# Patient Record
Sex: FEMALE | Race: WHITE | NOT HISPANIC OR LATINO
[De-identification: names, ages, dates, MRNs, and addresses within clinical notes are randomized per-mention and may not be internally consistent; named-entity substitution may affect disease eponyms.]

---

## 2017-07-31 PROBLEM — Z00.00 ENCOUNTER FOR PREVENTIVE HEALTH EXAMINATION: Status: ACTIVE | Noted: 2017-07-31

## 2017-08-01 ENCOUNTER — APPOINTMENT (OUTPATIENT)
Dept: HEMATOLOGY ONCOLOGY | Facility: CLINIC | Age: 82
End: 2017-08-01
Payer: MEDICARE

## 2017-08-01 ENCOUNTER — OUTPATIENT (OUTPATIENT)
Dept: OUTPATIENT SERVICES | Facility: HOSPITAL | Age: 82
LOS: 1 days | Discharge: HOME | End: 2017-08-01

## 2017-08-01 ENCOUNTER — RESULT REVIEW (OUTPATIENT)
Age: 82
End: 2017-08-01

## 2017-08-01 ENCOUNTER — OUTPATIENT (OUTPATIENT)
Dept: OUTPATIENT SERVICES | Facility: HOSPITAL | Age: 82
LOS: 1 days | End: 2017-08-01
Payer: MEDICARE

## 2017-08-01 VITALS
SYSTOLIC BLOOD PRESSURE: 212 MMHG | DIASTOLIC BLOOD PRESSURE: 58 MMHG | RESPIRATION RATE: 14 BRPM | TEMPERATURE: 97.6 F | HEART RATE: 63 BPM | WEIGHT: 129.63 LBS | OXYGEN SATURATION: 97 % | HEIGHT: 59.84 IN | BODY MASS INDEX: 25.45 KG/M2

## 2017-08-01 VITALS — SYSTOLIC BLOOD PRESSURE: 182 MMHG | DIASTOLIC BLOOD PRESSURE: 62 MMHG

## 2017-08-01 DIAGNOSIS — D64.9 ANEMIA, UNSPECIFIED: ICD-10-CM

## 2017-08-01 DIAGNOSIS — E78.00 PURE HYPERCHOLESTEROLEMIA, UNSPECIFIED: ICD-10-CM

## 2017-08-01 DIAGNOSIS — D50.9 IRON DEFICIENCY ANEMIA, UNSPECIFIED: ICD-10-CM

## 2017-08-01 DIAGNOSIS — E11.9 TYPE 2 DIABETES MELLITUS W/OUT COMPLICATIONS: ICD-10-CM

## 2017-08-01 LAB
BASOPHILS # BLD AUTO: 0 K/UL — SIGNIFICANT CHANGE UP (ref 0–0.2)
BASOPHILS NFR BLD AUTO: 0.7 % — SIGNIFICANT CHANGE UP (ref 0–2)
BLD GP AB SCN SERPL QL: SIGNIFICANT CHANGE UP
EOSINOPHIL # BLD AUTO: 0.1 K/UL — SIGNIFICANT CHANGE UP (ref 0–0.5)
EOSINOPHIL NFR BLD AUTO: 1.3 % — SIGNIFICANT CHANGE UP (ref 0–6)
HCT VFR BLD CALC: 26.9 % — LOW (ref 34.5–45)
HGB BLD-MCNC: 8.2 G/DL — LOW (ref 11.5–15.5)
LYMPHOCYTES # BLD AUTO: 1.8 K/UL — SIGNIFICANT CHANGE UP (ref 1–3.3)
LYMPHOCYTES # BLD AUTO: 24.4 % — SIGNIFICANT CHANGE UP (ref 13–44)
MCHC RBC-ENTMCNC: 22.4 PG — LOW (ref 27–34)
MCHC RBC-ENTMCNC: 30.6 GM/DL — LOW (ref 32–36)
MCV RBC AUTO: 73.2 FL — LOW (ref 80–100)
MONOCYTES # BLD AUTO: 0.7 K/UL — SIGNIFICANT CHANGE UP (ref 0–0.9)
MONOCYTES NFR BLD AUTO: 8.9 % — SIGNIFICANT CHANGE UP (ref 2–14)
NEUTROPHILS # BLD AUTO: 4.9 K/UL — SIGNIFICANT CHANGE UP (ref 1.8–7.4)
NEUTROPHILS NFR BLD AUTO: 64.8 % — SIGNIFICANT CHANGE UP (ref 43–77)
PLATELET # BLD AUTO: 351 K/UL — SIGNIFICANT CHANGE UP (ref 150–400)
RBC # BLD: 3.67 M/UL — LOW (ref 3.8–5.2)
RBC # FLD: 15.8 % — HIGH (ref 10.3–14.5)
TYPE + AB SCN PNL BLD: SIGNIFICANT CHANGE UP
WBC # BLD: 7.5 K/UL — SIGNIFICANT CHANGE UP (ref 3.8–10.5)
WBC # FLD AUTO: 7.5 K/UL — SIGNIFICANT CHANGE UP (ref 3.8–10.5)

## 2017-08-01 PROCEDURE — 99205 OFFICE O/P NEW HI 60 MIN: CPT

## 2017-08-01 RX ORDER — ACETAMINOPHEN 500 MG
650 TABLET ORAL ONCE
Qty: 0 | Refills: 0 | Status: COMPLETED | OUTPATIENT
Start: 2017-08-02 | End: 2017-08-02

## 2017-08-01 RX ORDER — INSULIN GLARGINE 100 [IU]/ML
100 INJECTION, SOLUTION SUBCUTANEOUS
Refills: 0 | Status: ACTIVE | COMMUNITY

## 2017-08-01 RX ORDER — ASPIRIN ENTERIC COATED TABLETS 81 MG 81 MG/1
81 TABLET, DELAYED RELEASE ORAL DAILY
Refills: 0 | Status: ACTIVE | COMMUNITY

## 2017-08-01 RX ORDER — RAMIPRIL 5 MG/1
CAPSULE ORAL
Refills: 0 | Status: ACTIVE | COMMUNITY

## 2017-08-02 ENCOUNTER — OUTPATIENT (OUTPATIENT)
Dept: OUTPATIENT SERVICES | Facility: HOSPITAL | Age: 82
LOS: 1 days | End: 2017-08-02
Payer: MEDICARE

## 2017-08-02 VITALS
SYSTOLIC BLOOD PRESSURE: 192 MMHG | WEIGHT: 123.9 LBS | TEMPERATURE: 99 F | OXYGEN SATURATION: 97 % | HEART RATE: 66 BPM | DIASTOLIC BLOOD PRESSURE: 61 MMHG

## 2017-08-02 VITALS — DIASTOLIC BLOOD PRESSURE: 56 MMHG | HEART RATE: 60 BPM | SYSTOLIC BLOOD PRESSURE: 167 MMHG

## 2017-08-02 DIAGNOSIS — D64.9 ANEMIA, UNSPECIFIED: ICD-10-CM

## 2017-08-02 LAB — ABO RH CONFIRMATION: SIGNIFICANT CHANGE UP

## 2017-08-02 PROCEDURE — 86920 COMPATIBILITY TEST SPIN: CPT

## 2017-08-02 PROCEDURE — 86850 RBC ANTIBODY SCREEN: CPT

## 2017-08-02 PROCEDURE — P9016: CPT

## 2017-08-02 PROCEDURE — 36430 TRANSFUSION BLD/BLD COMPNT: CPT

## 2017-08-02 PROCEDURE — 36415 COLL VENOUS BLD VENIPUNCTURE: CPT

## 2017-08-02 PROCEDURE — 86900 BLOOD TYPING SEROLOGIC ABO: CPT

## 2017-08-02 PROCEDURE — 86901 BLOOD TYPING SEROLOGIC RH(D): CPT

## 2017-08-02 RX ORDER — HYDRALAZINE HCL 50 MG
10 TABLET ORAL ONCE
Qty: 0 | Refills: 0 | Status: COMPLETED | OUTPATIENT
Start: 2017-08-02 | End: 2017-08-02

## 2017-08-02 RX ADMIN — Medication 10 MILLIGRAM(S): at 15:13

## 2017-08-02 RX ADMIN — Medication 650 MILLIGRAM(S): at 11:07

## 2017-08-02 NOTE — PATIENT PROFILE ADULT. - PMH
Diabetes mellitus of other type without complication    Essential hypertension    Hypothyroidism, unspecified type

## 2017-09-19 ENCOUNTER — OUTPATIENT (OUTPATIENT)
Dept: OUTPATIENT SERVICES | Facility: HOSPITAL | Age: 82
LOS: 1 days | Discharge: HOME | End: 2017-09-19
Payer: MEDICARE

## 2017-09-19 ENCOUNTER — TRANSCRIPTION ENCOUNTER (OUTPATIENT)
Age: 82
End: 2017-09-19

## 2017-09-19 VITALS
HEART RATE: 75 BPM | HEIGHT: 60 IN | OXYGEN SATURATION: 97 % | DIASTOLIC BLOOD PRESSURE: 68 MMHG | RESPIRATION RATE: 17 BRPM | WEIGHT: 125.22 LBS | SYSTOLIC BLOOD PRESSURE: 193 MMHG | TEMPERATURE: 98 F

## 2017-09-19 VITALS
RESPIRATION RATE: 17 BRPM | OXYGEN SATURATION: 97 % | SYSTOLIC BLOOD PRESSURE: 127 MMHG | HEART RATE: 73 BPM | DIASTOLIC BLOOD PRESSURE: 76 MMHG

## 2017-09-19 DIAGNOSIS — Z90.49 ACQUIRED ABSENCE OF OTHER SPECIFIED PARTS OF DIGESTIVE TRACT: Chronic | ICD-10-CM

## 2017-09-19 DIAGNOSIS — Z98.49 CATARACT EXTRACTION STATUS, UNSPECIFIED EYE: Chronic | ICD-10-CM

## 2017-09-19 DIAGNOSIS — H44.001 UNSPECIFIED PURULENT ENDOPHTHALMITIS, RIGHT EYE: ICD-10-CM

## 2017-09-19 PROCEDURE — 93010 ELECTROCARDIOGRAM REPORT: CPT

## 2017-09-19 PROCEDURE — 87186 SC STD MICRODIL/AGAR DIL: CPT

## 2017-09-19 PROCEDURE — 87102 FUNGUS ISOLATION CULTURE: CPT

## 2017-09-19 PROCEDURE — 87075 CULTR BACTERIA EXCEPT BLOOD: CPT

## 2017-09-19 PROCEDURE — 87070 CULTURE OTHR SPECIMN AEROBIC: CPT

## 2017-09-19 PROCEDURE — 67036 REMOVAL OF INNER EYE FLUID: CPT | Mod: RT

## 2017-09-19 PROCEDURE — 93005 ELECTROCARDIOGRAM TRACING: CPT

## 2017-09-19 PROCEDURE — 87205 SMEAR GRAM STAIN: CPT

## 2017-09-19 RX ORDER — VANCOMYCIN HCL 1 G
0.5 VIAL (EA) INTRAVENOUS ONCE
Qty: 0 | Refills: 0 | Status: DISCONTINUED | OUTPATIENT
Start: 2017-09-19 | End: 2017-10-04

## 2017-09-19 RX ORDER — CEFTAZIDIME 6 G/30ML
0.5 INJECTION, POWDER, FOR SOLUTION INTRAVENOUS ONCE
Qty: 0 | Refills: 0 | Status: DISCONTINUED | OUTPATIENT
Start: 2017-09-19 | End: 2017-10-04

## 2017-09-20 LAB
GRAM STN FLD: SIGNIFICANT CHANGE UP
GRAM STN FLD: SIGNIFICANT CHANGE UP
SPECIMEN SOURCE: SIGNIFICANT CHANGE UP
SPECIMEN SOURCE: SIGNIFICANT CHANGE UP

## 2017-09-21 LAB
-  AMPICILLIN/SULBACTAM: SIGNIFICANT CHANGE UP
-  AMPICILLIN/SULBACTAM: SIGNIFICANT CHANGE UP
-  CEFAZOLIN: SIGNIFICANT CHANGE UP
-  CEFAZOLIN: SIGNIFICANT CHANGE UP
-  CIPROFLOXACIN: SIGNIFICANT CHANGE UP
-  CIPROFLOXACIN: SIGNIFICANT CHANGE UP
-  CLINDAMYCIN: SIGNIFICANT CHANGE UP
-  CLINDAMYCIN: SIGNIFICANT CHANGE UP
-  ERYTHROMYCIN: SIGNIFICANT CHANGE UP
-  ERYTHROMYCIN: SIGNIFICANT CHANGE UP
-  GENTAMICIN: SIGNIFICANT CHANGE UP
-  GENTAMICIN: SIGNIFICANT CHANGE UP
-  LEVOFLOXACIN: SIGNIFICANT CHANGE UP
-  LEVOFLOXACIN: SIGNIFICANT CHANGE UP
-  MOXIFLOXACIN(AEROBIC): SIGNIFICANT CHANGE UP
-  MOXIFLOXACIN(AEROBIC): SIGNIFICANT CHANGE UP
-  OXACILLIN: SIGNIFICANT CHANGE UP
-  OXACILLIN: SIGNIFICANT CHANGE UP
-  PENICILLIN: SIGNIFICANT CHANGE UP
-  PENICILLIN: SIGNIFICANT CHANGE UP
-  RIFAMPIN: SIGNIFICANT CHANGE UP
-  RIFAMPIN: SIGNIFICANT CHANGE UP
-  TETRACYCLINE: SIGNIFICANT CHANGE UP
-  TETRACYCLINE: SIGNIFICANT CHANGE UP
-  TRIMETHOPRIM/SULFAMETHOXAZOLE: SIGNIFICANT CHANGE UP
-  TRIMETHOPRIM/SULFAMETHOXAZOLE: SIGNIFICANT CHANGE UP
-  VANCOMYCIN: SIGNIFICANT CHANGE UP
-  VANCOMYCIN: SIGNIFICANT CHANGE UP
METHOD TYPE: SIGNIFICANT CHANGE UP
METHOD TYPE: SIGNIFICANT CHANGE UP

## 2017-09-22 LAB
-  TOBRAMYCIN: SIGNIFICANT CHANGE UP
-  TOBRAMYCIN: SIGNIFICANT CHANGE UP
METHOD TYPE: SIGNIFICANT CHANGE UP
METHOD TYPE: SIGNIFICANT CHANGE UP

## 2017-09-25 DIAGNOSIS — H40.9 UNSPECIFIED GLAUCOMA: ICD-10-CM

## 2017-09-25 NOTE — ASU PATIENT PROFILE, ADULT - VISION (WITH CORRECTIVE LENSES IF THE PATIENT USUALLY WEARS THEM):
Severely impaired: cannot locate objects without hearing or touching them or patient nonresponsive./right eye

## 2017-09-26 ENCOUNTER — TRANSCRIPTION ENCOUNTER (OUTPATIENT)
Age: 82
End: 2017-09-26

## 2017-09-26 ENCOUNTER — OUTPATIENT (OUTPATIENT)
Dept: OUTPATIENT SERVICES | Facility: HOSPITAL | Age: 82
LOS: 1 days | Discharge: HOME | End: 2017-09-26
Payer: MEDICARE

## 2017-09-26 VITALS
DIASTOLIC BLOOD PRESSURE: 64 MMHG | SYSTOLIC BLOOD PRESSURE: 198 MMHG | RESPIRATION RATE: 13 BRPM | HEIGHT: 60 IN | WEIGHT: 124.78 LBS | TEMPERATURE: 98 F | OXYGEN SATURATION: 100 % | HEART RATE: 68 BPM

## 2017-09-26 VITALS
SYSTOLIC BLOOD PRESSURE: 141 MMHG | DIASTOLIC BLOOD PRESSURE: 53 MMHG | OXYGEN SATURATION: 100 % | HEART RATE: 65 BPM | RESPIRATION RATE: 18 BRPM

## 2017-09-26 DIAGNOSIS — Z90.49 ACQUIRED ABSENCE OF OTHER SPECIFIED PARTS OF DIGESTIVE TRACT: Chronic | ICD-10-CM

## 2017-09-26 DIAGNOSIS — H40.9 UNSPECIFIED GLAUCOMA: ICD-10-CM

## 2017-09-26 DIAGNOSIS — Z98.49 CATARACT EXTRACTION STATUS, UNSPECIFIED EYE: Chronic | ICD-10-CM

## 2017-09-26 LAB
GRAM STN FLD: SIGNIFICANT CHANGE UP
SPECIMEN SOURCE: SIGNIFICANT CHANGE UP

## 2017-09-26 PROCEDURE — 87205 SMEAR GRAM STAIN: CPT

## 2017-09-26 PROCEDURE — 67036 REMOVAL OF INNER EYE FLUID: CPT | Mod: RT

## 2017-09-26 PROCEDURE — 87070 CULTURE OTHR SPECIMN AEROBIC: CPT

## 2017-09-26 PROCEDURE — 87075 CULTR BACTERIA EXCEPT BLOOD: CPT

## 2017-09-26 RX ORDER — VANCOMYCIN HCL 1 G
0.5 VIAL (EA) INTRAVENOUS ONCE
Qty: 0 | Refills: 0 | Status: DISCONTINUED | OUTPATIENT
Start: 2017-09-26 | End: 2017-10-11

## 2017-09-26 NOTE — ADVANCED PRACTICE NURSE CONSULT - RECOMMEDATIONS
Repeat fingerstick post op  give post operative snack  Instructed to monitor glucose post op every 3 hours until stable  communicate with Dr Kim if glucose < 100mg/dl before hs lantus is due.

## 2017-09-26 NOTE — PROVIDER CONTACT NOTE (OTHER) - RECOMMENDATIONS
IV Dextrose 50% ( 1/2 amp) given at 1151. LR changed to D5.45 NS . Accucheck repeated at 1220. result 154.Seen by Dr. Benjamin .

## 2017-09-26 NOTE — ADVANCED PRACTICE NURSE CONSULT - ASSESSMENT
82y    Female    Patient is a 82y old  Female who presents with a chief complaint of hypoglycemia    Daily Height in cm: 152.4 (26 Sep 2017 12:29)       Vital Signs Last 24 Hrs  T(C): 36.6 (26 Sep 2017 12:29), Max: 36.6 (26 Sep 2017 12:29)  T(F): 97.9 (26 Sep 2017 12:29), Max: 97.9 (26 Sep 2017 12:29)  HR: 68 (26 Sep 2017 12:29) (68 - 68)  BP: 198/64 (26 Sep 2017 12:29) (198/64 - 198/64)  BP(mean): --  RR: 13 (26 Sep 2017 12:29) (13 - 13)  SpO2: 100% (26 Sep 2017 12:29) (100% - 100%)    HPI:      PAST MEDICAL & SURGICAL HISTORY:  Diabetes mellitus type 2   Hypothyroidism, unspecified type  Essential hypertension  S/P appendectomy  S/P cataract surgery: both eyes      MEDICATIONS  (STANDING):  vancomycin 1 mG/0.1 mL Ophthalmic Injectable 0.5 milliLiter(s) IntraVitreal Once    CAPILLARY BLOOD GLUCOSE  fingerstick 59mg/dl treated with 1/2 amp D50 post treatment 156mg/dl  d5NS infusing @50ml/hr  Took Lantus 25units sq last hs without snack, felt symptomatic hypoglycemia confirmed    At home takes Lantus 25 mg/dl every 12 hours

## 2017-09-26 NOTE — PROVIDER CONTACT NOTE (OTHER) - ASSESSMENT
Pt c/o feeling weak, like her blood sugar is low. Placed in PACU stretcher. IV accessed in left wrist with #22 gauge by ANNALISA Dunne RN. Dr. Benjamin notified. Orders received.

## 2017-10-02 ENCOUNTER — OUTPATIENT (OUTPATIENT)
Dept: OUTPATIENT SERVICES | Facility: HOSPITAL | Age: 82
LOS: 1 days | Discharge: HOME | End: 2017-10-02

## 2017-10-02 DIAGNOSIS — Z98.49 CATARACT EXTRACTION STATUS, UNSPECIFIED EYE: Chronic | ICD-10-CM

## 2017-10-02 DIAGNOSIS — D50.9 IRON DEFICIENCY ANEMIA, UNSPECIFIED: ICD-10-CM

## 2017-10-02 DIAGNOSIS — Z90.49 ACQUIRED ABSENCE OF OTHER SPECIFIED PARTS OF DIGESTIVE TRACT: Chronic | ICD-10-CM

## 2017-10-03 ENCOUNTER — APPOINTMENT (OUTPATIENT)
Dept: HEMATOLOGY ONCOLOGY | Facility: CLINIC | Age: 82
End: 2017-10-03
Payer: MEDICARE

## 2017-10-03 ENCOUNTER — RESULT REVIEW (OUTPATIENT)
Age: 82
End: 2017-10-03

## 2017-10-03 VITALS
OXYGEN SATURATION: 95 % | BODY MASS INDEX: 25.13 KG/M2 | HEART RATE: 62 BPM | WEIGHT: 127.98 LBS | DIASTOLIC BLOOD PRESSURE: 78 MMHG | SYSTOLIC BLOOD PRESSURE: 184 MMHG | TEMPERATURE: 97.6 F

## 2017-10-03 DIAGNOSIS — E03.9 HYPOTHYROIDISM, UNSPECIFIED: ICD-10-CM

## 2017-10-03 LAB
BASOPHILS # BLD AUTO: 0.1 K/UL — SIGNIFICANT CHANGE UP (ref 0–0.2)
BASOPHILS NFR BLD AUTO: 0.9 % — SIGNIFICANT CHANGE UP (ref 0–2)
CULTURE RESULTS: SIGNIFICANT CHANGE UP
EOSINOPHIL # BLD AUTO: 0.2 K/UL — SIGNIFICANT CHANGE UP (ref 0–0.5)
EOSINOPHIL NFR BLD AUTO: 2.7 % — SIGNIFICANT CHANGE UP (ref 0–6)
HCT VFR BLD CALC: 32.4 % — LOW (ref 34.5–45)
HGB BLD-MCNC: 10.1 G/DL — LOW (ref 11.5–15.5)
LYMPHOCYTES # BLD AUTO: 1.6 K/UL — SIGNIFICANT CHANGE UP (ref 1–3.3)
LYMPHOCYTES # BLD AUTO: 23.3 % — SIGNIFICANT CHANGE UP (ref 13–44)
MCHC RBC-ENTMCNC: 24.8 PG — LOW (ref 27–34)
MCHC RBC-ENTMCNC: 31.3 GM/DL — LOW (ref 32–36)
MCV RBC AUTO: 79.1 FL — LOW (ref 80–100)
MONOCYTES # BLD AUTO: 0.7 K/UL — SIGNIFICANT CHANGE UP (ref 0–0.9)
MONOCYTES NFR BLD AUTO: 10.5 % — SIGNIFICANT CHANGE UP (ref 2–14)
NEUTROPHILS # BLD AUTO: 4.2 K/UL — SIGNIFICANT CHANGE UP (ref 1.8–7.4)
NEUTROPHILS NFR BLD AUTO: 62.6 % — SIGNIFICANT CHANGE UP (ref 43–77)
ORGANISM # SPEC MICROSCOPIC CNT: SIGNIFICANT CHANGE UP
PLATELET # BLD AUTO: 390 K/UL — SIGNIFICANT CHANGE UP (ref 150–400)
RBC # BLD: 4.09 M/UL — SIGNIFICANT CHANGE UP (ref 3.8–5.2)
RBC # FLD: 18.6 % — HIGH (ref 10.3–14.5)
SPECIMEN SOURCE: SIGNIFICANT CHANGE UP
WBC # BLD: 6.7 K/UL — SIGNIFICANT CHANGE UP (ref 3.8–10.5)
WBC # FLD AUTO: 6.7 K/UL — SIGNIFICANT CHANGE UP (ref 3.8–10.5)

## 2017-10-03 PROCEDURE — 99214 OFFICE O/P EST MOD 30 MIN: CPT

## 2017-10-03 RX ORDER — LEVOTHYROXINE SODIUM 0.14 MG/1
137 TABLET ORAL
Refills: 0 | Status: ACTIVE | COMMUNITY
Start: 2017-10-03

## 2017-10-04 LAB
CULTURE RESULTS: SIGNIFICANT CHANGE UP
FERRITIN SERPL-MCNC: 12 NG/ML
IRON SATN MFR SERPL: 13 %
IRON SERPL-MCNC: 45 UG/DL
SPECIMEN SOURCE: SIGNIFICANT CHANGE UP
TIBC SERPL-MCNC: 358 UG/DL
UIBC SERPL-MCNC: 313 UG/DL

## 2017-10-10 LAB
CULTURE RESULTS: SIGNIFICANT CHANGE UP
SPECIMEN SOURCE: SIGNIFICANT CHANGE UP

## 2017-10-29 ENCOUNTER — EMERGENCY (EMERGENCY)
Facility: HOSPITAL | Age: 82
LOS: 1 days | Discharge: HOME | End: 2017-10-29
Attending: EMERGENCY MEDICINE
Payer: MEDICARE

## 2017-10-29 VITALS
RESPIRATION RATE: 18 BRPM | WEIGHT: 145.06 LBS | SYSTOLIC BLOOD PRESSURE: 238 MMHG | HEART RATE: 76 BPM | DIASTOLIC BLOOD PRESSURE: 94 MMHG | HEIGHT: 61 IN | TEMPERATURE: 98 F | OXYGEN SATURATION: 100 %

## 2017-10-29 VITALS — DIASTOLIC BLOOD PRESSURE: 73 MMHG | SYSTOLIC BLOOD PRESSURE: 194 MMHG

## 2017-10-29 DIAGNOSIS — Z98.49 CATARACT EXTRACTION STATUS, UNSPECIFIED EYE: Chronic | ICD-10-CM

## 2017-10-29 DIAGNOSIS — Z90.49 ACQUIRED ABSENCE OF OTHER SPECIFIED PARTS OF DIGESTIVE TRACT: Chronic | ICD-10-CM

## 2017-10-29 LAB
ALBUMIN SERPL ELPH-MCNC: 3.9 G/DL — SIGNIFICANT CHANGE UP (ref 3.3–5.2)
ALP SERPL-CCNC: 89 U/L — SIGNIFICANT CHANGE UP (ref 40–120)
ALT FLD-CCNC: 17 U/L — SIGNIFICANT CHANGE UP
ANION GAP SERPL CALC-SCNC: 14 MMOL/L — SIGNIFICANT CHANGE UP (ref 5–17)
APPEARANCE UR: CLEAR — SIGNIFICANT CHANGE UP
APTT BLD: 31.1 SEC — SIGNIFICANT CHANGE UP (ref 27.5–37.4)
AST SERPL-CCNC: 23 U/L — SIGNIFICANT CHANGE UP
BACTERIA # UR AUTO: ABNORMAL
BASOPHILS # BLD AUTO: 0 K/UL — SIGNIFICANT CHANGE UP (ref 0–0.2)
BASOPHILS NFR BLD AUTO: 0.2 % — SIGNIFICANT CHANGE UP (ref 0–2)
BILIRUB SERPL-MCNC: 0.3 MG/DL — LOW (ref 0.4–2)
BILIRUB UR-MCNC: NEGATIVE — SIGNIFICANT CHANGE UP
BLD GP AB SCN SERPL QL: SIGNIFICANT CHANGE UP
BUN SERPL-MCNC: 15 MG/DL — SIGNIFICANT CHANGE UP (ref 8–20)
CALCIUM SERPL-MCNC: 8.9 MG/DL — SIGNIFICANT CHANGE UP (ref 8.6–10.2)
CHLORIDE SERPL-SCNC: 100 MMOL/L — SIGNIFICANT CHANGE UP (ref 98–107)
CO2 SERPL-SCNC: 24 MMOL/L — SIGNIFICANT CHANGE UP (ref 22–29)
COLOR SPEC: YELLOW — SIGNIFICANT CHANGE UP
CREAT SERPL-MCNC: 0.6 MG/DL — SIGNIFICANT CHANGE UP (ref 0.5–1.3)
DIFF PNL FLD: ABNORMAL
EOSINOPHIL # BLD AUTO: 0 K/UL — SIGNIFICANT CHANGE UP (ref 0–0.5)
EOSINOPHIL NFR BLD AUTO: 0.6 % — SIGNIFICANT CHANGE UP (ref 0–6)
EPI CELLS # UR: NEGATIVE — SIGNIFICANT CHANGE UP
GLUCOSE SERPL-MCNC: 77 MG/DL — SIGNIFICANT CHANGE UP (ref 70–115)
GLUCOSE UR QL: NEGATIVE MG/DL — SIGNIFICANT CHANGE UP
HCT VFR BLD CALC: 32.2 % — LOW (ref 37–47)
HGB BLD-MCNC: 10.4 G/DL — LOW (ref 12–16)
INR BLD: 0.95 RATIO — SIGNIFICANT CHANGE UP (ref 0.88–1.16)
KETONES UR-MCNC: NEGATIVE — SIGNIFICANT CHANGE UP
LEUKOCYTE ESTERASE UR-ACNC: ABNORMAL
LYMPHOCYTES # BLD AUTO: 1.1 K/UL — SIGNIFICANT CHANGE UP (ref 1–4.8)
LYMPHOCYTES # BLD AUTO: 16.5 % — LOW (ref 20–55)
MCHC RBC-ENTMCNC: 25.9 PG — LOW (ref 27–31)
MCHC RBC-ENTMCNC: 32.3 G/DL — SIGNIFICANT CHANGE UP (ref 32–36)
MCV RBC AUTO: 80.3 FL — LOW (ref 81–99)
MONOCYTES # BLD AUTO: 0.5 K/UL — SIGNIFICANT CHANGE UP (ref 0–0.8)
MONOCYTES NFR BLD AUTO: 8 % — SIGNIFICANT CHANGE UP (ref 3–10)
NEUTROPHILS # BLD AUTO: 4.9 K/UL — SIGNIFICANT CHANGE UP (ref 1.8–8)
NEUTROPHILS NFR BLD AUTO: 74.5 % — HIGH (ref 37–73)
NITRITE UR-MCNC: POSITIVE
PH UR: 6.5 — SIGNIFICANT CHANGE UP (ref 5–8)
PLATELET # BLD AUTO: 363 K/UL — SIGNIFICANT CHANGE UP (ref 150–400)
POTASSIUM SERPL-MCNC: 3.9 MMOL/L — SIGNIFICANT CHANGE UP (ref 3.5–5.3)
POTASSIUM SERPL-SCNC: 3.9 MMOL/L — SIGNIFICANT CHANGE UP (ref 3.5–5.3)
PROT SERPL-MCNC: 7.5 G/DL — SIGNIFICANT CHANGE UP (ref 6.6–8.7)
PROT UR-MCNC: 30 MG/DL
PROTHROM AB SERPL-ACNC: 10.4 SEC — SIGNIFICANT CHANGE UP (ref 9.8–12.7)
RBC # BLD: 4.01 M/UL — LOW (ref 4.4–5.2)
RBC # FLD: 18.5 % — HIGH (ref 11–15.6)
RBC CASTS # UR COMP ASSIST: NEGATIVE /HPF — SIGNIFICANT CHANGE UP (ref 0–4)
SODIUM SERPL-SCNC: 138 MMOL/L — SIGNIFICANT CHANGE UP (ref 135–145)
SP GR SPEC: 1 — LOW (ref 1.01–1.02)
TYPE + AB SCN PNL BLD: SIGNIFICANT CHANGE UP
UROBILINOGEN FLD QL: NEGATIVE MG/DL — SIGNIFICANT CHANGE UP
WBC # BLD: 6.6 K/UL — SIGNIFICANT CHANGE UP (ref 4.8–10.8)
WBC # FLD AUTO: 6.6 K/UL — SIGNIFICANT CHANGE UP (ref 4.8–10.8)
WBC UR QL: >50

## 2017-10-29 PROCEDURE — 86900 BLOOD TYPING SEROLOGIC ABO: CPT

## 2017-10-29 PROCEDURE — 85027 COMPLETE CBC AUTOMATED: CPT

## 2017-10-29 PROCEDURE — 36415 COLL VENOUS BLD VENIPUNCTURE: CPT

## 2017-10-29 PROCEDURE — 81001 URINALYSIS AUTO W/SCOPE: CPT

## 2017-10-29 PROCEDURE — 85610 PROTHROMBIN TIME: CPT

## 2017-10-29 PROCEDURE — 99284 EMERGENCY DEPT VISIT MOD MDM: CPT

## 2017-10-29 PROCEDURE — 80053 COMPREHEN METABOLIC PANEL: CPT

## 2017-10-29 PROCEDURE — 86850 RBC ANTIBODY SCREEN: CPT

## 2017-10-29 PROCEDURE — 85730 THROMBOPLASTIN TIME PARTIAL: CPT

## 2017-10-29 PROCEDURE — 99283 EMERGENCY DEPT VISIT LOW MDM: CPT

## 2017-10-29 PROCEDURE — 86901 BLOOD TYPING SEROLOGIC RH(D): CPT

## 2017-10-29 RX ORDER — NITROFURANTOIN MACROCRYSTAL 50 MG
1 CAPSULE ORAL
Qty: 14 | Refills: 0
Start: 2017-10-29 | End: 2017-11-05

## 2017-10-29 RX ORDER — NITROFURANTOIN MACROCRYSTAL 50 MG
100 CAPSULE ORAL ONCE
Qty: 0 | Refills: 0 | Status: COMPLETED | OUTPATIENT
Start: 2017-10-29 | End: 2017-10-29

## 2017-10-29 RX ADMIN — Medication 0.1 MILLIGRAM(S): at 10:24

## 2017-10-29 RX ADMIN — Medication 100 MILLIGRAM(S): at 11:22

## 2017-10-29 NOTE — ED ADULT TRIAGE NOTE - CHIEF COMPLAINT QUOTE
Pt states she noticed a blood spot on her bed and in the bathroom with dried blood around her ankles, but does not notice blood coming from anywhere on her body.  Hx of varicose veins.  Pt takes ASA 81mg

## 2017-10-29 NOTE — ED STATDOCS - OBJECTIVE STATEMENT
81 y/o F pt presents to ED c/o of unknown bleeding. Pt states she found spots of bright red blood on her lower extremities  and on her bed in the morning today. She notes the blood was on the lower part of the bed, in the bathroom and in the kitchen. The pt states she does not have a laceration. Additionally the pt adds this happened to her once before because one of her veins burst in the past. NKDA. Denies injury or recent trauma. Denies hematuria, rectal bleeding, N/V/D, fever, chills, SOB, CP, and abdominal pain. No further complaints at this time.

## 2017-10-29 NOTE — ED STATDOCS - CARE PLAN
Principal Discharge DX:	UTI (urinary tract infection) Principal Discharge DX:	UTI (urinary tract infection)  Secondary Diagnosis:	Essential hypertension

## 2017-10-29 NOTE — ED STATDOCS - PMH
Diabetes mellitus of other type without complication    Essential hypertension    Hypothyroidism, unspecified type Anemia    Diabetes mellitus of other type without complication    Essential hypertension    Hypothyroidism, unspecified type

## 2017-10-29 NOTE — ED STATDOCS - PROGRESS NOTE DETAILS
NP NOTE:  81 y/o F presents with c/o finding blood in the bed and on her lower extremities when she woke up this morning.  Denies and open wounds, + dysuria no hematuria.  On exam: In NAD, no open wounds noted.  The family no believes that it was their father that was bleeding not the patient.  Will add UA to check for UTI. NP NOTE:  Labs reviewed, + UTI will treat with Macrobid and f/u Dr. Kim. NP NOTE:  BP improved, will d/c home with PCP f/u

## 2017-10-29 NOTE — ED STATDOCS - ATTENDING CONTRIBUTION TO CARE
I, Dariusz Fierro, performed the initial face to face bedside interview with this patient regarding history of present illness, review of symptoms and relevant past medical, social and family history.  I completed an independent physical examination.  I was the initial provider who evaluated this patient. I have signed out the follow up of any pending tests (i.e. labs, radiological studies) to the ACP.  I have communicated the patient’s plan of care and disposition with the ACP.

## 2017-12-21 NOTE — ED STATDOCS - SKIN [+], MLM
-heparin for DVT ppx  IMPROVE VTE Individual Risk Assessment        RISK                                                          Points  [  ] Previous VTE                                                3  [  ] Thrombophilia                                             2  [  ] Lower limb paralysis                                   2        (unable to hold up >15 seconds)    [  ] Current Cancer                                            2         (within 6 months)  [  ] Immobilization > 24 hrs                              1  [  ] ICU/CCU stay > 24 hours                            1  [ x ] Age > 60                                                    1  IMPROVE VTE Score 1  Pt will need to bring home Mayra   Pt's daughter Brittney Smith # [phone] is healthy proxy, who pt will ask to bring [facility] in AM -chronic improved  -continue with Spironolactone + bleeding

## 2017-12-26 ENCOUNTER — OUTPATIENT (OUTPATIENT)
Dept: OUTPATIENT SERVICES | Facility: HOSPITAL | Age: 82
LOS: 1 days | Discharge: HOME | End: 2017-12-26

## 2017-12-26 DIAGNOSIS — Z90.49 ACQUIRED ABSENCE OF OTHER SPECIFIED PARTS OF DIGESTIVE TRACT: Chronic | ICD-10-CM

## 2017-12-26 DIAGNOSIS — Z98.49 CATARACT EXTRACTION STATUS, UNSPECIFIED EYE: Chronic | ICD-10-CM

## 2017-12-26 DIAGNOSIS — D50.9 IRON DEFICIENCY ANEMIA, UNSPECIFIED: ICD-10-CM

## 2018-01-02 ENCOUNTER — APPOINTMENT (OUTPATIENT)
Dept: HEMATOLOGY ONCOLOGY | Facility: CLINIC | Age: 83
End: 2018-01-02

## 2018-09-09 NOTE — ASU PATIENT PROFILE, ADULT - VISION (WITH CORRECTIVE LENSES IF THE PATIENT USUALLY WEARS THEM):
Partially impaired: cannot see medication labels or newsprint, but can see obstacles in path, and the surrounding layout; can count fingers at arm's length
I have reviewed and confirmed nurses' notes...

## 2019-02-21 ENCOUNTER — TRANSCRIPTION ENCOUNTER (OUTPATIENT)
Age: 84
End: 2019-02-21

## 2019-04-09 ENCOUNTER — TRANSCRIPTION ENCOUNTER (OUTPATIENT)
Age: 84
End: 2019-04-09

## 2019-06-19 PROBLEM — I10 ESSENTIAL (PRIMARY) HYPERTENSION: Chronic | Status: ACTIVE | Noted: 2017-08-02

## 2019-06-19 PROBLEM — E13.9 OTHER SPECIFIED DIABETES MELLITUS WITHOUT COMPLICATIONS: Chronic | Status: ACTIVE | Noted: 2017-08-02

## 2019-06-19 PROBLEM — D64.9 ANEMIA, UNSPECIFIED: Chronic | Status: ACTIVE | Noted: 2017-10-29

## 2019-06-19 PROBLEM — E03.9 HYPOTHYROIDISM, UNSPECIFIED: Chronic | Status: ACTIVE | Noted: 2017-08-02

## 2019-06-22 ENCOUNTER — OUTPATIENT (OUTPATIENT)
Dept: OUTPATIENT SERVICES | Facility: HOSPITAL | Age: 84
LOS: 1 days | Discharge: HOME | End: 2019-06-22

## 2019-06-22 DIAGNOSIS — Z90.49 ACQUIRED ABSENCE OF OTHER SPECIFIED PARTS OF DIGESTIVE TRACT: Chronic | ICD-10-CM

## 2019-06-22 DIAGNOSIS — D50.9 IRON DEFICIENCY ANEMIA, UNSPECIFIED: ICD-10-CM

## 2019-06-22 DIAGNOSIS — Z98.49 CATARACT EXTRACTION STATUS, UNSPECIFIED EYE: Chronic | ICD-10-CM

## 2019-06-27 ENCOUNTER — RESULT REVIEW (OUTPATIENT)
Age: 84
End: 2019-06-27

## 2019-06-27 ENCOUNTER — APPOINTMENT (OUTPATIENT)
Dept: HEMATOLOGY ONCOLOGY | Facility: CLINIC | Age: 84
End: 2019-06-27
Payer: MEDICARE

## 2019-06-27 VITALS
DIASTOLIC BLOOD PRESSURE: 69 MMHG | HEIGHT: 59 IN | OXYGEN SATURATION: 97 % | WEIGHT: 133.04 LBS | SYSTOLIC BLOOD PRESSURE: 161 MMHG | BODY MASS INDEX: 26.82 KG/M2 | HEART RATE: 65 BPM | TEMPERATURE: 97.9 F

## 2019-06-27 LAB
BASOPHILS # BLD AUTO: 0.1 K/UL — SIGNIFICANT CHANGE UP (ref 0–0.2)
BASOPHILS NFR BLD AUTO: 1.3 % — SIGNIFICANT CHANGE UP (ref 0–2)
EOSINOPHIL # BLD AUTO: 0.2 K/UL — SIGNIFICANT CHANGE UP (ref 0–0.5)
EOSINOPHIL NFR BLD AUTO: 2.3 % — SIGNIFICANT CHANGE UP (ref 0–6)
HCT VFR BLD CALC: 28.6 % — LOW (ref 34.5–45)
HGB BLD-MCNC: 9 G/DL — LOW (ref 11.5–15.5)
LYMPHOCYTES # BLD AUTO: 1.9 K/UL — SIGNIFICANT CHANGE UP (ref 1–3.3)
LYMPHOCYTES # BLD AUTO: 29 % — SIGNIFICANT CHANGE UP (ref 13–44)
MCHC RBC-ENTMCNC: 23.9 PG — LOW (ref 27–34)
MCHC RBC-ENTMCNC: 31.3 G/DL — LOW (ref 32–36)
MCV RBC AUTO: 76.2 FL — LOW (ref 80–100)
MONOCYTES # BLD AUTO: 0.7 K/UL — SIGNIFICANT CHANGE UP (ref 0–0.9)
MONOCYTES NFR BLD AUTO: 9.8 % — SIGNIFICANT CHANGE UP (ref 2–14)
NEUTROPHILS # BLD AUTO: 3.8 K/UL — SIGNIFICANT CHANGE UP (ref 1.8–7.4)
NEUTROPHILS NFR BLD AUTO: 57.5 % — SIGNIFICANT CHANGE UP (ref 43–77)
PLATELET # BLD AUTO: 328 K/UL — SIGNIFICANT CHANGE UP (ref 150–400)
RBC # BLD: 3.75 M/UL — LOW (ref 3.8–5.2)
RBC # FLD: 14.7 % — HIGH (ref 10.3–14.5)
WBC # BLD: 6.6 K/UL — SIGNIFICANT CHANGE UP (ref 3.8–10.5)
WBC # FLD AUTO: 6.6 K/UL — SIGNIFICANT CHANGE UP (ref 3.8–10.5)

## 2019-06-27 PROCEDURE — 99214 OFFICE O/P EST MOD 30 MIN: CPT

## 2019-06-27 RX ORDER — FERROUS SULFATE TAB EC 325 MG (65 MG FE EQUIVALENT) 325 (65 FE) MG
325 (65 FE) TABLET DELAYED RESPONSE ORAL
Qty: 60 | Refills: 3 | Status: DISCONTINUED | COMMUNITY
Start: 2017-10-03 | End: 2019-06-27

## 2019-06-28 NOTE — PHYSICAL EXAM
[Normal] : normal appearance, no rash, nodules, vesicles, ulcers, erythema [de-identified] : forgetful, has difficult recalling events [de-identified] :  elderly female

## 2019-06-28 NOTE — ASSESSMENT
[FreeTextEntry1] : 82 year old female with iron deficiency anemia, she is s/p colonoscopy on 5/23/19 which showed no obvious signs of bleeding.  First degree hemorrhoids were seen. Current ferritin 8 ng/ml.\par \par Plan: \par Will proceed with feraheme weekly x 2 doses\par Follow up in 2 months with NP for repeat iron studies\par Will trend iron levels, if continues to trend down after replacement, she may need upper endoscopy / capsule endoscopy\par MD follow up in 6 months\par \par \par \par

## 2019-06-28 NOTE — HISTORY OF PRESENT ILLNESS
[de-identified] : 82 year old female referred for iron deficiency anemia.\par She reports constantly fatigued in the recent past, she can fall asleep any time.\par No shortness of breath. 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 No headaches, or dizziness. Denies palpitations.\par Denies blurry vision. No abdominal pain. No nausea, vomiting, diarrhea.  Reports mild constipation.\par Denies hematochezia. Denies black stool. No hematuria. \par Reports progressive memory issues x 1 year. \par \par  [de-identified] : Returns for delayed follow up. \par Last seen on 10/3/2017.\par She saw her PCP recently and was noted to have ferritin 8, %sat 8.\par She had a colonoscopy on 5/23/19 by Dr. Barnhart which showed first degree hemorrhoids, diverticulosis, and sessile polyp in the transverse colon. \par No weight loss. No black stools.  No hematochezia. \par No hematuria. \par \par 6/15/19\par Vitamin B12 428\par ferritin 8 ng/ml, %sat\par Hgb 8.8 g/dL\par MCV 78

## 2019-07-08 ENCOUNTER — RESULT REVIEW (OUTPATIENT)
Age: 84
End: 2019-07-08

## 2019-07-08 ENCOUNTER — APPOINTMENT (OUTPATIENT)
Age: 84
End: 2019-07-08

## 2019-07-08 LAB
BASOPHILS # BLD AUTO: 0.1 K/UL — SIGNIFICANT CHANGE UP (ref 0–0.2)
BASOPHILS NFR BLD AUTO: 1.6 % — SIGNIFICANT CHANGE UP (ref 0–2)
EOSINOPHIL # BLD AUTO: 0.2 K/UL — SIGNIFICANT CHANGE UP (ref 0–0.5)
EOSINOPHIL NFR BLD AUTO: 3.5 % — SIGNIFICANT CHANGE UP (ref 0–6)
HCT VFR BLD CALC: 27.5 % — LOW (ref 34.5–45)
HGB BLD-MCNC: 8.6 G/DL — LOW (ref 11.5–15.5)
LYMPHOCYTES # BLD AUTO: 1.7 K/UL — SIGNIFICANT CHANGE UP (ref 1–3.3)
LYMPHOCYTES # BLD AUTO: 28 % — SIGNIFICANT CHANGE UP (ref 13–44)
MCHC RBC-ENTMCNC: 23.5 PG — LOW (ref 27–34)
MCHC RBC-ENTMCNC: 31.2 G/DL — LOW (ref 32–36)
MCV RBC AUTO: 75.2 FL — LOW (ref 80–100)
MONOCYTES # BLD AUTO: 0.6 K/UL — SIGNIFICANT CHANGE UP (ref 0–0.9)
MONOCYTES NFR BLD AUTO: 9.3 % — SIGNIFICANT CHANGE UP (ref 2–14)
NEUTROPHILS # BLD AUTO: 3.6 K/UL — SIGNIFICANT CHANGE UP (ref 1.8–7.4)
NEUTROPHILS NFR BLD AUTO: 57.7 % — SIGNIFICANT CHANGE UP (ref 43–77)
PLATELET # BLD AUTO: 348 K/UL — SIGNIFICANT CHANGE UP (ref 150–400)
RBC # BLD: 3.66 M/UL — LOW (ref 3.8–5.2)
RBC # FLD: 14 % — SIGNIFICANT CHANGE UP (ref 10.3–14.5)
WBC # BLD: 6.2 K/UL — SIGNIFICANT CHANGE UP (ref 3.8–10.5)
WBC # FLD AUTO: 6.2 K/UL — SIGNIFICANT CHANGE UP (ref 3.8–10.5)

## 2019-07-15 ENCOUNTER — APPOINTMENT (OUTPATIENT)
Age: 84
End: 2019-07-15

## 2019-07-15 RX ORDER — LEVOTHYROXINE SODIUM 125 MCG
0 TABLET ORAL
Qty: 0 | Refills: 0 | DISCHARGE

## 2019-07-15 RX ORDER — ATENOLOL 25 MG/1
0 TABLET ORAL
Qty: 0 | Refills: 0 | DISCHARGE

## 2019-08-15 ENCOUNTER — OUTPATIENT (OUTPATIENT)
Dept: OUTPATIENT SERVICES | Facility: HOSPITAL | Age: 84
LOS: 1 days | Discharge: HOME | End: 2019-08-15

## 2019-08-15 DIAGNOSIS — Z90.49 ACQUIRED ABSENCE OF OTHER SPECIFIED PARTS OF DIGESTIVE TRACT: Chronic | ICD-10-CM

## 2019-08-15 DIAGNOSIS — D50.9 IRON DEFICIENCY ANEMIA, UNSPECIFIED: ICD-10-CM

## 2019-08-15 DIAGNOSIS — Z98.49 CATARACT EXTRACTION STATUS, UNSPECIFIED EYE: Chronic | ICD-10-CM

## 2019-08-19 ENCOUNTER — RESULT REVIEW (OUTPATIENT)
Age: 84
End: 2019-08-19

## 2019-08-19 ENCOUNTER — APPOINTMENT (OUTPATIENT)
Dept: HEMATOLOGY ONCOLOGY | Facility: CLINIC | Age: 84
End: 2019-08-19
Payer: MEDICARE

## 2019-08-19 VITALS
HEIGHT: 59 IN | SYSTOLIC BLOOD PRESSURE: 205 MMHG | HEART RATE: 59 BPM | WEIGHT: 138.02 LBS | OXYGEN SATURATION: 96 % | TEMPERATURE: 97.5 F | BODY MASS INDEX: 27.82 KG/M2 | DIASTOLIC BLOOD PRESSURE: 72 MMHG

## 2019-08-19 LAB
BASOPHILS # BLD AUTO: 0.1 K/UL — SIGNIFICANT CHANGE UP (ref 0–0.2)
BASOPHILS NFR BLD AUTO: 1.1 % — SIGNIFICANT CHANGE UP (ref 0–2)
EOSINOPHIL # BLD AUTO: 0.2 K/UL — SIGNIFICANT CHANGE UP (ref 0–0.5)
EOSINOPHIL NFR BLD AUTO: 2.6 % — SIGNIFICANT CHANGE UP (ref 0–6)
HCT VFR BLD CALC: 35.9 % — SIGNIFICANT CHANGE UP (ref 34.5–45)
HGB BLD-MCNC: 11.7 G/DL — SIGNIFICANT CHANGE UP (ref 11.5–15.5)
LYMPHOCYTES # BLD AUTO: 1.8 K/UL — SIGNIFICANT CHANGE UP (ref 1–3.3)
LYMPHOCYTES # BLD AUTO: 29.6 % — SIGNIFICANT CHANGE UP (ref 13–44)
MCHC RBC-ENTMCNC: 28.4 PG — SIGNIFICANT CHANGE UP (ref 27–34)
MCHC RBC-ENTMCNC: 32.7 G/DL — SIGNIFICANT CHANGE UP (ref 32–36)
MCV RBC AUTO: 86.9 FL — SIGNIFICANT CHANGE UP (ref 80–100)
MONOCYTES # BLD AUTO: 0.6 K/UL — SIGNIFICANT CHANGE UP (ref 0–0.9)
MONOCYTES NFR BLD AUTO: 9.8 % — SIGNIFICANT CHANGE UP (ref 2–14)
NEUTROPHILS # BLD AUTO: 3.4 K/UL — SIGNIFICANT CHANGE UP (ref 1.8–7.4)
NEUTROPHILS NFR BLD AUTO: 56.9 % — SIGNIFICANT CHANGE UP (ref 43–77)
PLATELET # BLD AUTO: 254 K/UL — SIGNIFICANT CHANGE UP (ref 150–400)
RBC # BLD: 4.13 M/UL — SIGNIFICANT CHANGE UP (ref 3.8–5.2)
RBC # FLD: 21.1 % — HIGH (ref 10.3–14.5)
WBC # BLD: 5.9 K/UL — SIGNIFICANT CHANGE UP (ref 3.8–10.5)
WBC # FLD AUTO: 5.9 K/UL — SIGNIFICANT CHANGE UP (ref 3.8–10.5)

## 2019-08-19 PROCEDURE — 99213 OFFICE O/P EST LOW 20 MIN: CPT

## 2019-08-23 ENCOUNTER — EMERGENCY (EMERGENCY)
Facility: HOSPITAL | Age: 84
LOS: 1 days | Discharge: HOME | End: 2019-08-23
Attending: EMERGENCY MEDICINE
Payer: MEDICARE

## 2019-08-23 VITALS
HEIGHT: 64 IN | HEART RATE: 65 BPM | OXYGEN SATURATION: 96 % | SYSTOLIC BLOOD PRESSURE: 210 MMHG | RESPIRATION RATE: 18 BRPM | DIASTOLIC BLOOD PRESSURE: 108 MMHG | WEIGHT: 134.92 LBS | TEMPERATURE: 98 F

## 2019-08-23 VITALS
HEART RATE: 60 BPM | DIASTOLIC BLOOD PRESSURE: 96 MMHG | RESPIRATION RATE: 18 BRPM | TEMPERATURE: 98 F | SYSTOLIC BLOOD PRESSURE: 179 MMHG | OXYGEN SATURATION: 99 %

## 2019-08-23 DIAGNOSIS — Z98.49 CATARACT EXTRACTION STATUS, UNSPECIFIED EYE: Chronic | ICD-10-CM

## 2019-08-23 DIAGNOSIS — Z90.49 ACQUIRED ABSENCE OF OTHER SPECIFIED PARTS OF DIGESTIVE TRACT: Chronic | ICD-10-CM

## 2019-08-23 LAB
ALBUMIN SERPL ELPH-MCNC: 4.2 G/DL — SIGNIFICANT CHANGE UP (ref 3.3–5.2)
ALP SERPL-CCNC: 92 U/L — SIGNIFICANT CHANGE UP (ref 40–120)
ALT FLD-CCNC: 21 U/L — SIGNIFICANT CHANGE UP
ANION GAP SERPL CALC-SCNC: 11 MMOL/L — SIGNIFICANT CHANGE UP (ref 5–17)
ANISOCYTOSIS BLD QL: SLIGHT — SIGNIFICANT CHANGE UP
AST SERPL-CCNC: 29 U/L — SIGNIFICANT CHANGE UP
BASOPHILS # BLD AUTO: 0.11 K/UL — SIGNIFICANT CHANGE UP (ref 0–0.2)
BASOPHILS NFR BLD AUTO: 0.9 % — SIGNIFICANT CHANGE UP (ref 0–2)
BILIRUB SERPL-MCNC: 0.3 MG/DL — LOW (ref 0.4–2)
BUN SERPL-MCNC: 17 MG/DL — SIGNIFICANT CHANGE UP (ref 8–20)
CALCIUM SERPL-MCNC: 9.7 MG/DL — SIGNIFICANT CHANGE UP (ref 8.6–10.2)
CHLORIDE SERPL-SCNC: 107 MMOL/L — SIGNIFICANT CHANGE UP (ref 98–107)
CO2 SERPL-SCNC: 24 MMOL/L — SIGNIFICANT CHANGE UP (ref 22–29)
CREAT SERPL-MCNC: 0.5 MG/DL — SIGNIFICANT CHANGE UP (ref 0.5–1.3)
ELLIPTOCYTES BLD QL SMEAR: SLIGHT — SIGNIFICANT CHANGE UP
EOSINOPHIL # BLD AUTO: 0.3 K/UL — SIGNIFICANT CHANGE UP (ref 0–0.5)
EOSINOPHIL NFR BLD AUTO: 2.6 % — SIGNIFICANT CHANGE UP (ref 0–6)
GLUCOSE SERPL-MCNC: 74 MG/DL — SIGNIFICANT CHANGE UP (ref 70–115)
HCT VFR BLD CALC: 41.7 % — SIGNIFICANT CHANGE UP (ref 34.5–45)
HGB BLD-MCNC: 13.6 G/DL — SIGNIFICANT CHANGE UP (ref 11.5–15.5)
LYMPHOCYTES # BLD AUTO: 1.02 K/UL — SIGNIFICANT CHANGE UP (ref 1–3.3)
LYMPHOCYTES # BLD AUTO: 8.7 % — LOW (ref 13–44)
MACROCYTES BLD QL: SLIGHT — SIGNIFICANT CHANGE UP
MANUAL SMEAR VERIFICATION: SIGNIFICANT CHANGE UP
MCHC RBC-ENTMCNC: 29.4 PG — SIGNIFICANT CHANGE UP (ref 27–34)
MCHC RBC-ENTMCNC: 32.6 GM/DL — SIGNIFICANT CHANGE UP (ref 32–36)
MCV RBC AUTO: 90.3 FL — SIGNIFICANT CHANGE UP (ref 80–100)
MICROCYTES BLD QL: SLIGHT — SIGNIFICANT CHANGE UP
MONOCYTES # BLD AUTO: 0.72 K/UL — SIGNIFICANT CHANGE UP (ref 0–0.9)
MONOCYTES NFR BLD AUTO: 6.1 % — SIGNIFICANT CHANGE UP (ref 2–14)
NEUTROPHILS # BLD AUTO: 9.58 K/UL — HIGH (ref 1.8–7.4)
NEUTROPHILS NFR BLD AUTO: 77.4 % — HIGH (ref 43–77)
NEUTS BAND # BLD: 4.3 % — SIGNIFICANT CHANGE UP (ref 0–8)
PLAT MORPH BLD: NORMAL — SIGNIFICANT CHANGE UP
PLATELET # BLD AUTO: 297 K/UL — SIGNIFICANT CHANGE UP (ref 150–400)
POIKILOCYTOSIS BLD QL AUTO: SLIGHT — SIGNIFICANT CHANGE UP
POLYCHROMASIA BLD QL SMEAR: SLIGHT — SIGNIFICANT CHANGE UP
POTASSIUM SERPL-MCNC: 4.5 MMOL/L — SIGNIFICANT CHANGE UP (ref 3.5–5.3)
POTASSIUM SERPL-SCNC: 4.5 MMOL/L — SIGNIFICANT CHANGE UP (ref 3.5–5.3)
PROT SERPL-MCNC: 7.9 G/DL — SIGNIFICANT CHANGE UP (ref 6.6–8.7)
RBC # BLD: 4.62 M/UL — SIGNIFICANT CHANGE UP (ref 3.8–5.2)
RBC # FLD: SIGNIFICANT CHANGE UP (ref 10.3–14.5)
RBC BLD AUTO: ABNORMAL
SODIUM SERPL-SCNC: 142 MMOL/L — SIGNIFICANT CHANGE UP (ref 135–145)
T3 SERPL-MCNC: 74 NG/DL — LOW (ref 80–200)
WBC # BLD: 11.73 K/UL — HIGH (ref 3.8–10.5)
WBC # FLD AUTO: 11.73 K/UL — HIGH (ref 3.8–10.5)

## 2019-08-23 PROCEDURE — 93010 ELECTROCARDIOGRAM REPORT: CPT

## 2019-08-23 PROCEDURE — 93005 ELECTROCARDIOGRAM TRACING: CPT

## 2019-08-23 PROCEDURE — 36415 COLL VENOUS BLD VENIPUNCTURE: CPT

## 2019-08-23 PROCEDURE — 80053 COMPREHEN METABOLIC PANEL: CPT

## 2019-08-23 PROCEDURE — 99284 EMERGENCY DEPT VISIT MOD MDM: CPT | Mod: 25

## 2019-08-23 PROCEDURE — 84480 ASSAY TRIIODOTHYRONINE (T3): CPT

## 2019-08-23 PROCEDURE — 84443 ASSAY THYROID STIM HORMONE: CPT

## 2019-08-23 PROCEDURE — 85027 COMPLETE CBC AUTOMATED: CPT

## 2019-08-23 PROCEDURE — 84436 ASSAY OF TOTAL THYROXINE: CPT

## 2019-08-23 PROCEDURE — 82962 GLUCOSE BLOOD TEST: CPT

## 2019-08-23 PROCEDURE — 99284 EMERGENCY DEPT VISIT MOD MDM: CPT

## 2019-08-23 RX ORDER — HYDRALAZINE HCL 50 MG
50 TABLET ORAL ONCE
Refills: 0 | Status: COMPLETED | OUTPATIENT
Start: 2019-08-23 | End: 2019-08-23

## 2019-08-23 RX ORDER — AMLODIPINE BESYLATE 2.5 MG/1
5 TABLET ORAL ONCE
Refills: 0 | Status: COMPLETED | OUTPATIENT
Start: 2019-08-23 | End: 2019-08-23

## 2019-08-23 RX ORDER — AMLODIPINE BESYLATE 2.5 MG/1
1 TABLET ORAL
Qty: 30 | Refills: 0
Start: 2019-08-23 | End: 2019-09-21

## 2019-08-23 RX ADMIN — AMLODIPINE BESYLATE 5 MILLIGRAM(S): 2.5 TABLET ORAL at 21:00

## 2019-08-23 RX ADMIN — Medication 50 MILLIGRAM(S): at 19:20

## 2019-08-23 RX ADMIN — Medication 50 MILLIGRAM(S): at 15:26

## 2019-08-23 NOTE — ED ADULT NURSE REASSESSMENT NOTE - NS ED NURSE REASSESS COMMENT FT1
Dr. Mullins at pt bedsdie at this time repeat vitals performed, POC disucssed with pt and family at bedside who verbalize understanding and agree, pt to be discharged at this time.

## 2019-08-23 NOTE — ED PROVIDER NOTE - NS ED ROS FT
Review of Systems  •	CONSTITUTIONAL - no  fever, no diaphoresis, no weight change  •	SKIN - no rash  •	HEMATOLOGIC - no bleeding, no bruising  •	EYES - no eye pain, no blurred vision  •	ENT - no change in hearing, no pain  •	RESPIRATORY - no shortness of breath, no cough  •	CARDIAC - no chest pain, no palpitations  •	GI - no abd pain, no nausea, no vomiting, no diarrhea, no constipation, no bleeding  •	GENITO-URINARY - no discharge, no dysuria; no hematuria,   •	ENDO - no polydypsia, no polyurea, no heat/no cold intolerance  •	MUSCULOSKELETAL - no joint pain, no swelling, no redness  •	NEUROLOGIC - (+) generalized weakness, no headache, no anesthesia, no paresthesias   •	PSYCH - no anxiety, non suicidal, non homicidal, no hallucination, no depression

## 2019-08-23 NOTE — ED ADULT TRIAGE NOTE - CHIEF COMPLAINT QUOTE
pt BIBA s.p hypoglycemic episode. pt used her insulin this AM and didn't eat properly. pt AOX3, states she was having difficulty with her speech earlier when her sugar was low but after drinking some OJ she is back to normal. pt also reports she was started on new HTN medication, no headache no vision changes. BS 79 on arrival.

## 2019-08-23 NOTE — ED PROVIDER NOTE - PROGRESS NOTE DETAILS
BP was high that improved after hydralazine 50 mg PO. BP increased again, additional hydralazine 50 mg Po given followed by norvasc 5 mg PO with improvement. patient ate a large meal. . asymptomatic

## 2019-08-23 NOTE — ED PROVIDER NOTE - CLINICAL SUMMARY MEDICAL DECISION MAKING FREE TEXT BOX
85 yo F p/w hypoglycemia after insulin found to have high blood pressure. blood work showed TSH low, T4 wnl, T3 high. BP improved after mediaction. She is asymtomatic. FS improved, ate a large meal. patient discharged home with addition of norvasc. copy of blood work given and recommend to follow up with PMD for BP meds adjustment and follow up with endocriologist.

## 2019-08-23 NOTE — ED ADULT NURSE NOTE - OBJECTIVE STATEMENT
assumed pt care 1515. Pt A&O x4. States she checked her blood sugar at home which was low and her BP was high. Pt denies any symptoms at this time. Portable monitor placed at bedside- NSR on monitor. Pt denies any chest pain, SOB, N/V/D, headaches, dizziness, numbness/tingling. No s/s of respiratory distress noted. Pt family at bedside. Safety maintained. Will continue to monitor.

## 2019-08-23 NOTE — ED ADULT NURSE REASSESSMENT NOTE - NS ED NURSE REASSESS COMMENT FT1
Report recvd from off going RN, pt recvd sitting up on stretcher, pt at baseline mental status per family at bedside, POC discussed, VS repeated pt BP at this time is 217/86, pt offers no complaints, able to ambulate with one person assist to restroom, denies dizziness, denies HA, offers no neuro complaints at this time, pt dtr Jessica on phone reports pt recently diagnosed with isolated systolic hypertension findings reported to MD Etienne, order for po Norvasc 5mg to be initiated

## 2019-08-23 NOTE — ED PROVIDER NOTE - OBJECTIVE STATEMENT
83 yo F hx of HTN, DM, hypothyroid, p/w hypoglycemia. She took insulin humolin 70/30 this morning but didn't eat much food. FS at home was 70. Family report she was altered but improved after juice. Upon arrival patient denies headache, dizziness, chest pain, or sob. She saw he PMD yesterday and increased her ramipril to 10mg Po from 5 mg PO.

## 2019-08-23 NOTE — ED PROVIDER NOTE - PHYSICAL EXAMINATION
VITAL SIGNS: I have reviewed nursing notes and confirm.  CONSTITUTIONAL: Well-developed; well-nourished; in no acute distress.  SKIN: Skin exam is warm and dry, no acute rash.  HEAD: Normocephalic; atraumatic.  EYES: PERRL, EOM intact; conjunctiva and sclera clear.  ENT: No nasal discharge; airway clear. Throat clear.  NECK: Supple; non tender.    CARD: S1, S2 normal; no murmurs, gallops, or rubs. Regular rate and rhythm.  RESP: No wheezes,  no rales or rhonchi.   ABD:  soft; non-distended; non-tender;   EXT: Normal ROM. No clubbing, cyanosis or edema.  NEURO: Alert, oriented. Grossly unremarkable. No focal deficits. no facial droop, moves all extremities,   PSYCH: Cooperative, appropriate.

## 2019-08-23 NOTE — ED ADULT NURSE NOTE - NSIMPLEMENTINTERV_GEN_ALL_ED
Implemented All Universal Safety Interventions:  [place] to call system. Call bell, personal items and telephone within reach. Instruct patient to call for assistance. Room bathroom lighting operational. Non-slip footwear when patient is off stretcher. Physically safe environment: no spills, clutter or unnecessary equipment. Stretcher in lowest position, wheels locked, appropriate side rails in place.

## 2019-10-01 NOTE — ASU PATIENT PROFILE, ADULT - PATIENT KNOW
[facility] DIVISION OF KIDNEY DISEASES AND HYPERTENSION -- FOLLOW UP NOTE  --------------------------------------------------------------------------------  Chief Complaint:/subjective: daughter at bedside    24 hour events: on crrt        PAST HISTORY  --------------------------------------------------------------------------------  No significant changes to PMH, PSH, FHx, SHx, unless otherwise noted    ALLERGIES & MEDICATIONS  --------------------------------------------------------------------------------  Allergies    Alcohol (Unknown)  No Known Drug Allergies  shellfish (Unknown)    Intolerances      Standing Inpatient Medications  ALBUTerol/ipratropium for Nebulization 3 milliLiter(s) Nebulizer every 6 hours  calcium gluconate IVPB 2 Gram(s) IV Intermittent every 6 hours  cefTRIAXone   IVPB 1000 milliGRAM(s) IV Intermittent every 24 hours  chlorhexidine 4% Liquid 1 Application(s) Topical <User Schedule>  CRRT Treatment    <Continuous>  dexmedetomidine Infusion 0.5 MICROgram(s)/kG/Hr IV Continuous <Continuous>  dextrose 10%. 1000 milliLiter(s) IV Continuous <Continuous>  levETIRAcetam  IVPB 750 milliGRAM(s) IV Intermittent every 12 hours  Phoxillum Filtration BK 4 / 2.5 5000 milliLiter(s) CRRT <Continuous>  PrismaSOL Filtration BGK 0 / 2.5 5000 milliLiter(s) CRRT <Continuous>  PrismaSOL Filtration BGK 4 / 2.5 5000 milliLiter(s) CRRT <Continuous>    PRN Inpatient Medications  ondansetron Injectable 4 milliGRAM(s) IV Push every 6 hours PRN      REVIEW OF SYSTEMS  --------------------------------------------------------------------------------  Gen: No weight changes, fatigue, fevers/chills, weakness  Skin: No rashes  Head/Eyes/Ears/Mouth: No headache;   Respiratory: No dyspnea, cough  CV: No chest pain, PND, orthopnea  GI: No abdominal pain, diarrhea, constipation, nausea, vomiting  : No increased frequency, dysuria, hematuria, nocturia  MSK: No joint pain/swelling; no back pain; no edema  Neuro: No dizziness/lightheadedness, weakness  Heme: No easy bruising or bleeding  Psych: No significant nervousness, anxiety, stress, depression    All other systems were reviewed and are negative, except as noted.    VITALS/PHYSICAL EXAM  --------------------------------------------------------------------------------  T(C): 36.4 (10-01-19 @ 12:00), Max: 43 (09-30-19 @ 20:00)  HR: 144 (10-01-19 @ 12:56) (68 - 144)  BP: 153/91 (10-01-19 @ 11:00) (96/52 - 158/66)  RR: 29 (10-01-19 @ 12:00) (11 - 30)  SpO2: 95% (10-01-19 @ 12:56) (92% - 100%)  Wt(kg): --  Adult Advanced Hemodynamics Last 24 Hrs  ABP: --  ABP(mean): --  CVP(mm Hg): --  CO: --  CI: --  PA: --  PA(mean): --  PCWP: --  SVR: --  SVRI: --        09-30-19 @ 07:01  -  10-01-19 @ 07:00  --------------------------------------------------------  IN: 3090.3 mL / OUT: 1627 mL / NET: 1463.3 mL    10-01-19 @ 07:01  -  10-01-19 @ 13:40  --------------------------------------------------------  IN: 500 mL / OUT: 285 mL / NET: 215 mL      Physical Exam:  	Gen: NAD,   	HEENT:   no jvp  	Pulm: CTA B/L  	CV: RRR, S1S2; no rub  	Back:   no sacral edema  	Abd: +BS, soft, nontender/nondistended  	: No suprapubic tenderness  	Ext: no edema  	Neuro: awake  	Psych: alert   	Skin: Warm,   	Vascular access: shiley    LABS/STUDIES  --------------------------------------------------------------------------------              6.6    12.5  >-----------<  73       [10-01-19 @ 03:15]              20.0     Hemoglobin: 6.6 g/dL (10-01-19 @ 03:15)  Hemoglobin: 7.3 g/dL (10-01-19 @ 00:16)    Platelet Count - Automated: 73 K/uL (10-01-19 @ 03:15)  Platelet Count - Automated: 41 K/uL (10-01-19 @ 00:16)    132  |  104  |  5   ----------------------------<  85      [10-01-19 @ 06:02]  3.8   |  17  |  0.46        Ca     8.2     [10-01-19 @ 06:02]      Mg     2.2     [10-01-19 @ 06:02]      Phos  2.2     [10-01-19 @ 06:02]    TPro  5.2  /  Alb  2.9  /  TBili  1.0  /  DBili  x   /  AST  245  /  ALT  147  /  AlkPhos  83  [10-01-19 @ 06:02]    PT/INR: PT 13.0 , INR 1.13       [10-01-19 @ 00:16]  PTT: 27.3       [10-01-19 @ 00:16]    Uric acid 0.6      [10-01-19 @ 00:16]  LDH >2250      [10-01-19 @ 00:16]    Creatinine Trend:  SCr 0.46 [10-01 @ 06:02]  SCr 0.42 [10-01 @ 00:16]  SCr 0.47 [09-30 @ 18:25]  SCr 0.55 [09-30 @ 13:35]  SCr 0.56 [09-30 @ 05:55]    Urinalysis - [09-27-19 @ 01:46]      Color Yellow / Appearance Slightly Turbid / SG 1.018 / pH 6.5      Gluc Negative / Ketone Trace  / Bili Negative / Urobili Negative       Blood Moderate / Protein 100 / Leuk Est Moderate / Nitrite Negative      RBC 6-10 / WBC >50 / Hyaline 0-2 / Gran  / Sq Epi  / Non Sq Epi Few / Bacteria Many        HBsAb Nonreact      [09-27-19 @ 05:35]  HBsAg Nonreact      [09-27-19 @ 05:35]  HCV 0.18, Nonreact      [09-27-19 @ 05:35]  HIV Nonreact      [09-27-19 @ 02:50] yes

## 2019-10-02 ENCOUNTER — EMERGENCY (EMERGENCY)
Facility: HOSPITAL | Age: 84
LOS: 1 days | Discharge: HOME | End: 2019-10-02
Attending: STUDENT IN AN ORGANIZED HEALTH CARE EDUCATION/TRAINING PROGRAM
Payer: MEDICARE

## 2019-10-02 VITALS
DIASTOLIC BLOOD PRESSURE: 90 MMHG | WEIGHT: 149.91 LBS | HEIGHT: 64 IN | TEMPERATURE: 98 F | OXYGEN SATURATION: 95 % | SYSTOLIC BLOOD PRESSURE: 249 MMHG | HEART RATE: 60 BPM | RESPIRATION RATE: 16 BRPM

## 2019-10-02 VITALS
DIASTOLIC BLOOD PRESSURE: 93 MMHG | HEART RATE: 60 BPM | SYSTOLIC BLOOD PRESSURE: 160 MMHG | OXYGEN SATURATION: 98 % | RESPIRATION RATE: 18 BRPM

## 2019-10-02 DIAGNOSIS — Z90.49 ACQUIRED ABSENCE OF OTHER SPECIFIED PARTS OF DIGESTIVE TRACT: Chronic | ICD-10-CM

## 2019-10-02 DIAGNOSIS — Z98.49 CATARACT EXTRACTION STATUS, UNSPECIFIED EYE: Chronic | ICD-10-CM

## 2019-10-02 LAB
ALBUMIN SERPL ELPH-MCNC: 3.8 G/DL — SIGNIFICANT CHANGE UP (ref 3.3–5.2)
ALP SERPL-CCNC: 94 U/L — SIGNIFICANT CHANGE UP (ref 40–120)
ALT FLD-CCNC: 20 U/L — SIGNIFICANT CHANGE UP
ANION GAP SERPL CALC-SCNC: 14 MMOL/L — SIGNIFICANT CHANGE UP (ref 5–17)
APPEARANCE UR: CLEAR — SIGNIFICANT CHANGE UP
AST SERPL-CCNC: 27 U/L — SIGNIFICANT CHANGE UP
BACTERIA # UR AUTO: ABNORMAL
BILIRUB SERPL-MCNC: 0.4 MG/DL — SIGNIFICANT CHANGE UP (ref 0.4–2)
BILIRUB UR-MCNC: NEGATIVE — SIGNIFICANT CHANGE UP
BUN SERPL-MCNC: 14 MG/DL — SIGNIFICANT CHANGE UP (ref 8–20)
CALCIUM SERPL-MCNC: 9 MG/DL — SIGNIFICANT CHANGE UP (ref 8.6–10.2)
CHLORIDE SERPL-SCNC: 101 MMOL/L — SIGNIFICANT CHANGE UP (ref 98–107)
CO2 SERPL-SCNC: 21 MMOL/L — LOW (ref 22–29)
COLOR SPEC: YELLOW — SIGNIFICANT CHANGE UP
CREAT SERPL-MCNC: 0.62 MG/DL — SIGNIFICANT CHANGE UP (ref 0.5–1.3)
DIFF PNL FLD: NEGATIVE — SIGNIFICANT CHANGE UP
EPI CELLS # UR: SIGNIFICANT CHANGE UP
GLUCOSE SERPL-MCNC: 155 MG/DL — HIGH (ref 70–115)
GLUCOSE UR QL: 50 MG/DL
HBA1C BLD-MCNC: 7.3 % — HIGH (ref 4–5.6)
HCT VFR BLD CALC: 41.4 % — SIGNIFICANT CHANGE UP (ref 34.5–45)
HGB BLD-MCNC: 13.6 G/DL — SIGNIFICANT CHANGE UP (ref 11.5–15.5)
KETONES UR-MCNC: NEGATIVE — SIGNIFICANT CHANGE UP
LEUKOCYTE ESTERASE UR-ACNC: NEGATIVE — SIGNIFICANT CHANGE UP
MAGNESIUM SERPL-MCNC: 2.1 MG/DL — SIGNIFICANT CHANGE UP (ref 1.6–2.6)
MCHC RBC-ENTMCNC: 30.6 PG — SIGNIFICANT CHANGE UP (ref 27–34)
MCHC RBC-ENTMCNC: 32.9 GM/DL — SIGNIFICANT CHANGE UP (ref 32–36)
MCV RBC AUTO: 93.2 FL — SIGNIFICANT CHANGE UP (ref 80–100)
NITRITE UR-MCNC: POSITIVE
PH UR: 7 — SIGNIFICANT CHANGE UP (ref 5–8)
PLATELET # BLD AUTO: 297 K/UL — SIGNIFICANT CHANGE UP (ref 150–400)
POTASSIUM SERPL-MCNC: 4.1 MMOL/L — SIGNIFICANT CHANGE UP (ref 3.5–5.3)
POTASSIUM SERPL-SCNC: 4.1 MMOL/L — SIGNIFICANT CHANGE UP (ref 3.5–5.3)
PROT SERPL-MCNC: 7.3 G/DL — SIGNIFICANT CHANGE UP (ref 6.6–8.7)
PROT UR-MCNC: 15 MG/DL
RBC # BLD: 4.44 M/UL — SIGNIFICANT CHANGE UP (ref 3.8–5.2)
RBC # FLD: 17.8 % — HIGH (ref 10.3–14.5)
RBC CASTS # UR COMP ASSIST: SIGNIFICANT CHANGE UP /HPF (ref 0–4)
SODIUM SERPL-SCNC: 136 MMOL/L — SIGNIFICANT CHANGE UP (ref 135–145)
SP GR SPEC: 1.01 — SIGNIFICANT CHANGE UP (ref 1.01–1.02)
TROPONIN T SERPL-MCNC: <0.01 NG/ML — SIGNIFICANT CHANGE UP (ref 0–0.06)
UROBILINOGEN FLD QL: NEGATIVE MG/DL — SIGNIFICANT CHANGE UP
WBC # BLD: 9.05 K/UL — SIGNIFICANT CHANGE UP (ref 3.8–10.5)
WBC # FLD AUTO: 9.05 K/UL — SIGNIFICANT CHANGE UP (ref 3.8–10.5)
WBC UR QL: SIGNIFICANT CHANGE UP

## 2019-10-02 PROCEDURE — 81001 URINALYSIS AUTO W/SCOPE: CPT

## 2019-10-02 PROCEDURE — 83036 HEMOGLOBIN GLYCOSYLATED A1C: CPT

## 2019-10-02 PROCEDURE — 83735 ASSAY OF MAGNESIUM: CPT

## 2019-10-02 PROCEDURE — 87086 URINE CULTURE/COLONY COUNT: CPT

## 2019-10-02 PROCEDURE — 80053 COMPREHEN METABOLIC PANEL: CPT

## 2019-10-02 PROCEDURE — 93005 ELECTROCARDIOGRAM TRACING: CPT

## 2019-10-02 PROCEDURE — 99285 EMERGENCY DEPT VISIT HI MDM: CPT | Mod: 25

## 2019-10-02 PROCEDURE — 99291 CRITICAL CARE FIRST HOUR: CPT

## 2019-10-02 PROCEDURE — 85027 COMPLETE CBC AUTOMATED: CPT

## 2019-10-02 PROCEDURE — 87186 SC STD MICRODIL/AGAR DIL: CPT

## 2019-10-02 PROCEDURE — 36415 COLL VENOUS BLD VENIPUNCTURE: CPT

## 2019-10-02 PROCEDURE — 84484 ASSAY OF TROPONIN QUANT: CPT

## 2019-10-02 PROCEDURE — 82962 GLUCOSE BLOOD TEST: CPT

## 2019-10-02 PROCEDURE — 93010 ELECTROCARDIOGRAM REPORT: CPT

## 2019-10-02 RX ORDER — LISINOPRIL 2.5 MG/1
40 TABLET ORAL DAILY
Refills: 0 | Status: DISCONTINUED | OUTPATIENT
Start: 2019-10-02 | End: 2019-10-20

## 2019-10-02 RX ORDER — CEPHALEXIN 500 MG
1 CAPSULE ORAL
Qty: 20 | Refills: 0
Start: 2019-10-02 | End: 2019-10-11

## 2019-10-02 RX ORDER — AMLODIPINE BESYLATE 2.5 MG/1
5 TABLET ORAL ONCE
Refills: 0 | Status: COMPLETED | OUTPATIENT
Start: 2019-10-02 | End: 2019-10-02

## 2019-10-02 RX ORDER — PROPRANOLOL HCL 160 MG
120 CAPSULE, EXTENDED RELEASE 24HR ORAL ONCE
Refills: 0 | Status: COMPLETED | OUTPATIENT
Start: 2019-10-02 | End: 2019-10-02

## 2019-10-02 RX ORDER — CEPHALEXIN 500 MG
500 CAPSULE ORAL ONCE
Refills: 0 | Status: COMPLETED | OUTPATIENT
Start: 2019-10-02 | End: 2019-10-02

## 2019-10-02 RX ADMIN — LISINOPRIL 40 MILLIGRAM(S): 2.5 TABLET ORAL at 05:19

## 2019-10-02 RX ADMIN — AMLODIPINE BESYLATE 5 MILLIGRAM(S): 2.5 TABLET ORAL at 05:19

## 2019-10-02 RX ADMIN — Medication 120 MILLIGRAM(S): at 05:20

## 2019-10-02 RX ADMIN — Medication 500 MILLIGRAM(S): at 06:48

## 2019-10-02 NOTE — ED PROVIDER NOTE - OBJECTIVE STATEMENT
83 yo female with hx of htn and DM presents for evaluation of mental status change. Patient currently feels well without complaint but does not recall exactly why she is, stating she only knows what her family told her. Patients  stats that around 10 pm he noted the patient had a little trouble walking and stubbed her two while attempting to go to bed. He checked her serum glucose and noted that it was 50. He gave her some juice and it increased to 70. They went to bed. Then around 3 am they woke up. She seemed ok. But then became altered. Her serum glucose was checked. glucose was 34 and patient to confused to drink juice. EMS was called and patient given dextrose with resolution of symptoms.    Patient's BP checked and noted to be elevated. Patient and family states that she has had recurrent elevated bp. She was seen at [facility] several weeks ago due the elevated blood pressure and placed on new medication. She takes her blood pressure daily and typically has an elevated blood pressure; several times a week it is above 200. They states she was told to take the new medication only when the pressure is about 200 but she normally doesn't.

## 2019-10-02 NOTE — ED PROVIDER NOTE - PATIENT PORTAL LINK FT
You can access the FollowMyHealth Patient Portal offered by [facility] by registering at the following website: [email or web address]. By joining [employer]’s FollowMyHealth portal, you will also be able to view your health information using other applications (apps) compatible with our system.

## 2019-10-02 NOTE — ED ADULT NURSE NOTE - PMH
Anemia    Diabetes mellitus of other type without complication    Essential hypertension    Hypothyroidism, unspecified type

## 2019-10-02 NOTE — ED ADULT NURSE NOTE - NS_ED_NURSE_TEACHING_TOPIC_ED_A_ED
Cardiac/Medications/follow up, MD EDDY boyd elaborately explained discharge instructions to pt and to pt family.

## 2019-10-02 NOTE — ED ADULT NURSE REASSESSMENT NOTE - NS ED NURSE REASSESS COMMENT FT1
As per MD Estinfverenice provide pt with juice and meal, provided for pt, tolerating PO intake without issue.

## 2019-10-02 NOTE — ED ADULT TRIAGE NOTE - CHIEF COMPLAINT QUOTE
Pt A&Ox4 states "I had low sugar." BIBA c/o hypoglycemic 57 was given an amp. Patient has history of hypoglycemia and hypertension, has not been taking her htn medication, #20 RAC from EMS. BS was 170

## 2019-10-02 NOTE — ED ADULT NURSE NOTE - NSIMPLEMENTINTERV_GEN_ALL_ED
Implemented All Fall Risk Interventions:  [place] to call system. Call bell, personal items and telephone within reach. Instruct patient to call for assistance. Room bathroom lighting operational. Non-slip footwear when patient is off stretcher. Physically safe environment: no spills, clutter or unnecessary equipment. Stretcher in lowest position, wheels locked, appropriate side rails in place. Provide visual cue, wrist band, yellow gown, etc. Monitor gait and stability. Monitor for mental status changes and reorient to person, place, and time. Review medications for side effects contributing to fall risk. Reinforce activity limits and safety measures with patient and family.

## 2019-10-02 NOTE — ED ADULT NURSE NOTE - OBJECTIVE STATEMENT
Assumed pt care, pt BIBA from home for hypoglycemia, pt  states he checked pt sugar before bed it was 70, in the middle of night ("around 2:30"), pt was barely arousable, making no sense,  checked the sugar and it was "in the 50's" gave her orange juice and tried to keep the sugar up, and the same episode of confusion and nonsensical talk occurred and  called 911 at that time. At this time, pt is hypertensive, did not take medications today as prescribed, pt is A+Ox4, with no complaints of pain or discomfort, no chest pain or shortness of breath, respirations are spontaneous even and unlabored. Pt moving all extremities without difficulty.

## 2019-10-02 NOTE — ED PROVIDER NOTE - CARE PLAN
Principal Discharge DX:	Essential hypertension  Secondary Diagnosis:	Hypoglycemia due to insulin Principal Discharge DX:	Essential hypertension  Secondary Diagnosis:	Hypoglycemia due to insulin  Secondary Diagnosis:	Acute cystitis without hematuria

## 2019-10-05 RX ORDER — CEFDINIR 250 MG/5ML
1 POWDER, FOR SUSPENSION ORAL
Qty: 20 | Refills: 0
Start: 2019-10-05 | End: 2019-10-14

## 2019-11-19 NOTE — ASSESSMENT
[FreeTextEntry1] : 82 year old female with iron deficiency anemia, she is s/p colonoscopy on 5/23/19 which showed no obvious signs of bleeding.  First degree hemorrhoids were seen. Current ferritin 8 ng/ml.\par \par 10/03/17\par % sat 13\par ferritin 12 ng/ml\par \par 7/08/19\par Hgb 8.6\par HCT 27.5\par MCV 75.2\par \par 8/19/19\par Hgb 11.7\par HCT 35.9\par MCV 86.9\par % sat 32%\par Ferritin 158 ng/mL\par \par S/p 2 doses of Ferahema on 7/08 and 7/15/19. Good response.\par \par Plan: \par - Will trend iron levels, if continues to trend down after replacement, she may need upper endoscopy / capsule endoscopy.\par - F/U w/ Dr. Jefferson on 12/19/19.

## 2019-11-19 NOTE — PHYSICAL EXAM
[Normal] : affect appropriate [Ambulatory and capable of all self care but unable to carry out any work activities] : Status 2- Ambulatory and capable of all self care but unable to carry out any work activities. Up and about more than 50% of waking hours [de-identified] :  elderly female [de-identified] : forgetful, has difficult recalling events

## 2019-11-19 NOTE — HISTORY OF PRESENT ILLNESS
[de-identified] : 82 year old female referred for iron deficiency anemia.\par She reports constantly fatigued in the recent past, she can fall asleep any time.\par No shortness of breath. 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 No headaches, or dizziness. Denies palpitations.\par Denies blurry vision. No abdominal pain. No nausea, vomiting, diarrhea.  Reports mild constipation.\par Denies hematochezia. Denies black stool. No hematuria. \par Reports progressive memory issues x 1 year. \par \par  [de-identified] : S/p Feraheme x 2 doses in July 2019. Since iron infusions, she is still about the same. She remains moderately fatigued. She is not sure if she was ever on iron supplements in the past. Last colonoscopy was in May 2019. Denies fevers, no SOB, no CP, appetite is good, no constipation or diarrhea, no blood in stool Energy level is fair. The remainder of ROS is negative.\par

## 2019-12-17 ENCOUNTER — OUTPATIENT (OUTPATIENT)
Dept: OUTPATIENT SERVICES | Facility: HOSPITAL | Age: 84
LOS: 1 days | Discharge: HOME | End: 2019-12-17

## 2019-12-17 DIAGNOSIS — D50.9 IRON DEFICIENCY ANEMIA, UNSPECIFIED: ICD-10-CM

## 2019-12-17 DIAGNOSIS — Z98.49 CATARACT EXTRACTION STATUS, UNSPECIFIED EYE: Chronic | ICD-10-CM

## 2019-12-17 DIAGNOSIS — Z90.49 ACQUIRED ABSENCE OF OTHER SPECIFIED PARTS OF DIGESTIVE TRACT: Chronic | ICD-10-CM

## 2019-12-19 ENCOUNTER — APPOINTMENT (OUTPATIENT)
Dept: HEMATOLOGY ONCOLOGY | Facility: CLINIC | Age: 84
End: 2019-12-19
Payer: MEDICARE

## 2019-12-19 ENCOUNTER — RESULT REVIEW (OUTPATIENT)
Age: 84
End: 2019-12-19

## 2019-12-19 VITALS
DIASTOLIC BLOOD PRESSURE: 74 MMHG | SYSTOLIC BLOOD PRESSURE: 226 MMHG | HEART RATE: 62 BPM | BODY MASS INDEX: 27.82 KG/M2 | TEMPERATURE: 97.3 F | OXYGEN SATURATION: 95 % | WEIGHT: 138 LBS | HEIGHT: 59 IN

## 2019-12-19 LAB
BASOPHILS # BLD AUTO: 0.1 K/UL — SIGNIFICANT CHANGE UP (ref 0–0.2)
BASOPHILS NFR BLD AUTO: 0.8 % — SIGNIFICANT CHANGE UP (ref 0–2)
EOSINOPHIL # BLD AUTO: 0.2 K/UL — SIGNIFICANT CHANGE UP (ref 0–0.5)
EOSINOPHIL NFR BLD AUTO: 2.3 % — SIGNIFICANT CHANGE UP (ref 0–6)
HCT VFR BLD CALC: 37.6 % — SIGNIFICANT CHANGE UP (ref 34.5–45)
HGB BLD-MCNC: 12.6 G/DL — SIGNIFICANT CHANGE UP (ref 11.5–15.5)
LYMPHOCYTES # BLD AUTO: 1.8 K/UL — SIGNIFICANT CHANGE UP (ref 1–3.3)
LYMPHOCYTES # BLD AUTO: 27.1 % — SIGNIFICANT CHANGE UP (ref 13–44)
MCHC RBC-ENTMCNC: 32 PG — SIGNIFICANT CHANGE UP (ref 27–34)
MCHC RBC-ENTMCNC: 33.6 G/DL — SIGNIFICANT CHANGE UP (ref 32–36)
MCV RBC AUTO: 95.2 FL — SIGNIFICANT CHANGE UP (ref 80–100)
MONOCYTES # BLD AUTO: 0.6 K/UL — SIGNIFICANT CHANGE UP (ref 0–0.9)
MONOCYTES NFR BLD AUTO: 8.5 % — SIGNIFICANT CHANGE UP (ref 2–14)
NEUTROPHILS # BLD AUTO: 4.2 K/UL — SIGNIFICANT CHANGE UP (ref 1.8–7.4)
NEUTROPHILS NFR BLD AUTO: 61.3 % — SIGNIFICANT CHANGE UP (ref 43–77)
PLATELET # BLD AUTO: 268 K/UL — SIGNIFICANT CHANGE UP (ref 150–400)
RBC # BLD: 3.95 M/UL — SIGNIFICANT CHANGE UP (ref 3.8–5.2)
RBC # FLD: 11.2 % — SIGNIFICANT CHANGE UP (ref 10.3–14.5)
WBC # BLD: 6.8 K/UL — SIGNIFICANT CHANGE UP (ref 3.8–10.5)
WBC # FLD AUTO: 6.8 K/UL — SIGNIFICANT CHANGE UP (ref 3.8–10.5)

## 2019-12-19 PROCEDURE — 99214 OFFICE O/P EST MOD 30 MIN: CPT

## 2019-12-19 RX ORDER — ATENOLOL 25 MG/1
25 TABLET ORAL DAILY
Refills: 0 | Status: DISCONTINUED | COMMUNITY
End: 2019-12-19

## 2019-12-19 RX ORDER — PROPRANOLOL HYDROCHLORIDE 120 MG/1
120 CAPSULE, EXTENDED RELEASE ORAL
Refills: 0 | Status: ACTIVE | COMMUNITY
Start: 2019-12-19

## 2019-12-19 NOTE — ASSESSMENT
[FreeTextEntry1] : 82 year old female with iron deficiency anemia, she is s/p colonoscopy on 5/23/19 which showed no obvious signs of bleeding.  First degree hemorrhoids were seen. She is s/p 2 doses of feraheme on 7/8/19 and 7/15/19.\par \par Continues to have ongoing fatigue but not explain by counts as her Hgb has normalized. Hgb 12.6 g/dL today.\par BP elevated today at 184/74, no headaches or dizziness or changes in vision\par \par Plan: \par Follow up with PCP re: HTN\par RTO 6 months for follow up

## 2019-12-19 NOTE — HISTORY OF PRESENT ILLNESS
[de-identified] : 82 year old female referred for iron deficiency anemia.\par She reports constantly fatigued in the recent past, she can fall asleep any time.\par No shortness of breath. 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 No headaches, or dizziness. Denies palpitations.\par Denies blurry vision. No abdominal pain. No nausea, vomiting, diarrhea.  Reports mild constipation.\par Denies hematochezia. Denies black stool. No hematuria. \par Reports progressive memory issues x 1 year. \par \par  [de-identified] : Returns follow up. \par S/p Feraheme x 2 doses in July 2019. \par She reports constant fatigue but improved since iron infusion per family.\par No SOB. No dizziness. \par No weight loss. \par \par \par Last colonoscopy was in May 2019.

## 2019-12-19 NOTE — PHYSICAL EXAM
[Ambulatory and capable of all self care but unable to carry out any work activities] : Status 2- Ambulatory and capable of all self care but unable to carry out any work activities. Up and about more than 50% of waking hours [Normal] : affect appropriate [de-identified] :  elderly female [de-identified] : forgetful, has difficult recalling events

## 2020-01-06 ENCOUNTER — RESULT REVIEW (OUTPATIENT)
Age: 85
End: 2020-01-06

## 2020-01-06 LAB
FERRITIN SERPL-MCNC: 158 NG/ML
IRON SATN MFR SERPL: 32 %
IRON SERPL-MCNC: 79 UG/DL
TIBC SERPL-MCNC: 250 UG/DL
UIBC SERPL-MCNC: 171 UG/DL

## 2020-03-06 ENCOUNTER — TRANSCRIPTION ENCOUNTER (OUTPATIENT)
Age: 85
End: 2020-03-06

## 2020-06-18 ENCOUNTER — OUTPATIENT (OUTPATIENT)
Dept: OUTPATIENT SERVICES | Facility: HOSPITAL | Age: 85
LOS: 1 days | Discharge: HOME | End: 2020-06-18

## 2020-06-18 DIAGNOSIS — Z98.49 CATARACT EXTRACTION STATUS, UNSPECIFIED EYE: Chronic | ICD-10-CM

## 2020-06-18 DIAGNOSIS — D50.9 IRON DEFICIENCY ANEMIA, UNSPECIFIED: ICD-10-CM

## 2020-06-18 DIAGNOSIS — Z90.49 ACQUIRED ABSENCE OF OTHER SPECIFIED PARTS OF DIGESTIVE TRACT: Chronic | ICD-10-CM

## 2020-06-23 ENCOUNTER — RESULT REVIEW (OUTPATIENT)
Age: 85
End: 2020-06-23

## 2020-06-23 ENCOUNTER — APPOINTMENT (OUTPATIENT)
Dept: HEMATOLOGY ONCOLOGY | Facility: CLINIC | Age: 85
End: 2020-06-23
Payer: MEDICARE

## 2020-06-23 VITALS
SYSTOLIC BLOOD PRESSURE: 189 MMHG | OXYGEN SATURATION: 96 % | DIASTOLIC BLOOD PRESSURE: 74 MMHG | HEIGHT: 59 IN | WEIGHT: 136 LBS | BODY MASS INDEX: 27.42 KG/M2

## 2020-06-23 DIAGNOSIS — I10 ESSENTIAL (PRIMARY) HYPERTENSION: ICD-10-CM

## 2020-06-23 DIAGNOSIS — D50.9 IRON DEFICIENCY ANEMIA, UNSPECIFIED: ICD-10-CM

## 2020-06-23 LAB
BASOPHILS # BLD AUTO: 0.1 K/UL — SIGNIFICANT CHANGE UP (ref 0–0.2)
BASOPHILS NFR BLD AUTO: 0.9 % — SIGNIFICANT CHANGE UP (ref 0–2)
EOSINOPHIL # BLD AUTO: 0.2 K/UL — SIGNIFICANT CHANGE UP (ref 0–0.5)
EOSINOPHIL NFR BLD AUTO: 2.5 % — SIGNIFICANT CHANGE UP (ref 0–6)
HCT VFR BLD CALC: 38.4 % — SIGNIFICANT CHANGE UP (ref 34.5–45)
HGB BLD-MCNC: 12.8 G/DL — SIGNIFICANT CHANGE UP (ref 11.5–15.5)
LYMPHOCYTES # BLD AUTO: 1.8 K/UL — SIGNIFICANT CHANGE UP (ref 1–3.3)
LYMPHOCYTES # BLD AUTO: 27.3 % — SIGNIFICANT CHANGE UP (ref 13–44)
MCHC RBC-ENTMCNC: 31.2 PG — SIGNIFICANT CHANGE UP (ref 27–34)
MCHC RBC-ENTMCNC: 33.4 G/DL — SIGNIFICANT CHANGE UP (ref 32–36)
MCV RBC AUTO: 93.4 FL — SIGNIFICANT CHANGE UP (ref 80–100)
MONOCYTES # BLD AUTO: 0.5 K/UL — SIGNIFICANT CHANGE UP (ref 0–0.9)
MONOCYTES NFR BLD AUTO: 8.1 % — SIGNIFICANT CHANGE UP (ref 2–14)
NEUTROPHILS # BLD AUTO: 4 K/UL — SIGNIFICANT CHANGE UP (ref 1.8–7.4)
NEUTROPHILS NFR BLD AUTO: 61.4 % — SIGNIFICANT CHANGE UP (ref 43–77)
PLATELET # BLD AUTO: 190 K/UL — SIGNIFICANT CHANGE UP (ref 150–400)
RBC # BLD: 4.11 M/UL — SIGNIFICANT CHANGE UP (ref 3.8–5.2)
RBC # FLD: 10.7 % — SIGNIFICANT CHANGE UP (ref 10.3–14.5)
WBC # BLD: 6.5 K/UL — SIGNIFICANT CHANGE UP (ref 3.8–10.5)
WBC # FLD AUTO: 6.5 K/UL — SIGNIFICANT CHANGE UP (ref 3.8–10.5)

## 2020-06-23 PROCEDURE — 99213 OFFICE O/P EST LOW 20 MIN: CPT

## 2020-06-23 NOTE — ASSESSMENT
[FreeTextEntry1] : 82 year old female with iron deficiency anemia, she is s/p colonoscopy on 5/23/19 which showed no obvious signs of bleeding.  First degree hemorrhoids were seen. She is s/p 2 doses of feraheme on 7/8/19 and 7/15/19.\par \par Labs reviewed. CBC today shows normal WBC 6.5, Hgb 12.8, and platelets 190K. \par BP elevated today AGAIN at 189/74, no headaches or dizziness or changes in vision\par \par Plan: \par Urged Follow up with PCP re: HTN\par Iron deficiency anemia has resolved. \par Follow up PRN

## 2020-06-23 NOTE — PHYSICAL EXAM
[Ambulatory and capable of all self care but unable to carry out any work activities] : Status 2- Ambulatory and capable of all self care but unable to carry out any work activities. Up and about more than 50% of waking hours [de-identified] :  elderly female

## 2020-06-23 NOTE — HISTORY OF PRESENT ILLNESS
[de-identified] : Returns follow up. \par S/p Feraheme x 2 doses in July 2019. \par Continues to have fatigue\par No SOB. No dizziness. \par No weight loss. No melena. No blood per rectum. No hematuria\par No abd pain, n/v. \par \par \par Last colonoscopy was in May 2019. [de-identified] : 82 year old female referred for iron deficiency anemia.\par s/p 2 doses of feraheme in July 2019. \par \par

## 2021-10-06 PROBLEM — I10 ESSENTIAL HYPERTENSION: Status: ACTIVE | Noted: 2017-08-01

## 2022-01-01 ENCOUNTER — INPATIENT (INPATIENT)
Facility: HOSPITAL | Age: 87
LOS: 27 days | DRG: 871 | End: 2022-08-15
Attending: HOSPITALIST | Admitting: HOSPITALIST
Payer: MEDICARE

## 2022-01-01 VITALS
SYSTOLIC BLOOD PRESSURE: 85 MMHG | DIASTOLIC BLOOD PRESSURE: 33 MMHG | TEMPERATURE: 103 F | RESPIRATION RATE: 22 BRPM | HEART RATE: 101 BPM | OXYGEN SATURATION: 95 %

## 2022-01-01 VITALS
OXYGEN SATURATION: 96 % | RESPIRATION RATE: 16 BRPM | DIASTOLIC BLOOD PRESSURE: 120 MMHG | TEMPERATURE: 99 F | HEIGHT: 64 IN | HEART RATE: 89 BPM | SYSTOLIC BLOOD PRESSURE: 248 MMHG

## 2022-01-01 DIAGNOSIS — R13.10 DYSPHAGIA, UNSPECIFIED: ICD-10-CM

## 2022-01-01 DIAGNOSIS — G91.2 (IDIOPATHIC) NORMAL PRESSURE HYDROCEPHALUS: ICD-10-CM

## 2022-01-01 DIAGNOSIS — F03.90 UNSPECIFIED DEMENTIA WITHOUT BEHAVIORAL DISTURBANCE: ICD-10-CM

## 2022-01-01 DIAGNOSIS — Z90.49 ACQUIRED ABSENCE OF OTHER SPECIFIED PARTS OF DIGESTIVE TRACT: Chronic | ICD-10-CM

## 2022-01-01 DIAGNOSIS — E11.65 TYPE 2 DIABETES MELLITUS WITH HYPERGLYCEMIA: ICD-10-CM

## 2022-01-01 DIAGNOSIS — G93.41 METABOLIC ENCEPHALOPATHY: ICD-10-CM

## 2022-01-01 DIAGNOSIS — R50.9 FEVER, UNSPECIFIED: ICD-10-CM

## 2022-01-01 DIAGNOSIS — I35.0 NONRHEUMATIC AORTIC (VALVE) STENOSIS: ICD-10-CM

## 2022-01-01 DIAGNOSIS — Z98.49 CATARACT EXTRACTION STATUS, UNSPECIFIED EYE: Chronic | ICD-10-CM

## 2022-01-01 DIAGNOSIS — D64.9 ANEMIA, UNSPECIFIED: ICD-10-CM

## 2022-01-01 DIAGNOSIS — E43 UNSPECIFIED SEVERE PROTEIN-CALORIE MALNUTRITION: ICD-10-CM

## 2022-01-01 LAB
-  AMIKACIN: SIGNIFICANT CHANGE UP
-  AMOXICILLIN/CLAVULANIC ACID: SIGNIFICANT CHANGE UP
-  AMPICILLIN/SULBACTAM: SIGNIFICANT CHANGE UP
-  AMPICILLIN: SIGNIFICANT CHANGE UP
-  AZTREONAM: SIGNIFICANT CHANGE UP
-  CEFAZOLIN: SIGNIFICANT CHANGE UP
-  CEFEPIME: SIGNIFICANT CHANGE UP
-  CEFOXITIN: SIGNIFICANT CHANGE UP
-  CEFTRIAXONE: SIGNIFICANT CHANGE UP
-  CIPROFLOXACIN: SIGNIFICANT CHANGE UP
-  ERTAPENEM: SIGNIFICANT CHANGE UP
-  GENTAMICIN: SIGNIFICANT CHANGE UP
-  IMIPENEM: SIGNIFICANT CHANGE UP
-  LEVOFLOXACIN: SIGNIFICANT CHANGE UP
-  MEROPENEM: SIGNIFICANT CHANGE UP
-  NITROFURANTOIN: SIGNIFICANT CHANGE UP
-  PIPERACILLIN/TAZOBACTAM: SIGNIFICANT CHANGE UP
-  TIGECYCLINE: SIGNIFICANT CHANGE UP
-  TOBRAMYCIN: SIGNIFICANT CHANGE UP
-  TRIMETHOPRIM/SULFAMETHOXAZOLE: SIGNIFICANT CHANGE UP
A1C WITH ESTIMATED AVERAGE GLUCOSE RESULT: 10.8 % — HIGH (ref 4–5.6)
A1C WITH ESTIMATED AVERAGE GLUCOSE RESULT: 8.4 % — HIGH (ref 4–5.6)
ACETONE SERPL-MCNC: NEGATIVE — SIGNIFICANT CHANGE UP
ALBUMIN SERPL ELPH-MCNC: 1.9 G/DL — LOW (ref 3.3–5.2)
ALBUMIN SERPL ELPH-MCNC: 2.1 G/DL — LOW (ref 3.3–5.2)
ALBUMIN SERPL ELPH-MCNC: 2.2 G/DL — LOW (ref 3.3–5.2)
ALBUMIN SERPL ELPH-MCNC: 2.5 G/DL — LOW (ref 3.3–5.2)
ALBUMIN SERPL ELPH-MCNC: 2.5 G/DL — LOW (ref 3.3–5.2)
ALBUMIN SERPL ELPH-MCNC: 2.6 G/DL — LOW (ref 3.3–5.2)
ALBUMIN SERPL ELPH-MCNC: 2.7 G/DL — LOW (ref 3.3–5.2)
ALBUMIN SERPL ELPH-MCNC: 2.7 G/DL — LOW (ref 3.3–5.2)
ALBUMIN SERPL ELPH-MCNC: 3 G/DL — LOW (ref 3.3–5.2)
ALBUMIN SERPL ELPH-MCNC: 3.3 G/DL — SIGNIFICANT CHANGE UP (ref 3.3–5.2)
ALBUMIN SERPL ELPH-MCNC: 4.3 G/DL — SIGNIFICANT CHANGE UP (ref 3.3–5.2)
ALP SERPL-CCNC: 104 U/L — SIGNIFICANT CHANGE UP (ref 40–120)
ALP SERPL-CCNC: 104 U/L — SIGNIFICANT CHANGE UP (ref 40–120)
ALP SERPL-CCNC: 121 U/L — HIGH (ref 40–120)
ALP SERPL-CCNC: 145 U/L — HIGH (ref 40–120)
ALP SERPL-CCNC: 147 U/L — HIGH (ref 40–120)
ALP SERPL-CCNC: 73 U/L — SIGNIFICANT CHANGE UP (ref 40–120)
ALP SERPL-CCNC: 74 U/L — SIGNIFICANT CHANGE UP (ref 40–120)
ALP SERPL-CCNC: 76 U/L — SIGNIFICANT CHANGE UP (ref 40–120)
ALP SERPL-CCNC: 80 U/L — SIGNIFICANT CHANGE UP (ref 40–120)
ALP SERPL-CCNC: 81 U/L — SIGNIFICANT CHANGE UP (ref 40–120)
ALP SERPL-CCNC: 81 U/L — SIGNIFICANT CHANGE UP (ref 40–120)
ALT FLD-CCNC: 14 U/L — SIGNIFICANT CHANGE UP
ALT FLD-CCNC: 14 U/L — SIGNIFICANT CHANGE UP
ALT FLD-CCNC: 15 U/L — SIGNIFICANT CHANGE UP
ALT FLD-CCNC: 15 U/L — SIGNIFICANT CHANGE UP
ALT FLD-CCNC: 16 U/L — SIGNIFICANT CHANGE UP
ALT FLD-CCNC: 17 U/L — SIGNIFICANT CHANGE UP
ALT FLD-CCNC: 18 U/L — SIGNIFICANT CHANGE UP
ALT FLD-CCNC: 20 U/L — SIGNIFICANT CHANGE UP
ALT FLD-CCNC: 20 U/L — SIGNIFICANT CHANGE UP
ALT FLD-CCNC: 24 U/L — SIGNIFICANT CHANGE UP
ALT FLD-CCNC: 25 U/L — SIGNIFICANT CHANGE UP
ANION GAP SERPL CALC-SCNC: 10 MMOL/L — SIGNIFICANT CHANGE UP (ref 5–17)
ANION GAP SERPL CALC-SCNC: 10 MMOL/L — SIGNIFICANT CHANGE UP (ref 5–17)
ANION GAP SERPL CALC-SCNC: 11 MMOL/L — SIGNIFICANT CHANGE UP (ref 5–17)
ANION GAP SERPL CALC-SCNC: 12 MMOL/L — SIGNIFICANT CHANGE UP (ref 5–17)
ANION GAP SERPL CALC-SCNC: 13 MMOL/L — SIGNIFICANT CHANGE UP (ref 5–17)
ANION GAP SERPL CALC-SCNC: 14 MMOL/L — SIGNIFICANT CHANGE UP (ref 5–17)
ANION GAP SERPL CALC-SCNC: 16 MMOL/L — SIGNIFICANT CHANGE UP (ref 5–17)
ANION GAP SERPL CALC-SCNC: 17 MMOL/L — SIGNIFICANT CHANGE UP (ref 5–17)
ANION GAP SERPL CALC-SCNC: 18 MMOL/L — HIGH (ref 5–17)
ANION GAP SERPL CALC-SCNC: 24 MMOL/L — HIGH (ref 5–17)
ANION GAP SERPL CALC-SCNC: 9 MMOL/L — SIGNIFICANT CHANGE UP (ref 5–17)
ANION GAP SERPL CALC-SCNC: 9 MMOL/L — SIGNIFICANT CHANGE UP (ref 5–17)
APPEARANCE UR: ABNORMAL
APPEARANCE UR: CLEAR — SIGNIFICANT CHANGE UP
APPEARANCE UR: CLEAR — SIGNIFICANT CHANGE UP
APTT BLD: 24 SEC — LOW (ref 27.5–35.5)
APTT BLD: 30.6 SEC — SIGNIFICANT CHANGE UP (ref 27.5–35.5)
APTT BLD: 44.2 SEC — HIGH (ref 27.5–35.5)
AST SERPL-CCNC: 18 U/L — SIGNIFICANT CHANGE UP
AST SERPL-CCNC: 19 U/L — SIGNIFICANT CHANGE UP
AST SERPL-CCNC: 21 U/L — SIGNIFICANT CHANGE UP
AST SERPL-CCNC: 23 U/L — SIGNIFICANT CHANGE UP
AST SERPL-CCNC: 24 U/L — SIGNIFICANT CHANGE UP
AST SERPL-CCNC: 28 U/L — SIGNIFICANT CHANGE UP
AST SERPL-CCNC: 30 U/L — SIGNIFICANT CHANGE UP
AST SERPL-CCNC: 39 U/L — HIGH
AST SERPL-CCNC: 50 U/L — HIGH
AST SERPL-CCNC: 60 U/L — HIGH
AST SERPL-CCNC: 74 U/L — HIGH
BACTERIA # UR AUTO: ABNORMAL
BASE EXCESS BLDA CALC-SCNC: -4.4 MMOL/L — LOW (ref -2–3)
BASE EXCESS BLDV CALC-SCNC: 3.8 MMOL/L — HIGH (ref -2–3)
BASOPHILS # BLD AUTO: 0.02 K/UL — SIGNIFICANT CHANGE UP (ref 0–0.2)
BASOPHILS # BLD AUTO: 0.02 K/UL — SIGNIFICANT CHANGE UP (ref 0–0.2)
BASOPHILS # BLD AUTO: 0.03 K/UL — SIGNIFICANT CHANGE UP (ref 0–0.2)
BASOPHILS # BLD AUTO: 0.04 K/UL — SIGNIFICANT CHANGE UP (ref 0–0.2)
BASOPHILS # BLD AUTO: 0.05 K/UL — SIGNIFICANT CHANGE UP (ref 0–0.2)
BASOPHILS # BLD AUTO: 0.22 K/UL — HIGH (ref 0–0.2)
BASOPHILS NFR BLD AUTO: 0.2 % — SIGNIFICANT CHANGE UP (ref 0–2)
BASOPHILS NFR BLD AUTO: 0.3 % — SIGNIFICANT CHANGE UP (ref 0–2)
BASOPHILS NFR BLD AUTO: 0.4 % — SIGNIFICANT CHANGE UP (ref 0–2)
BASOPHILS NFR BLD AUTO: 0.4 % — SIGNIFICANT CHANGE UP (ref 0–2)
BASOPHILS NFR BLD AUTO: 0.6 % — SIGNIFICANT CHANGE UP (ref 0–2)
BASOPHILS NFR BLD AUTO: 0.7 % — SIGNIFICANT CHANGE UP (ref 0–2)
BASOPHILS NFR BLD AUTO: 0.8 % — SIGNIFICANT CHANGE UP (ref 0–2)
BASOPHILS NFR BLD AUTO: 0.9 % — SIGNIFICANT CHANGE UP (ref 0–2)
BILIRUB SERPL-MCNC: 0.3 MG/DL — LOW (ref 0.4–2)
BILIRUB SERPL-MCNC: 0.3 MG/DL — LOW (ref 0.4–2)
BILIRUB SERPL-MCNC: 0.4 MG/DL — SIGNIFICANT CHANGE UP (ref 0.4–2)
BILIRUB SERPL-MCNC: 0.5 MG/DL — SIGNIFICANT CHANGE UP (ref 0.4–2)
BILIRUB SERPL-MCNC: 0.7 MG/DL — SIGNIFICANT CHANGE UP (ref 0.4–2)
BILIRUB SERPL-MCNC: 0.9 MG/DL — SIGNIFICANT CHANGE UP (ref 0.4–2)
BILIRUB SERPL-MCNC: 0.9 MG/DL — SIGNIFICANT CHANGE UP (ref 0.4–2)
BILIRUB SERPL-MCNC: 1 MG/DL — SIGNIFICANT CHANGE UP (ref 0.4–2)
BILIRUB UR-MCNC: NEGATIVE — SIGNIFICANT CHANGE UP
BLD GP AB SCN SERPL QL: SIGNIFICANT CHANGE UP
BLOOD GAS COMMENTS ARTERIAL: SIGNIFICANT CHANGE UP
BUN SERPL-MCNC: 10 MG/DL — SIGNIFICANT CHANGE UP (ref 8–20)
BUN SERPL-MCNC: 10.2 MG/DL — SIGNIFICANT CHANGE UP (ref 8–20)
BUN SERPL-MCNC: 11.4 MG/DL — SIGNIFICANT CHANGE UP (ref 8–20)
BUN SERPL-MCNC: 12.4 MG/DL — SIGNIFICANT CHANGE UP (ref 8–20)
BUN SERPL-MCNC: 13.4 MG/DL — SIGNIFICANT CHANGE UP (ref 8–20)
BUN SERPL-MCNC: 13.9 MG/DL — SIGNIFICANT CHANGE UP (ref 8–20)
BUN SERPL-MCNC: 14.5 MG/DL — SIGNIFICANT CHANGE UP (ref 8–20)
BUN SERPL-MCNC: 17.3 MG/DL — SIGNIFICANT CHANGE UP (ref 8–20)
BUN SERPL-MCNC: 19.7 MG/DL — SIGNIFICANT CHANGE UP (ref 8–20)
BUN SERPL-MCNC: 20.3 MG/DL — HIGH (ref 8–20)
BUN SERPL-MCNC: 20.6 MG/DL — HIGH (ref 8–20)
BUN SERPL-MCNC: 3.1 MG/DL — LOW (ref 8–20)
BUN SERPL-MCNC: 3.2 MG/DL — LOW (ref 8–20)
BUN SERPL-MCNC: 3.7 MG/DL — LOW (ref 8–20)
BUN SERPL-MCNC: 3.8 MG/DL — LOW (ref 8–20)
BUN SERPL-MCNC: 4 MG/DL — LOW (ref 8–20)
BUN SERPL-MCNC: 5.5 MG/DL — LOW (ref 8–20)
BUN SERPL-MCNC: 5.7 MG/DL — LOW (ref 8–20)
BUN SERPL-MCNC: 6.1 MG/DL — LOW (ref 8–20)
BUN SERPL-MCNC: 6.6 MG/DL — LOW (ref 8–20)
BUN SERPL-MCNC: 6.7 MG/DL — LOW (ref 8–20)
BUN SERPL-MCNC: 7.4 MG/DL — LOW (ref 8–20)
BUN SERPL-MCNC: 8.2 MG/DL — SIGNIFICANT CHANGE UP (ref 8–20)
BUN SERPL-MCNC: 9 MG/DL — SIGNIFICANT CHANGE UP (ref 8–20)
BUN SERPL-MCNC: 9.8 MG/DL — SIGNIFICANT CHANGE UP (ref 8–20)
BUN SERPL-MCNC: 9.9 MG/DL — SIGNIFICANT CHANGE UP (ref 8–20)
BUN SERPL-MCNC: <3 MG/DL — LOW (ref 8–20)
BURR CELLS BLD QL SMEAR: PRESENT — SIGNIFICANT CHANGE UP
CA-I SERPL-SCNC: 1.18 MMOL/L — SIGNIFICANT CHANGE UP (ref 1.15–1.33)
CALCIUM SERPL-MCNC: 7.6 MG/DL — LOW (ref 8.4–10.5)
CALCIUM SERPL-MCNC: 7.6 MG/DL — LOW (ref 8.4–10.5)
CALCIUM SERPL-MCNC: 7.7 MG/DL — LOW (ref 8.4–10.5)
CALCIUM SERPL-MCNC: 7.7 MG/DL — LOW (ref 8.6–10.2)
CALCIUM SERPL-MCNC: 7.8 MG/DL — LOW (ref 8.4–10.5)
CALCIUM SERPL-MCNC: 7.8 MG/DL — LOW (ref 8.6–10.2)
CALCIUM SERPL-MCNC: 7.9 MG/DL — LOW (ref 8.4–10.5)
CALCIUM SERPL-MCNC: 8 MG/DL — LOW (ref 8.4–10.5)
CALCIUM SERPL-MCNC: 8 MG/DL — LOW (ref 8.6–10.2)
CALCIUM SERPL-MCNC: 8.1 MG/DL — LOW (ref 8.4–10.5)
CALCIUM SERPL-MCNC: 8.1 MG/DL — LOW (ref 8.6–10.2)
CALCIUM SERPL-MCNC: 8.2 MG/DL — LOW (ref 8.4–10.5)
CALCIUM SERPL-MCNC: 8.3 MG/DL — LOW (ref 8.4–10.5)
CALCIUM SERPL-MCNC: 8.3 MG/DL — LOW (ref 8.6–10.2)
CALCIUM SERPL-MCNC: 8.5 MG/DL — SIGNIFICANT CHANGE UP (ref 8.4–10.5)
CALCIUM SERPL-MCNC: 8.6 MG/DL — SIGNIFICANT CHANGE UP (ref 8.6–10.2)
CALCIUM SERPL-MCNC: 8.8 MG/DL — SIGNIFICANT CHANGE UP (ref 8.6–10.2)
CALCIUM SERPL-MCNC: 8.8 MG/DL — SIGNIFICANT CHANGE UP (ref 8.6–10.2)
CALCIUM SERPL-MCNC: 8.9 MG/DL — SIGNIFICANT CHANGE UP (ref 8.6–10.2)
CALCIUM SERPL-MCNC: 8.9 MG/DL — SIGNIFICANT CHANGE UP (ref 8.6–10.2)
CALCIUM SERPL-MCNC: 9.3 MG/DL — SIGNIFICANT CHANGE UP (ref 8.6–10.2)
CALCIUM SERPL-MCNC: 9.6 MG/DL — SIGNIFICANT CHANGE UP (ref 8.6–10.2)
CHLORIDE BLDV-SCNC: 98 MMOL/L — SIGNIFICANT CHANGE UP (ref 98–107)
CHLORIDE SERPL-SCNC: 100 MMOL/L — SIGNIFICANT CHANGE UP (ref 98–107)
CHLORIDE SERPL-SCNC: 101 MMOL/L — SIGNIFICANT CHANGE UP (ref 98–107)
CHLORIDE SERPL-SCNC: 101 MMOL/L — SIGNIFICANT CHANGE UP (ref 98–107)
CHLORIDE SERPL-SCNC: 102 MMOL/L — SIGNIFICANT CHANGE UP (ref 98–107)
CHLORIDE SERPL-SCNC: 102 MMOL/L — SIGNIFICANT CHANGE UP (ref 98–107)
CHLORIDE SERPL-SCNC: 104 MMOL/L — SIGNIFICANT CHANGE UP (ref 98–107)
CHLORIDE SERPL-SCNC: 105 MMOL/L — SIGNIFICANT CHANGE UP (ref 98–107)
CHLORIDE SERPL-SCNC: 106 MMOL/L — SIGNIFICANT CHANGE UP (ref 98–107)
CHLORIDE SERPL-SCNC: 107 MMOL/L — SIGNIFICANT CHANGE UP (ref 98–107)
CHLORIDE SERPL-SCNC: 107 MMOL/L — SIGNIFICANT CHANGE UP (ref 98–107)
CHLORIDE SERPL-SCNC: 108 MMOL/L — HIGH (ref 98–107)
CHLORIDE SERPL-SCNC: 109 MMOL/L — HIGH (ref 98–107)
CHLORIDE SERPL-SCNC: 110 MMOL/L — HIGH (ref 98–107)
CHLORIDE SERPL-SCNC: 110 MMOL/L — HIGH (ref 98–107)
CHLORIDE SERPL-SCNC: 112 MMOL/L — HIGH (ref 98–107)
CHLORIDE SERPL-SCNC: 92 MMOL/L — LOW (ref 98–107)
CHLORIDE SERPL-SCNC: 93 MMOL/L — LOW (ref 98–107)
CHLORIDE SERPL-SCNC: 97 MMOL/L — LOW (ref 98–107)
CHLORIDE SERPL-SCNC: 97 MMOL/L — LOW (ref 98–107)
CHLORIDE SERPL-SCNC: 98 MMOL/L — SIGNIFICANT CHANGE UP (ref 98–107)
CHLORIDE SERPL-SCNC: 98 MMOL/L — SIGNIFICANT CHANGE UP (ref 98–107)
CHLORIDE SERPL-SCNC: 99 MMOL/L — SIGNIFICANT CHANGE UP (ref 98–107)
CHLORIDE SERPL-SCNC: 99 MMOL/L — SIGNIFICANT CHANGE UP (ref 98–107)
CHOLEST SERPL-MCNC: 158 MG/DL — SIGNIFICANT CHANGE UP
CK SERPL-CCNC: 60 U/L — SIGNIFICANT CHANGE UP (ref 25–170)
CO2 SERPL-SCNC: 15 MMOL/L — LOW (ref 22–29)
CO2 SERPL-SCNC: 17 MMOL/L — LOW (ref 22–29)
CO2 SERPL-SCNC: 17 MMOL/L — LOW (ref 22–29)
CO2 SERPL-SCNC: 18 MMOL/L — LOW (ref 22–29)
CO2 SERPL-SCNC: 19 MMOL/L — LOW (ref 22–29)
CO2 SERPL-SCNC: 20 MMOL/L — LOW (ref 22–29)
CO2 SERPL-SCNC: 21 MMOL/L — LOW (ref 22–29)
CO2 SERPL-SCNC: 22 MMOL/L — SIGNIFICANT CHANGE UP (ref 22–29)
CO2 SERPL-SCNC: 23 MMOL/L — SIGNIFICANT CHANGE UP (ref 22–29)
CO2 SERPL-SCNC: 24 MMOL/L — SIGNIFICANT CHANGE UP (ref 22–29)
CO2 SERPL-SCNC: 26 MMOL/L — SIGNIFICANT CHANGE UP (ref 22–29)
COLOR SPEC: YELLOW — SIGNIFICANT CHANGE UP
COMMENT - URINE: SIGNIFICANT CHANGE UP
COMMENT - URINE: SIGNIFICANT CHANGE UP
CREAT SERPL-MCNC: 0.57 MG/DL — SIGNIFICANT CHANGE UP (ref 0.5–1.3)
CREAT SERPL-MCNC: 0.61 MG/DL — SIGNIFICANT CHANGE UP (ref 0.5–1.3)
CREAT SERPL-MCNC: 0.61 MG/DL — SIGNIFICANT CHANGE UP (ref 0.5–1.3)
CREAT SERPL-MCNC: 0.63 MG/DL — SIGNIFICANT CHANGE UP (ref 0.5–1.3)
CREAT SERPL-MCNC: 0.63 MG/DL — SIGNIFICANT CHANGE UP (ref 0.5–1.3)
CREAT SERPL-MCNC: 0.64 MG/DL — SIGNIFICANT CHANGE UP (ref 0.5–1.3)
CREAT SERPL-MCNC: 0.65 MG/DL — SIGNIFICANT CHANGE UP (ref 0.5–1.3)
CREAT SERPL-MCNC: 0.65 MG/DL — SIGNIFICANT CHANGE UP (ref 0.5–1.3)
CREAT SERPL-MCNC: 0.66 MG/DL — SIGNIFICANT CHANGE UP (ref 0.5–1.3)
CREAT SERPL-MCNC: 0.67 MG/DL — SIGNIFICANT CHANGE UP (ref 0.5–1.3)
CREAT SERPL-MCNC: 0.67 MG/DL — SIGNIFICANT CHANGE UP (ref 0.5–1.3)
CREAT SERPL-MCNC: 0.7 MG/DL — SIGNIFICANT CHANGE UP (ref 0.5–1.3)
CREAT SERPL-MCNC: 0.71 MG/DL — SIGNIFICANT CHANGE UP (ref 0.5–1.3)
CREAT SERPL-MCNC: 0.74 MG/DL — SIGNIFICANT CHANGE UP (ref 0.5–1.3)
CREAT SERPL-MCNC: 0.75 MG/DL — SIGNIFICANT CHANGE UP (ref 0.5–1.3)
CREAT SERPL-MCNC: 0.77 MG/DL — SIGNIFICANT CHANGE UP (ref 0.5–1.3)
CREAT SERPL-MCNC: 0.77 MG/DL — SIGNIFICANT CHANGE UP (ref 0.5–1.3)
CREAT SERPL-MCNC: 0.78 MG/DL — SIGNIFICANT CHANGE UP (ref 0.5–1.3)
CREAT SERPL-MCNC: 0.83 MG/DL — SIGNIFICANT CHANGE UP (ref 0.5–1.3)
CREAT SERPL-MCNC: 0.85 MG/DL — SIGNIFICANT CHANGE UP (ref 0.5–1.3)
CREAT SERPL-MCNC: 0.86 MG/DL — SIGNIFICANT CHANGE UP (ref 0.5–1.3)
CREAT SERPL-MCNC: 0.89 MG/DL — SIGNIFICANT CHANGE UP (ref 0.5–1.3)
CREAT SERPL-MCNC: 0.99 MG/DL — SIGNIFICANT CHANGE UP (ref 0.5–1.3)
CREAT SERPL-MCNC: 1.05 MG/DL — SIGNIFICANT CHANGE UP (ref 0.5–1.3)
CREAT SERPL-MCNC: 1.12 MG/DL — SIGNIFICANT CHANGE UP (ref 0.5–1.3)
CULTURE RESULTS: NO GROWTH — SIGNIFICANT CHANGE UP
CULTURE RESULTS: SIGNIFICANT CHANGE UP
DIFF PNL FLD: ABNORMAL
DIFF PNL FLD: ABNORMAL
DIFF PNL FLD: NEGATIVE — SIGNIFICANT CHANGE UP
EGFR: 48 ML/MIN/1.73M2 — LOW
EGFR: 51 ML/MIN/1.73M2 — LOW
EGFR: 55 ML/MIN/1.73M2 — LOW
EGFR: 63 ML/MIN/1.73M2 — SIGNIFICANT CHANGE UP
EGFR: 65 ML/MIN/1.73M2 — SIGNIFICANT CHANGE UP
EGFR: 66 ML/MIN/1.73M2 — SIGNIFICANT CHANGE UP
EGFR: 68 ML/MIN/1.73M2 — SIGNIFICANT CHANGE UP
EGFR: 73 ML/MIN/1.73M2 — SIGNIFICANT CHANGE UP
EGFR: 75 ML/MIN/1.73M2 — SIGNIFICANT CHANGE UP
EGFR: 75 ML/MIN/1.73M2 — SIGNIFICANT CHANGE UP
EGFR: 77 ML/MIN/1.73M2 — SIGNIFICANT CHANGE UP
EGFR: 78 ML/MIN/1.73M2 — SIGNIFICANT CHANGE UP
EGFR: 82 ML/MIN/1.73M2 — SIGNIFICANT CHANGE UP
EGFR: 84 ML/MIN/1.73M2 — SIGNIFICANT CHANGE UP
EGFR: 85 ML/MIN/1.73M2 — SIGNIFICANT CHANGE UP
EGFR: 86 ML/MIN/1.73M2 — SIGNIFICANT CHANGE UP
EGFR: 88 ML/MIN/1.73M2 — SIGNIFICANT CHANGE UP
EOSINOPHIL # BLD AUTO: 0 K/UL — SIGNIFICANT CHANGE UP (ref 0–0.5)
EOSINOPHIL # BLD AUTO: 0.16 K/UL — SIGNIFICANT CHANGE UP (ref 0–0.5)
EOSINOPHIL # BLD AUTO: 0.24 K/UL — SIGNIFICANT CHANGE UP (ref 0–0.5)
EOSINOPHIL # BLD AUTO: 0.3 K/UL — SIGNIFICANT CHANGE UP (ref 0–0.5)
EOSINOPHIL # BLD AUTO: 0.31 K/UL — SIGNIFICANT CHANGE UP (ref 0–0.5)
EOSINOPHIL # BLD AUTO: 0.38 K/UL — SIGNIFICANT CHANGE UP (ref 0–0.5)
EOSINOPHIL # BLD AUTO: 0.41 K/UL — SIGNIFICANT CHANGE UP (ref 0–0.5)
EOSINOPHIL # BLD AUTO: 0.45 K/UL — SIGNIFICANT CHANGE UP (ref 0–0.5)
EOSINOPHIL # BLD AUTO: 0.52 K/UL — HIGH (ref 0–0.5)
EOSINOPHIL # BLD AUTO: 0.66 K/UL — HIGH (ref 0–0.5)
EOSINOPHIL NFR BLD AUTO: 0 % — SIGNIFICANT CHANGE UP (ref 0–6)
EOSINOPHIL NFR BLD AUTO: 1.4 % — SIGNIFICANT CHANGE UP (ref 0–6)
EOSINOPHIL NFR BLD AUTO: 1.7 % — SIGNIFICANT CHANGE UP (ref 0–6)
EOSINOPHIL NFR BLD AUTO: 10 % — HIGH (ref 0–6)
EOSINOPHIL NFR BLD AUTO: 2.2 % — SIGNIFICANT CHANGE UP (ref 0–6)
EOSINOPHIL NFR BLD AUTO: 3.9 % — SIGNIFICANT CHANGE UP (ref 0–6)
EOSINOPHIL NFR BLD AUTO: 4.3 % — SIGNIFICANT CHANGE UP (ref 0–6)
EOSINOPHIL NFR BLD AUTO: 5.1 % — SIGNIFICANT CHANGE UP (ref 0–6)
EOSINOPHIL NFR BLD AUTO: 5.6 % — SIGNIFICANT CHANGE UP (ref 0–6)
EOSINOPHIL NFR BLD AUTO: 6.3 % — HIGH (ref 0–6)
EPI CELLS # UR: ABNORMAL
EPI CELLS # UR: SIGNIFICANT CHANGE UP
EPI CELLS # UR: SIGNIFICANT CHANGE UP
ESTIMATED AVERAGE GLUCOSE: 194 MG/DL — HIGH (ref 68–114)
ESTIMATED AVERAGE GLUCOSE: 263 MG/DL — HIGH (ref 68–114)
FERRITIN SERPL-MCNC: 85 NG/ML — SIGNIFICANT CHANGE UP (ref 15–150)
GAS PNL BLDA: SIGNIFICANT CHANGE UP
GAS PNL BLDV: 132 MMOL/L — LOW (ref 136–145)
GAS PNL BLDV: SIGNIFICANT CHANGE UP
GIANT PLATELETS BLD QL SMEAR: PRESENT — SIGNIFICANT CHANGE UP
GLUCOSE BLDC GLUCOMTR-MCNC: 101 MG/DL — HIGH (ref 70–99)
GLUCOSE BLDC GLUCOMTR-MCNC: 103 MG/DL — HIGH (ref 70–99)
GLUCOSE BLDC GLUCOMTR-MCNC: 105 MG/DL — HIGH (ref 70–99)
GLUCOSE BLDC GLUCOMTR-MCNC: 108 MG/DL — HIGH (ref 70–99)
GLUCOSE BLDC GLUCOMTR-MCNC: 113 MG/DL — HIGH (ref 70–99)
GLUCOSE BLDC GLUCOMTR-MCNC: 116 MG/DL — HIGH (ref 70–99)
GLUCOSE BLDC GLUCOMTR-MCNC: 117 MG/DL — HIGH (ref 70–99)
GLUCOSE BLDC GLUCOMTR-MCNC: 118 MG/DL — HIGH (ref 70–99)
GLUCOSE BLDC GLUCOMTR-MCNC: 118 MG/DL — HIGH (ref 70–99)
GLUCOSE BLDC GLUCOMTR-MCNC: 119 MG/DL — HIGH (ref 70–99)
GLUCOSE BLDC GLUCOMTR-MCNC: 121 MG/DL — HIGH (ref 70–99)
GLUCOSE BLDC GLUCOMTR-MCNC: 122 MG/DL — HIGH (ref 70–99)
GLUCOSE BLDC GLUCOMTR-MCNC: 123 MG/DL — HIGH (ref 70–99)
GLUCOSE BLDC GLUCOMTR-MCNC: 124 MG/DL — HIGH (ref 70–99)
GLUCOSE BLDC GLUCOMTR-MCNC: 129 MG/DL — HIGH (ref 70–99)
GLUCOSE BLDC GLUCOMTR-MCNC: 129 MG/DL — HIGH (ref 70–99)
GLUCOSE BLDC GLUCOMTR-MCNC: 137 MG/DL — HIGH (ref 70–99)
GLUCOSE BLDC GLUCOMTR-MCNC: 137 MG/DL — HIGH (ref 70–99)
GLUCOSE BLDC GLUCOMTR-MCNC: 138 MG/DL — HIGH (ref 70–99)
GLUCOSE BLDC GLUCOMTR-MCNC: 142 MG/DL — HIGH (ref 70–99)
GLUCOSE BLDC GLUCOMTR-MCNC: 146 MG/DL — HIGH (ref 70–99)
GLUCOSE BLDC GLUCOMTR-MCNC: 148 MG/DL — HIGH (ref 70–99)
GLUCOSE BLDC GLUCOMTR-MCNC: 151 MG/DL — HIGH (ref 70–99)
GLUCOSE BLDC GLUCOMTR-MCNC: 151 MG/DL — HIGH (ref 70–99)
GLUCOSE BLDC GLUCOMTR-MCNC: 153 MG/DL — HIGH (ref 70–99)
GLUCOSE BLDC GLUCOMTR-MCNC: 153 MG/DL — HIGH (ref 70–99)
GLUCOSE BLDC GLUCOMTR-MCNC: 155 MG/DL — HIGH (ref 70–99)
GLUCOSE BLDC GLUCOMTR-MCNC: 155 MG/DL — HIGH (ref 70–99)
GLUCOSE BLDC GLUCOMTR-MCNC: 161 MG/DL — HIGH (ref 70–99)
GLUCOSE BLDC GLUCOMTR-MCNC: 161 MG/DL — HIGH (ref 70–99)
GLUCOSE BLDC GLUCOMTR-MCNC: 162 MG/DL — HIGH (ref 70–99)
GLUCOSE BLDC GLUCOMTR-MCNC: 164 MG/DL — HIGH (ref 70–99)
GLUCOSE BLDC GLUCOMTR-MCNC: 165 MG/DL — HIGH (ref 70–99)
GLUCOSE BLDC GLUCOMTR-MCNC: 165 MG/DL — HIGH (ref 70–99)
GLUCOSE BLDC GLUCOMTR-MCNC: 166 MG/DL — HIGH (ref 70–99)
GLUCOSE BLDC GLUCOMTR-MCNC: 167 MG/DL — HIGH (ref 70–99)
GLUCOSE BLDC GLUCOMTR-MCNC: 167 MG/DL — HIGH (ref 70–99)
GLUCOSE BLDC GLUCOMTR-MCNC: 168 MG/DL — HIGH (ref 70–99)
GLUCOSE BLDC GLUCOMTR-MCNC: 169 MG/DL — HIGH (ref 70–99)
GLUCOSE BLDC GLUCOMTR-MCNC: 170 MG/DL — HIGH (ref 70–99)
GLUCOSE BLDC GLUCOMTR-MCNC: 171 MG/DL — HIGH (ref 70–99)
GLUCOSE BLDC GLUCOMTR-MCNC: 175 MG/DL — HIGH (ref 70–99)
GLUCOSE BLDC GLUCOMTR-MCNC: 176 MG/DL — HIGH (ref 70–99)
GLUCOSE BLDC GLUCOMTR-MCNC: 178 MG/DL — HIGH (ref 70–99)
GLUCOSE BLDC GLUCOMTR-MCNC: 178 MG/DL — HIGH (ref 70–99)
GLUCOSE BLDC GLUCOMTR-MCNC: 179 MG/DL — HIGH (ref 70–99)
GLUCOSE BLDC GLUCOMTR-MCNC: 179 MG/DL — HIGH (ref 70–99)
GLUCOSE BLDC GLUCOMTR-MCNC: 180 MG/DL — HIGH (ref 70–99)
GLUCOSE BLDC GLUCOMTR-MCNC: 181 MG/DL — HIGH (ref 70–99)
GLUCOSE BLDC GLUCOMTR-MCNC: 184 MG/DL — HIGH (ref 70–99)
GLUCOSE BLDC GLUCOMTR-MCNC: 185 MG/DL — HIGH (ref 70–99)
GLUCOSE BLDC GLUCOMTR-MCNC: 186 MG/DL — HIGH (ref 70–99)
GLUCOSE BLDC GLUCOMTR-MCNC: 186 MG/DL — HIGH (ref 70–99)
GLUCOSE BLDC GLUCOMTR-MCNC: 187 MG/DL — HIGH (ref 70–99)
GLUCOSE BLDC GLUCOMTR-MCNC: 187 MG/DL — HIGH (ref 70–99)
GLUCOSE BLDC GLUCOMTR-MCNC: 188 MG/DL — HIGH (ref 70–99)
GLUCOSE BLDC GLUCOMTR-MCNC: 189 MG/DL — HIGH (ref 70–99)
GLUCOSE BLDC GLUCOMTR-MCNC: 189 MG/DL — HIGH (ref 70–99)
GLUCOSE BLDC GLUCOMTR-MCNC: 190 MG/DL — HIGH (ref 70–99)
GLUCOSE BLDC GLUCOMTR-MCNC: 191 MG/DL — HIGH (ref 70–99)
GLUCOSE BLDC GLUCOMTR-MCNC: 193 MG/DL — HIGH (ref 70–99)
GLUCOSE BLDC GLUCOMTR-MCNC: 193 MG/DL — HIGH (ref 70–99)
GLUCOSE BLDC GLUCOMTR-MCNC: 196 MG/DL — HIGH (ref 70–99)
GLUCOSE BLDC GLUCOMTR-MCNC: 197 MG/DL — HIGH (ref 70–99)
GLUCOSE BLDC GLUCOMTR-MCNC: 199 MG/DL — HIGH (ref 70–99)
GLUCOSE BLDC GLUCOMTR-MCNC: 201 MG/DL — HIGH (ref 70–99)
GLUCOSE BLDC GLUCOMTR-MCNC: 202 MG/DL — HIGH (ref 70–99)
GLUCOSE BLDC GLUCOMTR-MCNC: 202 MG/DL — HIGH (ref 70–99)
GLUCOSE BLDC GLUCOMTR-MCNC: 203 MG/DL — HIGH (ref 70–99)
GLUCOSE BLDC GLUCOMTR-MCNC: 203 MG/DL — HIGH (ref 70–99)
GLUCOSE BLDC GLUCOMTR-MCNC: 204 MG/DL — HIGH (ref 70–99)
GLUCOSE BLDC GLUCOMTR-MCNC: 205 MG/DL — HIGH (ref 70–99)
GLUCOSE BLDC GLUCOMTR-MCNC: 209 MG/DL — HIGH (ref 70–99)
GLUCOSE BLDC GLUCOMTR-MCNC: 212 MG/DL — HIGH (ref 70–99)
GLUCOSE BLDC GLUCOMTR-MCNC: 213 MG/DL — HIGH (ref 70–99)
GLUCOSE BLDC GLUCOMTR-MCNC: 213 MG/DL — HIGH (ref 70–99)
GLUCOSE BLDC GLUCOMTR-MCNC: 214 MG/DL — HIGH (ref 70–99)
GLUCOSE BLDC GLUCOMTR-MCNC: 214 MG/DL — HIGH (ref 70–99)
GLUCOSE BLDC GLUCOMTR-MCNC: 215 MG/DL — HIGH (ref 70–99)
GLUCOSE BLDC GLUCOMTR-MCNC: 217 MG/DL — HIGH (ref 70–99)
GLUCOSE BLDC GLUCOMTR-MCNC: 219 MG/DL — HIGH (ref 70–99)
GLUCOSE BLDC GLUCOMTR-MCNC: 222 MG/DL — HIGH (ref 70–99)
GLUCOSE BLDC GLUCOMTR-MCNC: 222 MG/DL — HIGH (ref 70–99)
GLUCOSE BLDC GLUCOMTR-MCNC: 231 MG/DL — HIGH (ref 70–99)
GLUCOSE BLDC GLUCOMTR-MCNC: 242 MG/DL — HIGH (ref 70–99)
GLUCOSE BLDC GLUCOMTR-MCNC: 252 MG/DL — HIGH (ref 70–99)
GLUCOSE BLDC GLUCOMTR-MCNC: 254 MG/DL — HIGH (ref 70–99)
GLUCOSE BLDC GLUCOMTR-MCNC: 274 MG/DL — HIGH (ref 70–99)
GLUCOSE BLDC GLUCOMTR-MCNC: 274 MG/DL — HIGH (ref 70–99)
GLUCOSE BLDC GLUCOMTR-MCNC: 312 MG/DL — HIGH (ref 70–99)
GLUCOSE BLDC GLUCOMTR-MCNC: 313 MG/DL — HIGH (ref 70–99)
GLUCOSE BLDC GLUCOMTR-MCNC: 322 MG/DL — HIGH (ref 70–99)
GLUCOSE BLDC GLUCOMTR-MCNC: 360 MG/DL — HIGH (ref 70–99)
GLUCOSE BLDC GLUCOMTR-MCNC: 378 MG/DL — HIGH (ref 70–99)
GLUCOSE BLDC GLUCOMTR-MCNC: 388 MG/DL — HIGH (ref 70–99)
GLUCOSE BLDC GLUCOMTR-MCNC: 408 MG/DL — HIGH (ref 70–99)
GLUCOSE BLDC GLUCOMTR-MCNC: 425 MG/DL — HIGH (ref 70–99)
GLUCOSE BLDC GLUCOMTR-MCNC: 44 MG/DL — CRITICAL LOW (ref 70–99)
GLUCOSE BLDC GLUCOMTR-MCNC: 62 MG/DL — LOW (ref 70–99)
GLUCOSE BLDC GLUCOMTR-MCNC: 68 MG/DL — LOW (ref 70–99)
GLUCOSE BLDC GLUCOMTR-MCNC: 69 MG/DL — LOW (ref 70–99)
GLUCOSE BLDC GLUCOMTR-MCNC: 73 MG/DL — SIGNIFICANT CHANGE UP (ref 70–99)
GLUCOSE BLDC GLUCOMTR-MCNC: 77 MG/DL — SIGNIFICANT CHANGE UP (ref 70–99)
GLUCOSE BLDC GLUCOMTR-MCNC: 81 MG/DL — SIGNIFICANT CHANGE UP (ref 70–99)
GLUCOSE BLDC GLUCOMTR-MCNC: 86 MG/DL — SIGNIFICANT CHANGE UP (ref 70–99)
GLUCOSE BLDC GLUCOMTR-MCNC: 93 MG/DL — SIGNIFICANT CHANGE UP (ref 70–99)
GLUCOSE BLDC GLUCOMTR-MCNC: 94 MG/DL — SIGNIFICANT CHANGE UP (ref 70–99)
GLUCOSE BLDC GLUCOMTR-MCNC: 97 MG/DL — SIGNIFICANT CHANGE UP (ref 70–99)
GLUCOSE BLDV-MCNC: 369 MG/DL — HIGH (ref 70–99)
GLUCOSE SERPL-MCNC: 104 MG/DL — HIGH (ref 70–99)
GLUCOSE SERPL-MCNC: 116 MG/DL — HIGH (ref 70–99)
GLUCOSE SERPL-MCNC: 123 MG/DL — HIGH (ref 70–99)
GLUCOSE SERPL-MCNC: 125 MG/DL — HIGH (ref 70–99)
GLUCOSE SERPL-MCNC: 143 MG/DL — HIGH (ref 70–99)
GLUCOSE SERPL-MCNC: 145 MG/DL — HIGH (ref 70–99)
GLUCOSE SERPL-MCNC: 164 MG/DL — HIGH (ref 70–99)
GLUCOSE SERPL-MCNC: 166 MG/DL — HIGH (ref 70–99)
GLUCOSE SERPL-MCNC: 167 MG/DL — HIGH (ref 70–99)
GLUCOSE SERPL-MCNC: 182 MG/DL — HIGH (ref 70–99)
GLUCOSE SERPL-MCNC: 184 MG/DL — HIGH (ref 70–99)
GLUCOSE SERPL-MCNC: 185 MG/DL — HIGH (ref 70–99)
GLUCOSE SERPL-MCNC: 187 MG/DL — HIGH (ref 70–99)
GLUCOSE SERPL-MCNC: 190 MG/DL — HIGH (ref 70–99)
GLUCOSE SERPL-MCNC: 201 MG/DL — HIGH (ref 70–99)
GLUCOSE SERPL-MCNC: 206 MG/DL — HIGH (ref 70–99)
GLUCOSE SERPL-MCNC: 209 MG/DL — HIGH (ref 70–99)
GLUCOSE SERPL-MCNC: 210 MG/DL — HIGH (ref 70–99)
GLUCOSE SERPL-MCNC: 211 MG/DL — HIGH (ref 70–99)
GLUCOSE SERPL-MCNC: 218 MG/DL — HIGH (ref 70–99)
GLUCOSE SERPL-MCNC: 221 MG/DL — HIGH (ref 70–99)
GLUCOSE SERPL-MCNC: 243 MG/DL — HIGH (ref 70–99)
GLUCOSE SERPL-MCNC: 244 MG/DL — HIGH (ref 70–99)
GLUCOSE SERPL-MCNC: 318 MG/DL — HIGH (ref 70–99)
GLUCOSE SERPL-MCNC: 321 MG/DL — HIGH (ref 70–99)
GLUCOSE SERPL-MCNC: 358 MG/DL — HIGH (ref 70–99)
GLUCOSE SERPL-MCNC: 431 MG/DL — HIGH (ref 70–99)
GLUCOSE SERPL-MCNC: 46 MG/DL — CRITICAL LOW (ref 70–99)
GLUCOSE SERPL-MCNC: 72 MG/DL — SIGNIFICANT CHANGE UP (ref 70–99)
GLUCOSE SERPL-MCNC: 75 MG/DL — SIGNIFICANT CHANGE UP (ref 70–99)
GLUCOSE SERPL-MCNC: 92 MG/DL — SIGNIFICANT CHANGE UP (ref 70–99)
GLUCOSE UR QL: 1000 MG/DL
GLUCOSE UR QL: 250 MG/DL
GLUCOSE UR QL: NEGATIVE MG/DL — SIGNIFICANT CHANGE UP
HCO3 BLDA-SCNC: 20 MMOL/L — LOW (ref 21–28)
HCO3 BLDV-SCNC: 28 MMOL/L — SIGNIFICANT CHANGE UP (ref 22–29)
HCT VFR BLD CALC: 24.6 % — LOW (ref 34.5–45)
HCT VFR BLD CALC: 25.8 % — LOW (ref 34.5–45)
HCT VFR BLD CALC: 26.7 % — LOW (ref 34.5–45)
HCT VFR BLD CALC: 27.5 % — LOW (ref 34.5–45)
HCT VFR BLD CALC: 28.5 % — LOW (ref 34.5–45)
HCT VFR BLD CALC: 29.5 % — LOW (ref 34.5–45)
HCT VFR BLD CALC: 29.6 % — LOW (ref 34.5–45)
HCT VFR BLD CALC: 29.7 % — LOW (ref 34.5–45)
HCT VFR BLD CALC: 29.9 % — LOW (ref 34.5–45)
HCT VFR BLD CALC: 29.9 % — LOW (ref 34.5–45)
HCT VFR BLD CALC: 30 % — LOW (ref 34.5–45)
HCT VFR BLD CALC: 30.1 % — LOW (ref 34.5–45)
HCT VFR BLD CALC: 30.2 % — LOW (ref 34.5–45)
HCT VFR BLD CALC: 30.5 % — LOW (ref 34.5–45)
HCT VFR BLD CALC: 31.1 % — LOW (ref 34.5–45)
HCT VFR BLD CALC: 31.2 % — LOW (ref 34.5–45)
HCT VFR BLD CALC: 31.4 % — LOW (ref 34.5–45)
HCT VFR BLD CALC: 31.4 % — LOW (ref 34.5–45)
HCT VFR BLD CALC: 31.6 % — LOW (ref 34.5–45)
HCT VFR BLD CALC: 31.6 % — LOW (ref 34.5–45)
HCT VFR BLD CALC: 31.7 % — LOW (ref 34.5–45)
HCT VFR BLD CALC: 31.9 % — LOW (ref 34.5–45)
HCT VFR BLD CALC: 32.5 % — LOW (ref 34.5–45)
HCT VFR BLD CALC: 32.8 % — LOW (ref 34.5–45)
HCT VFR BLD CALC: 32.9 % — LOW (ref 34.5–45)
HCT VFR BLD CALC: 33 % — LOW (ref 34.5–45)
HCT VFR BLD CALC: 35.1 % — SIGNIFICANT CHANGE UP (ref 34.5–45)
HCT VFR BLD CALC: 35.2 % — SIGNIFICANT CHANGE UP (ref 34.5–45)
HCT VFR BLD CALC: 37.9 % — SIGNIFICANT CHANGE UP (ref 34.5–45)
HCT VFR BLDA CALC: 37 % — SIGNIFICANT CHANGE UP
HDLC SERPL-MCNC: 16 MG/DL — LOW
HGB BLD CALC-MCNC: 12.2 G/DL — SIGNIFICANT CHANGE UP (ref 11.7–16.1)
HGB BLD-MCNC: 10 G/DL — LOW (ref 11.5–15.5)
HGB BLD-MCNC: 10.2 G/DL — LOW (ref 11.5–15.5)
HGB BLD-MCNC: 10.4 G/DL — LOW (ref 11.5–15.5)
HGB BLD-MCNC: 11.3 G/DL — LOW (ref 11.5–15.5)
HGB BLD-MCNC: 11.5 G/DL — SIGNIFICANT CHANGE UP (ref 11.5–15.5)
HGB BLD-MCNC: 7.5 G/DL — LOW (ref 11.5–15.5)
HGB BLD-MCNC: 7.8 G/DL — LOW (ref 11.5–15.5)
HGB BLD-MCNC: 8.1 G/DL — LOW (ref 11.5–15.5)
HGB BLD-MCNC: 8.1 G/DL — LOW (ref 11.5–15.5)
HGB BLD-MCNC: 8.8 G/DL — LOW (ref 11.5–15.5)
HGB BLD-MCNC: 8.8 G/DL — LOW (ref 11.5–15.5)
HGB BLD-MCNC: 9 G/DL — LOW (ref 11.5–15.5)
HGB BLD-MCNC: 9.2 G/DL — LOW (ref 11.5–15.5)
HGB BLD-MCNC: 9.3 G/DL — LOW (ref 11.5–15.5)
HGB BLD-MCNC: 9.4 G/DL — LOW (ref 11.5–15.5)
HGB BLD-MCNC: 9.5 G/DL — LOW (ref 11.5–15.5)
HGB BLD-MCNC: 9.6 G/DL — LOW (ref 11.5–15.5)
HGB BLD-MCNC: 9.7 G/DL — LOW (ref 11.5–15.5)
HGB BLD-MCNC: 9.7 G/DL — LOW (ref 11.5–15.5)
HGB BLD-MCNC: 9.8 G/DL — LOW (ref 11.5–15.5)
HOROWITZ INDEX BLDA+IHG-RTO: 3 — SIGNIFICANT CHANGE UP
IMM GRANULOCYTES NFR BLD AUTO: 0.2 % — SIGNIFICANT CHANGE UP (ref 0–1.5)
IMM GRANULOCYTES NFR BLD AUTO: 0.3 % — SIGNIFICANT CHANGE UP (ref 0–1.5)
IMM GRANULOCYTES NFR BLD AUTO: 0.5 % — SIGNIFICANT CHANGE UP (ref 0–1.5)
IMM GRANULOCYTES NFR BLD AUTO: 0.5 % — SIGNIFICANT CHANGE UP (ref 0–1.5)
IMM GRANULOCYTES NFR BLD AUTO: 0.8 % — SIGNIFICANT CHANGE UP (ref 0–1.5)
IMM GRANULOCYTES NFR BLD AUTO: 0.9 % — SIGNIFICANT CHANGE UP (ref 0–1.5)
IMM GRANULOCYTES NFR BLD AUTO: 1.3 % — SIGNIFICANT CHANGE UP (ref 0–1.5)
IMM GRANULOCYTES NFR BLD AUTO: 1.5 % — SIGNIFICANT CHANGE UP (ref 0–1.5)
IMM GRANULOCYTES NFR BLD AUTO: 1.6 % — HIGH (ref 0–1.5)
INR BLD: 1.07 RATIO — SIGNIFICANT CHANGE UP (ref 0.88–1.16)
INR BLD: 1.09 RATIO — SIGNIFICANT CHANGE UP (ref 0.88–1.16)
IRON SATN MFR SERPL: 20 UG/DL — LOW (ref 37–145)
IRON SATN MFR SERPL: 7 % — LOW (ref 14–50)
KETONES UR-MCNC: ABNORMAL
KETONES UR-MCNC: ABNORMAL
KETONES UR-MCNC: NEGATIVE — SIGNIFICANT CHANGE UP
LACTATE BLDV-MCNC: 1.8 MMOL/L — SIGNIFICANT CHANGE UP (ref 0.5–2)
LACTATE BLDV-MCNC: 2.2 MMOL/L — HIGH (ref 0.5–2)
LACTATE SERPL-SCNC: 1.2 MMOL/L — SIGNIFICANT CHANGE UP (ref 0.5–2)
LACTATE SERPL-SCNC: 2.1 MMOL/L — HIGH (ref 0.5–2)
LEUKOCYTE ESTERASE UR-ACNC: ABNORMAL
LEUKOCYTE ESTERASE UR-ACNC: ABNORMAL
LEUKOCYTE ESTERASE UR-ACNC: NEGATIVE — SIGNIFICANT CHANGE UP
LIPID PNL WITH DIRECT LDL SERPL: 100 MG/DL — HIGH
LYMPHOCYTES # BLD AUTO: 1.42 K/UL — SIGNIFICANT CHANGE UP (ref 1–3.3)
LYMPHOCYTES # BLD AUTO: 1.5 K/UL — SIGNIFICANT CHANGE UP (ref 1–3.3)
LYMPHOCYTES # BLD AUTO: 1.78 K/UL — SIGNIFICANT CHANGE UP (ref 1–3.3)
LYMPHOCYTES # BLD AUTO: 1.79 K/UL — SIGNIFICANT CHANGE UP (ref 1–3.3)
LYMPHOCYTES # BLD AUTO: 1.96 K/UL — SIGNIFICANT CHANGE UP (ref 1–3.3)
LYMPHOCYTES # BLD AUTO: 12.2 % — LOW (ref 13–44)
LYMPHOCYTES # BLD AUTO: 12.8 % — LOW (ref 13–44)
LYMPHOCYTES # BLD AUTO: 16.2 % — SIGNIFICANT CHANGE UP (ref 13–44)
LYMPHOCYTES # BLD AUTO: 18 % — SIGNIFICANT CHANGE UP (ref 13–44)
LYMPHOCYTES # BLD AUTO: 2.08 K/UL — SIGNIFICANT CHANGE UP (ref 1–3.3)
LYMPHOCYTES # BLD AUTO: 2.14 K/UL — SIGNIFICANT CHANGE UP (ref 1–3.3)
LYMPHOCYTES # BLD AUTO: 2.87 K/UL — SIGNIFICANT CHANGE UP (ref 1–3.3)
LYMPHOCYTES # BLD AUTO: 2.92 K/UL — SIGNIFICANT CHANGE UP (ref 1–3.3)
LYMPHOCYTES # BLD AUTO: 2.93 K/UL — SIGNIFICANT CHANGE UP (ref 1–3.3)
LYMPHOCYTES # BLD AUTO: 23.6 % — SIGNIFICANT CHANGE UP (ref 13–44)
LYMPHOCYTES # BLD AUTO: 25.1 % — SIGNIFICANT CHANGE UP (ref 13–44)
LYMPHOCYTES # BLD AUTO: 25.6 % — SIGNIFICANT CHANGE UP (ref 13–44)
LYMPHOCYTES # BLD AUTO: 26.9 % — SIGNIFICANT CHANGE UP (ref 13–44)
LYMPHOCYTES # BLD AUTO: 27.9 % — SIGNIFICANT CHANGE UP (ref 13–44)
LYMPHOCYTES # BLD AUTO: 34.6 % — SIGNIFICANT CHANGE UP (ref 13–44)
MAGNESIUM SERPL-MCNC: 1.5 MG/DL — LOW (ref 1.6–2.6)
MAGNESIUM SERPL-MCNC: 1.5 MG/DL — LOW (ref 1.6–2.6)
MAGNESIUM SERPL-MCNC: 1.6 MG/DL — SIGNIFICANT CHANGE UP (ref 1.6–2.6)
MAGNESIUM SERPL-MCNC: 1.7 MG/DL — SIGNIFICANT CHANGE UP (ref 1.6–2.6)
MAGNESIUM SERPL-MCNC: 1.8 MG/DL — SIGNIFICANT CHANGE UP (ref 1.6–2.6)
MAGNESIUM SERPL-MCNC: 2 MG/DL — SIGNIFICANT CHANGE UP (ref 1.6–2.6)
MAGNESIUM SERPL-MCNC: 2 MG/DL — SIGNIFICANT CHANGE UP (ref 1.8–2.6)
MAGNESIUM SERPL-MCNC: 2.1 MG/DL — SIGNIFICANT CHANGE UP (ref 1.6–2.6)
MAGNESIUM SERPL-MCNC: 2.1 MG/DL — SIGNIFICANT CHANGE UP (ref 1.8–2.6)
MAGNESIUM SERPL-MCNC: 2.2 MG/DL — SIGNIFICANT CHANGE UP (ref 1.6–2.6)
MAGNESIUM SERPL-MCNC: 2.3 MG/DL — SIGNIFICANT CHANGE UP (ref 1.6–2.6)
MAGNESIUM SERPL-MCNC: 2.4 MG/DL — SIGNIFICANT CHANGE UP (ref 1.6–2.6)
MANUAL SMEAR VERIFICATION: SIGNIFICANT CHANGE UP
MCHC RBC-ENTMCNC: 24.4 PG — LOW (ref 27–34)
MCHC RBC-ENTMCNC: 24.5 PG — LOW (ref 27–34)
MCHC RBC-ENTMCNC: 24.7 PG — LOW (ref 27–34)
MCHC RBC-ENTMCNC: 24.7 PG — LOW (ref 27–34)
MCHC RBC-ENTMCNC: 24.9 PG — LOW (ref 27–34)
MCHC RBC-ENTMCNC: 25.3 PG — LOW (ref 27–34)
MCHC RBC-ENTMCNC: 25.3 PG — LOW (ref 27–34)
MCHC RBC-ENTMCNC: 25.5 PG — LOW (ref 27–34)
MCHC RBC-ENTMCNC: 25.5 PG — LOW (ref 27–34)
MCHC RBC-ENTMCNC: 25.8 PG — LOW (ref 27–34)
MCHC RBC-ENTMCNC: 25.9 PG — LOW (ref 27–34)
MCHC RBC-ENTMCNC: 26 PG — LOW (ref 27–34)
MCHC RBC-ENTMCNC: 26.1 PG — LOW (ref 27–34)
MCHC RBC-ENTMCNC: 26.2 PG — LOW (ref 27–34)
MCHC RBC-ENTMCNC: 26.4 PG — LOW (ref 27–34)
MCHC RBC-ENTMCNC: 26.4 PG — LOW (ref 27–34)
MCHC RBC-ENTMCNC: 26.5 PG — LOW (ref 27–34)
MCHC RBC-ENTMCNC: 26.6 PG — LOW (ref 27–34)
MCHC RBC-ENTMCNC: 26.7 PG — LOW (ref 27–34)
MCHC RBC-ENTMCNC: 26.9 PG — LOW (ref 27–34)
MCHC RBC-ENTMCNC: 29.4 GM/DL — LOW (ref 32–36)
MCHC RBC-ENTMCNC: 29.4 GM/DL — LOW (ref 32–36)
MCHC RBC-ENTMCNC: 29.5 GM/DL — LOW (ref 32–36)
MCHC RBC-ENTMCNC: 29.5 GM/DL — LOW (ref 32–36)
MCHC RBC-ENTMCNC: 29.8 GM/DL — LOW (ref 32–36)
MCHC RBC-ENTMCNC: 30 GM/DL — LOW (ref 32–36)
MCHC RBC-ENTMCNC: 30.1 GM/DL — LOW (ref 32–36)
MCHC RBC-ENTMCNC: 30.2 GM/DL — LOW (ref 32–36)
MCHC RBC-ENTMCNC: 30.2 GM/DL — LOW (ref 32–36)
MCHC RBC-ENTMCNC: 30.3 GM/DL — LOW (ref 32–36)
MCHC RBC-ENTMCNC: 30.4 GM/DL — LOW (ref 32–36)
MCHC RBC-ENTMCNC: 30.4 GM/DL — LOW (ref 32–36)
MCHC RBC-ENTMCNC: 30.5 GM/DL — LOW (ref 32–36)
MCHC RBC-ENTMCNC: 30.6 GM/DL — LOW (ref 32–36)
MCHC RBC-ENTMCNC: 30.7 GM/DL — LOW (ref 32–36)
MCHC RBC-ENTMCNC: 30.9 GM/DL — LOW (ref 32–36)
MCHC RBC-ENTMCNC: 31 GM/DL — LOW (ref 32–36)
MCHC RBC-ENTMCNC: 31 GM/DL — LOW (ref 32–36)
MCHC RBC-ENTMCNC: 31.1 GM/DL — LOW (ref 32–36)
MCHC RBC-ENTMCNC: 31.1 GM/DL — LOW (ref 32–36)
MCHC RBC-ENTMCNC: 31.4 GM/DL — LOW (ref 32–36)
MCHC RBC-ENTMCNC: 31.5 GM/DL — LOW (ref 32–36)
MCHC RBC-ENTMCNC: 31.8 GM/DL — LOW (ref 32–36)
MCHC RBC-ENTMCNC: 32.2 GM/DL — SIGNIFICANT CHANGE UP (ref 32–36)
MCV RBC AUTO: 77.3 FL — LOW (ref 80–100)
MCV RBC AUTO: 78.9 FL — LOW (ref 80–100)
MCV RBC AUTO: 80.3 FL — SIGNIFICANT CHANGE UP (ref 80–100)
MCV RBC AUTO: 80.4 FL — SIGNIFICANT CHANGE UP (ref 80–100)
MCV RBC AUTO: 80.9 FL — SIGNIFICANT CHANGE UP (ref 80–100)
MCV RBC AUTO: 81.4 FL — SIGNIFICANT CHANGE UP (ref 80–100)
MCV RBC AUTO: 81.7 FL — SIGNIFICANT CHANGE UP (ref 80–100)
MCV RBC AUTO: 82.6 FL — SIGNIFICANT CHANGE UP (ref 80–100)
MCV RBC AUTO: 82.8 FL — SIGNIFICANT CHANGE UP (ref 80–100)
MCV RBC AUTO: 83.1 FL — SIGNIFICANT CHANGE UP (ref 80–100)
MCV RBC AUTO: 83.2 FL — SIGNIFICANT CHANGE UP (ref 80–100)
MCV RBC AUTO: 83.3 FL — SIGNIFICANT CHANGE UP (ref 80–100)
MCV RBC AUTO: 83.3 FL — SIGNIFICANT CHANGE UP (ref 80–100)
MCV RBC AUTO: 84.3 FL — SIGNIFICANT CHANGE UP (ref 80–100)
MCV RBC AUTO: 84.4 FL — SIGNIFICANT CHANGE UP (ref 80–100)
MCV RBC AUTO: 84.7 FL — SIGNIFICANT CHANGE UP (ref 80–100)
MCV RBC AUTO: 85.3 FL — SIGNIFICANT CHANGE UP (ref 80–100)
MCV RBC AUTO: 85.7 FL — SIGNIFICANT CHANGE UP (ref 80–100)
MCV RBC AUTO: 86.1 FL — SIGNIFICANT CHANGE UP (ref 80–100)
MCV RBC AUTO: 86.7 FL — SIGNIFICANT CHANGE UP (ref 80–100)
MCV RBC AUTO: 86.8 FL — SIGNIFICANT CHANGE UP (ref 80–100)
MCV RBC AUTO: 86.8 FL — SIGNIFICANT CHANGE UP (ref 80–100)
MCV RBC AUTO: 87.1 FL — SIGNIFICANT CHANGE UP (ref 80–100)
MCV RBC AUTO: 87.3 FL — SIGNIFICANT CHANGE UP (ref 80–100)
MCV RBC AUTO: 87.6 FL — SIGNIFICANT CHANGE UP (ref 80–100)
MCV RBC AUTO: 87.8 FL — SIGNIFICANT CHANGE UP (ref 80–100)
MCV RBC AUTO: 87.8 FL — SIGNIFICANT CHANGE UP (ref 80–100)
MCV RBC AUTO: 88.1 FL — SIGNIFICANT CHANGE UP (ref 80–100)
MCV RBC AUTO: 88.2 FL — SIGNIFICANT CHANGE UP (ref 80–100)
MCV RBC AUTO: 88.3 FL — SIGNIFICANT CHANGE UP (ref 80–100)
METHOD TYPE: SIGNIFICANT CHANGE UP
MONOCYTES # BLD AUTO: 0.54 K/UL — SIGNIFICANT CHANGE UP (ref 0–0.9)
MONOCYTES # BLD AUTO: 0.63 K/UL — SIGNIFICANT CHANGE UP (ref 0–0.9)
MONOCYTES # BLD AUTO: 0.7 K/UL — SIGNIFICANT CHANGE UP (ref 0–0.9)
MONOCYTES # BLD AUTO: 0.72 K/UL — SIGNIFICANT CHANGE UP (ref 0–0.9)
MONOCYTES # BLD AUTO: 0.72 K/UL — SIGNIFICANT CHANGE UP (ref 0–0.9)
MONOCYTES # BLD AUTO: 0.78 K/UL — SIGNIFICANT CHANGE UP (ref 0–0.9)
MONOCYTES # BLD AUTO: 0.83 K/UL — SIGNIFICANT CHANGE UP (ref 0–0.9)
MONOCYTES # BLD AUTO: 1 K/UL — HIGH (ref 0–0.9)
MONOCYTES # BLD AUTO: 1.01 K/UL — HIGH (ref 0–0.9)
MONOCYTES # BLD AUTO: 3.54 K/UL — HIGH (ref 0–0.9)
MONOCYTES NFR BLD AUTO: 10.4 % — SIGNIFICANT CHANGE UP (ref 2–14)
MONOCYTES NFR BLD AUTO: 10.4 % — SIGNIFICANT CHANGE UP (ref 2–14)
MONOCYTES NFR BLD AUTO: 12.3 % — SIGNIFICANT CHANGE UP (ref 2–14)
MONOCYTES NFR BLD AUTO: 14.8 % — HIGH (ref 2–14)
MONOCYTES NFR BLD AUTO: 6.1 % — SIGNIFICANT CHANGE UP (ref 2–14)
MONOCYTES NFR BLD AUTO: 6.5 % — SIGNIFICANT CHANGE UP (ref 2–14)
MONOCYTES NFR BLD AUTO: 7.1 % — SIGNIFICANT CHANGE UP (ref 2–14)
MONOCYTES NFR BLD AUTO: 7.2 % — SIGNIFICANT CHANGE UP (ref 2–14)
MONOCYTES NFR BLD AUTO: 9.1 % — SIGNIFICANT CHANGE UP (ref 2–14)
MONOCYTES NFR BLD AUTO: 9.6 % — SIGNIFICANT CHANGE UP (ref 2–14)
MRSA PCR RESULT.: SIGNIFICANT CHANGE UP
NEUTROPHILS # BLD AUTO: 12.3 K/UL — HIGH (ref 1.8–7.4)
NEUTROPHILS # BLD AUTO: 16.85 K/UL — HIGH (ref 1.8–7.4)
NEUTROPHILS # BLD AUTO: 2.28 K/UL — SIGNIFICANT CHANGE UP (ref 1.8–7.4)
NEUTROPHILS # BLD AUTO: 3.26 K/UL — SIGNIFICANT CHANGE UP (ref 1.8–7.4)
NEUTROPHILS # BLD AUTO: 4.38 K/UL — SIGNIFICANT CHANGE UP (ref 1.8–7.4)
NEUTROPHILS # BLD AUTO: 5.3 K/UL — SIGNIFICANT CHANGE UP (ref 1.8–7.4)
NEUTROPHILS # BLD AUTO: 5.66 K/UL — SIGNIFICANT CHANGE UP (ref 1.8–7.4)
NEUTROPHILS # BLD AUTO: 5.73 K/UL — SIGNIFICANT CHANGE UP (ref 1.8–7.4)
NEUTROPHILS # BLD AUTO: 7.61 K/UL — HIGH (ref 1.8–7.4)
NEUTROPHILS # BLD AUTO: 8.14 K/UL — HIGH (ref 1.8–7.4)
NEUTROPHILS NFR BLD AUTO: 44 % — SIGNIFICANT CHANGE UP (ref 43–77)
NEUTROPHILS NFR BLD AUTO: 54.5 % — SIGNIFICANT CHANGE UP (ref 43–77)
NEUTROPHILS NFR BLD AUTO: 55 % — SIGNIFICANT CHANGE UP (ref 43–77)
NEUTROPHILS NFR BLD AUTO: 55.4 % — SIGNIFICANT CHANGE UP (ref 43–77)
NEUTROPHILS NFR BLD AUTO: 64.3 % — SIGNIFICANT CHANGE UP (ref 43–77)
NEUTROPHILS NFR BLD AUTO: 66.4 % — SIGNIFICANT CHANGE UP (ref 43–77)
NEUTROPHILS NFR BLD AUTO: 67 % — SIGNIFICANT CHANGE UP (ref 43–77)
NEUTROPHILS NFR BLD AUTO: 70.4 % — SIGNIFICANT CHANGE UP (ref 43–77)
NEUTROPHILS NFR BLD AUTO: 73.8 % — SIGNIFICANT CHANGE UP (ref 43–77)
NEUTROPHILS NFR BLD AUTO: 80 % — HIGH (ref 43–77)
NITRITE UR-MCNC: NEGATIVE — SIGNIFICANT CHANGE UP
NON HDL CHOLESTEROL: 142 MG/DL — HIGH
NT-PROBNP SERPL-SCNC: 340 PG/ML — HIGH (ref 0–300)
ORGANISM # SPEC MICROSCOPIC CNT: SIGNIFICANT CHANGE UP
ORGANISM # SPEC MICROSCOPIC CNT: SIGNIFICANT CHANGE UP
PCO2 BLDA: 31 MMHG — LOW (ref 32–35)
PCO2 BLDV: 40 MMHG — SIGNIFICANT CHANGE UP (ref 39–42)
PH BLDA: 7.42 — SIGNIFICANT CHANGE UP (ref 7.35–7.45)
PH BLDV: 7.45 — HIGH (ref 7.32–7.43)
PH UR: 5 — SIGNIFICANT CHANGE UP (ref 5–8)
PH UR: 5 — SIGNIFICANT CHANGE UP (ref 5–8)
PH UR: 6 — SIGNIFICANT CHANGE UP (ref 5–8)
PHOSPHATE SERPL-MCNC: 1.8 MG/DL — LOW (ref 2.4–4.7)
PHOSPHATE SERPL-MCNC: 2 MG/DL — LOW (ref 2.4–4.7)
PHOSPHATE SERPL-MCNC: 2.4 MG/DL — SIGNIFICANT CHANGE UP (ref 2.4–4.7)
PHOSPHATE SERPL-MCNC: 2.7 MG/DL — SIGNIFICANT CHANGE UP (ref 2.4–4.7)
PHOSPHATE SERPL-MCNC: 3.1 MG/DL — SIGNIFICANT CHANGE UP (ref 2.4–4.7)
PHOSPHATE SERPL-MCNC: 3.1 MG/DL — SIGNIFICANT CHANGE UP (ref 2.4–4.7)
PHOSPHATE SERPL-MCNC: 3.3 MG/DL — SIGNIFICANT CHANGE UP (ref 2.4–4.7)
PHOSPHATE SERPL-MCNC: 3.4 MG/DL — SIGNIFICANT CHANGE UP (ref 2.4–4.7)
PHOSPHATE SERPL-MCNC: 3.4 MG/DL — SIGNIFICANT CHANGE UP (ref 2.4–4.7)
PHOSPHATE SERPL-MCNC: 3.5 MG/DL — SIGNIFICANT CHANGE UP (ref 2.4–4.7)
PHOSPHATE SERPL-MCNC: 3.6 MG/DL — SIGNIFICANT CHANGE UP (ref 2.4–4.7)
PHOSPHATE SERPL-MCNC: 3.6 MG/DL — SIGNIFICANT CHANGE UP (ref 2.4–4.7)
PHOSPHATE SERPL-MCNC: 3.9 MG/DL — SIGNIFICANT CHANGE UP (ref 2.4–4.7)
PLAT MORPH BLD: NORMAL — SIGNIFICANT CHANGE UP
PLATELET # BLD AUTO: 267 K/UL — SIGNIFICANT CHANGE UP (ref 150–400)
PLATELET # BLD AUTO: 297 K/UL — SIGNIFICANT CHANGE UP (ref 150–400)
PLATELET # BLD AUTO: 300 K/UL — SIGNIFICANT CHANGE UP (ref 150–400)
PLATELET # BLD AUTO: 308 K/UL — SIGNIFICANT CHANGE UP (ref 150–400)
PLATELET # BLD AUTO: 309 K/UL — SIGNIFICANT CHANGE UP (ref 150–400)
PLATELET # BLD AUTO: 319 K/UL — SIGNIFICANT CHANGE UP (ref 150–400)
PLATELET # BLD AUTO: 321 K/UL — SIGNIFICANT CHANGE UP (ref 150–400)
PLATELET # BLD AUTO: 322 K/UL — SIGNIFICANT CHANGE UP (ref 150–400)
PLATELET # BLD AUTO: 331 K/UL — SIGNIFICANT CHANGE UP (ref 150–400)
PLATELET # BLD AUTO: 331 K/UL — SIGNIFICANT CHANGE UP (ref 150–400)
PLATELET # BLD AUTO: 334 K/UL — SIGNIFICANT CHANGE UP (ref 150–400)
PLATELET # BLD AUTO: 342 K/UL — SIGNIFICANT CHANGE UP (ref 150–400)
PLATELET # BLD AUTO: 347 K/UL — SIGNIFICANT CHANGE UP (ref 150–400)
PLATELET # BLD AUTO: 352 K/UL — SIGNIFICANT CHANGE UP (ref 150–400)
PLATELET # BLD AUTO: 362 K/UL — SIGNIFICANT CHANGE UP (ref 150–400)
PLATELET # BLD AUTO: 377 K/UL — SIGNIFICANT CHANGE UP (ref 150–400)
PLATELET # BLD AUTO: 381 K/UL — SIGNIFICANT CHANGE UP (ref 150–400)
PLATELET # BLD AUTO: 382 K/UL — SIGNIFICANT CHANGE UP (ref 150–400)
PLATELET # BLD AUTO: 383 K/UL — SIGNIFICANT CHANGE UP (ref 150–400)
PLATELET # BLD AUTO: 386 K/UL — SIGNIFICANT CHANGE UP (ref 150–400)
PLATELET # BLD AUTO: 387 K/UL — SIGNIFICANT CHANGE UP (ref 150–400)
PLATELET # BLD AUTO: 394 K/UL — SIGNIFICANT CHANGE UP (ref 150–400)
PLATELET # BLD AUTO: 396 K/UL — SIGNIFICANT CHANGE UP (ref 150–400)
PLATELET # BLD AUTO: 405 K/UL — HIGH (ref 150–400)
PLATELET # BLD AUTO: 406 K/UL — HIGH (ref 150–400)
PLATELET # BLD AUTO: 408 K/UL — HIGH (ref 150–400)
PLATELET # BLD AUTO: 412 K/UL — HIGH (ref 150–400)
PLATELET # BLD AUTO: 484 K/UL — HIGH (ref 150–400)
PO2 BLDA: 77 MMHG — LOW (ref 83–108)
PO2 BLDV: 80 MMHG — HIGH (ref 25–45)
POIKILOCYTOSIS BLD QL AUTO: SLIGHT — SIGNIFICANT CHANGE UP
POLYCHROMASIA BLD QL SMEAR: SLIGHT — SIGNIFICANT CHANGE UP
POTASSIUM BLDV-SCNC: 3.8 MMOL/L — SIGNIFICANT CHANGE UP (ref 3.5–5.1)
POTASSIUM SERPL-MCNC: 3.2 MMOL/L — LOW (ref 3.5–5.3)
POTASSIUM SERPL-MCNC: 3.2 MMOL/L — LOW (ref 3.5–5.3)
POTASSIUM SERPL-MCNC: 3.3 MMOL/L — LOW (ref 3.5–5.3)
POTASSIUM SERPL-MCNC: 3.4 MMOL/L — LOW (ref 3.5–5.3)
POTASSIUM SERPL-MCNC: 3.6 MMOL/L — SIGNIFICANT CHANGE UP (ref 3.5–5.3)
POTASSIUM SERPL-MCNC: 3.7 MMOL/L — SIGNIFICANT CHANGE UP (ref 3.5–5.3)
POTASSIUM SERPL-MCNC: 3.8 MMOL/L — SIGNIFICANT CHANGE UP (ref 3.5–5.3)
POTASSIUM SERPL-MCNC: 3.9 MMOL/L — SIGNIFICANT CHANGE UP (ref 3.5–5.3)
POTASSIUM SERPL-MCNC: 4 MMOL/L — SIGNIFICANT CHANGE UP (ref 3.5–5.3)
POTASSIUM SERPL-MCNC: 4.1 MMOL/L — SIGNIFICANT CHANGE UP (ref 3.5–5.3)
POTASSIUM SERPL-MCNC: 4.2 MMOL/L — SIGNIFICANT CHANGE UP (ref 3.5–5.3)
POTASSIUM SERPL-MCNC: 4.3 MMOL/L — SIGNIFICANT CHANGE UP (ref 3.5–5.3)
POTASSIUM SERPL-MCNC: 4.6 MMOL/L — SIGNIFICANT CHANGE UP (ref 3.5–5.3)
POTASSIUM SERPL-SCNC: 3.2 MMOL/L — LOW (ref 3.5–5.3)
POTASSIUM SERPL-SCNC: 3.2 MMOL/L — LOW (ref 3.5–5.3)
POTASSIUM SERPL-SCNC: 3.3 MMOL/L — LOW (ref 3.5–5.3)
POTASSIUM SERPL-SCNC: 3.4 MMOL/L — LOW (ref 3.5–5.3)
POTASSIUM SERPL-SCNC: 3.6 MMOL/L — SIGNIFICANT CHANGE UP (ref 3.5–5.3)
POTASSIUM SERPL-SCNC: 3.7 MMOL/L — SIGNIFICANT CHANGE UP (ref 3.5–5.3)
POTASSIUM SERPL-SCNC: 3.8 MMOL/L — SIGNIFICANT CHANGE UP (ref 3.5–5.3)
POTASSIUM SERPL-SCNC: 3.9 MMOL/L — SIGNIFICANT CHANGE UP (ref 3.5–5.3)
POTASSIUM SERPL-SCNC: 4 MMOL/L — SIGNIFICANT CHANGE UP (ref 3.5–5.3)
POTASSIUM SERPL-SCNC: 4.1 MMOL/L — SIGNIFICANT CHANGE UP (ref 3.5–5.3)
POTASSIUM SERPL-SCNC: 4.2 MMOL/L — SIGNIFICANT CHANGE UP (ref 3.5–5.3)
POTASSIUM SERPL-SCNC: 4.3 MMOL/L — SIGNIFICANT CHANGE UP (ref 3.5–5.3)
POTASSIUM SERPL-SCNC: 4.6 MMOL/L — SIGNIFICANT CHANGE UP (ref 3.5–5.3)
PROCALCITONIN SERPL-MCNC: 0.15 NG/ML — HIGH (ref 0.02–0.1)
PROCALCITONIN SERPL-MCNC: 0.21 NG/ML — HIGH (ref 0.02–0.1)
PROCALCITONIN SERPL-MCNC: 2.21 NG/ML — HIGH (ref 0.02–0.1)
PROT SERPL-MCNC: 5.2 G/DL — LOW (ref 6.6–8.7)
PROT SERPL-MCNC: 5.2 G/DL — LOW (ref 6.6–8.7)
PROT SERPL-MCNC: 5.3 G/DL — LOW (ref 6.6–8.7)
PROT SERPL-MCNC: 5.4 G/DL — LOW (ref 6.6–8.7)
PROT SERPL-MCNC: 5.5 G/DL — LOW (ref 6.6–8.7)
PROT SERPL-MCNC: 5.6 G/DL — LOW (ref 6.6–8.7)
PROT SERPL-MCNC: 5.6 G/DL — LOW (ref 6.6–8.7)
PROT SERPL-MCNC: 5.7 G/DL — LOW (ref 6.6–8.7)
PROT SERPL-MCNC: 6.1 G/DL — LOW (ref 6.6–8.7)
PROT SERPL-MCNC: 6.9 G/DL — SIGNIFICANT CHANGE UP (ref 6.6–8.7)
PROT SERPL-MCNC: 8.5 G/DL — SIGNIFICANT CHANGE UP (ref 6.6–8.7)
PROT UR-MCNC: 15
PROT UR-MCNC: NEGATIVE — SIGNIFICANT CHANGE UP
PROT UR-MCNC: NEGATIVE — SIGNIFICANT CHANGE UP
PROTHROM AB SERPL-ACNC: 12.4 SEC — SIGNIFICANT CHANGE UP (ref 10.5–13.4)
PROTHROM AB SERPL-ACNC: 12.7 SEC — SIGNIFICANT CHANGE UP (ref 10.5–13.4)
RAPID RVP RESULT: SIGNIFICANT CHANGE UP
RBC # BLD: 3.01 M/UL — LOW (ref 3.8–5.2)
RBC # BLD: 3.28 M/UL — LOW (ref 3.8–5.2)
RBC # BLD: 3.3 M/UL — LOW (ref 3.8–5.2)
RBC # BLD: 3.34 M/UL — LOW (ref 3.8–5.2)
RBC # BLD: 3.4 M/UL — LOW (ref 3.8–5.2)
RBC # BLD: 3.45 M/UL — LOW (ref 3.8–5.2)
RBC # BLD: 3.5 M/UL — LOW (ref 3.8–5.2)
RBC # BLD: 3.51 M/UL — LOW (ref 3.8–5.2)
RBC # BLD: 3.53 M/UL — LOW (ref 3.8–5.2)
RBC # BLD: 3.54 M/UL — LOW (ref 3.8–5.2)
RBC # BLD: 3.54 M/UL — LOW (ref 3.8–5.2)
RBC # BLD: 3.56 M/UL — LOW (ref 3.8–5.2)
RBC # BLD: 3.57 M/UL — LOW (ref 3.8–5.2)
RBC # BLD: 3.59 M/UL — LOW (ref 3.8–5.2)
RBC # BLD: 3.59 M/UL — LOW (ref 3.8–5.2)
RBC # BLD: 3.6 M/UL — LOW (ref 3.8–5.2)
RBC # BLD: 3.63 M/UL — LOW (ref 3.8–5.2)
RBC # BLD: 3.64 M/UL — LOW (ref 3.8–5.2)
RBC # BLD: 3.67 M/UL — LOW (ref 3.8–5.2)
RBC # BLD: 3.72 M/UL — LOW (ref 3.8–5.2)
RBC # BLD: 3.72 M/UL — LOW (ref 3.8–5.2)
RBC # BLD: 3.74 M/UL — LOW (ref 3.8–5.2)
RBC # BLD: 3.75 M/UL — LOW (ref 3.8–5.2)
RBC # BLD: 3.76 M/UL — LOW (ref 3.8–5.2)
RBC # BLD: 3.96 M/UL — SIGNIFICANT CHANGE UP (ref 3.8–5.2)
RBC # BLD: 4.01 M/UL — SIGNIFICANT CHANGE UP (ref 3.8–5.2)
RBC # BLD: 4.09 M/UL — SIGNIFICANT CHANGE UP (ref 3.8–5.2)
RBC # BLD: 4.54 M/UL — SIGNIFICANT CHANGE UP (ref 3.8–5.2)
RBC # BLD: 4.72 M/UL — SIGNIFICANT CHANGE UP (ref 3.8–5.2)
RBC # FLD: 15.8 % — HIGH (ref 10.3–14.5)
RBC # FLD: 16.1 % — HIGH (ref 10.3–14.5)
RBC # FLD: 16.2 % — HIGH (ref 10.3–14.5)
RBC # FLD: 16.3 % — HIGH (ref 10.3–14.5)
RBC # FLD: 16.4 % — HIGH (ref 10.3–14.5)
RBC # FLD: 16.6 % — HIGH (ref 10.3–14.5)
RBC # FLD: 17.2 % — HIGH (ref 10.3–14.5)
RBC # FLD: 17.6 % — HIGH (ref 10.3–14.5)
RBC # FLD: 17.6 % — HIGH (ref 10.3–14.5)
RBC # FLD: 17.8 % — HIGH (ref 10.3–14.5)
RBC # FLD: 17.8 % — HIGH (ref 10.3–14.5)
RBC # FLD: 17.9 % — HIGH (ref 10.3–14.5)
RBC # FLD: 18.2 % — HIGH (ref 10.3–14.5)
RBC # FLD: 18.4 % — HIGH (ref 10.3–14.5)
RBC # FLD: 18.6 % — HIGH (ref 10.3–14.5)
RBC # FLD: 19.3 % — HIGH (ref 10.3–14.5)
RBC # FLD: 19.3 % — HIGH (ref 10.3–14.5)
RBC # FLD: 19.4 % — HIGH (ref 10.3–14.5)
RBC # FLD: 19.5 % — HIGH (ref 10.3–14.5)
RBC # FLD: 19.7 % — HIGH (ref 10.3–14.5)
RBC # FLD: 19.8 % — HIGH (ref 10.3–14.5)
RBC # FLD: 19.9 % — HIGH (ref 10.3–14.5)
RBC # FLD: 19.9 % — HIGH (ref 10.3–14.5)
RBC # FLD: 20.1 % — HIGH (ref 10.3–14.5)
RBC # FLD: 20.1 % — HIGH (ref 10.3–14.5)
RBC # FLD: 20.2 % — HIGH (ref 10.3–14.5)
RBC # FLD: 20.3 % — HIGH (ref 10.3–14.5)
RBC # FLD: 20.3 % — HIGH (ref 10.3–14.5)
RBC # FLD: 20.9 % — HIGH (ref 10.3–14.5)
RBC # FLD: 21.5 % — HIGH (ref 10.3–14.5)
RBC BLD AUTO: SIGNIFICANT CHANGE UP
RBC CASTS # UR COMP ASSIST: ABNORMAL /HPF (ref 0–4)
RBC CASTS # UR COMP ASSIST: SIGNIFICANT CHANGE UP /HPF (ref 0–4)
RBC CASTS # UR COMP ASSIST: SIGNIFICANT CHANGE UP /HPF (ref 0–4)
RETICS #: 63.2 K/UL — SIGNIFICANT CHANGE UP (ref 25–125)
RETICS/RBC NFR: 1.8 % — SIGNIFICANT CHANGE UP (ref 0.5–2.5)
S AUREUS DNA NOSE QL NAA+PROBE: SIGNIFICANT CHANGE UP
SAO2 % BLDA: 96.3 % — SIGNIFICANT CHANGE UP (ref 94–98)
SAO2 % BLDV: 95.7 % — SIGNIFICANT CHANGE UP
SARS-COV-2 RNA SPEC QL NAA+PROBE: SIGNIFICANT CHANGE UP
SMUDGE CELLS # BLD: PRESENT — SIGNIFICANT CHANGE UP
SODIUM SERPL-SCNC: 127 MMOL/L — LOW (ref 135–145)
SODIUM SERPL-SCNC: 130 MMOL/L — LOW (ref 135–145)
SODIUM SERPL-SCNC: 131 MMOL/L — LOW (ref 135–145)
SODIUM SERPL-SCNC: 131 MMOL/L — LOW (ref 135–145)
SODIUM SERPL-SCNC: 132 MMOL/L — LOW (ref 135–145)
SODIUM SERPL-SCNC: 132 MMOL/L — LOW (ref 135–145)
SODIUM SERPL-SCNC: 133 MMOL/L — LOW (ref 135–145)
SODIUM SERPL-SCNC: 134 MMOL/L — LOW (ref 135–145)
SODIUM SERPL-SCNC: 136 MMOL/L — SIGNIFICANT CHANGE UP (ref 135–145)
SODIUM SERPL-SCNC: 137 MMOL/L — SIGNIFICANT CHANGE UP (ref 135–145)
SODIUM SERPL-SCNC: 139 MMOL/L — SIGNIFICANT CHANGE UP (ref 135–145)
SODIUM SERPL-SCNC: 139 MMOL/L — SIGNIFICANT CHANGE UP (ref 135–145)
SODIUM SERPL-SCNC: 140 MMOL/L — SIGNIFICANT CHANGE UP (ref 135–145)
SODIUM SERPL-SCNC: 140 MMOL/L — SIGNIFICANT CHANGE UP (ref 135–145)
SODIUM SERPL-SCNC: 141 MMOL/L — SIGNIFICANT CHANGE UP (ref 135–145)
SODIUM SERPL-SCNC: 141 MMOL/L — SIGNIFICANT CHANGE UP (ref 135–145)
SODIUM SERPL-SCNC: 142 MMOL/L — SIGNIFICANT CHANGE UP (ref 135–145)
SODIUM SERPL-SCNC: 144 MMOL/L — SIGNIFICANT CHANGE UP (ref 135–145)
SODIUM SERPL-SCNC: 144 MMOL/L — SIGNIFICANT CHANGE UP (ref 135–145)
SODIUM SERPL-SCNC: 145 MMOL/L — SIGNIFICANT CHANGE UP (ref 135–145)
SODIUM SERPL-SCNC: 146 MMOL/L — HIGH (ref 135–145)
SODIUM SERPL-SCNC: 146 MMOL/L — HIGH (ref 135–145)
SP GR SPEC: 1.01 — SIGNIFICANT CHANGE UP (ref 1.01–1.02)
SP GR SPEC: 1.02 — SIGNIFICANT CHANGE UP (ref 1.01–1.02)
SP GR SPEC: 1.02 — SIGNIFICANT CHANGE UP (ref 1.01–1.02)
SPECIMEN SOURCE: SIGNIFICANT CHANGE UP
T4 AB SER-ACNC: 10.9 UG/DL — SIGNIFICANT CHANGE UP (ref 4.5–12)
TIBC SERPL-MCNC: 283 UG/DL — SIGNIFICANT CHANGE UP (ref 220–430)
TRANSFERRIN SERPL-MCNC: 198 MG/DL — SIGNIFICANT CHANGE UP (ref 192–382)
TRIGL SERPL-MCNC: 210 MG/DL — HIGH
TROPONIN T SERPL-MCNC: 0.01 NG/ML — SIGNIFICANT CHANGE UP (ref 0–0.06)
TROPONIN T SERPL-MCNC: <0.01 NG/ML — SIGNIFICANT CHANGE UP (ref 0–0.06)
TROPONIN T SERPL-MCNC: <0.01 NG/ML — SIGNIFICANT CHANGE UP (ref 0–0.06)
TSH SERPL-MCNC: 0.6 UIU/ML — SIGNIFICANT CHANGE UP (ref 0.27–4.2)
TSH SERPL-MCNC: 0.81 UIU/ML — SIGNIFICANT CHANGE UP (ref 0.27–4.2)
TSH SERPL-MCNC: <0.1 UIU/ML — LOW (ref 0.27–4.2)
UROBILINOGEN FLD QL: 1 MG/DL
UROBILINOGEN FLD QL: NEGATIVE MG/DL — SIGNIFICANT CHANGE UP
UROBILINOGEN FLD QL: NEGATIVE MG/DL — SIGNIFICANT CHANGE UP
WBC # BLD: 10.51 K/UL — HIGH (ref 3.8–10.5)
WBC # BLD: 11.01 K/UL — HIGH (ref 3.8–10.5)
WBC # BLD: 11.45 K/UL — HIGH (ref 3.8–10.5)
WBC # BLD: 14.86 K/UL — HIGH (ref 3.8–10.5)
WBC # BLD: 15.12 K/UL — HIGH (ref 3.8–10.5)
WBC # BLD: 15.37 K/UL — HIGH (ref 3.8–10.5)
WBC # BLD: 16.31 K/UL — HIGH (ref 3.8–10.5)
WBC # BLD: 16.44 K/UL — HIGH (ref 3.8–10.5)
WBC # BLD: 23.93 K/UL — HIGH (ref 3.8–10.5)
WBC # BLD: 4.65 K/UL — SIGNIFICANT CHANGE UP (ref 3.8–10.5)
WBC # BLD: 4.87 K/UL — SIGNIFICANT CHANGE UP (ref 3.8–10.5)
WBC # BLD: 4.96 K/UL — SIGNIFICANT CHANGE UP (ref 3.8–10.5)
WBC # BLD: 5.02 K/UL — SIGNIFICANT CHANGE UP (ref 3.8–10.5)
WBC # BLD: 5.18 K/UL — SIGNIFICANT CHANGE UP (ref 3.8–10.5)
WBC # BLD: 5.63 K/UL — SIGNIFICANT CHANGE UP (ref 3.8–10.5)
WBC # BLD: 5.74 K/UL — SIGNIFICANT CHANGE UP (ref 3.8–10.5)
WBC # BLD: 5.87 K/UL — SIGNIFICANT CHANGE UP (ref 3.8–10.5)
WBC # BLD: 5.93 K/UL — SIGNIFICANT CHANGE UP (ref 3.8–10.5)
WBC # BLD: 5.94 K/UL — SIGNIFICANT CHANGE UP (ref 3.8–10.5)
WBC # BLD: 5.96 K/UL — SIGNIFICANT CHANGE UP (ref 3.8–10.5)
WBC # BLD: 7.3 K/UL — SIGNIFICANT CHANGE UP (ref 3.8–10.5)
WBC # BLD: 7.85 K/UL — SIGNIFICANT CHANGE UP (ref 3.8–10.5)
WBC # BLD: 7.91 K/UL — SIGNIFICANT CHANGE UP (ref 3.8–10.5)
WBC # BLD: 7.97 K/UL — SIGNIFICANT CHANGE UP (ref 3.8–10.5)
WBC # BLD: 7.97 K/UL — SIGNIFICANT CHANGE UP (ref 3.8–10.5)
WBC # BLD: 8.07 K/UL — SIGNIFICANT CHANGE UP (ref 3.8–10.5)
WBC # BLD: 8.2 K/UL — SIGNIFICANT CHANGE UP (ref 3.8–10.5)
WBC # BLD: 8.54 K/UL — SIGNIFICANT CHANGE UP (ref 3.8–10.5)
WBC # BLD: 8.81 K/UL — SIGNIFICANT CHANGE UP (ref 3.8–10.5)
WBC # BLD: 9.41 K/UL — SIGNIFICANT CHANGE UP (ref 3.8–10.5)
WBC # FLD AUTO: 10.51 K/UL — HIGH (ref 3.8–10.5)
WBC # FLD AUTO: 11.01 K/UL — HIGH (ref 3.8–10.5)
WBC # FLD AUTO: 11.45 K/UL — HIGH (ref 3.8–10.5)
WBC # FLD AUTO: 14.86 K/UL — HIGH (ref 3.8–10.5)
WBC # FLD AUTO: 15.12 K/UL — HIGH (ref 3.8–10.5)
WBC # FLD AUTO: 15.37 K/UL — HIGH (ref 3.8–10.5)
WBC # FLD AUTO: 16.31 K/UL — HIGH (ref 3.8–10.5)
WBC # FLD AUTO: 16.44 K/UL — HIGH (ref 3.8–10.5)
WBC # FLD AUTO: 23.93 K/UL — HIGH (ref 3.8–10.5)
WBC # FLD AUTO: 4.65 K/UL — SIGNIFICANT CHANGE UP (ref 3.8–10.5)
WBC # FLD AUTO: 4.87 K/UL — SIGNIFICANT CHANGE UP (ref 3.8–10.5)
WBC # FLD AUTO: 4.96 K/UL — SIGNIFICANT CHANGE UP (ref 3.8–10.5)
WBC # FLD AUTO: 5.02 K/UL — SIGNIFICANT CHANGE UP (ref 3.8–10.5)
WBC # FLD AUTO: 5.18 K/UL — SIGNIFICANT CHANGE UP (ref 3.8–10.5)
WBC # FLD AUTO: 5.63 K/UL — SIGNIFICANT CHANGE UP (ref 3.8–10.5)
WBC # FLD AUTO: 5.74 K/UL — SIGNIFICANT CHANGE UP (ref 3.8–10.5)
WBC # FLD AUTO: 5.87 K/UL — SIGNIFICANT CHANGE UP (ref 3.8–10.5)
WBC # FLD AUTO: 5.93 K/UL — SIGNIFICANT CHANGE UP (ref 3.8–10.5)
WBC # FLD AUTO: 5.94 K/UL — SIGNIFICANT CHANGE UP (ref 3.8–10.5)
WBC # FLD AUTO: 5.96 K/UL — SIGNIFICANT CHANGE UP (ref 3.8–10.5)
WBC # FLD AUTO: 7.3 K/UL — SIGNIFICANT CHANGE UP (ref 3.8–10.5)
WBC # FLD AUTO: 7.85 K/UL — SIGNIFICANT CHANGE UP (ref 3.8–10.5)
WBC # FLD AUTO: 7.91 K/UL — SIGNIFICANT CHANGE UP (ref 3.8–10.5)
WBC # FLD AUTO: 7.97 K/UL — SIGNIFICANT CHANGE UP (ref 3.8–10.5)
WBC # FLD AUTO: 7.97 K/UL — SIGNIFICANT CHANGE UP (ref 3.8–10.5)
WBC # FLD AUTO: 8.07 K/UL — SIGNIFICANT CHANGE UP (ref 3.8–10.5)
WBC # FLD AUTO: 8.2 K/UL — SIGNIFICANT CHANGE UP (ref 3.8–10.5)
WBC # FLD AUTO: 8.54 K/UL — SIGNIFICANT CHANGE UP (ref 3.8–10.5)
WBC # FLD AUTO: 8.81 K/UL — SIGNIFICANT CHANGE UP (ref 3.8–10.5)
WBC # FLD AUTO: 9.41 K/UL — SIGNIFICANT CHANGE UP (ref 3.8–10.5)
WBC UR QL: ABNORMAL /HPF (ref 0–5)
WBC UR QL: ABNORMAL /HPF (ref 0–5)
WBC UR QL: SIGNIFICANT CHANGE UP /HPF (ref 0–5)

## 2022-01-01 PROCEDURE — 99222 1ST HOSP IP/OBS MODERATE 55: CPT

## 2022-01-01 PROCEDURE — 99233 SBSQ HOSP IP/OBS HIGH 50: CPT

## 2022-01-01 PROCEDURE — 31500 INSERT EMERGENCY AIRWAY: CPT

## 2022-01-01 PROCEDURE — 85730 THROMBOPLASTIN TIME PARTIAL: CPT

## 2022-01-01 PROCEDURE — 99497 ADVNCD CARE PLAN 30 MIN: CPT | Mod: 25

## 2022-01-01 PROCEDURE — 0225U NFCT DS DNA&RNA 21 SARSCOV2: CPT

## 2022-01-01 PROCEDURE — 76705 ECHO EXAM OF ABDOMEN: CPT | Mod: 26

## 2022-01-01 PROCEDURE — 99232 SBSQ HOSP IP/OBS MODERATE 35: CPT

## 2022-01-01 PROCEDURE — 87640 STAPH A DNA AMP PROBE: CPT

## 2022-01-01 PROCEDURE — 93306 TTE W/DOPPLER COMPLETE: CPT

## 2022-01-01 PROCEDURE — 95819 EEG AWAKE AND ASLEEP: CPT | Mod: 26

## 2022-01-01 PROCEDURE — ZZZZZ: CPT

## 2022-01-01 PROCEDURE — 87040 BLOOD CULTURE FOR BACTERIA: CPT

## 2022-01-01 PROCEDURE — 80061 LIPID PANEL: CPT

## 2022-01-01 PROCEDURE — 36430 TRANSFUSION BLD/BLD COMPNT: CPT

## 2022-01-01 PROCEDURE — 84436 ASSAY OF TOTAL THYROXINE: CPT

## 2022-01-01 PROCEDURE — 99223 1ST HOSP IP/OBS HIGH 75: CPT

## 2022-01-01 PROCEDURE — 82962 GLUCOSE BLOOD TEST: CPT

## 2022-01-01 PROCEDURE — 86900 BLOOD TYPING SEROLOGIC ABO: CPT

## 2022-01-01 PROCEDURE — 83036 HEMOGLOBIN GLYCOSYLATED A1C: CPT

## 2022-01-01 PROCEDURE — 71045 X-RAY EXAM CHEST 1 VIEW: CPT | Mod: 26

## 2022-01-01 PROCEDURE — 70450 CT HEAD/BRAIN W/O DYE: CPT | Mod: 26

## 2022-01-01 PROCEDURE — 95819 EEG AWAKE AND ASLEEP: CPT

## 2022-01-01 PROCEDURE — 71045 X-RAY EXAM CHEST 1 VIEW: CPT

## 2022-01-01 PROCEDURE — 92526 ORAL FUNCTION THERAPY: CPT

## 2022-01-01 PROCEDURE — U0005: CPT

## 2022-01-01 PROCEDURE — 71250 CT THORAX DX C-: CPT | Mod: 26

## 2022-01-01 PROCEDURE — 83550 IRON BINDING TEST: CPT

## 2022-01-01 PROCEDURE — 97167 OT EVAL HIGH COMPLEX 60 MIN: CPT

## 2022-01-01 PROCEDURE — 82728 ASSAY OF FERRITIN: CPT

## 2022-01-01 PROCEDURE — 99285 EMERGENCY DEPT VISIT HI MDM: CPT

## 2022-01-01 PROCEDURE — 74176 CT ABD & PELVIS W/O CONTRAST: CPT | Mod: 26

## 2022-01-01 PROCEDURE — 93880 EXTRACRANIAL BILAT STUDY: CPT

## 2022-01-01 PROCEDURE — 74176 CT ABD & PELVIS W/O CONTRAST: CPT

## 2022-01-01 PROCEDURE — 85018 HEMOGLOBIN: CPT

## 2022-01-01 PROCEDURE — 97163 PT EVAL HIGH COMPLEX 45 MIN: CPT

## 2022-01-01 PROCEDURE — 93970 EXTREMITY STUDY: CPT | Mod: 26

## 2022-01-01 PROCEDURE — 71250 CT THORAX DX C-: CPT

## 2022-01-01 PROCEDURE — P9040: CPT

## 2022-01-01 PROCEDURE — 96374 THER/PROPH/DIAG INJ IV PUSH: CPT

## 2022-01-01 PROCEDURE — 84443 ASSAY THYROID STIM HORMONE: CPT

## 2022-01-01 PROCEDURE — 84100 ASSAY OF PHOSPHORUS: CPT

## 2022-01-01 PROCEDURE — 84145 PROCALCITONIN (PCT): CPT

## 2022-01-01 PROCEDURE — 82947 ASSAY GLUCOSE BLOOD QUANT: CPT

## 2022-01-01 PROCEDURE — 83880 ASSAY OF NATRIURETIC PEPTIDE: CPT

## 2022-01-01 PROCEDURE — 86923 COMPATIBILITY TEST ELECTRIC: CPT

## 2022-01-01 PROCEDURE — 99233 SBSQ HOSP IP/OBS HIGH 50: CPT | Mod: GC

## 2022-01-01 PROCEDURE — 86901 BLOOD TYPING SEROLOGIC RH(D): CPT

## 2022-01-01 PROCEDURE — 83540 ASSAY OF IRON: CPT

## 2022-01-01 PROCEDURE — 36600 WITHDRAWAL OF ARTERIAL BLOOD: CPT

## 2022-01-01 PROCEDURE — 74177 CT ABD & PELVIS W/CONTRAST: CPT | Mod: 26

## 2022-01-01 PROCEDURE — 93005 ELECTROCARDIOGRAM TRACING: CPT

## 2022-01-01 PROCEDURE — 80048 BASIC METABOLIC PNL TOTAL CA: CPT

## 2022-01-01 PROCEDURE — 87086 URINE CULTURE/COLONY COUNT: CPT

## 2022-01-01 PROCEDURE — U0003: CPT

## 2022-01-01 PROCEDURE — 85610 PROTHROMBIN TIME: CPT

## 2022-01-01 PROCEDURE — 85027 COMPLETE CBC AUTOMATED: CPT

## 2022-01-01 PROCEDURE — 82330 ASSAY OF CALCIUM: CPT

## 2022-01-01 PROCEDURE — 70450 CT HEAD/BRAIN W/O DYE: CPT | Mod: MG

## 2022-01-01 PROCEDURE — 94003 VENT MGMT INPAT SUBQ DAY: CPT

## 2022-01-01 PROCEDURE — 99498 ADVNCD CARE PLAN ADDL 30 MIN: CPT

## 2022-01-01 PROCEDURE — 92610 EVALUATE SWALLOWING FUNCTION: CPT

## 2022-01-01 PROCEDURE — 87186 SC STD MICRODIL/AGAR DIL: CPT

## 2022-01-01 PROCEDURE — 83735 ASSAY OF MAGNESIUM: CPT

## 2022-01-01 PROCEDURE — 81001 URINALYSIS AUTO W/SCOPE: CPT

## 2022-01-01 PROCEDURE — 70553 MRI BRAIN STEM W/O & W/DYE: CPT

## 2022-01-01 PROCEDURE — 93306 TTE W/DOPPLER COMPLETE: CPT | Mod: 26

## 2022-01-01 PROCEDURE — 99285 EMERGENCY DEPT VISIT HI MDM: CPT | Mod: 25

## 2022-01-01 PROCEDURE — G1004: CPT

## 2022-01-01 PROCEDURE — 82009 KETONE BODYS QUAL: CPT

## 2022-01-01 PROCEDURE — 82803 BLOOD GASES ANY COMBINATION: CPT

## 2022-01-01 PROCEDURE — 82435 ASSAY OF BLOOD CHLORIDE: CPT

## 2022-01-01 PROCEDURE — 80053 COMPREHEN METABOLIC PANEL: CPT

## 2022-01-01 PROCEDURE — 74177 CT ABD & PELVIS W/CONTRAST: CPT

## 2022-01-01 PROCEDURE — 83605 ASSAY OF LACTIC ACID: CPT

## 2022-01-01 PROCEDURE — 93010 ELECTROCARDIOGRAM REPORT: CPT

## 2022-01-01 PROCEDURE — 84295 ASSAY OF SERUM SODIUM: CPT

## 2022-01-01 PROCEDURE — 85045 AUTOMATED RETICULOCYTE COUNT: CPT

## 2022-01-01 PROCEDURE — 85025 COMPLETE CBC W/AUTO DIFF WBC: CPT

## 2022-01-01 PROCEDURE — 84132 ASSAY OF SERUM POTASSIUM: CPT

## 2022-01-01 PROCEDURE — 93970 EXTREMITY STUDY: CPT

## 2022-01-01 PROCEDURE — 70450 CT HEAD/BRAIN W/O DYE: CPT | Mod: 26,MG

## 2022-01-01 PROCEDURE — 87641 MR-STAPH DNA AMP PROBE: CPT

## 2022-01-01 PROCEDURE — 76705 ECHO EXAM OF ABDOMEN: CPT

## 2022-01-01 PROCEDURE — 36415 COLL VENOUS BLD VENIPUNCTURE: CPT

## 2022-01-01 PROCEDURE — 93880 EXTRACRANIAL BILAT STUDY: CPT | Mod: 26

## 2022-01-01 PROCEDURE — 86850 RBC ANTIBODY SCREEN: CPT

## 2022-01-01 PROCEDURE — 85014 HEMATOCRIT: CPT

## 2022-01-01 PROCEDURE — 82550 ASSAY OF CK (CPK): CPT

## 2022-01-01 PROCEDURE — 84466 ASSAY OF TRANSFERRIN: CPT

## 2022-01-01 PROCEDURE — 70553 MRI BRAIN STEM W/O & W/DYE: CPT | Mod: 26

## 2022-01-01 PROCEDURE — 84484 ASSAY OF TROPONIN QUANT: CPT

## 2022-01-01 PROCEDURE — 99291 CRITICAL CARE FIRST HOUR: CPT

## 2022-01-01 RX ORDER — ACETAMINOPHEN 500 MG
1000 TABLET ORAL ONCE
Refills: 0 | Status: COMPLETED | OUTPATIENT
Start: 2022-01-01 | End: 2022-01-01

## 2022-01-01 RX ORDER — SODIUM CHLORIDE 9 MG/ML
1000 INJECTION, SOLUTION INTRAVENOUS
Refills: 0 | Status: DISCONTINUED | OUTPATIENT
Start: 2022-01-01 | End: 2022-01-01

## 2022-01-01 RX ORDER — MAGNESIUM SULFATE 500 MG/ML
1 VIAL (ML) INJECTION ONCE
Refills: 0 | Status: COMPLETED | OUTPATIENT
Start: 2022-01-01 | End: 2022-01-01

## 2022-01-01 RX ORDER — ENOXAPARIN SODIUM 100 MG/ML
40 INJECTION SUBCUTANEOUS EVERY 24 HOURS
Refills: 0 | Status: DISCONTINUED | OUTPATIENT
Start: 2022-01-01 | End: 2022-01-01

## 2022-01-01 RX ORDER — MEROPENEM 1 G/30ML
1000 INJECTION INTRAVENOUS EVERY 12 HOURS
Refills: 0 | Status: DISCONTINUED | OUTPATIENT
Start: 2022-01-01 | End: 2022-01-01

## 2022-01-01 RX ORDER — PIPERACILLIN AND TAZOBACTAM 4; .5 G/20ML; G/20ML
3.38 INJECTION, POWDER, LYOPHILIZED, FOR SOLUTION INTRAVENOUS EVERY 8 HOURS
Refills: 0 | Status: DISCONTINUED | OUTPATIENT
Start: 2022-01-01 | End: 2022-01-01

## 2022-01-01 RX ORDER — MEMANTINE HYDROCHLORIDE 10 MG/1
10 TABLET ORAL
Refills: 0 | Status: DISCONTINUED | OUTPATIENT
Start: 2022-01-01 | End: 2022-01-01

## 2022-01-01 RX ORDER — AMLODIPINE BESYLATE 2.5 MG/1
2.5 TABLET ORAL AT BEDTIME
Refills: 0 | Status: DISCONTINUED | OUTPATIENT
Start: 2022-01-01 | End: 2022-01-01

## 2022-01-01 RX ORDER — ONDANSETRON 8 MG/1
4 TABLET, FILM COATED ORAL EVERY 6 HOURS
Refills: 0 | Status: DISCONTINUED | OUTPATIENT
Start: 2022-01-01 | End: 2022-01-01

## 2022-01-01 RX ORDER — LEVOTHYROXINE SODIUM 125 MCG
50 TABLET ORAL AT BEDTIME
Refills: 0 | Status: DISCONTINUED | OUTPATIENT
Start: 2022-01-01 | End: 2022-01-01

## 2022-01-01 RX ORDER — POTASSIUM PHOSPHATE, MONOBASIC POTASSIUM PHOSPHATE, DIBASIC 236; 224 MG/ML; MG/ML
30 INJECTION, SOLUTION INTRAVENOUS ONCE
Refills: 0 | Status: COMPLETED | OUTPATIENT
Start: 2022-01-01 | End: 2022-01-01

## 2022-01-01 RX ORDER — HYDRALAZINE HCL 50 MG
5 TABLET ORAL EVERY 6 HOURS
Refills: 0 | Status: DISCONTINUED | OUTPATIENT
Start: 2022-01-01 | End: 2022-01-01

## 2022-01-01 RX ORDER — POTASSIUM CHLORIDE 20 MEQ
40 PACKET (EA) ORAL ONCE
Refills: 0 | Status: COMPLETED | OUTPATIENT
Start: 2022-01-01 | End: 2022-01-01

## 2022-01-01 RX ORDER — RAMIPRIL 5 MG
0 CAPSULE ORAL
Qty: 0 | Refills: 0 | DISCHARGE

## 2022-01-01 RX ORDER — DIAZEPAM 5 MG
10 TABLET ORAL EVERY 4 HOURS
Refills: 0 | Status: DISCONTINUED | OUTPATIENT
Start: 2022-01-01 | End: 2022-01-01

## 2022-01-01 RX ORDER — CEFEPIME 1 G/1
2000 INJECTION, POWDER, FOR SOLUTION INTRAMUSCULAR; INTRAVENOUS EVERY 12 HOURS
Refills: 0 | Status: DISCONTINUED | OUTPATIENT
Start: 2022-01-01 | End: 2022-01-01

## 2022-01-01 RX ORDER — RAMIPRIL 5 MG
1 CAPSULE ORAL
Qty: 0 | Refills: 0 | DISCHARGE

## 2022-01-01 RX ORDER — POTASSIUM CHLORIDE 20 MEQ
10 PACKET (EA) ORAL
Refills: 0 | Status: COMPLETED | OUTPATIENT
Start: 2022-01-01 | End: 2022-01-01

## 2022-01-01 RX ORDER — FUROSEMIDE 40 MG
1 TABLET ORAL
Qty: 0 | Refills: 0 | DISCHARGE

## 2022-01-01 RX ORDER — ACETAMINOPHEN 500 MG
325 TABLET ORAL ONCE
Refills: 0 | Status: COMPLETED | OUTPATIENT
Start: 2022-01-01 | End: 2022-01-01

## 2022-01-01 RX ORDER — SODIUM BICARBONATE 1 MEQ/ML
0.06 SYRINGE (ML) INTRAVENOUS
Qty: 50 | Refills: 0 | Status: DISCONTINUED | OUTPATIENT
Start: 2022-01-01 | End: 2022-01-01

## 2022-01-01 RX ORDER — AMPICILLIN SODIUM AND SULBACTAM SODIUM 250; 125 MG/ML; MG/ML
3 INJECTION, POWDER, FOR SUSPENSION INTRAMUSCULAR; INTRAVENOUS ONCE
Refills: 0 | Status: COMPLETED | OUTPATIENT
Start: 2022-01-01 | End: 2022-01-01

## 2022-01-01 RX ORDER — PANTOPRAZOLE SODIUM 20 MG/1
40 TABLET, DELAYED RELEASE ORAL DAILY
Refills: 0 | Status: DISCONTINUED | OUTPATIENT
Start: 2022-01-01 | End: 2022-01-01

## 2022-01-01 RX ORDER — LISINOPRIL 2.5 MG/1
40 TABLET ORAL DAILY
Refills: 0 | Status: DISCONTINUED | OUTPATIENT
Start: 2022-01-01 | End: 2022-01-01

## 2022-01-01 RX ORDER — ETOMIDATE 2 MG/ML
20 INJECTION INTRAVENOUS ONCE
Refills: 0 | Status: COMPLETED | OUTPATIENT
Start: 2022-01-01 | End: 2022-01-01

## 2022-01-01 RX ORDER — INSULIN LISPRO 100/ML
VIAL (ML) SUBCUTANEOUS EVERY 6 HOURS
Refills: 0 | Status: DISCONTINUED | OUTPATIENT
Start: 2022-01-01 | End: 2022-01-01

## 2022-01-01 RX ORDER — AMPICILLIN SODIUM AND SULBACTAM SODIUM 250; 125 MG/ML; MG/ML
INJECTION, POWDER, FOR SUSPENSION INTRAMUSCULAR; INTRAVENOUS
Refills: 0 | Status: COMPLETED | OUTPATIENT
Start: 2022-01-01 | End: 2022-01-01

## 2022-01-01 RX ORDER — INSULIN LISPRO 100/ML
VIAL (ML) SUBCUTANEOUS
Refills: 0 | Status: DISCONTINUED | OUTPATIENT
Start: 2022-01-01 | End: 2022-01-01

## 2022-01-01 RX ORDER — CHLORHEXIDINE GLUCONATE 213 G/1000ML
1 SOLUTION TOPICAL DAILY
Refills: 0 | Status: DISCONTINUED | OUTPATIENT
Start: 2022-01-01 | End: 2022-01-01

## 2022-01-01 RX ORDER — LABETALOL HCL 100 MG
200 TABLET ORAL
Refills: 0 | Status: DISCONTINUED | OUTPATIENT
Start: 2022-01-01 | End: 2022-01-01

## 2022-01-01 RX ORDER — IRON SUCROSE 20 MG/ML
200 INJECTION, SOLUTION INTRAVENOUS EVERY 24 HOURS
Refills: 0 | Status: COMPLETED | OUTPATIENT
Start: 2022-01-01 | End: 2022-01-01

## 2022-01-01 RX ORDER — PIPERACILLIN AND TAZOBACTAM 4; .5 G/20ML; G/20ML
3.38 INJECTION, POWDER, LYOPHILIZED, FOR SOLUTION INTRAVENOUS ONCE
Refills: 0 | Status: COMPLETED | OUTPATIENT
Start: 2022-01-01 | End: 2022-01-01

## 2022-01-01 RX ORDER — VANCOMYCIN HCL 1 G
1000 VIAL (EA) INTRAVENOUS ONCE
Refills: 0 | Status: COMPLETED | OUTPATIENT
Start: 2022-01-01 | End: 2022-01-01

## 2022-01-01 RX ORDER — AMLODIPINE BESYLATE 2.5 MG/1
2.5 TABLET ORAL DAILY
Refills: 0 | Status: DISCONTINUED | OUTPATIENT
Start: 2022-01-01 | End: 2022-01-01

## 2022-01-01 RX ORDER — LABETALOL HCL 100 MG
10 TABLET ORAL ONCE
Refills: 0 | Status: COMPLETED | OUTPATIENT
Start: 2022-01-01 | End: 2022-01-01

## 2022-01-01 RX ORDER — CHLORHEXIDINE GLUCONATE 213 G/1000ML
15 SOLUTION TOPICAL EVERY 12 HOURS
Refills: 0 | Status: DISCONTINUED | OUTPATIENT
Start: 2022-01-01 | End: 2022-01-01

## 2022-01-01 RX ORDER — DEXTROSE 50 % IN WATER 50 %
25 SYRINGE (ML) INTRAVENOUS ONCE
Refills: 0 | Status: DISCONTINUED | OUTPATIENT
Start: 2022-01-01 | End: 2022-01-01

## 2022-01-01 RX ORDER — INSULIN GLARGINE 100 [IU]/ML
20 INJECTION, SOLUTION SUBCUTANEOUS
Refills: 0 | Status: DISCONTINUED | OUTPATIENT
Start: 2022-01-01 | End: 2022-01-01

## 2022-01-01 RX ORDER — INSULIN NPH HUM/REG INSULIN HM 70-30/ML
30 VIAL (ML) SUBCUTANEOUS
Qty: 0 | Refills: 0 | DISCHARGE

## 2022-01-01 RX ORDER — POTASSIUM CHLORIDE 20 MEQ
20 PACKET (EA) ORAL ONCE
Refills: 0 | Status: COMPLETED | OUTPATIENT
Start: 2022-01-01 | End: 2022-01-01

## 2022-01-01 RX ORDER — MAGNESIUM SULFATE 500 MG/ML
2 VIAL (ML) INJECTION ONCE
Refills: 0 | Status: COMPLETED | OUTPATIENT
Start: 2022-01-01 | End: 2022-01-01

## 2022-01-01 RX ORDER — ACETAMINOPHEN 500 MG
650 TABLET ORAL ONCE
Refills: 0 | Status: COMPLETED | OUTPATIENT
Start: 2022-01-01 | End: 2022-01-01

## 2022-01-01 RX ORDER — MORPHINE SULFATE 50 MG/1
5 CAPSULE, EXTENDED RELEASE ORAL EVERY 4 HOURS
Refills: 0 | Status: DISCONTINUED | OUTPATIENT
Start: 2022-01-01 | End: 2022-01-01

## 2022-01-01 RX ORDER — ASPIRIN/CALCIUM CARB/MAGNESIUM 324 MG
0 TABLET ORAL
Qty: 0 | Refills: 0 | DISCHARGE

## 2022-01-01 RX ORDER — GLUCAGON INJECTION, SOLUTION 0.5 MG/.1ML
1 INJECTION, SOLUTION SUBCUTANEOUS ONCE
Refills: 0 | Status: DISCONTINUED | OUTPATIENT
Start: 2022-01-01 | End: 2022-01-01

## 2022-01-01 RX ORDER — SODIUM CHLORIDE 9 MG/ML
500 INJECTION, SOLUTION INTRAVENOUS
Refills: 0 | Status: DISCONTINUED | OUTPATIENT
Start: 2022-01-01 | End: 2022-01-01

## 2022-01-01 RX ORDER — ACETAMINOPHEN 500 MG
650 TABLET ORAL EVERY 6 HOURS
Refills: 0 | Status: DISCONTINUED | OUTPATIENT
Start: 2022-01-01 | End: 2022-01-01

## 2022-01-01 RX ORDER — SODIUM CHLORIDE 9 MG/ML
1000 INJECTION INTRAMUSCULAR; INTRAVENOUS; SUBCUTANEOUS
Refills: 0 | Status: DISCONTINUED | OUTPATIENT
Start: 2022-01-01 | End: 2022-01-01

## 2022-01-01 RX ORDER — ASPIRIN/CALCIUM CARB/MAGNESIUM 324 MG
1 TABLET ORAL
Qty: 0 | Refills: 0 | DISCHARGE

## 2022-01-01 RX ORDER — ACETAMINOPHEN 500 MG
650 TABLET ORAL EVERY 8 HOURS
Refills: 0 | Status: DISCONTINUED | OUTPATIENT
Start: 2022-01-01 | End: 2022-01-01

## 2022-01-01 RX ORDER — FERROUS SULFATE 325(65) MG
325 TABLET ORAL DAILY
Refills: 0 | Status: DISCONTINUED | OUTPATIENT
Start: 2022-01-01 | End: 2022-01-01

## 2022-01-01 RX ORDER — IRON SUCROSE 20 MG/ML
200 INJECTION, SOLUTION INTRAVENOUS EVERY 24 HOURS
Refills: 0 | Status: DISCONTINUED | OUTPATIENT
Start: 2022-01-01 | End: 2022-01-01

## 2022-01-01 RX ORDER — LABETALOL HCL 100 MG
100 TABLET ORAL
Refills: 0 | Status: DISCONTINUED | OUTPATIENT
Start: 2022-01-01 | End: 2022-01-01

## 2022-01-01 RX ORDER — ROBINUL 0.2 MG/ML
0.2 INJECTION INTRAMUSCULAR; INTRAVENOUS EVERY 6 HOURS
Refills: 0 | Status: DISCONTINUED | OUTPATIENT
Start: 2022-01-01 | End: 2022-01-01

## 2022-01-01 RX ORDER — DIAZEPAM 5 MG
10 TABLET ORAL EVERY 8 HOURS
Refills: 0 | Status: DISCONTINUED | OUTPATIENT
Start: 2022-01-01 | End: 2022-01-01

## 2022-01-01 RX ORDER — HYDROCORTISONE 20 MG
50 TABLET ORAL EVERY 6 HOURS
Refills: 0 | Status: DISCONTINUED | OUTPATIENT
Start: 2022-01-01 | End: 2022-01-01

## 2022-01-01 RX ORDER — INSULIN LISPRO 100/ML
3 VIAL (ML) SUBCUTANEOUS EVERY 6 HOURS
Refills: 0 | Status: DISCONTINUED | OUTPATIENT
Start: 2022-01-01 | End: 2022-01-01

## 2022-01-01 RX ORDER — INSULIN GLARGINE 100 [IU]/ML
10 INJECTION, SOLUTION SUBCUTANEOUS AT BEDTIME
Refills: 0 | Status: DISCONTINUED | OUTPATIENT
Start: 2022-01-01 | End: 2022-01-01

## 2022-01-01 RX ORDER — LISINOPRIL 2.5 MG/1
20 TABLET ORAL DAILY
Refills: 0 | Status: DISCONTINUED | OUTPATIENT
Start: 2022-01-01 | End: 2022-01-01

## 2022-01-01 RX ORDER — INSULIN GLARGINE 100 [IU]/ML
15 INJECTION, SOLUTION SUBCUTANEOUS AT BEDTIME
Refills: 0 | Status: DISCONTINUED | OUTPATIENT
Start: 2022-01-01 | End: 2022-01-01

## 2022-01-01 RX ORDER — NOREPINEPHRINE BITARTRATE/D5W 8 MG/250ML
0.05 PLASTIC BAG, INJECTION (ML) INTRAVENOUS
Qty: 8 | Refills: 0 | Status: DISCONTINUED | OUTPATIENT
Start: 2022-01-01 | End: 2022-01-01

## 2022-01-01 RX ORDER — INSULIN GLARGINE 100 [IU]/ML
12 INJECTION, SOLUTION SUBCUTANEOUS AT BEDTIME
Refills: 0 | Status: DISCONTINUED | OUTPATIENT
Start: 2022-01-01 | End: 2022-01-01

## 2022-01-01 RX ORDER — FUROSEMIDE 40 MG
20 TABLET ORAL DAILY
Refills: 0 | Status: DISCONTINUED | OUTPATIENT
Start: 2022-01-01 | End: 2022-01-01

## 2022-01-01 RX ORDER — PHENYLEPHRINE HYDROCHLORIDE 10 MG/ML
200 INJECTION INTRAVENOUS ONCE
Refills: 0 | Status: COMPLETED | OUTPATIENT
Start: 2022-01-01 | End: 2022-01-01

## 2022-01-01 RX ORDER — DEXTROSE 50 % IN WATER 50 %
15 SYRINGE (ML) INTRAVENOUS ONCE
Refills: 0 | Status: COMPLETED | OUTPATIENT
Start: 2022-01-01 | End: 2022-01-01

## 2022-01-01 RX ORDER — MORPHINE SULFATE 50 MG/1
2 CAPSULE, EXTENDED RELEASE ORAL EVERY 6 HOURS
Refills: 0 | Status: DISCONTINUED | OUTPATIENT
Start: 2022-01-01 | End: 2022-01-01

## 2022-01-01 RX ORDER — LEVOTHYROXINE SODIUM 125 MCG
112 TABLET ORAL DAILY
Refills: 0 | Status: DISCONTINUED | OUTPATIENT
Start: 2022-01-01 | End: 2022-01-01

## 2022-01-01 RX ORDER — MORPHINE SULFATE 50 MG/1
2 CAPSULE, EXTENDED RELEASE ORAL
Refills: 0 | Status: DISCONTINUED | OUTPATIENT
Start: 2022-01-01 | End: 2022-01-01

## 2022-01-01 RX ORDER — FENTANYL CITRATE 50 UG/ML
0.5 INJECTION INTRAVENOUS
Qty: 2500 | Refills: 0 | Status: DISCONTINUED | OUTPATIENT
Start: 2022-01-01 | End: 2022-01-01

## 2022-01-01 RX ORDER — SODIUM CHLORIDE 9 MG/ML
500 INJECTION, SOLUTION INTRAVENOUS ONCE
Refills: 0 | Status: COMPLETED | OUTPATIENT
Start: 2022-01-01 | End: 2022-01-01

## 2022-01-01 RX ORDER — INSULIN LISPRO 100/ML
3 VIAL (ML) SUBCUTANEOUS
Refills: 0 | Status: DISCONTINUED | OUTPATIENT
Start: 2022-01-01 | End: 2022-01-01

## 2022-01-01 RX ORDER — LEVOTHYROXINE SODIUM 125 MCG
137 TABLET ORAL DAILY
Refills: 0 | Status: DISCONTINUED | OUTPATIENT
Start: 2022-01-01 | End: 2022-01-01

## 2022-01-01 RX ORDER — LISINOPRIL 2.5 MG/1
10 TABLET ORAL DAILY
Refills: 0 | Status: DISCONTINUED | OUTPATIENT
Start: 2022-01-01 | End: 2022-01-01

## 2022-01-01 RX ORDER — INSULIN GLARGINE 100 [IU]/ML
0 INJECTION, SOLUTION SUBCUTANEOUS
Qty: 0 | Refills: 0 | DISCHARGE

## 2022-01-01 RX ORDER — PROPOFOL 10 MG/ML
20 INJECTION, EMULSION INTRAVENOUS
Qty: 1000 | Refills: 0 | Status: DISCONTINUED | OUTPATIENT
Start: 2022-01-01 | End: 2022-01-01

## 2022-01-01 RX ORDER — INSULIN LISPRO 100/ML
1 VIAL (ML) SUBCUTANEOUS ONCE
Refills: 0 | Status: COMPLETED | OUTPATIENT
Start: 2022-01-01 | End: 2022-01-01

## 2022-01-01 RX ORDER — SODIUM,POTASSIUM PHOSPHATES 278-250MG
1 POWDER IN PACKET (EA) ORAL ONCE
Refills: 0 | Status: COMPLETED | OUTPATIENT
Start: 2022-01-01 | End: 2022-01-01

## 2022-01-01 RX ORDER — DEXTROSE 50 % IN WATER 50 %
12.5 SYRINGE (ML) INTRAVENOUS ONCE
Refills: 0 | Status: DISCONTINUED | OUTPATIENT
Start: 2022-01-01 | End: 2022-01-01

## 2022-01-01 RX ORDER — HYDRALAZINE HCL 50 MG
10 TABLET ORAL EVERY 6 HOURS
Refills: 0 | Status: DISCONTINUED | OUTPATIENT
Start: 2022-01-01 | End: 2022-01-01

## 2022-01-01 RX ORDER — CEFTRIAXONE 500 MG/1
1000 INJECTION, POWDER, FOR SOLUTION INTRAMUSCULAR; INTRAVENOUS EVERY 24 HOURS
Refills: 0 | Status: DISCONTINUED | OUTPATIENT
Start: 2022-01-01 | End: 2022-01-01

## 2022-01-01 RX ORDER — ATORVASTATIN CALCIUM 80 MG/1
80 TABLET, FILM COATED ORAL AT BEDTIME
Refills: 0 | Status: DISCONTINUED | OUTPATIENT
Start: 2022-01-01 | End: 2022-01-01

## 2022-01-01 RX ORDER — DEXTROSE 50 % IN WATER 50 %
15 SYRINGE (ML) INTRAVENOUS ONCE
Refills: 0 | Status: DISCONTINUED | OUTPATIENT
Start: 2022-01-01 | End: 2022-01-01

## 2022-01-01 RX ORDER — LEVOTHYROXINE SODIUM 125 MCG
100 TABLET ORAL DAILY
Refills: 0 | Status: DISCONTINUED | OUTPATIENT
Start: 2022-01-01 | End: 2022-01-01

## 2022-01-01 RX ORDER — AMPICILLIN SODIUM AND SULBACTAM SODIUM 250; 125 MG/ML; MG/ML
3 INJECTION, POWDER, FOR SUSPENSION INTRAMUSCULAR; INTRAVENOUS EVERY 6 HOURS
Refills: 0 | Status: COMPLETED | OUTPATIENT
Start: 2022-01-01 | End: 2022-01-01

## 2022-01-01 RX ORDER — AMLODIPINE BESYLATE 2.5 MG/1
1 TABLET ORAL
Qty: 30 | Refills: 0

## 2022-01-01 RX ORDER — VANCOMYCIN HCL 1 G
750 VIAL (EA) INTRAVENOUS
Refills: 0 | Status: DISCONTINUED | OUTPATIENT
Start: 2022-01-01 | End: 2022-01-01

## 2022-01-01 RX ORDER — ASPIRIN/CALCIUM CARB/MAGNESIUM 324 MG
300 TABLET ORAL DAILY
Refills: 0 | Status: DISCONTINUED | OUTPATIENT
Start: 2022-01-01 | End: 2022-01-01

## 2022-01-01 RX ORDER — SUCCINYLCHOLINE CHLORIDE 100 MG/5ML
100 SYRINGE (ML) INTRAVENOUS ONCE
Refills: 0 | Status: COMPLETED | OUTPATIENT
Start: 2022-01-01 | End: 2022-01-01

## 2022-01-01 RX ORDER — PROPOFOL 10 MG/ML
20 INJECTION, EMULSION INTRAVENOUS
Qty: 500 | Refills: 0 | Status: DISCONTINUED | OUTPATIENT
Start: 2022-01-01 | End: 2022-01-01

## 2022-01-01 RX ORDER — INSULIN HUMAN 100 [IU]/ML
6 INJECTION, SOLUTION SUBCUTANEOUS
Qty: 100 | Refills: 0 | Status: DISCONTINUED | OUTPATIENT
Start: 2022-01-01 | End: 2022-01-01

## 2022-01-01 RX ORDER — NYSTATIN CREAM 100000 [USP'U]/G
1 CREAM TOPICAL
Refills: 0 | Status: DISCONTINUED | OUTPATIENT
Start: 2022-01-01 | End: 2022-01-01

## 2022-01-01 RX ADMIN — MEMANTINE HYDROCHLORIDE 10 MILLIGRAM(S): 10 TABLET ORAL at 06:02

## 2022-01-01 RX ADMIN — NYSTATIN CREAM 1 APPLICATION(S): 100000 CREAM TOPICAL at 05:31

## 2022-01-01 RX ADMIN — MEROPENEM 100 MILLIGRAM(S): 1 INJECTION INTRAVENOUS at 11:03

## 2022-01-01 RX ADMIN — MORPHINE SULFATE 2 MILLIGRAM(S): 50 CAPSULE, EXTENDED RELEASE ORAL at 11:01

## 2022-01-01 RX ADMIN — LISINOPRIL 40 MILLIGRAM(S): 2.5 TABLET ORAL at 05:19

## 2022-01-01 RX ADMIN — PIPERACILLIN AND TAZOBACTAM 25 GRAM(S): 4; .5 INJECTION, POWDER, LYOPHILIZED, FOR SOLUTION INTRAVENOUS at 17:39

## 2022-01-01 RX ADMIN — AMLODIPINE BESYLATE 2.5 MILLIGRAM(S): 2.5 TABLET ORAL at 21:33

## 2022-01-01 RX ADMIN — MEMANTINE HYDROCHLORIDE 10 MILLIGRAM(S): 10 TABLET ORAL at 05:20

## 2022-01-01 RX ADMIN — Medication 2: at 18:07

## 2022-01-01 RX ADMIN — INSULIN GLARGINE 20 UNIT(S): 100 INJECTION, SOLUTION SUBCUTANEOUS at 23:04

## 2022-01-01 RX ADMIN — Medication 50 MILLIGRAM(S): at 00:23

## 2022-01-01 RX ADMIN — Medication 1: at 12:05

## 2022-01-01 RX ADMIN — Medication 5 MILLIGRAM(S): at 04:18

## 2022-01-01 RX ADMIN — SODIUM CHLORIDE 50 MILLILITER(S): 9 INJECTION, SOLUTION INTRAVENOUS at 05:41

## 2022-01-01 RX ADMIN — Medication 10: at 05:34

## 2022-01-01 RX ADMIN — PIPERACILLIN AND TAZOBACTAM 25 GRAM(S): 4; .5 INJECTION, POWDER, LYOPHILIZED, FOR SOLUTION INTRAVENOUS at 15:53

## 2022-01-01 RX ADMIN — Medication 300 MILLIGRAM(S): at 21:45

## 2022-01-01 RX ADMIN — PIPERACILLIN AND TAZOBACTAM 25 GRAM(S): 4; .5 INJECTION, POWDER, LYOPHILIZED, FOR SOLUTION INTRAVENOUS at 22:22

## 2022-01-01 RX ADMIN — ENOXAPARIN SODIUM 40 MILLIGRAM(S): 100 INJECTION SUBCUTANEOUS at 15:54

## 2022-01-01 RX ADMIN — Medication 100 MILLIEQUIVALENT(S): at 17:17

## 2022-01-01 RX ADMIN — Medication 250 MILLIGRAM(S): at 22:57

## 2022-01-01 RX ADMIN — Medication 250 MILLIGRAM(S): at 15:27

## 2022-01-01 RX ADMIN — LISINOPRIL 10 MILLIGRAM(S): 2.5 TABLET ORAL at 10:02

## 2022-01-01 RX ADMIN — SODIUM CHLORIDE 75 MILLILITER(S): 9 INJECTION, SOLUTION INTRAVENOUS at 11:59

## 2022-01-01 RX ADMIN — Medication 100 MILLIGRAM(S): at 17:26

## 2022-01-01 RX ADMIN — NYSTATIN CREAM 1 APPLICATION(S): 100000 CREAM TOPICAL at 17:36

## 2022-01-01 RX ADMIN — PIPERACILLIN AND TAZOBACTAM 25 GRAM(S): 4; .5 INJECTION, POWDER, LYOPHILIZED, FOR SOLUTION INTRAVENOUS at 21:48

## 2022-01-01 RX ADMIN — NYSTATIN CREAM 1 APPLICATION(S): 100000 CREAM TOPICAL at 06:06

## 2022-01-01 RX ADMIN — MEMANTINE HYDROCHLORIDE 10 MILLIGRAM(S): 10 TABLET ORAL at 17:55

## 2022-01-01 RX ADMIN — Medication 2: at 09:08

## 2022-01-01 RX ADMIN — MORPHINE SULFATE 2 MILLIGRAM(S): 50 CAPSULE, EXTENDED RELEASE ORAL at 23:59

## 2022-01-01 RX ADMIN — Medication 2: at 06:21

## 2022-01-01 RX ADMIN — Medication 650 MILLIGRAM(S): at 22:11

## 2022-01-01 RX ADMIN — Medication 10 MILLIGRAM(S): at 21:19

## 2022-01-01 RX ADMIN — Medication 325 MILLIGRAM(S): at 17:39

## 2022-01-01 RX ADMIN — Medication 137 MICROGRAM(S): at 06:02

## 2022-01-01 RX ADMIN — Medication 2: at 21:27

## 2022-01-01 RX ADMIN — MORPHINE SULFATE 2 MILLIGRAM(S): 50 CAPSULE, EXTENDED RELEASE ORAL at 16:24

## 2022-01-01 RX ADMIN — NYSTATIN CREAM 1 APPLICATION(S): 100000 CREAM TOPICAL at 15:54

## 2022-01-01 RX ADMIN — INSULIN GLARGINE 20 UNIT(S): 100 INJECTION, SOLUTION SUBCUTANEOUS at 21:23

## 2022-01-01 RX ADMIN — Medication 10 MILLIGRAM(S): at 22:25

## 2022-01-01 RX ADMIN — Medication 300 MILLIGRAM(S): at 12:30

## 2022-01-01 RX ADMIN — MEMANTINE HYDROCHLORIDE 10 MILLIGRAM(S): 10 TABLET ORAL at 17:30

## 2022-01-01 RX ADMIN — ENOXAPARIN SODIUM 40 MILLIGRAM(S): 100 INJECTION SUBCUTANEOUS at 13:52

## 2022-01-01 RX ADMIN — Medication 2: at 12:27

## 2022-01-01 RX ADMIN — PIPERACILLIN AND TAZOBACTAM 25 GRAM(S): 4; .5 INJECTION, POWDER, LYOPHILIZED, FOR SOLUTION INTRAVENOUS at 16:08

## 2022-01-01 RX ADMIN — Medication 650 MILLIGRAM(S): at 14:15

## 2022-01-01 RX ADMIN — ENOXAPARIN SODIUM 40 MILLIGRAM(S): 100 INJECTION SUBCUTANEOUS at 15:15

## 2022-01-01 RX ADMIN — CHLORHEXIDINE GLUCONATE 15 MILLILITER(S): 213 SOLUTION TOPICAL at 05:42

## 2022-01-01 RX ADMIN — Medication 100 MILLIGRAM(S): at 17:29

## 2022-01-01 RX ADMIN — Medication 137 MICROGRAM(S): at 05:12

## 2022-01-01 RX ADMIN — NYSTATIN CREAM 1 APPLICATION(S): 100000 CREAM TOPICAL at 17:18

## 2022-01-01 RX ADMIN — NYSTATIN CREAM 1 APPLICATION(S): 100000 CREAM TOPICAL at 05:41

## 2022-01-01 RX ADMIN — Medication 2: at 18:03

## 2022-01-01 RX ADMIN — NYSTATIN CREAM 1 APPLICATION(S): 100000 CREAM TOPICAL at 17:38

## 2022-01-01 RX ADMIN — AMPICILLIN SODIUM AND SULBACTAM SODIUM 200 GRAM(S): 250; 125 INJECTION, POWDER, FOR SUSPENSION INTRAMUSCULAR; INTRAVENOUS at 01:09

## 2022-01-01 RX ADMIN — MEMANTINE HYDROCHLORIDE 10 MILLIGRAM(S): 10 TABLET ORAL at 17:14

## 2022-01-01 RX ADMIN — ENOXAPARIN SODIUM 40 MILLIGRAM(S): 100 INJECTION SUBCUTANEOUS at 15:34

## 2022-01-01 RX ADMIN — NYSTATIN CREAM 1 APPLICATION(S): 100000 CREAM TOPICAL at 17:28

## 2022-01-01 RX ADMIN — Medication 2: at 00:52

## 2022-01-01 RX ADMIN — SODIUM CHLORIDE 75 MILLILITER(S): 9 INJECTION, SOLUTION INTRAVENOUS at 01:36

## 2022-01-01 RX ADMIN — LISINOPRIL 10 MILLIGRAM(S): 2.5 TABLET ORAL at 05:26

## 2022-01-01 RX ADMIN — NYSTATIN CREAM 1 APPLICATION(S): 100000 CREAM TOPICAL at 05:09

## 2022-01-01 RX ADMIN — NYSTATIN CREAM 1 APPLICATION(S): 100000 CREAM TOPICAL at 17:46

## 2022-01-01 RX ADMIN — Medication 100 MILLIGRAM(S): at 18:04

## 2022-01-01 RX ADMIN — PIPERACILLIN AND TAZOBACTAM 25 GRAM(S): 4; .5 INJECTION, POWDER, LYOPHILIZED, FOR SOLUTION INTRAVENOUS at 22:47

## 2022-01-01 RX ADMIN — MEMANTINE HYDROCHLORIDE 10 MILLIGRAM(S): 10 TABLET ORAL at 17:59

## 2022-01-01 RX ADMIN — PIPERACILLIN AND TAZOBACTAM 25 GRAM(S): 4; .5 INJECTION, POWDER, LYOPHILIZED, FOR SOLUTION INTRAVENOUS at 21:24

## 2022-01-01 RX ADMIN — INSULIN GLARGINE 20 UNIT(S): 100 INJECTION, SOLUTION SUBCUTANEOUS at 22:42

## 2022-01-01 RX ADMIN — NYSTATIN CREAM 1 APPLICATION(S): 100000 CREAM TOPICAL at 18:01

## 2022-01-01 RX ADMIN — LISINOPRIL 10 MILLIGRAM(S): 2.5 TABLET ORAL at 06:02

## 2022-01-01 RX ADMIN — ENOXAPARIN SODIUM 40 MILLIGRAM(S): 100 INJECTION SUBCUTANEOUS at 18:02

## 2022-01-01 RX ADMIN — PIPERACILLIN AND TAZOBACTAM 200 GRAM(S): 4; .5 INJECTION, POWDER, LYOPHILIZED, FOR SOLUTION INTRAVENOUS at 08:29

## 2022-01-01 RX ADMIN — ENOXAPARIN SODIUM 40 MILLIGRAM(S): 100 INJECTION SUBCUTANEOUS at 14:57

## 2022-01-01 RX ADMIN — Medication 3 UNIT(S): at 13:53

## 2022-01-01 RX ADMIN — INSULIN GLARGINE 10 UNIT(S): 100 INJECTION, SOLUTION SUBCUTANEOUS at 21:37

## 2022-01-01 RX ADMIN — CHLORHEXIDINE GLUCONATE 15 MILLILITER(S): 213 SOLUTION TOPICAL at 18:04

## 2022-01-01 RX ADMIN — Medication 2: at 17:46

## 2022-01-01 RX ADMIN — SODIUM CHLORIDE 50 MILLILITER(S): 9 INJECTION, SOLUTION INTRAVENOUS at 15:56

## 2022-01-01 RX ADMIN — CHLORHEXIDINE GLUCONATE 1 APPLICATION(S): 213 SOLUTION TOPICAL at 11:04

## 2022-01-01 RX ADMIN — PIPERACILLIN AND TAZOBACTAM 200 GRAM(S): 4; .5 INJECTION, POWDER, LYOPHILIZED, FOR SOLUTION INTRAVENOUS at 18:31

## 2022-01-01 RX ADMIN — Medication 1: at 23:48

## 2022-01-01 RX ADMIN — MEMANTINE HYDROCHLORIDE 10 MILLIGRAM(S): 10 TABLET ORAL at 17:21

## 2022-01-01 RX ADMIN — Medication 8: at 05:22

## 2022-01-01 RX ADMIN — ENOXAPARIN SODIUM 40 MILLIGRAM(S): 100 INJECTION SUBCUTANEOUS at 17:32

## 2022-01-01 RX ADMIN — Medication 1: at 06:12

## 2022-01-01 RX ADMIN — Medication 137 MICROGRAM(S): at 05:42

## 2022-01-01 RX ADMIN — PIPERACILLIN AND TAZOBACTAM 200 GRAM(S): 4; .5 INJECTION, POWDER, LYOPHILIZED, FOR SOLUTION INTRAVENOUS at 10:19

## 2022-01-01 RX ADMIN — Medication 137 MICROGRAM(S): at 05:58

## 2022-01-01 RX ADMIN — Medication 400 MILLIGRAM(S): at 18:01

## 2022-01-01 RX ADMIN — Medication 50 MICROGRAM(S): at 22:26

## 2022-01-01 RX ADMIN — AMPICILLIN SODIUM AND SULBACTAM SODIUM 200 GRAM(S): 250; 125 INJECTION, POWDER, FOR SUSPENSION INTRAMUSCULAR; INTRAVENOUS at 13:20

## 2022-01-01 RX ADMIN — LISINOPRIL 40 MILLIGRAM(S): 2.5 TABLET ORAL at 23:54

## 2022-01-01 RX ADMIN — MORPHINE SULFATE 2 MILLIGRAM(S): 50 CAPSULE, EXTENDED RELEASE ORAL at 23:55

## 2022-01-01 RX ADMIN — Medication 10 MILLIGRAM(S): at 15:41

## 2022-01-01 RX ADMIN — Medication 10 MILLIGRAM(S): at 16:05

## 2022-01-01 RX ADMIN — MEROPENEM 100 MILLIGRAM(S): 1 INJECTION INTRAVENOUS at 05:15

## 2022-01-01 RX ADMIN — Medication 1 UNIT(S): at 23:07

## 2022-01-01 RX ADMIN — PIPERACILLIN AND TAZOBACTAM 25 GRAM(S): 4; .5 INJECTION, POWDER, LYOPHILIZED, FOR SOLUTION INTRAVENOUS at 22:25

## 2022-01-01 RX ADMIN — MEMANTINE HYDROCHLORIDE 10 MILLIGRAM(S): 10 TABLET ORAL at 05:34

## 2022-01-01 RX ADMIN — Medication 50 MILLIGRAM(S): at 11:52

## 2022-01-01 RX ADMIN — MORPHINE SULFATE 2 MILLIGRAM(S): 50 CAPSULE, EXTENDED RELEASE ORAL at 00:50

## 2022-01-01 RX ADMIN — Medication 50 MILLIGRAM(S): at 23:51

## 2022-01-01 RX ADMIN — NYSTATIN CREAM 1 APPLICATION(S): 100000 CREAM TOPICAL at 18:12

## 2022-01-01 RX ADMIN — INSULIN GLARGINE 20 UNIT(S): 100 INJECTION, SOLUTION SUBCUTANEOUS at 22:28

## 2022-01-01 RX ADMIN — PIPERACILLIN AND TAZOBACTAM 25 GRAM(S): 4; .5 INJECTION, POWDER, LYOPHILIZED, FOR SOLUTION INTRAVENOUS at 21:45

## 2022-01-01 RX ADMIN — Medication 325 MILLIGRAM(S): at 16:40

## 2022-01-01 RX ADMIN — Medication 10 MILLIGRAM(S): at 05:56

## 2022-01-01 RX ADMIN — Medication 100 GRAM(S): at 11:38

## 2022-01-01 RX ADMIN — FENTANYL CITRATE 3.01 MICROGRAM(S)/KG/HR: 50 INJECTION INTRAVENOUS at 21:57

## 2022-01-01 RX ADMIN — Medication 1: at 22:21

## 2022-01-01 RX ADMIN — ENOXAPARIN SODIUM 40 MILLIGRAM(S): 100 INJECTION SUBCUTANEOUS at 13:27

## 2022-01-01 RX ADMIN — PANTOPRAZOLE SODIUM 40 MILLIGRAM(S): 20 TABLET, DELAYED RELEASE ORAL at 11:56

## 2022-01-01 RX ADMIN — INSULIN GLARGINE 20 UNIT(S): 100 INJECTION, SOLUTION SUBCUTANEOUS at 22:17

## 2022-01-01 RX ADMIN — NYSTATIN CREAM 1 APPLICATION(S): 100000 CREAM TOPICAL at 06:02

## 2022-01-01 RX ADMIN — Medication 20 MILLIGRAM(S): at 06:10

## 2022-01-01 RX ADMIN — Medication 137 MICROGRAM(S): at 05:34

## 2022-01-01 RX ADMIN — Medication 5.63 MICROGRAM(S)/KG/MIN: at 20:05

## 2022-01-01 RX ADMIN — NYSTATIN CREAM 1 APPLICATION(S): 100000 CREAM TOPICAL at 17:30

## 2022-01-01 RX ADMIN — ENOXAPARIN SODIUM 40 MILLIGRAM(S): 100 INJECTION SUBCUTANEOUS at 12:20

## 2022-01-01 RX ADMIN — Medication 2: at 08:59

## 2022-01-01 RX ADMIN — Medication 50 MILLIGRAM(S): at 05:43

## 2022-01-01 RX ADMIN — Medication 137 MICROGRAM(S): at 05:20

## 2022-01-01 RX ADMIN — Medication 10 MILLIGRAM(S): at 05:03

## 2022-01-01 RX ADMIN — Medication 137 MICROGRAM(S): at 05:26

## 2022-01-01 RX ADMIN — AMPICILLIN SODIUM AND SULBACTAM SODIUM 200 GRAM(S): 250; 125 INJECTION, POWDER, FOR SUSPENSION INTRAMUSCULAR; INTRAVENOUS at 12:21

## 2022-01-01 RX ADMIN — INSULIN GLARGINE 20 UNIT(S): 100 INJECTION, SOLUTION SUBCUTANEOUS at 08:58

## 2022-01-01 RX ADMIN — PIPERACILLIN AND TAZOBACTAM 25 GRAM(S): 4; .5 INJECTION, POWDER, LYOPHILIZED, FOR SOLUTION INTRAVENOUS at 21:36

## 2022-01-01 RX ADMIN — CEFEPIME 100 MILLIGRAM(S): 1 INJECTION, POWDER, FOR SOLUTION INTRAMUSCULAR; INTRAVENOUS at 18:32

## 2022-01-01 RX ADMIN — Medication 1: at 22:46

## 2022-01-01 RX ADMIN — Medication 6: at 17:26

## 2022-01-01 RX ADMIN — PIPERACILLIN AND TAZOBACTAM 25 GRAM(S): 4; .5 INJECTION, POWDER, LYOPHILIZED, FOR SOLUTION INTRAVENOUS at 17:23

## 2022-01-01 RX ADMIN — Medication 100 MILLIGRAM(S): at 05:34

## 2022-01-01 RX ADMIN — PIPERACILLIN AND TAZOBACTAM 25 GRAM(S): 4; .5 INJECTION, POWDER, LYOPHILIZED, FOR SOLUTION INTRAVENOUS at 05:42

## 2022-01-01 RX ADMIN — Medication 100 MILLIGRAM(S): at 05:56

## 2022-01-01 RX ADMIN — Medication 137 MICROGRAM(S): at 05:40

## 2022-01-01 RX ADMIN — Medication 300 MILLIGRAM(S): at 12:03

## 2022-01-01 RX ADMIN — IRON SUCROSE 110 MILLIGRAM(S): 20 INJECTION, SOLUTION INTRAVENOUS at 18:11

## 2022-01-01 RX ADMIN — Medication 10: at 11:55

## 2022-01-01 RX ADMIN — NYSTATIN CREAM 1 APPLICATION(S): 100000 CREAM TOPICAL at 18:32

## 2022-01-01 RX ADMIN — PIPERACILLIN AND TAZOBACTAM 25 GRAM(S): 4; .5 INJECTION, POWDER, LYOPHILIZED, FOR SOLUTION INTRAVENOUS at 14:47

## 2022-01-01 RX ADMIN — NYSTATIN CREAM 1 APPLICATION(S): 100000 CREAM TOPICAL at 18:05

## 2022-01-01 RX ADMIN — MORPHINE SULFATE 2 MILLIGRAM(S): 50 CAPSULE, EXTENDED RELEASE ORAL at 19:18

## 2022-01-01 RX ADMIN — ENOXAPARIN SODIUM 40 MILLIGRAM(S): 100 INJECTION SUBCUTANEOUS at 14:05

## 2022-01-01 RX ADMIN — Medication 1000 MILLIGRAM(S): at 05:30

## 2022-01-01 RX ADMIN — AMLODIPINE BESYLATE 2.5 MILLIGRAM(S): 2.5 TABLET ORAL at 16:00

## 2022-01-01 RX ADMIN — PIPERACILLIN AND TAZOBACTAM 25 GRAM(S): 4; .5 INJECTION, POWDER, LYOPHILIZED, FOR SOLUTION INTRAVENOUS at 13:53

## 2022-01-01 RX ADMIN — Medication 650 MILLIGRAM(S): at 07:00

## 2022-01-01 RX ADMIN — PIPERACILLIN AND TAZOBACTAM 25 GRAM(S): 4; .5 INJECTION, POWDER, LYOPHILIZED, FOR SOLUTION INTRAVENOUS at 06:05

## 2022-01-01 RX ADMIN — Medication 2: at 17:10

## 2022-01-01 RX ADMIN — NYSTATIN CREAM 1 APPLICATION(S): 100000 CREAM TOPICAL at 05:34

## 2022-01-01 RX ADMIN — Medication 3 UNIT(S): at 05:33

## 2022-01-01 RX ADMIN — SODIUM CHLORIDE 75 MILLILITER(S): 9 INJECTION, SOLUTION INTRAVENOUS at 11:04

## 2022-01-01 RX ADMIN — MEMANTINE HYDROCHLORIDE 10 MILLIGRAM(S): 10 TABLET ORAL at 18:02

## 2022-01-01 RX ADMIN — MORPHINE SULFATE 2 MILLIGRAM(S): 50 CAPSULE, EXTENDED RELEASE ORAL at 19:26

## 2022-01-01 RX ADMIN — LISINOPRIL 40 MILLIGRAM(S): 2.5 TABLET ORAL at 06:10

## 2022-01-01 RX ADMIN — Medication 100 MILLIGRAM(S): at 05:40

## 2022-01-01 RX ADMIN — Medication 325 MILLIGRAM(S): at 11:01

## 2022-01-01 RX ADMIN — Medication 3 UNIT(S): at 17:36

## 2022-01-01 RX ADMIN — LISINOPRIL 10 MILLIGRAM(S): 2.5 TABLET ORAL at 06:09

## 2022-01-01 RX ADMIN — Medication 100 MILLIGRAM(S): at 05:48

## 2022-01-01 RX ADMIN — Medication 1: at 00:34

## 2022-01-01 RX ADMIN — NYSTATIN CREAM 1 APPLICATION(S): 100000 CREAM TOPICAL at 05:35

## 2022-01-01 RX ADMIN — Medication 400 MILLIGRAM(S): at 11:16

## 2022-01-01 RX ADMIN — MORPHINE SULFATE 2 MILLIGRAM(S): 50 CAPSULE, EXTENDED RELEASE ORAL at 00:48

## 2022-01-01 RX ADMIN — PIPERACILLIN AND TAZOBACTAM 25 GRAM(S): 4; .5 INJECTION, POWDER, LYOPHILIZED, FOR SOLUTION INTRAVENOUS at 21:29

## 2022-01-01 RX ADMIN — PIPERACILLIN AND TAZOBACTAM 25 GRAM(S): 4; .5 INJECTION, POWDER, LYOPHILIZED, FOR SOLUTION INTRAVENOUS at 05:08

## 2022-01-01 RX ADMIN — Medication 10 MILLIGRAM(S): at 22:57

## 2022-01-01 RX ADMIN — Medication 200 MILLIGRAM(S): at 09:21

## 2022-01-01 RX ADMIN — SODIUM CHLORIDE 500 MILLILITER(S): 9 INJECTION, SOLUTION INTRAVENOUS at 23:38

## 2022-01-01 RX ADMIN — AMPICILLIN SODIUM AND SULBACTAM SODIUM 200 GRAM(S): 250; 125 INJECTION, POWDER, FOR SUSPENSION INTRAMUSCULAR; INTRAVENOUS at 12:20

## 2022-01-01 RX ADMIN — MEMANTINE HYDROCHLORIDE 10 MILLIGRAM(S): 10 TABLET ORAL at 05:56

## 2022-01-01 RX ADMIN — Medication 3 UNIT(S): at 23:37

## 2022-01-01 RX ADMIN — MORPHINE SULFATE 2 MILLIGRAM(S): 50 CAPSULE, EXTENDED RELEASE ORAL at 05:44

## 2022-01-01 RX ADMIN — AMPICILLIN SODIUM AND SULBACTAM SODIUM 200 GRAM(S): 250; 125 INJECTION, POWDER, FOR SUSPENSION INTRAMUSCULAR; INTRAVENOUS at 20:08

## 2022-01-01 RX ADMIN — Medication 137 MICROGRAM(S): at 05:35

## 2022-01-01 RX ADMIN — Medication 1: at 05:47

## 2022-01-01 RX ADMIN — Medication 75 MEQ/KG/HR: at 09:13

## 2022-01-01 RX ADMIN — Medication 650 MILLIGRAM(S): at 00:58

## 2022-01-01 RX ADMIN — Medication 2: at 09:15

## 2022-01-01 RX ADMIN — NYSTATIN CREAM 1 APPLICATION(S): 100000 CREAM TOPICAL at 17:48

## 2022-01-01 RX ADMIN — INSULIN GLARGINE 10 UNIT(S): 100 INJECTION, SOLUTION SUBCUTANEOUS at 21:49

## 2022-01-01 RX ADMIN — Medication 50 MILLIGRAM(S): at 17:37

## 2022-01-01 RX ADMIN — Medication 2: at 12:12

## 2022-01-01 RX ADMIN — NYSTATIN CREAM 1 APPLICATION(S): 100000 CREAM TOPICAL at 15:57

## 2022-01-01 RX ADMIN — Medication 2: at 05:21

## 2022-01-01 RX ADMIN — Medication 12: at 11:02

## 2022-01-01 RX ADMIN — AMPICILLIN SODIUM AND SULBACTAM SODIUM 200 GRAM(S): 250; 125 INJECTION, POWDER, FOR SUSPENSION INTRAMUSCULAR; INTRAVENOUS at 23:05

## 2022-01-01 RX ADMIN — SODIUM CHLORIDE 75 MILLILITER(S): 9 INJECTION, SOLUTION INTRAVENOUS at 21:35

## 2022-01-01 RX ADMIN — PIPERACILLIN AND TAZOBACTAM 25 GRAM(S): 4; .5 INJECTION, POWDER, LYOPHILIZED, FOR SOLUTION INTRAVENOUS at 13:41

## 2022-01-01 RX ADMIN — Medication 100 MILLIEQUIVALENT(S): at 16:02

## 2022-01-01 RX ADMIN — Medication 50 MICROGRAM(S): at 21:40

## 2022-01-01 RX ADMIN — Medication 300 MILLIGRAM(S): at 11:07

## 2022-01-01 RX ADMIN — Medication 100 GRAM(S): at 09:44

## 2022-01-01 RX ADMIN — PIPERACILLIN AND TAZOBACTAM 25 GRAM(S): 4; .5 INJECTION, POWDER, LYOPHILIZED, FOR SOLUTION INTRAVENOUS at 21:15

## 2022-01-01 RX ADMIN — Medication 300 MILLIGRAM(S): at 13:45

## 2022-01-01 RX ADMIN — Medication 40 MILLIEQUIVALENT(S): at 08:51

## 2022-01-01 RX ADMIN — MEMANTINE HYDROCHLORIDE 10 MILLIGRAM(S): 10 TABLET ORAL at 18:03

## 2022-01-01 RX ADMIN — CHLORHEXIDINE GLUCONATE 15 MILLILITER(S): 213 SOLUTION TOPICAL at 17:22

## 2022-01-01 RX ADMIN — Medication 100 MILLIEQUIVALENT(S): at 09:01

## 2022-01-01 RX ADMIN — IRON SUCROSE 110 MILLIGRAM(S): 20 INJECTION, SOLUTION INTRAVENOUS at 18:03

## 2022-01-01 RX ADMIN — Medication 325 MILLIGRAM(S): at 12:08

## 2022-01-01 RX ADMIN — Medication 2: at 23:04

## 2022-01-01 RX ADMIN — ENOXAPARIN SODIUM 40 MILLIGRAM(S): 100 INJECTION SUBCUTANEOUS at 14:50

## 2022-01-01 RX ADMIN — Medication 650 MILLIGRAM(S): at 01:14

## 2022-01-01 RX ADMIN — Medication 50 MICROGRAM(S): at 21:24

## 2022-01-01 RX ADMIN — Medication 1: at 06:32

## 2022-01-01 RX ADMIN — MEMANTINE HYDROCHLORIDE 10 MILLIGRAM(S): 10 TABLET ORAL at 05:53

## 2022-01-01 RX ADMIN — ENOXAPARIN SODIUM 40 MILLIGRAM(S): 100 INJECTION SUBCUTANEOUS at 17:28

## 2022-01-01 RX ADMIN — Medication 100 MILLIGRAM(S): at 05:12

## 2022-01-01 RX ADMIN — Medication 100 MILLIEQUIVALENT(S): at 17:22

## 2022-01-01 RX ADMIN — NYSTATIN CREAM 1 APPLICATION(S): 100000 CREAM TOPICAL at 06:00

## 2022-01-01 RX ADMIN — MEMANTINE HYDROCHLORIDE 10 MILLIGRAM(S): 10 TABLET ORAL at 06:05

## 2022-01-01 RX ADMIN — NYSTATIN CREAM 1 APPLICATION(S): 100000 CREAM TOPICAL at 05:43

## 2022-01-01 RX ADMIN — Medication 2: at 22:02

## 2022-01-01 RX ADMIN — Medication 650 MILLIGRAM(S): at 18:11

## 2022-01-01 RX ADMIN — PIPERACILLIN AND TAZOBACTAM 200 GRAM(S): 4; .5 INJECTION, POWDER, LYOPHILIZED, FOR SOLUTION INTRAVENOUS at 11:36

## 2022-01-01 RX ADMIN — SODIUM CHLORIDE 50 MILLILITER(S): 9 INJECTION, SOLUTION INTRAVENOUS at 06:16

## 2022-01-01 RX ADMIN — AMPICILLIN SODIUM AND SULBACTAM SODIUM 200 GRAM(S): 250; 125 INJECTION, POWDER, FOR SUSPENSION INTRAMUSCULAR; INTRAVENOUS at 08:46

## 2022-01-01 RX ADMIN — CEFTRIAXONE 100 MILLIGRAM(S): 500 INJECTION, POWDER, FOR SOLUTION INTRAMUSCULAR; INTRAVENOUS at 15:54

## 2022-01-01 RX ADMIN — PIPERACILLIN AND TAZOBACTAM 25 GRAM(S): 4; .5 INJECTION, POWDER, LYOPHILIZED, FOR SOLUTION INTRAVENOUS at 06:10

## 2022-01-01 RX ADMIN — MEMANTINE HYDROCHLORIDE 10 MILLIGRAM(S): 10 TABLET ORAL at 17:39

## 2022-01-01 RX ADMIN — Medication 10 MILLIGRAM(S): at 10:44

## 2022-01-01 RX ADMIN — MEMANTINE HYDROCHLORIDE 10 MILLIGRAM(S): 10 TABLET ORAL at 06:15

## 2022-01-01 RX ADMIN — MEMANTINE HYDROCHLORIDE 10 MILLIGRAM(S): 10 TABLET ORAL at 05:26

## 2022-01-01 RX ADMIN — LISINOPRIL 20 MILLIGRAM(S): 2.5 TABLET ORAL at 05:43

## 2022-01-01 RX ADMIN — LISINOPRIL 10 MILLIGRAM(S): 2.5 TABLET ORAL at 05:33

## 2022-01-01 RX ADMIN — Medication 100 MILLIGRAM(S): at 17:30

## 2022-01-01 RX ADMIN — PIPERACILLIN AND TAZOBACTAM 25 GRAM(S): 4; .5 INJECTION, POWDER, LYOPHILIZED, FOR SOLUTION INTRAVENOUS at 14:28

## 2022-01-01 RX ADMIN — Medication 2: at 11:33

## 2022-01-01 RX ADMIN — Medication 100 MILLIEQUIVALENT(S): at 06:39

## 2022-01-01 RX ADMIN — ENOXAPARIN SODIUM 40 MILLIGRAM(S): 100 INJECTION SUBCUTANEOUS at 14:46

## 2022-01-01 RX ADMIN — MORPHINE SULFATE 2 MILLIGRAM(S): 50 CAPSULE, EXTENDED RELEASE ORAL at 05:06

## 2022-01-01 RX ADMIN — Medication 3 UNIT(S): at 12:32

## 2022-01-01 RX ADMIN — MORPHINE SULFATE 2 MILLIGRAM(S): 50 CAPSULE, EXTENDED RELEASE ORAL at 11:03

## 2022-01-01 RX ADMIN — Medication 650 MILLIGRAM(S): at 11:12

## 2022-01-01 RX ADMIN — ENOXAPARIN SODIUM 40 MILLIGRAM(S): 100 INJECTION SUBCUTANEOUS at 17:30

## 2022-01-01 RX ADMIN — MEMANTINE HYDROCHLORIDE 10 MILLIGRAM(S): 10 TABLET ORAL at 17:29

## 2022-01-01 RX ADMIN — ENOXAPARIN SODIUM 40 MILLIGRAM(S): 100 INJECTION SUBCUTANEOUS at 17:55

## 2022-01-01 RX ADMIN — INSULIN GLARGINE 20 UNIT(S): 100 INJECTION, SOLUTION SUBCUTANEOUS at 08:48

## 2022-01-01 RX ADMIN — Medication 325 MILLIGRAM(S): at 14:16

## 2022-01-01 RX ADMIN — Medication 100 MILLIEQUIVALENT(S): at 13:20

## 2022-01-01 RX ADMIN — NYSTATIN CREAM 1 APPLICATION(S): 100000 CREAM TOPICAL at 17:01

## 2022-01-01 RX ADMIN — SODIUM CHLORIDE 75 MILLILITER(S): 9 INJECTION, SOLUTION INTRAVENOUS at 15:34

## 2022-01-01 RX ADMIN — MORPHINE SULFATE 2 MILLIGRAM(S): 50 CAPSULE, EXTENDED RELEASE ORAL at 13:01

## 2022-01-01 RX ADMIN — Medication 10 MILLIGRAM(S): at 06:00

## 2022-01-01 RX ADMIN — INSULIN GLARGINE 10 UNIT(S): 100 INJECTION, SOLUTION SUBCUTANEOUS at 23:07

## 2022-01-01 RX ADMIN — Medication 75 MEQ/KG/HR: at 03:09

## 2022-01-01 RX ADMIN — LISINOPRIL 10 MILLIGRAM(S): 2.5 TABLET ORAL at 06:05

## 2022-01-01 RX ADMIN — Medication 300 MILLIGRAM(S): at 11:11

## 2022-01-01 RX ADMIN — LISINOPRIL 10 MILLIGRAM(S): 2.5 TABLET ORAL at 05:12

## 2022-01-01 RX ADMIN — INSULIN GLARGINE 20 UNIT(S): 100 INJECTION, SOLUTION SUBCUTANEOUS at 21:53

## 2022-01-01 RX ADMIN — Medication 3 UNIT(S): at 08:48

## 2022-01-01 RX ADMIN — PIPERACILLIN AND TAZOBACTAM 25 GRAM(S): 4; .5 INJECTION, POWDER, LYOPHILIZED, FOR SOLUTION INTRAVENOUS at 05:31

## 2022-01-01 RX ADMIN — ENOXAPARIN SODIUM 40 MILLIGRAM(S): 100 INJECTION SUBCUTANEOUS at 17:38

## 2022-01-01 RX ADMIN — NYSTATIN CREAM 1 APPLICATION(S): 100000 CREAM TOPICAL at 05:27

## 2022-01-01 RX ADMIN — SODIUM CHLORIDE 50 MILLILITER(S): 9 INJECTION, SOLUTION INTRAVENOUS at 21:03

## 2022-01-01 RX ADMIN — ENOXAPARIN SODIUM 40 MILLIGRAM(S): 100 INJECTION SUBCUTANEOUS at 13:20

## 2022-01-01 RX ADMIN — Medication 650 MILLIGRAM(S): at 20:27

## 2022-01-01 RX ADMIN — Medication 10 MILLIGRAM(S): at 16:56

## 2022-01-01 RX ADMIN — Medication 3 UNIT(S): at 12:15

## 2022-01-01 RX ADMIN — ENOXAPARIN SODIUM 40 MILLIGRAM(S): 100 INJECTION SUBCUTANEOUS at 17:37

## 2022-01-01 RX ADMIN — Medication 100 MILLIEQUIVALENT(S): at 10:32

## 2022-01-01 RX ADMIN — Medication 1: at 22:17

## 2022-01-01 RX ADMIN — Medication 4: at 17:24

## 2022-01-01 RX ADMIN — Medication 1: at 21:23

## 2022-01-01 RX ADMIN — MORPHINE SULFATE 2 MILLIGRAM(S): 50 CAPSULE, EXTENDED RELEASE ORAL at 18:09

## 2022-01-01 RX ADMIN — INSULIN GLARGINE 20 UNIT(S): 100 INJECTION, SOLUTION SUBCUTANEOUS at 09:17

## 2022-01-01 RX ADMIN — AMPICILLIN SODIUM AND SULBACTAM SODIUM 200 GRAM(S): 250; 125 INJECTION, POWDER, FOR SUSPENSION INTRAMUSCULAR; INTRAVENOUS at 17:39

## 2022-01-01 RX ADMIN — Medication 325 MILLIGRAM(S): at 23:44

## 2022-01-01 RX ADMIN — Medication 650 MILLIGRAM(S): at 22:00

## 2022-01-01 RX ADMIN — NYSTATIN CREAM 1 APPLICATION(S): 100000 CREAM TOPICAL at 18:20

## 2022-01-01 RX ADMIN — NYSTATIN CREAM 1 APPLICATION(S): 100000 CREAM TOPICAL at 18:04

## 2022-01-01 RX ADMIN — CEFTRIAXONE 100 MILLIGRAM(S): 500 INJECTION, POWDER, FOR SOLUTION INTRAMUSCULAR; INTRAVENOUS at 13:26

## 2022-01-01 RX ADMIN — MEMANTINE HYDROCHLORIDE 10 MILLIGRAM(S): 10 TABLET ORAL at 05:12

## 2022-01-01 RX ADMIN — PIPERACILLIN AND TAZOBACTAM 25 GRAM(S): 4; .5 INJECTION, POWDER, LYOPHILIZED, FOR SOLUTION INTRAVENOUS at 13:30

## 2022-01-01 RX ADMIN — CHLORHEXIDINE GLUCONATE 1 APPLICATION(S): 213 SOLUTION TOPICAL at 17:17

## 2022-01-01 RX ADMIN — INSULIN GLARGINE 10 UNIT(S): 100 INJECTION, SOLUTION SUBCUTANEOUS at 21:28

## 2022-01-01 RX ADMIN — Medication 10 MILLIGRAM(S): at 15:27

## 2022-01-01 RX ADMIN — Medication 650 MILLIGRAM(S): at 00:14

## 2022-01-01 RX ADMIN — SODIUM CHLORIDE 50 MILLILITER(S): 9 INJECTION, SOLUTION INTRAVENOUS at 17:48

## 2022-01-01 RX ADMIN — Medication 10 MILLIGRAM(S): at 00:07

## 2022-01-01 RX ADMIN — Medication 100 GRAM(S): at 09:16

## 2022-01-01 RX ADMIN — Medication 325 MILLIGRAM(S): at 13:24

## 2022-01-01 RX ADMIN — PIPERACILLIN AND TAZOBACTAM 25 GRAM(S): 4; .5 INJECTION, POWDER, LYOPHILIZED, FOR SOLUTION INTRAVENOUS at 15:15

## 2022-01-01 RX ADMIN — SODIUM CHLORIDE 75 MILLILITER(S): 9 INJECTION, SOLUTION INTRAVENOUS at 04:50

## 2022-01-01 RX ADMIN — NYSTATIN CREAM 1 APPLICATION(S): 100000 CREAM TOPICAL at 05:15

## 2022-01-01 RX ADMIN — Medication 15 GRAM(S): at 06:27

## 2022-01-01 RX ADMIN — Medication 1: at 09:02

## 2022-01-01 RX ADMIN — Medication 650 MILLIGRAM(S): at 21:28

## 2022-01-01 RX ADMIN — MORPHINE SULFATE 2 MILLIGRAM(S): 50 CAPSULE, EXTENDED RELEASE ORAL at 18:06

## 2022-01-01 RX ADMIN — Medication 2: at 17:47

## 2022-01-01 RX ADMIN — Medication 6: at 23:38

## 2022-01-01 RX ADMIN — AMPICILLIN SODIUM AND SULBACTAM SODIUM 200 GRAM(S): 250; 125 INJECTION, POWDER, FOR SUSPENSION INTRAMUSCULAR; INTRAVENOUS at 05:56

## 2022-01-01 RX ADMIN — Medication 100 MILLIGRAM(S): at 06:05

## 2022-01-01 RX ADMIN — Medication 10 MILLIGRAM(S): at 05:06

## 2022-01-01 RX ADMIN — Medication 300 MILLIGRAM(S): at 14:57

## 2022-01-01 RX ADMIN — Medication 100 MILLIEQUIVALENT(S): at 11:37

## 2022-01-01 RX ADMIN — Medication 1000 MILLIGRAM(S): at 11:28

## 2022-01-01 RX ADMIN — Medication 325 MILLIGRAM(S): at 16:56

## 2022-01-01 RX ADMIN — PIPERACILLIN AND TAZOBACTAM 25 GRAM(S): 4; .5 INJECTION, POWDER, LYOPHILIZED, FOR SOLUTION INTRAVENOUS at 05:35

## 2022-01-01 RX ADMIN — Medication 137 MICROGRAM(S): at 06:09

## 2022-01-01 RX ADMIN — Medication 2: at 18:02

## 2022-01-01 RX ADMIN — MEMANTINE HYDROCHLORIDE 10 MILLIGRAM(S): 10 TABLET ORAL at 05:43

## 2022-01-01 RX ADMIN — Medication 400 MILLIGRAM(S): at 13:49

## 2022-01-01 RX ADMIN — Medication 50 MILLIGRAM(S): at 23:36

## 2022-01-01 RX ADMIN — MORPHINE SULFATE 2 MILLIGRAM(S): 50 CAPSULE, EXTENDED RELEASE ORAL at 05:03

## 2022-01-01 RX ADMIN — AMPICILLIN SODIUM AND SULBACTAM SODIUM 200 GRAM(S): 250; 125 INJECTION, POWDER, FOR SUSPENSION INTRAMUSCULAR; INTRAVENOUS at 05:53

## 2022-01-01 RX ADMIN — IRON SUCROSE 110 MILLIGRAM(S): 20 INJECTION, SOLUTION INTRAVENOUS at 17:52

## 2022-01-01 RX ADMIN — Medication 1: at 17:29

## 2022-01-01 RX ADMIN — LISINOPRIL 10 MILLIGRAM(S): 2.5 TABLET ORAL at 05:34

## 2022-01-01 RX ADMIN — Medication 100 MILLIEQUIVALENT(S): at 08:46

## 2022-01-01 RX ADMIN — Medication 1: at 11:58

## 2022-01-01 RX ADMIN — Medication 3 UNIT(S): at 17:28

## 2022-01-01 RX ADMIN — Medication 137 MICROGRAM(S): at 06:11

## 2022-01-01 RX ADMIN — LISINOPRIL 10 MILLIGRAM(S): 2.5 TABLET ORAL at 06:03

## 2022-01-01 RX ADMIN — NYSTATIN CREAM 1 APPLICATION(S): 100000 CREAM TOPICAL at 17:09

## 2022-01-01 RX ADMIN — NYSTATIN CREAM 1 APPLICATION(S): 100000 CREAM TOPICAL at 06:04

## 2022-01-01 RX ADMIN — Medication 1: at 12:41

## 2022-01-01 RX ADMIN — Medication 650 MILLIGRAM(S): at 21:54

## 2022-01-01 RX ADMIN — Medication 650 MILLIGRAM(S): at 11:28

## 2022-01-01 RX ADMIN — PIPERACILLIN AND TAZOBACTAM 25 GRAM(S): 4; .5 INJECTION, POWDER, LYOPHILIZED, FOR SOLUTION INTRAVENOUS at 13:19

## 2022-01-01 RX ADMIN — Medication 10 MILLIGRAM(S): at 14:20

## 2022-01-01 RX ADMIN — Medication 650 MILLIGRAM(S): at 00:00

## 2022-01-01 RX ADMIN — MEROPENEM 100 MILLIGRAM(S): 1 INJECTION INTRAVENOUS at 17:56

## 2022-01-01 RX ADMIN — Medication 8: at 17:36

## 2022-01-01 RX ADMIN — Medication 1: at 21:33

## 2022-01-01 RX ADMIN — Medication 100 MILLIGRAM(S): at 17:39

## 2022-01-01 RX ADMIN — MEMANTINE HYDROCHLORIDE 10 MILLIGRAM(S): 10 TABLET ORAL at 05:40

## 2022-01-01 RX ADMIN — INSULIN HUMAN 6 UNIT(S)/HR: 100 INJECTION, SOLUTION SUBCUTANEOUS at 21:16

## 2022-01-01 RX ADMIN — PIPERACILLIN AND TAZOBACTAM 25 GRAM(S): 4; .5 INJECTION, POWDER, LYOPHILIZED, FOR SOLUTION INTRAVENOUS at 15:03

## 2022-01-01 RX ADMIN — Medication 10 MILLIGRAM(S): at 19:55

## 2022-01-01 RX ADMIN — INSULIN GLARGINE 10 UNIT(S): 100 INJECTION, SOLUTION SUBCUTANEOUS at 21:47

## 2022-01-01 RX ADMIN — NYSTATIN CREAM 1 APPLICATION(S): 100000 CREAM TOPICAL at 05:49

## 2022-01-01 RX ADMIN — MEMANTINE HYDROCHLORIDE 10 MILLIGRAM(S): 10 TABLET ORAL at 05:49

## 2022-01-01 RX ADMIN — Medication 1: at 21:54

## 2022-01-01 RX ADMIN — Medication 25 GRAM(S): at 09:06

## 2022-01-01 RX ADMIN — Medication 1000 MILLIGRAM(S): at 17:27

## 2022-01-01 RX ADMIN — Medication 1: at 18:30

## 2022-01-01 RX ADMIN — POTASSIUM PHOSPHATE, MONOBASIC POTASSIUM PHOSPHATE, DIBASIC 83.33 MILLIMOLE(S): 236; 224 INJECTION, SOLUTION INTRAVENOUS at 17:10

## 2022-01-01 RX ADMIN — Medication 650 MILLIGRAM(S): at 11:01

## 2022-01-01 RX ADMIN — Medication 15 GRAM(S): at 06:51

## 2022-01-01 RX ADMIN — MORPHINE SULFATE 2 MILLIGRAM(S): 50 CAPSULE, EXTENDED RELEASE ORAL at 19:33

## 2022-01-01 RX ADMIN — MEMANTINE HYDROCHLORIDE 10 MILLIGRAM(S): 10 TABLET ORAL at 06:09

## 2022-01-01 RX ADMIN — MORPHINE SULFATE 2 MILLIGRAM(S): 50 CAPSULE, EXTENDED RELEASE ORAL at 13:31

## 2022-01-01 RX ADMIN — SODIUM CHLORIDE 75 MILLILITER(S): 9 INJECTION INTRAMUSCULAR; INTRAVENOUS; SUBCUTANEOUS at 23:52

## 2022-01-01 RX ADMIN — Medication 3 UNIT(S): at 23:05

## 2022-01-01 RX ADMIN — MORPHINE SULFATE 2 MILLIGRAM(S): 50 CAPSULE, EXTENDED RELEASE ORAL at 11:17

## 2022-01-01 RX ADMIN — Medication 1: at 00:58

## 2022-01-01 RX ADMIN — Medication 137 MICROGRAM(S): at 05:53

## 2022-01-01 RX ADMIN — MEMANTINE HYDROCHLORIDE 10 MILLIGRAM(S): 10 TABLET ORAL at 17:27

## 2022-01-01 RX ADMIN — Medication 2: at 08:57

## 2022-01-01 RX ADMIN — Medication 100 MILLIEQUIVALENT(S): at 12:32

## 2022-01-01 RX ADMIN — Medication 100 MILLIGRAM(S): at 05:26

## 2022-01-01 RX ADMIN — MEMANTINE HYDROCHLORIDE 10 MILLIGRAM(S): 10 TABLET ORAL at 18:17

## 2022-01-01 RX ADMIN — Medication 10 MILLIGRAM(S): at 21:08

## 2022-01-01 RX ADMIN — Medication 650 MILLIGRAM(S): at 06:01

## 2022-01-01 RX ADMIN — PIPERACILLIN AND TAZOBACTAM 25 GRAM(S): 4; .5 INJECTION, POWDER, LYOPHILIZED, FOR SOLUTION INTRAVENOUS at 21:49

## 2022-01-01 RX ADMIN — PIPERACILLIN AND TAZOBACTAM 25 GRAM(S): 4; .5 INJECTION, POWDER, LYOPHILIZED, FOR SOLUTION INTRAVENOUS at 06:00

## 2022-01-01 RX ADMIN — Medication 1: at 06:41

## 2022-01-01 RX ADMIN — Medication 300 MILLIGRAM(S): at 11:27

## 2022-01-01 RX ADMIN — Medication 100 MILLIGRAM(S): at 07:38

## 2022-01-01 RX ADMIN — Medication 50 MILLIGRAM(S): at 05:19

## 2022-01-01 RX ADMIN — Medication 50 MILLIGRAM(S): at 11:56

## 2022-01-01 RX ADMIN — ENOXAPARIN SODIUM 40 MILLIGRAM(S): 100 INJECTION SUBCUTANEOUS at 16:09

## 2022-01-01 RX ADMIN — Medication 100 MILLIGRAM(S): at 18:02

## 2022-01-01 RX ADMIN — SODIUM CHLORIDE 75 MILLILITER(S): 9 INJECTION, SOLUTION INTRAVENOUS at 13:17

## 2022-01-01 RX ADMIN — SODIUM CHLORIDE 75 MILLILITER(S): 9 INJECTION, SOLUTION INTRAVENOUS at 14:48

## 2022-01-01 RX ADMIN — Medication 40 MILLIEQUIVALENT(S): at 09:08

## 2022-01-01 RX ADMIN — Medication 137 MICROGRAM(S): at 06:03

## 2022-01-01 RX ADMIN — Medication 100 MILLIGRAM(S): at 17:17

## 2022-01-01 RX ADMIN — Medication 1000 MILLIGRAM(S): at 20:00

## 2022-01-01 RX ADMIN — Medication 650 MILLIGRAM(S): at 23:09

## 2022-01-01 RX ADMIN — Medication 100 MILLIGRAM(S): at 06:02

## 2022-01-01 RX ADMIN — INSULIN GLARGINE 10 UNIT(S): 100 INJECTION, SOLUTION SUBCUTANEOUS at 22:33

## 2022-01-01 RX ADMIN — MORPHINE SULFATE 2 MILLIGRAM(S): 50 CAPSULE, EXTENDED RELEASE ORAL at 18:56

## 2022-01-01 RX ADMIN — Medication 20 MILLIEQUIVALENT(S): at 09:16

## 2022-01-01 RX ADMIN — CHLORHEXIDINE GLUCONATE 1 APPLICATION(S): 213 SOLUTION TOPICAL at 11:57

## 2022-01-01 RX ADMIN — MEMANTINE HYDROCHLORIDE 10 MILLIGRAM(S): 10 TABLET ORAL at 18:11

## 2022-01-01 RX ADMIN — Medication 1: at 21:49

## 2022-01-01 RX ADMIN — ENOXAPARIN SODIUM 40 MILLIGRAM(S): 100 INJECTION SUBCUTANEOUS at 16:55

## 2022-01-01 RX ADMIN — Medication 10 MILLIGRAM(S): at 21:26

## 2022-01-01 RX ADMIN — Medication 650 MILLIGRAM(S): at 23:00

## 2022-01-01 RX ADMIN — PIPERACILLIN AND TAZOBACTAM 200 GRAM(S): 4; .5 INJECTION, POWDER, LYOPHILIZED, FOR SOLUTION INTRAVENOUS at 13:31

## 2022-01-01 RX ADMIN — Medication 50 MICROGRAM(S): at 21:36

## 2022-01-01 RX ADMIN — MEROPENEM 100 MILLIGRAM(S): 1 INJECTION INTRAVENOUS at 05:19

## 2022-01-01 RX ADMIN — Medication 10 MILLIGRAM(S): at 14:16

## 2022-01-01 RX ADMIN — LISINOPRIL 10 MILLIGRAM(S): 2.5 TABLET ORAL at 05:56

## 2022-01-01 RX ADMIN — Medication 100 MILLIGRAM(S): at 06:03

## 2022-01-01 RX ADMIN — Medication 5 MILLIGRAM(S): at 05:31

## 2022-01-01 RX ADMIN — Medication 650 MILLIGRAM(S): at 00:10

## 2022-01-01 RX ADMIN — Medication 50 MICROGRAM(S): at 22:47

## 2022-01-01 RX ADMIN — NYSTATIN CREAM 1 APPLICATION(S): 100000 CREAM TOPICAL at 05:14

## 2022-01-01 RX ADMIN — SODIUM CHLORIDE 75 MILLILITER(S): 9 INJECTION, SOLUTION INTRAVENOUS at 00:01

## 2022-01-01 RX ADMIN — Medication 1: at 12:03

## 2022-01-01 RX ADMIN — Medication 3 UNIT(S): at 05:21

## 2022-01-01 RX ADMIN — Medication 325 MILLIGRAM(S): at 08:55

## 2022-01-01 RX ADMIN — PIPERACILLIN AND TAZOBACTAM 25 GRAM(S): 4; .5 INJECTION, POWDER, LYOPHILIZED, FOR SOLUTION INTRAVENOUS at 05:14

## 2022-01-01 RX ADMIN — Medication 650 MILLIGRAM(S): at 22:54

## 2022-01-01 RX ADMIN — AMPICILLIN SODIUM AND SULBACTAM SODIUM 200 GRAM(S): 250; 125 INJECTION, POWDER, FOR SUSPENSION INTRAMUSCULAR; INTRAVENOUS at 23:45

## 2022-01-01 RX ADMIN — AMLODIPINE BESYLATE 2.5 MILLIGRAM(S): 2.5 TABLET ORAL at 06:19

## 2022-01-01 RX ADMIN — INSULIN GLARGINE 12 UNIT(S): 100 INJECTION, SOLUTION SUBCUTANEOUS at 22:57

## 2022-01-01 RX ADMIN — Medication 25 GRAM(S): at 08:31

## 2022-01-01 RX ADMIN — Medication 1: at 13:56

## 2022-01-01 RX ADMIN — Medication 3 UNIT(S): at 11:56

## 2022-01-01 RX ADMIN — Medication 50 MILLIGRAM(S): at 17:57

## 2022-01-01 RX ADMIN — Medication 100 MILLIEQUIVALENT(S): at 07:50

## 2022-01-01 RX ADMIN — NYSTATIN CREAM 1 APPLICATION(S): 100000 CREAM TOPICAL at 06:05

## 2022-01-01 RX ADMIN — MEMANTINE HYDROCHLORIDE 10 MILLIGRAM(S): 10 TABLET ORAL at 18:33

## 2022-01-01 RX ADMIN — LISINOPRIL 10 MILLIGRAM(S): 2.5 TABLET ORAL at 05:49

## 2022-01-01 RX ADMIN — PIPERACILLIN AND TAZOBACTAM 25 GRAM(S): 4; .5 INJECTION, POWDER, LYOPHILIZED, FOR SOLUTION INTRAVENOUS at 23:08

## 2022-01-01 RX ADMIN — Medication 250 MILLIGRAM(S): at 11:04

## 2022-01-01 RX ADMIN — Medication 1: at 21:36

## 2022-01-01 RX ADMIN — Medication 3 UNIT(S): at 09:15

## 2022-01-01 RX ADMIN — INSULIN GLARGINE 20 UNIT(S): 100 INJECTION, SOLUTION SUBCUTANEOUS at 09:31

## 2022-01-01 RX ADMIN — Medication 1000 MILLIGRAM(S): at 17:00

## 2022-01-01 RX ADMIN — Medication 50 MICROGRAM(S): at 21:17

## 2022-01-01 RX ADMIN — Medication 6: at 12:13

## 2022-01-01 RX ADMIN — MORPHINE SULFATE 2 MILLIGRAM(S): 50 CAPSULE, EXTENDED RELEASE ORAL at 16:26

## 2022-01-01 RX ADMIN — INSULIN GLARGINE 20 UNIT(S): 100 INJECTION, SOLUTION SUBCUTANEOUS at 09:14

## 2022-01-01 RX ADMIN — CHLORHEXIDINE GLUCONATE 15 MILLILITER(S): 213 SOLUTION TOPICAL at 05:19

## 2022-01-01 RX ADMIN — Medication 100 MILLIGRAM(S): at 18:17

## 2022-01-01 RX ADMIN — ENOXAPARIN SODIUM 40 MILLIGRAM(S): 100 INJECTION SUBCUTANEOUS at 15:55

## 2022-01-01 RX ADMIN — PIPERACILLIN AND TAZOBACTAM 25 GRAM(S): 4; .5 INJECTION, POWDER, LYOPHILIZED, FOR SOLUTION INTRAVENOUS at 05:41

## 2022-01-01 RX ADMIN — Medication 1: at 21:45

## 2022-01-01 RX ADMIN — Medication 650 MILLIGRAM(S): at 10:54

## 2022-01-01 RX ADMIN — ONDANSETRON 4 MILLIGRAM(S): 8 TABLET, FILM COATED ORAL at 11:04

## 2022-01-01 RX ADMIN — Medication 2: at 06:13

## 2022-01-01 RX ADMIN — MEROPENEM 100 MILLIGRAM(S): 1 INJECTION INTRAVENOUS at 17:37

## 2022-01-01 RX ADMIN — NYSTATIN CREAM 1 APPLICATION(S): 100000 CREAM TOPICAL at 06:10

## 2022-01-01 RX ADMIN — Medication 3 UNIT(S): at 11:03

## 2022-01-01 RX ADMIN — Medication 25 GRAM(S): at 15:29

## 2022-01-01 RX ADMIN — Medication 2: at 00:33

## 2022-01-01 RX ADMIN — Medication 50 MICROGRAM(S): at 21:29

## 2022-01-01 RX ADMIN — Medication 100 MILLIEQUIVALENT(S): at 15:15

## 2022-01-01 RX ADMIN — Medication 1: at 22:33

## 2022-01-01 RX ADMIN — MORPHINE SULFATE 2 MILLIGRAM(S): 50 CAPSULE, EXTENDED RELEASE ORAL at 12:30

## 2022-01-01 RX ADMIN — CEFEPIME 100 MILLIGRAM(S): 1 INJECTION, POWDER, FOR SOLUTION INTRAMUSCULAR; INTRAVENOUS at 05:15

## 2022-01-01 RX ADMIN — Medication 3: at 23:41

## 2022-01-01 RX ADMIN — Medication 325 MILLIGRAM(S): at 00:44

## 2022-01-01 RX ADMIN — Medication 300 MILLIGRAM(S): at 15:15

## 2022-01-01 RX ADMIN — ENOXAPARIN SODIUM 40 MILLIGRAM(S): 100 INJECTION SUBCUTANEOUS at 11:00

## 2022-01-01 RX ADMIN — Medication 50 MILLIGRAM(S): at 05:16

## 2022-01-01 RX ADMIN — Medication 137 MICROGRAM(S): at 05:48

## 2022-01-01 RX ADMIN — Medication 100 MILLIGRAM(S): at 18:11

## 2022-01-01 RX ADMIN — Medication 400 MILLIGRAM(S): at 04:37

## 2022-01-01 RX ADMIN — SODIUM CHLORIDE 70 MILLILITER(S): 9 INJECTION, SOLUTION INTRAVENOUS at 10:55

## 2022-01-01 RX ADMIN — Medication 1 PACKET(S): at 21:54

## 2022-01-01 RX ADMIN — ENOXAPARIN SODIUM 40 MILLIGRAM(S): 100 INJECTION SUBCUTANEOUS at 14:38

## 2022-01-01 RX ADMIN — Medication 100 MILLIGRAM(S): at 18:03

## 2022-01-01 RX ADMIN — Medication 650 MILLIGRAM(S): at 12:32

## 2022-01-01 RX ADMIN — AMPICILLIN SODIUM AND SULBACTAM SODIUM 200 GRAM(S): 250; 125 INJECTION, POWDER, FOR SUSPENSION INTRAMUSCULAR; INTRAVENOUS at 18:01

## 2022-01-01 RX ADMIN — PIPERACILLIN AND TAZOBACTAM 25 GRAM(S): 4; .5 INJECTION, POWDER, LYOPHILIZED, FOR SOLUTION INTRAVENOUS at 13:52

## 2022-01-01 RX ADMIN — Medication 325 MILLIGRAM(S): at 18:00

## 2022-01-01 RX ADMIN — SODIUM CHLORIDE 75 MILLILITER(S): 9 INJECTION, SOLUTION INTRAVENOUS at 09:34

## 2022-01-01 RX ADMIN — Medication 10 MILLIGRAM(S): at 05:44

## 2022-01-01 RX ADMIN — Medication 100 MILLIGRAM(S): at 17:57

## 2022-01-01 RX ADMIN — Medication 1: at 17:17

## 2022-01-01 RX ADMIN — Medication 100 MILLIGRAM(S): at 05:53

## 2022-01-01 RX ADMIN — PANTOPRAZOLE SODIUM 40 MILLIGRAM(S): 20 TABLET, DELAYED RELEASE ORAL at 11:51

## 2022-01-01 RX ADMIN — Medication 10: at 09:22

## 2022-01-01 RX ADMIN — Medication 650 MILLIGRAM(S): at 09:25

## 2022-01-01 RX ADMIN — Medication 650 MILLIGRAM(S): at 13:41

## 2022-01-01 RX ADMIN — PIPERACILLIN AND TAZOBACTAM 25 GRAM(S): 4; .5 INJECTION, POWDER, LYOPHILIZED, FOR SOLUTION INTRAVENOUS at 05:40

## 2022-01-01 RX ADMIN — AMPICILLIN SODIUM AND SULBACTAM SODIUM 200 GRAM(S): 250; 125 INJECTION, POWDER, FOR SUSPENSION INTRAMUSCULAR; INTRAVENOUS at 12:16

## 2022-01-01 RX ADMIN — MEMANTINE HYDROCHLORIDE 10 MILLIGRAM(S): 10 TABLET ORAL at 06:03

## 2022-01-01 RX ADMIN — Medication 137 MICROGRAM(S): at 06:06

## 2022-01-01 RX ADMIN — Medication 400 MILLIGRAM(S): at 16:08

## 2022-01-01 RX ADMIN — PIPERACILLIN AND TAZOBACTAM 25 GRAM(S): 4; .5 INJECTION, POWDER, LYOPHILIZED, FOR SOLUTION INTRAVENOUS at 06:02

## 2022-01-01 RX ADMIN — Medication 650 MILLIGRAM(S): at 11:10

## 2022-01-01 RX ADMIN — Medication 2: at 14:04

## 2022-01-01 RX ADMIN — Medication 650 MILLIGRAM(S): at 13:15

## 2022-01-01 RX ADMIN — Medication 2: at 15:54

## 2022-07-18 NOTE — H&P ADULT - ASSESSMENT
87F with a history of hypertension, dementia, hypothyroidism, and macular degeneration who presented from home with generalized weakness and found to have uncontrolled hypertension as well as fever and leukocytosis.    Generalized weakness - Multifactorial. CT of the head was notable for chronic basal ganglia infarcts but no acute pathology. No obvious focal deficits on examination. The patient was also noted to have fever and leukocytosis which may represent an underlying infection. Her blood pressure was also noted to be poorly controlled.    Fever - Empiric antibiotic were administered in the emergency department. Chest Xray was without obvious consolidation and that patient is without dyspnea, cough, or hypoxia on examination. Urinalysis was not indicative of infection. Culture results to be reviewed when available. Respiratory viral panel results pending. Acetaminophen for fever as needed. The patient was previously treated with antibiotics on an outpatient basis for a leg rash. Does not appear to have cellulitis on examination.    Hypertensive urgency - Labetalol was administered in the emergency department with improvement in the blood pressure. The patient's daughter reported a long standing history of poorly controlled hypertension. For resumption of the patient's home medications with intravenous hydralazine as needed.    Diabetes - Insulin coverage, close monitoring of blood glucose levels. TSH level requested.    Hypothyroidism - On levothyroxine.    Dementia - On memantine.

## 2022-07-18 NOTE — H&P ADULT - NSHPLABSRESULTS_GEN_ALL_CORE
Chest Xray was reviewed, no acute pulmonary disease    CT of the had was reviewed, no acute intracranial pathology

## 2022-07-18 NOTE — H&P ADULT - NSHPPHYSICALEXAM_GEN_ALL_CORE
Vital Signs Last 24 Hrs  T(C): 38.1 (18 Jul 2022 16:13), Max: 38.1 (18 Jul 2022 16:13)  T(F): 100.6 (18 Jul 2022 16:13), Max: 100.6 (18 Jul 2022 16:13)  HR: 69 (18 Jul 2022 17:15) (69 - 89)  BP: 177/77 (18 Jul 2022 17:15) (177/77 - 248/120)  BP(mean): --  RR: 16 (18 Jul 2022 17:15) (16 - 18)  SpO2: 98% (18 Jul 2022 17:15) (96% - 98%)    Parameters below as of 18 Jul 2022 17:15  Patient On (Oxygen Delivery Method): room air    General appearance: No acute distress, Awake, Alert  HEENT: Normocephalic, Atraumatic, Conjunctiva clear, EOMI, Right pupil cloudy  Neck: Supple, No JVD, No tenderness  Lungs: Breath sound equal bilaterally, No wheezes, No rales  Cardiovascular: S1S2, Regular rhythm  Abdomen: Soft, Nontender, Nondistended, No guarding/rebound, Positive bowel sounds  Extremities: No clubbing, No cyanosis, No edema, No calf tenderness, Small area of erythema to the left lower extremity  Neuro: Strength equal bilaterally, No tremors  Psychiatric: Appropriate mood, Normal affect

## 2022-07-18 NOTE — H&P ADULT - NEGATIVE PSYCHIATRIC SYMPTOMS
How Severe Are Your Spot(S)?: mild Have Your Spot(S) Been Treated In The Past?: has not been treated Hpi Title: Evaluation of Skin Lesions no suicidal ideation/no depression/no anxiety

## 2022-07-18 NOTE — ED ADULT TRIAGE NOTE - GLASGOW COMA SCALE: BEST MOTOR RESPONSE, MLM
(M6) obeys commands (3) Alterations in Oxygenation (Respiratory Diagnosis, Dehydration, Anemia, Anorexia, Syncope/Dizziness, etc.)

## 2022-07-18 NOTE — ED ADULT TRIAGE NOTE - CHIEF COMPLAINT QUOTE
PT BIBA from home for lethargy and weakness. PT hypertensive and hyperglycemic. MD eisenberg to assess pt

## 2022-07-18 NOTE — H&P ADULT - NSICDXPASTMEDICALHX_GEN_ALL_CORE_FT
PAST MEDICAL HISTORY:  Anemia     Diabetes mellitus of other type without complication     Essential hypertension     Hypothyroidism, unspecified type

## 2022-07-18 NOTE — ED PROVIDER NOTE - CLINICAL SUMMARY MEDICAL DECISION MAKING FREE TEXT BOX
88 y/o F hx of vascular dementia, hypothyroid, DM presents w/ weakness for the past 2 days. per son at bedside, states she has been more lethargic compared to her baseline beginning yesterday morning (>24 hours ago). hypoxic in field per ems, fsg elevated. saturating mid 90s on room air. febrile 100.6. will r/o infectious etiology, no clear source. cxr, ua,uc, rvp. ct head given elevated sbp on arrival > 230 and weakness/weak appearing. labetalol/tylenol. r/o DKA.

## 2022-07-18 NOTE — H&P ADULT - HISTORY OF PRESENT ILLNESS
87F who presented from home when her  had noted that she had profound weakness and was unable to stand or feed herself. The patient is noted to have a history of progressive dementia and was limited in terms of providing a history. Additional information was obtained from the patient's daughter at the bedside. The patient was note to have had progressive dementia over the past few years. She lives with her  at home and there was no recent fevers, chills, or sick contacts. She is reported to be compliant with her medications at home which her daughter prepares for her. She is noted to have a history of poorly controlled blood pressures despite her cardiac medications. At baseline she was noted to be able to ambulate independently at a slow rate. There were no complaints of chest pain, palpitations, dyspnea, or cough. The patient was otherwise in her usual state of health. Upon arrival of EMS, the patient was reported to have been hypotensive and hypoxic. In the emergency department, the patient was noted to have fever and leukocytosis. There were no similar episodes in the past and no known aggravating or relieving factors. 87F who presented from home when her  had noted that she had profound weakness and was unable to stand or feed herself. The patient is noted to have a history of progressive dementia and was limited in terms of providing a history. Additional information was obtained from the patient's daughter at the bedside. The patient was note to have had progressive dementia over the past few years. She lives with her  at home and there was no recent fevers, chills, or sick contacts. She is reported to be compliant with her medications at home which her daughter prepares for her. She is noted to have a history of poorly controlled blood pressures despite her cardiac medications. At baseline she was noted to be able to ambulate independently at a slow rate. There were no complaints of chest pain, palpitations, dyspnea, or cough. The patient was otherwise in her usual state of health. Upon arrival of EMS, the patient was reported to have been hypotensive and hypoxic. In the emergency department, the patient was noted to have fever and leukocytosis. There were no similar episodes in the past and no known aggravating or relieving factors. The patient's daughter did note that the patient had vision loss and pain in the right eye in the past and was planned for surgery but the pain resolved spontaneously. She was also noted to have occasional swelling and weeping wounds on the left leg for which she would be treated with antibiotics.

## 2022-07-18 NOTE — ED PROVIDER NOTE - PHYSICAL EXAMINATION
General: Weak appearing female in no acute distress  HEENT: Normocephalic, atraumatic. Moist mucous membranes. Oropharynx clear. No lymphadenopathy.  Eyes: No scleral icterus. EOMI. DHARA.  Neck:. Soft and supple. Full ROM without pain. No midline tenderness  Cardiac: Regular rate and regular rhythm. No murmurs, rubs, gallops. Peripheral pulses 2+ and symmetric. No LE edema.  Resp: Lungs CTAB. Speaking in full sentences. No wheezes, rales or rhonchi.  Abd: Soft, non-tender, non-distended. No guarding or rebound. No scars, masses, or lesions.  Back: Spine midline and non-tender. No CVA tenderness.    Skin: No rashes, abrasions, or lacerations.  Neuro: AO x 3. Moves all extremities symmetrically. Motor strength and sensation grossly intact.

## 2022-07-18 NOTE — ED PROVIDER NOTE - ATTENDING CONTRIBUTION TO CARE
88 y/o F hx of vascular dementia, hypothyroid, DM presents w/ weakness for the past 2 days. per son at bedside, states she has been more lethargic and generalized weakness. Normally ambulates with assistance. Had trouble feeding herself today. Per EMS, patient was hypoxic and hypergylcemic. Patient is aox3 and denies any complaints. Denies headache, n/v, cp, sob, abd pain. Son reports she was recently treated for UTI  AP - well appearing, not hypoxic here. no complaints. hypertensive, will treat supportively. need CTH r/o bleed. infectiuos w/up given fever. anticipate admission

## 2022-07-18 NOTE — H&P ADULT - NSHPSOCIALHISTORY_GEN_ALL_CORE
No reported tobacco or alcohol use  Lives at home with her     Family History: Unable to obtain due to the patient's condition

## 2022-07-18 NOTE — ED PROVIDER NOTE - OBJECTIVE STATEMENT
88 y/o F hx of vascular dementia, hypothyroid, DM presents w/ weakness for the past 2 days. per son at bedside, states she has been more lethargic compared to her baseline beginning yesterday morning (>24 hours ago). states she normally ambulates w/o assistance at her baseline but today EMS was unable to have her take a step due to weakness. hypoxic per ems prior to arrival and placed on nasal cannula. fsg 377 in the field. denies falls. denies trauma. denies chest pain/sob. denies abdominal pain. denies nausea.
Implemented All Universal Safety Interventions:  [place] to call system. Call bell, personal items and telephone within reach. Instruct patient to call for assistance. Room bathroom lighting operational. Non-slip footwear when patient is off stretcher. Physically safe environment: no spills, clutter or unnecessary equipment. Stretcher in lowest position, wheels locked, appropriate side rails in place.

## 2022-07-19 NOTE — PROGRESS NOTE ADULT - SUBJECTIVE AND OBJECTIVE BOX
SHARIFA ENGLE  ----------------------------------------  The patient was seen at bedside. Patient with fever and generalized weakness. The patient was examined earlier today without specific complaints this morning.    Vital Signs Last 24 Hrs  T(C): 36.5 (2022 11:33), Max: 38.1 (2022 16:13)  T(F): 97.7 (2022 11:33), Max: 100.6 (2022 16:13)  HR: 58 (:33) (58 - 89)  BP: 177/62 (2022 08:21) (177/62 - 248/120)  BP(mean): --  RR: 20 (:33) (16 - 20)  SpO2: 92% (:) (92% - 98%)    Parameters below as of 33  Patient On (Oxygen Delivery Method): room air    CAPILLARY BLOOD GLUCOSE  POCT Blood Glucose.: 408 mg/dL (2022 11:47)  POCT Blood Glucose.: 425 mg/dL (2022 11:00)  POCT Blood Glucose.: 378 mg/dL (2022 08:00)  POCT Blood Glucose.: 313 mg/dL (2022 22:43)  POCT Blood Glucose.: 317 mg/dL (2022 15:40)    PHYSICAL EXAMINATION:  ----------------------------------------  General appearance: No acute distress, Awake, Alert  HEENT: Normocephalic, Atraumatic, Conjunctiva clear, EOMI  Neck: Supple, No JVD, No tenderness  Lungs: Breath sound equal bilaterally, No wheezes, No rales  Cardiovascular: S1S2, Regular rhythm  Abdomen: Soft, Nontender, Nondistended, No guarding/rebound, Positive bowel sounds  Extremities: No clubbing, No cyanosis, No edema, No calf tenderness  Neuro: Strength equal bilaterally, No tremors  Psychiatric: Appropriate mood, Normal affect    LABORATORY STUDIES:  ----------------------------------------             11.5   16.44 )-----------( 308      ( 2022 04:09 )             37.9     07    137  |  98  |  14.5  ----------------------------<  358<H>  3.6   |  15.0<L>  |  0.61    Ca    9.3      2022 04:09  Mg     2.1         TPro  8.5  /  Alb  4.3  /  TBili  0.7  /  DBili  x   /  AST  23  /  ALT  20  /  AlkPhos  145<H>      LIVER FUNCTIONS - ( 2022 16:15 )  Alb: 4.3 g/dL / Pro: 8.5 g/dL / ALK PHOS: 145 U/L / ALT: 20 U/L / AST: 23 U/L / GGT: x           CARDIAC MARKERS ( 2022 16:15 )  x     / <0.01 ng/mL / x     / x     / x        Urinalysis Basic - ( 2022 16:52 )  Color: Yellow / Appearance: Clear / S.015 / pH: x  Gluc: x / Ketone: Negative  / Bili: Negative / Urobili: Negative mg/dL   Blood: x / Protein: 15 / Nitrite: Negative   Leuk Esterase: Negative / RBC: 0-2 /HPF / WBC 0-2 /HPF   Sq Epi: x / Non Sq Epi: Occasional / Bacteria: Occasional    MEDICATIONS  (STANDING):  acetaminophen   IVPB .. 1000 milliGRAM(s) IV Intermittent once  dextrose 5%. 1000 milliLiter(s) (50 mL/Hr) IV Continuous <Continuous>  dextrose 5%. 1000 milliLiter(s) (100 mL/Hr) IV Continuous <Continuous>  dextrose 50% Injectable 25 Gram(s) IV Push once  dextrose 50% Injectable 12.5 Gram(s) IV Push once  dextrose 50% Injectable 25 Gram(s) IV Push once  enoxaparin Injectable 40 milliGRAM(s) SubCutaneous every 24 hours  etomidate Injectable 20 milliGRAM(s) IV Push once  furosemide    Tablet 20 milliGRAM(s) Oral daily  glucagon  Injectable 1 milliGRAM(s) IntraMuscular once  insulin glargine Injectable (LANTUS) 15 Unit(s) SubCutaneous at bedtime  insulin lispro (ADMELOG) corrective regimen sliding scale   SubCutaneous three times a day before meals  insulin lispro Injectable (ADMELOG) 3 Unit(s) SubCutaneous three times a day before meals  labetalol 200 milliGRAM(s) Oral two times a day  lactated ringers. 1000 milliLiter(s) (75 mL/Hr) IV Continuous <Continuous>  levothyroxine 137 MICROGram(s) Oral daily  lisinopril 40 milliGRAM(s) Oral daily  memantine 10 milliGRAM(s) Oral two times a day  phenylephrine   100 mCg/mL NaCl 0.9% Injectable 200 MICROGram(s) IV Push once  piperacillin/tazobactam IVPB.. 3.375 Gram(s) IV Intermittent every 8 hours  succinylcholine Injectable 100 milliGRAM(s) IV Push once    MEDICATIONS  (PRN):  acetaminophen     Tablet .. 650 milliGRAM(s) Oral every 6 hours PRN Temp greater or equal to 38C (100.4F), Mild Pain (1 - 3)  dextrose Oral Gel 15 Gram(s) Oral once PRN Blood Glucose LESS THAN 70 milliGRAM(s)/deciliter  hydrALAZINE Injectable 10 milliGRAM(s) IV Push every 6 hours PRN SBP > 170  ondansetron Injectable 4 milliGRAM(s) IV Push every 6 hours PRN Nausea and/or Vomiting      ASSESSMENT / PLAN:  ----------------------------------------  87F with a history of hypertension, dementia, hypothyroidism, and macular degeneration who presented from home with generalized weakness and found to have uncontrolled hypertension as well as fever and leukocytosis.    Generalized weakness - Multifactorial. CT of the head was notable for chronic basal ganglia infarcts but no acute pathology. The patient was also noted to have fever and leukocytosis which may represent an underlying infection. Her blood pressure was also noted to be poorly controlled.    Fever - Empiric antibiotic were initiated. Chest Xray was without obvious consolidation. Urinalysis was not indicative of infection. The patient had persistent leukocytosis. Culture results to be reviewed when available. Respiratory viral panel was negative. The patient was previously treated with antibiotics on an outpatient basis for a leg rash. Does not appear to have cellulitis on examination.    Hypertensive urgency - Labetalol was administered in the emergency department with improvement in the blood pressure. The patient's daughter reported a long standing history of poorly controlled hypertension. To continued on antihypertensive medications.    Diabetes - Insulin coverage, close monitoring of blood glucose levels.     Hypothyroidism - On levothyroxine. TSH level requested.    Dementia - On memantine.

## 2022-07-19 NOTE — PATIENT PROFILE ADULT - FUNCTIONAL ASSESSMENT - BASIC MOBILITY 6.
3-calculated by average/Not able to assess (calculate score using [facility] averaging method)

## 2022-07-19 NOTE — RAPID RESPONSE TEAM SUMMARY - NSADDTLFINDINGSRRT_GEN_ALL_CORE
RRT called for hypotension and bradycardia. Pt then required intubation of airway protection and respiratory support. CXR, CTH and ABG ordered STAT. MICU at bedside for transfer to higher level of care.     VS:  Temp: 97.7 , BP: 78/42, HR: 56 , RR: 12 , O2 sat: 91%  Blood sugar:408  PHYSICAL EXAM:  GENERAL: Pt lying in bed, lethargic obtunded  HEAD:  Atraumatic   ENT: Dry mucous membranes  NECK: Supple, No JVD  CHEST/LUNG: shallow respirations  HEART: RRR  ABDOMEN: Soft, Nontender, Nondistended. No guarding or rigidity    NERVOUS SYSTEM:  Alert & Oriented XO,    SKIN: No rashes or lesions        CXR: Compared to previous CXR     EKG: NSR, HR ___ bpm , no acute ST or T wave segment changes compared to previous EKG  RRT called for hypotension and bradycardia. Pt noted to be unresponsive to painful stimuli.  Manual BP noted to be 80/60, pt with RR of 8 and rapid response called for airway protection. Pt then required intubation of airway protection and respiratory support. CXR, CTH and ABG ordered STAT. MICU at bedside for transfer to higher level of care.

## 2022-07-19 NOTE — CONSULT NOTE ADULT - ASSESSMENT
ICU ASSESSMENT AND PLAN:   86yo F with PMH: DM, HTN, Hypothyroid, macular degeneration, cataracts, blind right eye, dementia, declining functional status over last year who has had poor oral intake this week, increased urinary frequency, increased HTN from 140s to 170-180s, and altered mental status last few days.   Family brought her to ED yesterday 7/18 and she was admitted to Medical service. WBC 15, U/a negative, CXR clear, urine/blood cultures no growth so far. Had been started on Zosyn.  This morning the patient was sitting up in bed, a little better mental state than yesterday and family had fed her a few spoonfuls of eggs and some water which she swallowed fine, then had a coughing fit and became unresponsive. Rapid Response called and patient was urgently intubated.   Stat head CT scan without evidence of bleed and patient is now admitted to ICU for critical care monitoring.     1- Altered mental status with acute hypoxic respiratory failure  2- Septic Shock, now requiring vasopressors, metabolic acidosis  3- Uncontrolled DM  4- Hypothyroid    Neuro:  - Neuro checks  - Sedation with propofol with daily SATs    Cards:  - Levophed for BP augmentation  - Echocardiogram  - IVF bolus, LR 500cc IV x1 now  - Hold ACEI  - Labetalol on hold while patient receiving Levophed    Resp:  - Low volume 6-8cc/kg ventilation  - Check ABG and adjust vent settings as able  - SBT in AM  - Pulmonary toilet  - Monitor sputum production, send cultures if necessary    Renal:  - Hold ACEI  - IVF Bolus 500cc LR  - Renally adjust all medications  - Maintain electrolytes, goal of K>4.0, Phos>3.0, Mg>2.0    GI:  - OG Tube placement to low wall suction  - Protonix IV Daily for GI Proph  - Start feeds in 24-48 hours if unable to extubate    :  - Lyles Catheter placement  - Urine culture repeat  - Followup up cultures    ID:  - Continue Zosyn  - Add one dose of Vanco  - Monitor blood urine  - Repeat Blood/urine cultures today  - Check Procalcitonin    Skin:  - Monitor for skin breakdown  - no open wounds at present  - Peripheral IVs, no central lines at present       ICU ASSESSMENT AND PLAN:   88yo F with PMH: DM, HTN, Hypothyroid, macular degeneration, cataracts, blind right eye, dementia, declining functional status over last year who has had poor oral intake this week, increased urinary frequency, increased HTN from 140s to 170-180s, and altered mental status last few days.   Family brought her to ED yesterday 7/18 and she was admitted to Medical service. WBC 15, U/a negative, CXR clear, urine/blood cultures no growth so far. Had been started on Zosyn.  This morning the patient was sitting up in bed, a little better mental state than yesterday and family had fed her a few spoonfuls of eggs and some water which she swallowed fine, then had a coughing fit and became unresponsive. Rapid Response called and patient was urgently intubated.   Stat head CT scan without evidence of bleed and patient is now admitted to ICU for critical care monitoring.     1- Altered mental status with acute hypoxic respiratory failure  2- Septic Shock, now requiring vasopressors, metabolic acidosis  3- Uncontrolled DM  4- Hypothyroid    Neuro:  - Neuro checks  - Sedation with propofol with daily SATs    Cards:  - Levophed for BP augmentation  - Echocardiogram  - IVF bolus, LR 500cc IV x1 now  - Hold ACEI  - Labetalol on hold while patient receiving Levophed    Resp:  - Low volume 6-8cc/kg ventilation  - Check ABG and adjust vent settings as able  - SBT in AM  - Pulmonary toilet  - Monitor sputum production, send cultures if necessary    Renal:  - Hold ACEI  - IVF Bolus 500cc LR  - Renally adjust all medications  - Maintain electrolytes, goal of K>4.0, Phos>3.0, Mg>2.0    GI:  - OG Tube placement to low wall suction  - Protonix IV Daily for GI Proph  - Start feeds in 24-48 hours if unable to extubate    :  - Lyles Catheter placement  - Urine culture repeat  - Followup up cultures    ID:  - Continue Zosyn  - Add one dose of Vanco  - Monitor blood urine  - Repeat Blood/urine cultures today  - Check Procalcitonin    Endo:  - Continue Synthroid as TSH normal  - A1C 10.8, poorly controlled DM  - Insulin sliding scale  - Continue Lantus, may need to increase dose as she is not getting meal admelog for now    Skin:  - Monitor for skin breakdown  - no open wounds at present  - Peripheral IVs, no central lines at present

## 2022-07-19 NOTE — RAPID RESPONSE TEAM SUMMARY - NSSITUATIONBACKGROUNDRRT_GEN_ALL_CORE
87F with a history of hypertension, dementia, hypothyroidism, and macular degeneration who presented from home with generalized weakness and found to have uncontrolled hypertension as well as fever and leukocytosis. The patient had an episode of vomiting earlier today for which ondansetron was administered.

## 2022-07-19 NOTE — ED PROCEDURE NOTE - PROCEDURE ADDITIONAL DETAILS
tracheal intubation performed for rapid response called- altered mental status, hypotensive, RR decreased

## 2022-07-19 NOTE — PATIENT PROFILE ADULT - FALL HARM RISK - HARM RISK INTERVENTIONS

## 2022-07-19 NOTE — CONSULT NOTE ADULT - SUBJECTIVE AND OBJECTIVE BOX
Patient is a 87y old  Female who presents with a chief complaint of fever, altered mental status and poorly controlled DM    BRIEF HOSPITAL COURSE:   88yo F with PMH: DM, HTN, Hypothyroid, macular degeneration, cataracts, blind right eye, dementia, declining functional status over last year who has had poor oral intake this week, increased urinary frequency, increased HTN from 140s to 170-180s, and altered mental status last few days.   Family brought her to ED yesterday  and she was admitted to Medical service. WBC 15, U/a negative, CXR clear, urine/blood cultures no growth so far. Had been started on Zosyn.  This morning the patient was sitting up in bed, a little better mental state than yesterday and family had fed her a few spoonfuls of eggs and some water which she swallowed fine, then had a coughing fit and became unresponsive. Rapid Response called and patient was urgently intubated.   Stat head CT scan without evidence of bleed and patient is now admitted to ICU for critical care monitoring.       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 Prior to last few days had been at baseline without complaint      Medications:  piperacillin/tazobactam IVPB.. 3.375 Gram(s) IV Intermittent every 8 hours  vancomycin  IVPB 1000 milliGRAM(s) IV Intermittent once    hydrALAZINE Injectable 10 milliGRAM(s) IV Push every 6 hours PRN  labetalol 200 milliGRAM(s) Oral two times a day  norepinephrine Infusion 0.05 MICROgram(s)/kG/Min IV Continuous <Continuous>  phenylephrine   100 mCg/mL NaCl 0.9% Injectable 200 MICROGram(s) IV Push once    acetaminophen     Tablet .. 650 milliGRAM(s) Oral every 6 hours PRN  acetaminophen   IVPB .. 1000 milliGRAM(s) IV Intermittent once  etomidate Injectable 20 milliGRAM(s) IV Push once  memantine 10 milliGRAM(s) Oral two times a day  ondansetron Injectable 4 milliGRAM(s) IV Push every 6 hours PRN  propofol Infusion 20 MICROgram(s)/kG/Min IV Continuous <Continuous>  succinylcholine Injectable 100 milliGRAM(s) IV Push once    enoxaparin Injectable 40 milliGRAM(s) SubCutaneous every 24 hours    dextrose 50% Injectable 25 Gram(s) IV Push once  dextrose 50% Injectable 12.5 Gram(s) IV Push once  dextrose 50% Injectable 25 Gram(s) IV Push once  dextrose Oral Gel 15 Gram(s) Oral once PRN  glucagon  Injectable 1 milliGRAM(s) IntraMuscular once  insulin glargine Injectable (LANTUS) 15 Unit(s) SubCutaneous at bedtime  insulin lispro (ADMELOG) corrective regimen sliding scale   SubCutaneous every 6 hours  insulin lispro Injectable (ADMELOG) 3 Unit(s) SubCutaneous three times a day before meals  levothyroxine 137 MICROGram(s) Oral daily    dextrose 5%. 1000 milliLiter(s) IV Continuous <Continuous>  dextrose 5%. 1000 milliLiter(s) IV Continuous <Continuous>  lactated ringers. 500 milliLiter(s) IV Continuous <Continuous>  lactated ringers. 1000 milliLiter(s) IV Continuous <Continuous>    Mode: AC/ CMV (Assist Control/ Continuous Mandatory Ventilation)  RR (machine): 12  TV (machine): 350  FiO2: 50  PEEP: 5  MAP: 8  PIP: 18    ICU Vital Signs Last 24 Hrs  T(C): 36.5 (2022 11:33), Max: 38.1 (2022 16:13)  T(F): 97.7 (2022 11:33), Max: 100.6 (2022 16:13)  HR: 61 (2022 13:16) (58 - 89)  BP: 177/62 (2022 08:21) (177/62 - 248/120)  RR: 20 (2022 11:33) (16 - 20)  SpO2: 100% (2022 13:16) (92% - 100%)    O2 Parameters below as of 2022 11:33  Patient On (Oxygen Delivery Method): room air    Vital Signs Last 24 Hrs  T(C): 36.5 (2022 11:33), Max: 38.1 (2022 16:13)  T(F): 97.7 (2022 11:33), Max: 100.6 (2022 16:13)  HR: 61 (2022 13:16) (58 - 89)  BP: 177/62 (2022 08:21) (177/62 - 248/120)    RR: 20 (2022 11:33) (16 - 20)  SpO2: 100% (2022 13:16) (92% - 100%)    Parameters below as of 2022 11:33  Patient On (Oxygen Delivery Method): room air    ABG - ( 2022 12:16 )  pH, Arterial: 7.450 pH, Blood: x     /  pCO2: 28    /  pO2: 378   / HCO3: 20    / Base Excess: -4.5  /  SaO2: 99.2      I&O's Detail marcano being placed now    LABS:                        11.5   16.44 )-----------( 308      ( 2022 04:09 )             37.9     07-19    137  |  98  |  14.5  ----------------------------<  358<H>  3.6   |  15.0<L>  |  0.61    Ca    9.3      2022 04:09  Mg     2.1     07-18    TPro  8.5  /  Alb  4.3  /  TBili  0.7  /  DBili  x   /  AST  23  /  ALT  20  /  AlkPhos  145<H>  07-18    CARDIAC MARKERS ( 2022 16:15 )  x     / <0.01 ng/mL / x     / x     / x        CAPILLARY BLOOD GLUCOSE: POCT Blood Glucose.: 408 mg/dL (2022 11:47)    Urinalysis Basic - ( 2022 16:52 )  Color: Yellow / Appearance: Clear / S.015 / pH: x  Gluc: x / Ketone: Negative  / Bili: Negative / Urobili: Negative mg/dL   Blood: x / Protein: 15 / Nitrite: Negative   Leuk Esterase: Negative / RBC: 0-2 /HPF / WBC 0-2 /HPF   Sq Epi: x / Non Sq Epi: Occasional / Bacteria: Occasional      CULTURES: urine/blood pending from yesterday, repeat cultures obtained today    Physical Examination:  General: 88yo F laying in stretcher, intubated, now sedated, no obvious distress  HEENT: Right pupil cavitary, opaque, Left pupil 3mm reactive  PULM: Mechanically vented, Clear to auscultation bilaterally, no significant sputum production  CVS: Regular rate and rhythm, no murmurs, rubs, or gallops  ABD: Soft, nondistended, nontender, normoactive bowel sounds, no masses  EXT: No edema, nontender  SKIN: Warm and well perfused, no rashes noted.    RADIOLOGY:   CXR : ETT and OGT in good position  Head CT scan : No evidence of bleeding    CRITICAL CARE TIME SPENT: 55 minutes  Case discussed with Dr. John, ICU Attending

## 2022-07-19 NOTE — CONSULT NOTE ADULT - NS PANP COMMENT GEN_ALL_CORE FT
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 intake this past week increased urinary frequency and increased at home hypertension presented with altered mental status on July 18 to [facility] ED with hypertensive crisis with systolic in 240s and diastolic in 120s was admitted to medical services with goal to slowly improve blood pressure with initial CT head negative and ruled out UTI on admission based on urinalysis with no significant finding on chest x-ray.  Hospital course today complicated by episode of vomiting earlier this morning which followed by bradycardia hypotension lethargy to obtundation with respiratory distress requiring urgent intubation by ER team and subsequent negative CT head without contrast for acute pathology and admitted to medical ICU for further assessment.  Repeat labs with improved blood sugar, WBC, metabolic acidosis.   patient's T-max is 38 degrees now after intubation with hypotension related to propofol.  After few hours of sedation and volume assist control, now undergoing trial of spontaneous awakening and breathing trial with pressure support 15/5 at 50% with respiration ranging from 18-24 and tidal volume from 3  patient looks comfortable but not completely arousable while grimacing to pain full stimuli.    We will continue with neurochecks.  Blood culture repeated with urine culture and empiric Zosyn with dose of vancomycin today.    We will repeat CT head or MRI brain if worsening neuro status.    Most likely differential in my opinion would be exaggerated vasovagal response with in-hospital transition of propranolol to labetalol after episode of vomiting this morning and could have secondary aspiration pneumonitis.    Family at bedside.  Plan discussed with  sister son and daughter and all questions answered to the best of my ability

## 2022-07-20 NOTE — DIETITIAN INITIAL EVALUATION ADULT - ETIOLOGY
related to inability to meet sufficient protein-energy in setting of advanced age, dementia, declining functional status, failure to thrive, poor po intake

## 2022-07-20 NOTE — DIETITIAN INITIAL EVALUATION ADULT - OTHER INFO
87 year old female PMH HTN, DM, hypothyroidism, anemia, blind R eye after ocular debridement, dementia, declining functional status who presented on 7/18/22 with poor PO intake, FTT, increased urinary frequency, and hypertensive urgency, had episode of vomiting followed by sudden unresponsiveness s/p intubation, course c/b sepsis with shock.    Pt remains intubated at this time. Noted with poor po intake PTA. Remains NPO at this time. Noted with HgA1c 10.8%. RD to follow up.

## 2022-07-20 NOTE — CHART NOTE - NSCHARTNOTEFT_GEN_A_CORE
Obtained consent from daughter Jessica to remove left ring-finger ring by cutting. Unable to remove manually with lubrication or string unwinding method. Ring was cut at the bedside after consent obtained, without incident.       Braulio Olivas M.D.  Pulmonary & Critical Care Medicine  [facility]  Pulmonary and Sleep Medicine at [address]  [place], [place] [address]  T: [phone]  F: [phone]

## 2022-07-20 NOTE — DIETITIAN INITIAL EVALUATION ADULT - PERTINENT MEDS FT
MEDICATIONS  (STANDING):  chlorhexidine 0.12% Liquid 15 milliLiter(s) Oral Mucosa every 12 hours  chlorhexidine 2% Cloths 1 Application(s) Topical daily  dextrose 5%. 1000 milliLiter(s) (50 mL/Hr) IV Continuous <Continuous>  dextrose 5%. 1000 milliLiter(s) (100 mL/Hr) IV Continuous <Continuous>  dextrose 50% Injectable 25 Gram(s) IV Push once  dextrose 50% Injectable 12.5 Gram(s) IV Push once  dextrose 50% Injectable 25 Gram(s) IV Push once  enoxaparin Injectable 40 milliGRAM(s) SubCutaneous every 24 hours  fentaNYL   Infusion. 0.5 MICROgram(s)/kG/Hr (3.01 mL/Hr) IV Continuous <Continuous>  glucagon  Injectable 1 milliGRAM(s) IntraMuscular once  hydrocortisone sodium succinate Injectable 50 milliGRAM(s) IV Push every 6 hours  insulin regular Infusion 6 Unit(s)/Hr (6 mL/Hr) IV Continuous <Continuous>  labetalol 200 milliGRAM(s) Oral two times a day  lactated ringers. 500 milliLiter(s) (1000 mL/Hr) IV Continuous <Continuous>  lactated ringers. 1000 milliLiter(s) (75 mL/Hr) IV Continuous <Continuous>  levothyroxine 137 MICROGram(s) Oral daily  memantine 10 milliGRAM(s) Oral two times a day  meropenem  IVPB 1000 milliGRAM(s) IV Intermittent every 12 hours  norepinephrine Infusion 0.05 MICROgram(s)/kG/Min (5.63 mL/Hr) IV Continuous <Continuous>  pantoprazole  Injectable 40 milliGRAM(s) IV Push daily  vancomycin  IVPB 750 milliGRAM(s) IV Intermittent <User Schedule>    MEDICATIONS  (PRN):  acetaminophen     Tablet .. 650 milliGRAM(s) Oral every 6 hours PRN Temp greater or equal to 38C (100.4F), Mild Pain (1 - 3)  dextrose Oral Gel 15 Gram(s) Oral once PRN Blood Glucose LESS THAN 70 milliGRAM(s)/deciliter  hydrALAZINE Injectable 10 milliGRAM(s) IV Push every 6 hours PRN SBP > 170  ondansetron Injectable 4 milliGRAM(s) IV Push every 6 hours PRN Nausea and/or Vomiting

## 2022-07-20 NOTE — CHART NOTE - NSCHARTNOTEFT_GEN_A_CORE
Patient's sister and son Arnol at bedside.  Updated about medical condition.  Answered all question.

## 2022-07-20 NOTE — DIETITIAN INITIAL EVALUATION ADULT - PERTINENT LABORATORY DATA
07-20    132<L>  |  99  |  20.6<H>  ----------------------------<  166<H>  3.7   |  17.0<L>  |  0.99    Ca    8.9      20 Jul 2022 03:00  Phos  3.1     07-20  Mg     2.4     07-20    TPro  6.9  /  Alb  3.3  /  TBili  1.0  /  DBili  x   /  AST  39<H>  /  ALT  18  /  AlkPhos  121<H>  07-20  POCT Blood Glucose.: 165 mg/dL (07-20-22 @ 09:09)  A1C with Estimated Average Glucose Result: 10.8 % (07-19-22 @ 04:09)

## 2022-07-20 NOTE — PROGRESS NOTE ADULT - SUBJECTIVE AND OBJECTIVE BOX
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 Pt admitted to medical service, had a WBC count of 15, empirically started on Zosyn. On 22 patient was eating, noted ot have a coughing episode and suddenly became unresponsive prompting RRT, and was intubated for airway protection. CT showed moderate normal pressure hydrocephalus. She became febrile and hypotensive necessitating pressors.       PAST MEDICAL & SURGICAL HISTORY:  Essential hypertension      Hypothyroidism, unspecified type      Diabetes mellitus of other type without complication      Anemia      S/P cataract surgery  both eyes      S/P appendectomy            Medications:  piperacillin/tazobactam IVPB.. 3.375 Gram(s) IV Intermittent every 8 hours    hydrALAZINE Injectable 10 milliGRAM(s) IV Push every 6 hours PRN  labetalol 200 milliGRAM(s) Oral two times a day  norepinephrine Infusion 0.05 MICROgram(s)/kG/Min IV Continuous <Continuous>      acetaminophen     Tablet .. 650 milliGRAM(s) Oral every 6 hours PRN  fentaNYL   Infusion. 0.5 MICROgram(s)/kG/Hr IV Continuous <Continuous>  memantine 10 milliGRAM(s) Oral two times a day  ondansetron Injectable 4 milliGRAM(s) IV Push every 6 hours PRN  propofol Infusion 20 MICROgram(s)/kG/Min IV Continuous <Continuous>      enoxaparin Injectable 40 milliGRAM(s) SubCutaneous every 24 hours        dextrose 50% Injectable 25 Gram(s) IV Push once  dextrose 50% Injectable 12.5 Gram(s) IV Push once  dextrose 50% Injectable 25 Gram(s) IV Push once  dextrose Oral Gel 15 Gram(s) Oral once PRN  glucagon  Injectable 1 milliGRAM(s) IntraMuscular once  insulin regular Infusion 6 Unit(s)/Hr IV Continuous <Continuous>  levothyroxine 137 MICROGram(s) Oral daily    dextrose 5%. 1000 milliLiter(s) IV Continuous <Continuous>  dextrose 5%. 1000 milliLiter(s) IV Continuous <Continuous>  lactated ringers. 1000 milliLiter(s) IV Continuous <Continuous>  lactated ringers. 500 milliLiter(s) IV Continuous <Continuous>  lactated ringers. 500 milliLiter(s) IV Continuous <Continuous>  lactated ringers. 1000 milliLiter(s) IV Continuous <Continuous>      chlorhexidine 0.12% Liquid 15 milliLiter(s) Oral Mucosa every 12 hours  chlorhexidine 2% Cloths 1 Application(s) Topical daily        Mode: CPAP with PS  FiO2: 50  PEEP: 5  PS: 15  MAP: 8  PIP: 20      ICU Vital Signs Last 24 Hrs  T(C): 37.6 (2022 00:15), Max: 38.1 (2022 18:00)  T(F): 99.7 (2022 00:15), Max: 100.6 (2022 18:00)  HR: 56 (2022 00:20) (55 - 73)  BP: 118/50 (2022 00:00) (89/61 - 179/70)  BP(mean): 67 (2022 00:00) (53 - 74)  ABP: --  ABP(mean): --  RR: 18 (2022 00:15) (14 - 22)  SpO2: 99% (2022 00:20) (92% - 100%)    O2 Parameters below as of 2022 18:00  Patient On (Oxygen Delivery Method): BiPAP/CPAP    O2 Concentration (%): 30        ABG - ( 2022 14:00 )  pH, Arterial: 7.460 pH, Blood: x     /  pCO2: 28    /  pO2: 195   / HCO3: 20    / Base Excess: -3.9  /  SaO2: 99.0                I&O's Detail    2022 07:01  -  2022 00:31  --------------------------------------------------------  IN:    FentaNYL: 12 mL    Insulin: 13.5 mL    IV PiggyBack: 400 mL    IV PiggyBack: 350 mL    Lactated Ringers: 2000 mL    Lactated Ringers: 500 mL    Lactated Ringers: 1050 mL    Norepinephrine: 70 mL    Propofol: 26.6 mL  Total IN: 4422.1 mL    OUT:    Indwelling Catheter - Urethral (mL): 120 mL    Voided (mL): 160 mL  Total OUT: 280 mL    Total NET: 4142.1 mL            LABS:                        9.3    15.12 )-----------( 387      ( 2022 14:00 )             29.5     07-19    127<L>  |  92<L>  |  20.3<H>  ----------------------------<  431<H>  4.3   |  19.0<L>  |  1.12    Ca    7.8<L>      2022 19:00  Phos  3.9     07-  Mg     1.7     -19    TPro  6.1<L>  /  Alb  3.0<L>  /  TBili  0.9  /  DBili  x   /  AST  18  /  ALT  14  /  AlkPhos  104  07-19      CARDIAC MARKERS ( 2022 19:00 )  x     / 0.01 ng/mL / x     / x     / x      CARDIAC MARKERS ( 2022 16:15 )  x     / <0.01 ng/mL / x     / x     / x          CAPILLARY BLOOD GLUCOSE      POCT Blood Glucose.: 171 mg/dL (2022 23:43)      Urinalysis Basic - ( 2022 16:52 )    Color: Yellow / Appearance: Clear / S.015 / pH: x  Gluc: x / Ketone: Negative  / Bili: Negative / Urobili: Negative mg/dL   Blood: x / Protein: 15 / Nitrite: Negative   Leuk Esterase: Negative / RBC: 0-2 /HPF / WBC 0-2 /HPF   Sq Epi: x / Non Sq Epi: Occasional / Bacteria: Occasional      CULTURES:  Culture Results:   No growth to date. ( @ 22:10)  Culture Results:   No growth to date. ( @ 22:10)  Culture Results:   10,000 - 49,000 CFU/mL Gram positive organisms ( @ 22:01)  Rapid RVP Result: NotDetec ( @ 17:07)      Physical Examination:  GENERAL: In NAD   HEENT: NC/AT  NECK: Supple, trachea midline  PULM: Coarse mechanical breath sounds B/L  CVS: +S1, S2  ABD: Soft, non-tender  EXTREMITIES: No pedal edema B/L  SKIN: No open wounds  NEURO: Grossly non-focal    DEVICES:     RADIOLOGY:   < from: CT Head No Cont (22 @ 12:55) >    ACC: [id number] EXAM:  CT BRAIN                          PROCEDURE DATE:  2022          INTERPRETATION:  Noncontrast CT of the brain.    CLINICAL INDICATION:  Altered mental status    TECHNIQUE : Axial CT scanning of the brain was obtained fromthe skull   base to the vertex without the administration of intravenous contrast.   Sagittal and coronal reformats were provided.    COMPARISON: CT brain 2022    FINDINGS:    Ventricles are persistently enlarged out of proportion to the sulci, and   there is crowding of the sulci at the vertex. Findings are consistent   with moderate normal pressure hydrocephalus.    Similar mild leftward midline shift. No effacement of basal cisterns.    No acute intracranial hemorrhage or brain edema.    Mild white matter microvascular ischemic disease. 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    IMPRESSION:    Findings consistent with persistent moderate normal pressure   hydrocephalus.    No acute intracranial hemorrhage or brain edema.    --- End of Report ---            CHELSEA GASPAR MD; Attending Radiologist  This document has been electronically signed. 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 Pt admitted to medical service, had a WBC count of 15, empirically started on Zosyn. On 22 patient was eating, noted ot have a coughing episode and suddenly became unresponsive prompting RRT, and was intubated for airway protection. CT showed moderate normal pressure hydrocephalus. She became febrile and hypotensive necessitating pressors.       PAST MEDICAL & SURGICAL HISTORY:  Essential hypertension      Hypothyroidism, unspecified type      Diabetes mellitus of other type without complication      Anemia      S/P cataract surgery  both eyes      S/P appendectomy            Medications:  piperacillin/tazobactam IVPB.. 3.375 Gram(s) IV Intermittent every 8 hours    hydrALAZINE Injectable 10 milliGRAM(s) IV Push every 6 hours PRN  labetalol 200 milliGRAM(s) Oral two times a day  norepinephrine Infusion 0.05 MICROgram(s)/kG/Min IV Continuous <Continuous>      acetaminophen     Tablet .. 650 milliGRAM(s) Oral every 6 hours PRN  fentaNYL   Infusion. 0.5 MICROgram(s)/kG/Hr IV Continuous <Continuous>  memantine 10 milliGRAM(s) Oral two times a day  ondansetron Injectable 4 milliGRAM(s) IV Push every 6 hours PRN  propofol Infusion 20 MICROgram(s)/kG/Min IV Continuous <Continuous>      enoxaparin Injectable 40 milliGRAM(s) SubCutaneous every 24 hours        dextrose 50% Injectable 25 Gram(s) IV Push once  dextrose 50% Injectable 12.5 Gram(s) IV Push once  dextrose 50% Injectable 25 Gram(s) IV Push once  dextrose Oral Gel 15 Gram(s) Oral once PRN  glucagon  Injectable 1 milliGRAM(s) IntraMuscular once  insulin regular Infusion 6 Unit(s)/Hr IV Continuous <Continuous>  levothyroxine 137 MICROGram(s) Oral daily    dextrose 5%. 1000 milliLiter(s) IV Continuous <Continuous>  dextrose 5%. 1000 milliLiter(s) IV Continuous <Continuous>  lactated ringers. 1000 milliLiter(s) IV Continuous <Continuous>  lactated ringers. 500 milliLiter(s) IV Continuous <Continuous>  lactated ringers. 500 milliLiter(s) IV Continuous <Continuous>  lactated ringers. 1000 milliLiter(s) IV Continuous <Continuous>      chlorhexidine 0.12% Liquid 15 milliLiter(s) Oral Mucosa every 12 hours  chlorhexidine 2% Cloths 1 Application(s) Topical daily        Mode: CPAP with PS  FiO2: 50  PEEP: 5  PS: 15  MAP: 8  PIP: 20      ICU Vital Signs Last 24 Hrs  T(C): 37.6 (2022 00:15), Max: 38.1 (2022 18:00)  T(F): 99.7 (2022 00:15), Max: 100.6 (2022 18:00)  HR: 56 (2022 00:20) (55 - 73)  BP: 118/50 (2022 00:00) (89/61 - 179/70)  BP(mean): 67 (2022 00:00) (53 - 74)  ABP: --  ABP(mean): --  RR: 18 (2022 00:15) (14 - 22)  SpO2: 99% (2022 00:20) (92% - 100%)    O2 Parameters below as of 2022 18:00  Patient On (Oxygen Delivery Method): BiPAP/CPAP    O2 Concentration (%): 30        ABG - ( 2022 14:00 )  pH, Arterial: 7.460 pH, Blood: x     /  pCO2: 28    /  pO2: 195   / HCO3: 20    / Base Excess: -3.9  /  SaO2: 99.0                I&O's Detail    2022 07:01  -  2022 00:31  --------------------------------------------------------  IN:    FentaNYL: 12 mL    Insulin: 13.5 mL    IV PiggyBack: 400 mL    IV PiggyBack: 350 mL    Lactated Ringers: 2000 mL    Lactated Ringers: 500 mL    Lactated Ringers: 1050 mL    Norepinephrine: 70 mL    Propofol: 26.6 mL  Total IN: 4422.1 mL    OUT:    Indwelling Catheter - Urethral (mL): 120 mL    Voided (mL): 160 mL  Total OUT: 280 mL    Total NET: 4142.1 mL            LABS:                        9.3    15.12 )-----------( 387      ( 2022 14:00 )             29.5     07-19    127<L>  |  92<L>  |  20.3<H>  ----------------------------<  431<H>  4.3   |  19.0<L>  |  1.12    Ca    7.8<L>      2022 19:00  Phos  3.9     07-  Mg     1.7     -19    TPro  6.1<L>  /  Alb  3.0<L>  /  TBili  0.9  /  DBili  x   /  AST  18  /  ALT  14  /  AlkPhos  104  07-19      CARDIAC MARKERS ( 2022 19:00 )  x     / 0.01 ng/mL / x     / x     / x      CARDIAC MARKERS ( 2022 16:15 )  x     / <0.01 ng/mL / x     / x     / x          CAPILLARY BLOOD GLUCOSE      POCT Blood Glucose.: 171 mg/dL (2022 23:43)      Urinalysis Basic - ( 2022 16:52 )    Color: Yellow / Appearance: Clear / S.015 / pH: x  Gluc: x / Ketone: Negative  / Bili: Negative / Urobili: Negative mg/dL   Blood: x / Protein: 15 / Nitrite: Negative   Leuk Esterase: Negative / RBC: 0-2 /HPF / WBC 0-2 /HPF   Sq Epi: x / Non Sq Epi: Occasional / Bacteria: Occasional      CULTURES:  Culture Results:   No growth to date. ( @ 22:10)  Culture Results:   No growth to date. ( @ 22:10)  Culture Results:   10,000 - 49,000 CFU/mL Gram positive organisms ( @ 22:01)  Rapid RVP Result: NotDetec ( @ 17:07)      Physical Examination:  GENERAL: In NAD   HEENT: NC/AT  NECK: Supple, trachea midline  PULM: Coarse mechanical breath sounds B/L  CVS: +S1, S2  ABD: Soft, non-tender  EXTREMITIES: No pedal edema B/L  SKIN: No open wounds  NEURO: Grossly non-focal    DEVICES:     RADIOLOGY:   < from: CT Head No Cont (22 @ 12:55) >    ACC: [id number] EXAM:  CT BRAIN                          PROCEDURE DATE:  2022          INTERPRETATION:  Noncontrast CT of the brain.    CLINICAL INDICATION:  Altered mental status    TECHNIQUE : Axial CT scanning of the brain was obtained fromthe skull   base to the vertex without the administration of intravenous contrast.   Sagittal and coronal reformats were provided.    COMPARISON: CT brain 2022    FINDINGS:    Ventricles are persistently enlarged out of proportion to the sulci, and   there is crowding of the sulci at the vertex. Findings are consistent   with moderate normal pressure hydrocephalus.    Similar mild leftward midline shift. No effacement of basal cisterns.    No acute intracranial hemorrhage or brain edema.    Mild white matter microvascular ischemic disease. 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    IMPRESSION:    Findings consistent with persistent moderate normal pressure   hydrocephalus.    No acute intracranial hemorrhage or brain edema.    --- End of Report ---            CHELSEA GASPAR MD; Attending Radiologist  This document has been electronically signed. 2022  2:19PM    < end of copied text >        Antibiotics Course:    piperacillin/tazobactam IVPB.   200 mL/Hr (22 @ 10:19)    piperacillin/tazobactam IVPB..   25 mL/Hr (22 @ 21:49)   25 mL/Hr (22 @ 17:23)    piperacillin/tazobactam IVPB...   200 mL/Hr (22 @ 18:31)    vancomycin  IVPB   250 mL/Hr (22 @ 15:27)    vancomycin  IVPB.   250 mL/Hr (22 @ 22:57)

## 2022-07-20 NOTE — DIETITIAN INITIAL EVALUATION ADULT - ENTERAL
As medically feasible/tolerable, initiate Glucerna 1.5 @ 20 ml/hr and advance 10 ml/hr q4 hrs until goal rate of 50 ml/hr (x20 hrs) to provide 1000 ml, 1500 kcal, 83g protein, 759 ml free water, and 100% of RDIs for vitamins/minerals. Additional free water per MD discretion.

## 2022-07-20 NOTE — PHARMACOTHERAPY INTERVENTION NOTE - COMMENTS
Code Rapid Response  Rapid Sequence Intubation  
merrem decrease to q12  vanco decrease to once daily est auc/luz maria 445 (goal 400-600)

## 2022-07-20 NOTE — PROGRESS NOTE ADULT - ASSESSMENT
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, dementia, declining functional status who presented on 7/18/22 with poor PO intake, FTT, increased urinary frequency, and hypertensive urgency, had episode of vomiting followed by sudden unresponsiveness s/p intubation, course c/b sepsis with shock    Impression/Plan:    AMS  Sudden unresponsiveness - possible vagal event post vomiting  Possible component of septic encephalopathy  - CTH with moderate hydrocephalus  - Will f/u with MRI non-contrast with CSF flow for further characterization  - Monitor mentation closely, with daily SAT  - Fentanyl for sedation, goal RASS -1  - Checking UTox    Normal pressure hydrocephalus - moderate  - Per family, no known history of NPH  - Will obtain MRI brain as above to exclude secondary causes and to exclude ex-vacuo hydrocephalus (compensatory enlargement of CSF space in setting of encephalic volume loss)  - Pending MRI will decide on large-volume lumbar puncture  - Need to remove rings on hands prior to MRI (could no remove due to swollen finger despite lubrication and string-unwinding technique, may need to cut the ring)    Sepsis - with shock  Leukocytosis, fevers  - Unclear source at this time; CXR grossly clear, UA negative  - Checking CT A/P with IV contrast, and CT chest  - F/u BCx, UCx (in lab)  - C/w Zosyn (7/18- )  - On Levophed, titrate for SBP >90 or MAP >65; currently running peripherally  - When sinus bradycardia improves can start Midodrine  - LR bolus 500cc given overnight, will monitor hemodynamic response  - PRN APAP  - TTE performed, f/u    Hyperglycemia  - Insulin infusion  - Goal -180  - Monitor BMP serially, and aggressively replete lytes PRN    Hypertensive urgency - now resolved  - Is now on pressors for septic shock as above    Hypothyroidism  - C/w Synthroid    Dementia  - C/w Memantine    F/E/N/PPx/Lines  - IVF boluses as above  - Maintain K>4 Mg>2  - NPO  - Lovenox for VTE PPX; IV Protonix daily for GI PPx while intubated  - PIV    Ethics/Dispo  - Full code  - C/w care in ICU      Braulio Olivas M.D.  Pulmonary & Critical Care Medicine  [facility]  Pulmonary and Sleep Medicine at [place]  [address]  [place] 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, dementia, declining functional status who presented on 7/18/22 with poor PO intake, FTT, increased urinary frequency, and hypertensive urgency, had episode of vomiting followed by sudden unresponsiveness s/p intubation, course c/b sepsis with shock    Impression/Plan:    AMS  Sudden unresponsiveness - possible vagal event post vomiting  Possible component of septic encephalopathy  - CTH with moderate hydrocephalus  - Will f/u with MRI non-contrast with CSF flow for further characterization  - Monitor mentation closely, with daily SAT  - Fentanyl for sedation, goal RASS -1  - Checking UTox    Normal pressure hydrocephalus - moderate  - Per family, no known history of NPH  - Will obtain MRI brain as above to exclude secondary causes and to exclude ex-vacuo hydrocephalus (compensatory enlargement of CSF space in setting of encephalic volume loss)  - Pending MRI will decide on large-volume lumbar puncture  - Need to remove rings on hands prior to MRI (could not remove due to swollen finger despite lubrication and string-unwinding technique, may need to cut the ring)    Sepsis - with shock  Leukocytosis, fevers  - Unclear source at this time; CXR grossly clear, UA negative  - Checking CT A/P with IV contrast, and CT chest  - F/u BCx, UCx (in lab)  - C/w Zosyn (7/18- )  - On Levophed, titrate for SBP >90 or MAP >65; currently running peripherally  - When sinus bradycardia improves can start Midodrine  - LR bolus 500cc given overnight, will monitor hemodynamic response  - PRN APAP  - TTE performed, f/u    Hyperglycemia  - Insulin infusion  - Goal -180  - Monitor BMP serially, and aggressively replete lytes PRN    Hypertensive urgency - now resolved  - Is now on pressors for septic shock as above    Hypothyroidism  - C/w Synthroid    Dementia  - C/w Memantine    F/E/N/PPx/Lines  - IVF boluses as above  - Maintain K>4 Mg>2  - NPO  - Lovenox for VTE PPX; IV Protonix daily for GI PPx while intubated  - PIV    Ethics/Dispo  - Full code  - C/w care in ICU      Braulio Olivas M.D.  Pulmonary & Critical Care Medicine  [facility]  Pulmonary and Sleep Medicine at [place]  [address]  [place], [place] [address]  T: [phone]  F: [phone]

## 2022-07-20 NOTE — DIETITIAN NUTRITION RISK NOTIFICATION - ADDITIONAL COMMENTS/DIETITIAN RECOMMENDATIONS
As medically feasible/tolerable, initiate Glucerna 1.5 @ 20 ml/hr and advance 10 ml/hr q4 hrs until goal rate of 50 ml/hr (x20 hrs) to provide 1000 ml, 1500 kcal, 83g protein, 759 ml free water, and 100% of RDIs for vitamins/minerals. Additional free water per MD discretion.  Monitor blood sugars and correct as needed. Obtain daily weights to monitor trends.

## 2022-07-21 NOTE — CONSULT NOTE ADULT - SUBJECTIVE AND OBJECTIVE BOX
[facility]                                     Neurology at [place]                                 Silvia Roman, & Keron                                  [address]                                        [place], [place], [address]                                             [phone]    CC: dementia  History obtained from son and sister at bedside and daughter by phone    HPI:  The patient is a 87y Female who presented with failure to thrive and was found to be hypoxic and hypotensive.  She was intubated and admitted to MIICU.  Head CT x 2 showed NPH.  She has a 8 year history of dementia, diagnosed by PMD as vascular dementia.  She has history of gait troubles for past year or more and wears a pad for incontinence.  Her dementia has worsened over past 3 years.  She has been taking memantine as prescribed by her PMD.  Neurology is asked to evaluate.    PAST MEDICAL & SURGICAL HISTORY:  Essential hypertension      Hypothyroidism, unspecified type      Diabetes mellitus of other type without complication      Anemia      S/P cataract surgery  both eyes      S/P appendectomy          MEDICATIONS  (STANDING):  chlorhexidine 2% Cloths 1 Application(s) Topical daily  dextrose 5%. 1000 milliLiter(s) (50 mL/Hr) IV Continuous <Continuous>  dextrose 5%. 1000 milliLiter(s) (100 mL/Hr) IV Continuous <Continuous>  dextrose 50% Injectable 25 Gram(s) IV Push once  dextrose 50% Injectable 12.5 Gram(s) IV Push once  dextrose 50% Injectable 25 Gram(s) IV Push once  enoxaparin Injectable 40 milliGRAM(s) SubCutaneous every 24 hours  fentaNYL   Infusion. 0.5 MICROgram(s)/kG/Hr (3.01 mL/Hr) IV Continuous <Continuous>  glucagon  Injectable 1 milliGRAM(s) IntraMuscular once  hydrocortisone sodium succinate Injectable 50 milliGRAM(s) IV Push every 6 hours  insulin glargine Injectable (LANTUS) 20 Unit(s) SubCutaneous two times a day  insulin lispro (ADMELOG) corrective regimen sliding scale   SubCutaneous every 6 hours  insulin lispro Injectable (ADMELOG) 3 Unit(s) SubCutaneous every 6 hours  insulin regular Infusion 6 Unit(s)/Hr (6 mL/Hr) IV Continuous <Continuous>  lactated ringers. 1000 milliLiter(s) (75 mL/Hr) IV Continuous <Continuous>  levothyroxine 137 MICROGram(s) Oral daily  lisinopril 40 milliGRAM(s) Oral daily  memantine 10 milliGRAM(s) Oral two times a day  meropenem  IVPB 1000 milliGRAM(s) IV Intermittent every 12 hours  pantoprazole  Injectable 40 milliGRAM(s) IV Push daily    MEDICATIONS  (PRN):  dextrose Oral Gel 15 Gram(s) Oral once PRN Blood Glucose LESS THAN 70 milliGRAM(s)/deciliter  hydrALAZINE Injectable 10 milliGRAM(s) IV Push every 6 hours PRN SBP > 170  ondansetron Injectable 4 milliGRAM(s) IV Push every 6 hours PRN Nausea and/or Vomiting      Allergies    No Known Allergies    Intolerances        SOCIAL HISTORY:  no tob,   no alcohol   no drugs    FAMILY HISTORY:        ROS: 14 point ROS negative other than what is present in HPI or below    Vital Signs Last 24 Hrs  T(C): 36.7 (21 Jul 2022 11:12), Max: 37.9 (20 Jul 2022 19:00)  T(F): 98 (21 Jul 2022 11:12), Max: 100.2 (20 Jul 2022 19:00)  HR: 96 (21 Jul 2022 08:00) (61 - 96)  BP: 145/90 (21 Jul 2022 08:00) (103/51 - 180/70)  BP(mean): 117 (21 Jul 2022 08:00) (64 - 189)  RR: 14 (21 Jul 2022 08:00) (12 - 20)  SpO2: 98% (21 Jul 2022 12:24) (98% - 100%)    Parameters below as of 21 Jul 2022 12:24  Patient On (Oxygen Delivery Method): nasal cannula  O2 Flow (L/min): 4        General: NAD    Detailed Neurologic Exam:    Mental status: The patient is awake and alert and has diminished attention span.  The patient is oriented to self only.  The patient is not oriented to current events. The patient is able to follow commands.    Cranial nerves: right eye blind, left pupil reacts There is no visual field deficit to confrontation. Extraocular motion is full with no nystagmus. There is no ptosis. Facial sensation is intact. Facial musculature is symmetric. Tongue is midline.    Motor: There is normal bulk and tone.  There is no tremor.  moves weakly throughout    Sensation: Intact to light touch and pin in 4 extremities    Reflexes: muted throughout and plantar responses are equivocal.    Cerebellar: Unable to assess for dysmetria on finger to nose testing due to visual impairment    Gait : deferred    LABS:                         8.1    16.31 )-----------( 300      ( 21 Jul 2022 03:30 )             27.5       07-21    130<L>  |  97<L>  |  13.9  ----------------------------<  321<H>  4.6   |  17.0<L>  |  0.70    Ca    8.3<L>      21 Jul 2022 03:30  Phos  3.6     07-21  Mg     2.1     07-21    TPro  6.9  /  Alb  3.3  /  TBili  1.0  /  DBili  x   /  AST  39<H>  /  ALT  18  /  AlkPhos  121<H>  07-20      PT/INR - ( 20 Jul 2022 03:00 )   PT: 12.4 sec;   INR: 1.07 ratio         PTT - ( 20 Jul 2022 03:00 )  PTT:24.0 sec    RADIOLOGY & ADDITIONAL STUDIES (independently reviewed unless otherwise noted):  Head CT no acute CVA, mas or blood (+) enlarged ventricles

## 2022-07-21 NOTE — PROGRESS NOTE ADULT - ASSESSMENT
86 yo f pmhx DM, HTN, hypothyroid, macular degeneration, cataracts, right eye blind, dementia declining function status initially admitted to medicine with AMS/FTT/HTN urgency with course complicated by presumed aspiration event -->obtunded/hypotensive requiring intubation and vasopressor therapy and hyperglycemia requiring insulin gtt.      NEURO: Fentanyl for sedation. nl pressure hydrocephalus on ct scan, neurology consult.  mri brain pending.  CV: Hydralazine ivp prn for bp control, lisinopril added for additional bp control. stress dose steroids.   RESP: Hypoxic respiratory failure, ac/vc 4-6cc/kg tv lung protective strategies, sbt as tolerated.    RENAL: Monitor urine output, lytes, replace lytes as needed.    GI: NPO with tf  ENDO: Lantus/ISS/standing Lispro for coverage, hypothyroidism on synthroid   ID: Meropenem/vancomycin for coverage, blood cx NGTD  HEME: Lovenox for vte ppx   DISPO: Full code.  Patient doing well on sbt however ms remains poor preventing extubation.  on going goc with patient's family.

## 2022-07-21 NOTE — CONSULT NOTE ADULT - ASSESSMENT
The patient is a 87y Female who is followed by neurology because of Dementia and NPH    NPH  I spoke in detail with family about possibility of NPH, how it is diagnosed (LP) and treated ( shunt)  At this time her daughter would like to think about this prior to proceeding with spinal tap  Also at this point it would be hard to assess benefit of LP given critical illness    I will cancel MRI brain    She can follow up with me as an outpatient if the family wishes.  discussed with patients sister as well as her son and daughter (dtr by phone)    Discussed with Dr John    Thank you for allowing me to participate in the care of your patient    Ryan Vega MD, PhD   [id number]

## 2022-07-21 NOTE — SWALLOW BEDSIDE ASSESSMENT ADULT - SWALLOW EVAL: DIAGNOSIS
Pt w/ minimal acceptance of PO therefore, difficult to determine at this time however, oral stage judged to be WFL for trace amounts of puree. Pharyngeal stage not assessed 2' expectoration of trial.

## 2022-07-21 NOTE — CONSULT NOTE ADULT - SUBJECTIVE AND OBJECTIVE BOX
[facility]                                   [address] [place] [address]                                                      PHONE: [phone]                                                         FAX: [phone]  [email or web address]  ---------------------------------------------------------------------------------------------------------------------------------    Reason for Consult:AS  CVS: [facility]   HPI:  SHARIFA ENGLE is an 87y Female PMHx Dementia, HTN, DM pw fever and respiratory distress requiring intubation and subsequent extubation.  Pt found to have severe AS on workup.    PAST MEDICAL & SURGICAL HISTORY:  Essential hypertension      Hypothyroidism, unspecified type      Diabetes mellitus of other type without complication      Anemia      S/P cataract surgery  both eyes      S/P appendectomy          No Known Allergies      MEDICATIONS  (STANDING):  chlorhexidine 2% Cloths 1 Application(s) Topical daily  dextrose 5%. 1000 milliLiter(s) (50 mL/Hr) IV Continuous <Continuous>  dextrose 5%. 1000 milliLiter(s) (100 mL/Hr) IV Continuous <Continuous>  dextrose 50% Injectable 25 Gram(s) IV Push once  dextrose 50% Injectable 12.5 Gram(s) IV Push once  dextrose 50% Injectable 25 Gram(s) IV Push once  enoxaparin Injectable 40 milliGRAM(s) SubCutaneous every 24 hours  fentaNYL   Infusion. 0.5 MICROgram(s)/kG/Hr (3.01 mL/Hr) IV Continuous <Continuous>  glucagon  Injectable 1 milliGRAM(s) IntraMuscular once  hydrocortisone sodium succinate Injectable 50 milliGRAM(s) IV Push every 6 hours  insulin glargine Injectable (LANTUS) 20 Unit(s) SubCutaneous two times a day  insulin lispro (ADMELOG) corrective regimen sliding scale   SubCutaneous every 6 hours  insulin lispro Injectable (ADMELOG) 3 Unit(s) SubCutaneous every 6 hours  insulin regular Infusion 6 Unit(s)/Hr (6 mL/Hr) IV Continuous <Continuous>  lactated ringers. 1000 milliLiter(s) (75 mL/Hr) IV Continuous <Continuous>  levothyroxine 137 MICROGram(s) Oral daily  lisinopril 40 milliGRAM(s) Oral daily  memantine 10 milliGRAM(s) Oral two times a day  meropenem  IVPB 1000 milliGRAM(s) IV Intermittent every 12 hours  pantoprazole  Injectable 40 milliGRAM(s) IV Push daily    MEDICATIONS  (PRN):  dextrose Oral Gel 15 Gram(s) Oral once PRN Blood Glucose LESS THAN 70 milliGRAM(s)/deciliter  hydrALAZINE Injectable 10 milliGRAM(s) IV Push every 6 hours PRN SBP > 170  ondansetron Injectable 4 milliGRAM(s) IV Push every 6 hours PRN Nausea and/or Vomiting      Social History:  Cigarettes:     no               Alchohol:     no            Illicit Drug Abuse:  no  FHx no SCD  ROS: Negative other than as mentioned in HPI.    Vital Signs Last 24 Hrs  T(C): 36.7 (21 Jul 2022 11:12), Max: 38 (20 Jul 2022 12:04)  T(F): 98 (21 Jul 2022 11:12), Max: 100.4 (20 Jul 2022 12:04)  HR: 96 (21 Jul 2022 08:00) (60 - 96)  BP: 145/90 (21 Jul 2022 08:00) (103/51 - 180/70)  BP(mean): 117 (21 Jul 2022 08:00) (64 - 189)  RR: 14 (21 Jul 2022 08:00) (12 - 23)  SpO2: 100% (21 Jul 2022 08:00) (99% - 100%)    Parameters below as of 21 Jul 2022 09:00    O2 Flow (L/min): 30  O2 Concentration (%): 40  ICU Vital Signs Last 24 Hrs  SHARIFA ENGLE  I&O's Detail    20 Jul 2022 07:01  -  21 Jul 2022 07:00  --------------------------------------------------------  IN:    FentaNYL: 138 mL    Insulin: 54 mL    IV PiggyBack: 300 mL    IV PiggyBack: 100 mL    IV PiggyBack: 400 mL    Jevity: 240 mL    Lactated Ringers: 1800 mL  Total IN: 3032 mL    OUT:    Indwelling Catheter - Urethral (mL): 300 mL    Norepinephrine: 0 mL  Total OUT: 300 mL    Total NET: 2732 mL      21 Jul 2022 07:01  -  21 Jul 2022 11:14  --------------------------------------------------------  IN:    Lactated Ringers: 150 mL  Total IN: 150 mL    OUT:  Total OUT: 0 mL    Total NET: 150 mL        I&O's Summary    20 Jul 2022 07:01  -  21 Jul 2022 07:00  --------------------------------------------------------  IN: 3032 mL / OUT: 300 mL / NET: 2732 mL    21 Jul 2022 07:01  -  21 Jul 2022 11:14  --------------------------------------------------------  IN: 150 mL / OUT: 0 mL / NET: 150 mL      Drug Dosing Weight  SHARIFA KADIE  Mode: CPAP with PS, FiO2: 40, PEEP: 5, PS: 15, MAP: 9    PHYSICAL EXAM:  General:  alert and cooperative.  HEENT: Head; normocephalic, atraumatic.  Eyes: Pupils reactive, cornea wnl.  Neck: Supple, no nodes adenopathy, no NVD or carotid bruit or thyromegaly.  CARDIOVASCULAR: 3/6 murmur, no S2   LUNGS: coarse breath sounds bilaterally.  ABDOMEN: Soft, nontender without mass or organomegaly. bowel sounds normoactive.  EXTREMITIES: No clubbing, cyanosis or edema. Distal pulses wnl.   SKIN: warm and dry with normal turgor.  NEURO: Alert   PSYCH: normal affect.        LABS:                        8.1    16.31 )-----------( 300      ( 21 Jul 2022 03:30 )             27.5     07-21    130<L>  |  97<L>  |  13.9  ----------------------------<  321<H>  4.6   |  17.0<L>  |  0.70    Ca    8.3<L>      21 Jul 2022 03:30  Phos  3.6     07-21  Mg     2.1     07-21    TPro  6.9  /  Alb  3.3  /  TBili  1.0  /  DBili  x   /  AST  39<H>  /  ALT  18  /  AlkPhos  121<H>  07-20    SHARIFA ENGLE  CARDIAC MARKERS ( 19 Jul 2022 19:00 )  x     / 0.01 ng/mL / x     / x     / x          PT/INR - ( 20 Jul 2022 03:00 )   PT: 12.4 sec;   INR: 1.07 ratio         PTT - ( 20 Jul 2022 03:00 )  PTT:24.0 sec      RADIOLOGY & ADDITIONAL STUDIES:    INTERPRETATION OF TELEMETRY (personally reviewed): no events    ECG: NS @ 62 LVH no acute ischemic changes    ECHO: < from: TTE Echo Complete w/o Contrast w/ Doppler (07.19.22 @ 20:08) >  Summary:   1. Technically suboptimal study.   2. Estimated pulmonary artery systolic pressure is 38.9 mmHg assuming a   right atrial pressure of 8 mmHg, which is consistent with borderline   pulmonary hypertension.   3. Hyperdynamic global left ventricular systolic function.   4. Left ventricular ejection fraction, by visual estimation, is >75%.   5. Severely enlarged left atrium.   6. Spectral Doppler shows impaired relaxation pattern of left   ventricular myocardial filling (Grade I diastolic dysfunction).   7. Mildly enlarged right atrium.   8. Mild mitral annular calcification.   9. Trace mitral valve regurgitation.  10. Mild tricuspid regurgitation.  11. Endocardial visualization was enhanced with intravenous echo contrast.  12. Sclerotic aortic valve with decreased opening.  13. Stroke volume across LVOT is 31 ml. ERROL by continuity equation is 0.6   cm^2. DVI of 0.21 also suggestive of severe aortic valve stenosis.  14. There is no evidence of pericardial effusion.    MD Cosmo Electronically signed on 7/20/2022 at 10:31:13 AM    < end of copied text >       Assessment and Plan:  In summary, SHARIFA ENGLE is an 87y Female with PMHx Dementia, HTN, DM pw fever and respiratory distress requiring intubation and subsequent extubation.  Pt found to have severe AS on workup.    1) Not a candidate for intervention of AS with Dementia  2) Will monitor volume status, careful with fluids  3) ICU care as needed, now extubated

## 2022-07-21 NOTE — PROGRESS NOTE ADULT - SUBJECTIVE AND OBJECTIVE BOX
Patient is a 87y old  Female who presents with a chief complaint of Fever due to unspecified condition     (20 Jul 2022 09:53)      BRIEF HOSPITAL COURSE:   88 yo f pmhx DM, HTN, hypothyroid, macular degeneration, cataracts, right eye blind, dementia declining function status initially admitted to medicine with AMS/FTT/HTN urgency with course complicated by presumed aspiration event -->obtunded/hypotensive requiring intubation and vasopressor therapy and hyperglycemia requiring insulin gtt.      Events last 24 hours: ***    PAST MEDICAL & SURGICAL HISTORY:  Essential hypertension      Hypothyroidism, unspecified type      Diabetes mellitus of other type without complication      Anemia      S/P cataract surgery  both eyes      S/P appendectomy        Allergies    No Known Allergies    Intolerances      FAMILY HISTORY:      Social History:     Review of Systems:  CONSTITUTIONAL: No fever, chills, or fatigue  EYES: No eye pain, visual disturbances, or discharge  ENMT:  No difficulty hearing, tinnitus, vertigo; No sinus or throat pain  NECK: No pain or stiffness  RESPIRATORY: No cough, wheezing, chills or hemoptysis; No shortness of breath  CARDIOVASCULAR: No chest pain, palpitations, dizziness, or leg swelling  GASTROINTESTINAL: No abdominal or epigastric pain. No nausea, vomiting, or hematemesis; No diarrhea or constipation. No melena or hematochezia.  GENITOURINARY: No dysuria, frequency, hematuria, or incontinence  NEUROLOGICAL: No headaches, memory loss, loss of strength, numbness, or tremors  SKIN: No itching, burning, rashes, or lesions   MUSCULOSKELETAL: No joint pain or swelling; No muscle, back, or extremity pain  PSYCHIATRIC: No depression, anxiety, mood swings, or difficulty sleeping      Vitals During Exam:   HR:   BP:   RR:  sPO2:     Physical Examination:    General: No acute distress.      HEENT: Pupils equal, reactive to light.  Symmetric.    PULM: Clear to auscultation bilaterally, no significant sputum production    CVS: Regular rate and rhythm, no murmurs, rubs, or gallops    ABD: Soft, nondistended, nontender, normoactive bowel sounds, no masses    EXT: No edema, nontender    SKIN: Warm and well perfused, no rashes noted.    NEURO: Alert, oriented, interactive, nonfocal      Medications:  meropenem  IVPB 1000 milliGRAM(s) IV Intermittent every 12 hours  vancomycin  IVPB 750 milliGRAM(s) IV Intermittent <User Schedule>    hydrALAZINE Injectable 10 milliGRAM(s) IV Push every 6 hours PRN      fentaNYL   Infusion. 0.5 MICROgram(s)/kG/Hr IV Continuous <Continuous>  memantine 10 milliGRAM(s) Oral two times a day  ondansetron Injectable 4 milliGRAM(s) IV Push every 6 hours PRN      enoxaparin Injectable 40 milliGRAM(s) SubCutaneous every 24 hours    pantoprazole  Injectable 40 milliGRAM(s) IV Push daily      dextrose 50% Injectable 25 Gram(s) IV Push once  dextrose 50% Injectable 12.5 Gram(s) IV Push once  dextrose 50% Injectable 25 Gram(s) IV Push once  dextrose Oral Gel 15 Gram(s) Oral once PRN  glucagon  Injectable 1 milliGRAM(s) IntraMuscular once  hydrocortisone sodium succinate Injectable 50 milliGRAM(s) IV Push every 6 hours  insulin glargine Injectable (LANTUS) 20 Unit(s) SubCutaneous two times a day  insulin lispro (ADMELOG) corrective regimen sliding scale   SubCutaneous every 6 hours  insulin lispro Injectable (ADMELOG) 3 Unit(s) SubCutaneous every 6 hours  insulin regular Infusion 6 Unit(s)/Hr IV Continuous <Continuous>  levothyroxine 137 MICROGram(s) Oral daily    dextrose 5%. 1000 milliLiter(s) IV Continuous <Continuous>  dextrose 5%. 1000 milliLiter(s) IV Continuous <Continuous>  lactated ringers. 1000 milliLiter(s) IV Continuous <Continuous>      chlorhexidine 0.12% Liquid 15 milliLiter(s) Oral Mucosa every 12 hours  chlorhexidine 2% Cloths 1 Application(s) Topical daily        Mode: CPAP with PS  FiO2: 40  PEEP: 5  PS: 15  MAP: 7      ICU Vital Signs Last 24 Hrs  T(C): 37.1 (21 Jul 2022 00:15), Max: 38 (20 Jul 2022 05:45)  T(F): 98.7 (21 Jul 2022 00:15), Max: 100.4 (20 Jul 2022 05:45)  HR: 73 (21 Jul 2022 00:23) (56 - 84)  BP: 164/87 (21 Jul 2022 00:00) (110/41 - 180/70)  BP(mean): 108 (21 Jul 2022 00:00) (62 - 189)  ABP: --  ABP(mean): --  RR: 12 (21 Jul 2022 00:00) (12 - 23)  SpO2: 100% (21 Jul 2022 00:23) (97% - 100%)    O2 Parameters below as of 20 Jul 2022 20:00  Patient On (Oxygen Delivery Method): ventilator    O2 Concentration (%): 40      Vital Signs Last 24 Hrs  T(C): 37.1 (21 Jul 2022 00:15), Max: 38 (20 Jul 2022 05:45)  T(F): 98.7 (21 Jul 2022 00:15), Max: 100.4 (20 Jul 2022 05:45)  HR: 73 (21 Jul 2022 00:23) (56 - 84)  BP: 164/87 (21 Jul 2022 00:00) (110/41 - 180/70)  BP(mean): 108 (21 Jul 2022 00:00) (62 - 189)  RR: 12 (21 Jul 2022 00:00) (12 - 23)  SpO2: 100% (21 Jul 2022 00:23) (97% - 100%)    Parameters below as of 20 Jul 2022 20:00  Patient On (Oxygen Delivery Method): ventilator    O2 Concentration (%): 40    ABG - ( 20 Jul 2022 06:05 )  pH, Arterial: 7.460 pH, Blood: x     /  pCO2: 32    /  pO2: 112   / HCO3: 23    / Base Excess: -1.0  /  SaO2: 99.1                I&O's Detail    19 Jul 2022 07:01  -  20 Jul 2022 07:00  --------------------------------------------------------  IN:    FentaNYL: 45 mL    Insulin: 26.5 mL    IV PiggyBack: 400 mL    IV PiggyBack: 350 mL    Lactated Ringers: 2000 mL    Lactated Ringers: 500 mL    Lactated Ringers: 1500 mL    Norepinephrine: 95 mL    Propofol: 26.6 mL  Total IN: 4943.1 mL    OUT:    Indwelling Catheter - Urethral (mL): 270 mL    Voided (mL): 160 mL  Total OUT: 430 mL    Total NET: 4513.1 mL      20 Jul 2022 07:01  -  21 Jul 2022 00:50  --------------------------------------------------------  IN:    FentaNYL: 102 mL    Insulin: 54 mL    IV PiggyBack: 300 mL    IV PiggyBack: 100 mL    IV PiggyBack: 350 mL    Jevity: 100 mL    Lactated Ringers: 1350 mL  Total IN: 2356 mL    OUT:    Indwelling Catheter - Urethral (mL): 300 mL    Norepinephrine: 0 mL  Total OUT: 300 mL    Total NET: 2056 mL            LABS:                        10.2   23.93 )-----------( 405      ( 20 Jul 2022 03:00 )             32.9     07-20    132<L>  |  101  |  13.4  ----------------------------<  206<H>  3.8   |  20.0<L>  |  0.77    Ca    8.0<L>      20 Jul 2022 13:10  Phos  3.1     07-20  Mg     2.4     07-20    TPro  6.9  /  Alb  3.3  /  TBili  1.0  /  DBili  x   /  AST  39<H>  /  ALT  18  /  AlkPhos  121<H>  07-20      CARDIAC MARKERS ( 19 Jul 2022 19:00 )  x     / 0.01 ng/mL / x     / x     / x          CAPILLARY BLOOD GLUCOSE      POCT Blood Glucose.: 181 mg/dL (20 Jul 2022 23:58)    PT/INR - ( 20 Jul 2022 03:00 )   PT: 12.4 sec;   INR: 1.07 ratio         PTT - ( 20 Jul 2022 03:00 )  PTT:24.0 sec    CULTURES:  Culture Results:   No growth (07-19 @ 14:10)  Culture Results:   No growth to date. (07-19 @ 14:10)  Culture Results:   No growth to date. (07-19 @ 14:10)  Culture Results:   No growth to date. (07-18 @ 22:10)  Culture Results:   No growth to date. (07-18 @ 22:10)  Culture Results:   10,000 - 49,000 CFU/mL Gram positive organisms (07-18 @ 22:01)        RADIOLOGY: ***      SUPPLEMENTAL O2:   LINES:  IVF:  BELL:   PPx:   CONTACT: Patient is a 87y old  Female who presents with a chief complaint of Fever due to unspecified condition     (20 Jul 2022 09:53)      BRIEF HOSPITAL COURSE:   86 yo f pmhx DM, HTN, hypothyroid, macular degeneration, cataracts, right eye blind, dementia declining function status initially admitted to medicine with AMS/FTT/HTN urgency with course complicated by presumed aspiration event -->obtunded/hypotensive requiring intubation and vasopressor therapy and hyperglycemia requiring insulin gtt.      7/19: CT head findings consistent with persistent moderate nl pressure hydrocephalus    Events last 24 hours:   Transitioned off insulin gtt to lantus/standing lispro/ISS.  On SBT, tolerating well.    PAST MEDICAL & SURGICAL HISTORY:  Essential hypertension      Hypothyroidism, unspecified type      Diabetes mellitus of other type without complication      Anemia      S/P cataract surgery  both eyes      S/P appendectomy        Allergies  No Known Allergies  Intolerances      FAMILY HISTORY:  Unknown       Social History:   From home       Review of Systems:  Unable to obtain 2/2 mental status/intubated      Physical Examination:    General: Elderly female, lying in bed, nad    HEENT: NC/AT, ett and ogt in place    PULM: Symmetrical thorax expansion upon respiration. Grossly clear to auscultation bilaterally, no significant sputum production    CVS: Regular rate and rhythm, no murmurs, rubs, or gallops    ABD: Soft, nondistended, nontender, normoactive bowel sounds    EXT: + edema    SKIN: Warm     NEURO: poor ms      Medications:  meropenem  IVPB 1000 milliGRAM(s) IV Intermittent every 12 hours  vancomycin  IVPB 750 milliGRAM(s) IV Intermittent <User Schedule>    hydrALAZINE Injectable 10 milliGRAM(s) IV Push every 6 hours PRN      fentaNYL   Infusion. 0.5 MICROgram(s)/kG/Hr IV Continuous <Continuous>  memantine 10 milliGRAM(s) Oral two times a day  ondansetron Injectable 4 milliGRAM(s) IV Push every 6 hours PRN      enoxaparin Injectable 40 milliGRAM(s) SubCutaneous every 24 hours    pantoprazole  Injectable 40 milliGRAM(s) IV Push daily      dextrose 50% Injectable 25 Gram(s) IV Push once  dextrose 50% Injectable 12.5 Gram(s) IV Push once  dextrose 50% Injectable 25 Gram(s) IV Push once  dextrose Oral Gel 15 Gram(s) Oral once PRN  glucagon  Injectable 1 milliGRAM(s) IntraMuscular once  hydrocortisone sodium succinate Injectable 50 milliGRAM(s) IV Push every 6 hours  insulin glargine Injectable (LANTUS) 20 Unit(s) SubCutaneous two times a day  insulin lispro (ADMELOG) corrective regimen sliding scale   SubCutaneous every 6 hours  insulin lispro Injectable (ADMELOG) 3 Unit(s) SubCutaneous every 6 hours  insulin regular Infusion 6 Unit(s)/Hr IV Continuous <Continuous>  levothyroxine 137 MICROGram(s) Oral daily    dextrose 5%. 1000 milliLiter(s) IV Continuous <Continuous>  dextrose 5%. 1000 milliLiter(s) IV Continuous <Continuous>  lactated ringers. 1000 milliLiter(s) IV Continuous <Continuous>      chlorhexidine 0.12% Liquid 15 milliLiter(s) Oral Mucosa every 12 hours  chlorhexidine 2% Cloths 1 Application(s) Topical daily        Mode: CPAP with PS  FiO2: 40  PEEP: 5  PS: 15  MAP: 7      ICU Vital Signs Last 24 Hrs  T(C): 37.1 (21 Jul 2022 00:15), Max: 38 (20 Jul 2022 05:45)  T(F): 98.7 (21 Jul 2022 00:15), Max: 100.4 (20 Jul 2022 05:45)  HR: 73 (21 Jul 2022 00:23) (56 - 84)  BP: 164/87 (21 Jul 2022 00:00) (110/41 - 180/70)  BP(mean): 108 (21 Jul 2022 00:00) (62 - 189)  ABP: --  ABP(mean): --  RR: 12 (21 Jul 2022 00:00) (12 - 23)  SpO2: 100% (21 Jul 2022 00:23) (97% - 100%)    O2 Parameters below as of 20 Jul 2022 20:00  Patient On (Oxygen Delivery Method): ventilator    O2 Concentration (%): 40      Vital Signs Last 24 Hrs  T(C): 37.1 (21 Jul 2022 00:15), Max: 38 (20 Jul 2022 05:45)  T(F): 98.7 (21 Jul 2022 00:15), Max: 100.4 (20 Jul 2022 05:45)  HR: 73 (21 Jul 2022 00:23) (56 - 84)  BP: 164/87 (21 Jul 2022 00:00) (110/41 - 180/70)  BP(mean): 108 (21 Jul 2022 00:00) (62 - 189)  RR: 12 (21 Jul 2022 00:00) (12 - 23)  SpO2: 100% (21 Jul 2022 00:23) (97% - 100%)    Parameters below as of 20 Jul 2022 20:00  Patient On (Oxygen Delivery Method): ventilator    O2 Concentration (%): 40    ABG - ( 20 Jul 2022 06:05 )  pH, Arterial: 7.460 pH, Blood: x     /  pCO2: 32    /  pO2: 112   / HCO3: 23    / Base Excess: -1.0  /  SaO2: 99.1                I&O's Detail    19 Jul 2022 07:01  -  20 Jul 2022 07:00  --------------------------------------------------------  IN:    FentaNYL: 45 mL    Insulin: 26.5 mL    IV PiggyBack: 400 mL    IV PiggyBack: 350 mL    Lactated Ringers: 2000 mL    Lactated Ringers: 500 mL    Lactated Ringers: 1500 mL    Norepinephrine: 95 mL    Propofol: 26.6 mL  Total IN: 4943.1 mL    OUT:    Indwelling Catheter - Urethral (mL): 270 mL    Voided (mL): 160 mL  Total OUT: 430 mL    Total NET: 4513.1 mL      20 Jul 2022 07:01  -  21 Jul 2022 00:50  --------------------------------------------------------  IN:    FentaNYL: 102 mL    Insulin: 54 mL    IV PiggyBack: 300 mL    IV PiggyBack: 100 mL    IV PiggyBack: 350 mL    Jevity: 100 mL    Lactated Ringers: 1350 mL  Total IN: 2356 mL    OUT:    Indwelling Catheter - Urethral (mL): 300 mL    Norepinephrine: 0 mL  Total OUT: 300 mL    Total NET: 2056 mL            LABS:                        10.2   23.93 )-----------( 405      ( 20 Jul 2022 03:00 )             32.9     07-20    132<L>  |  101  |  13.4  ----------------------------<  206<H>  3.8   |  20.0<L>  |  0.77    Ca    8.0<L>      20 Jul 2022 13:10  Phos  3.1     07-20  Mg     2.4     07-20    TPro  6.9  /  Alb  3.3  /  TBili  1.0  /  DBili  x   /  AST  39<H>  /  ALT  18  /  AlkPhos  121<H>  07-20      CARDIAC MARKERS ( 19 Jul 2022 19:00 )  x     / 0.01 ng/mL / x     / x     / x          CAPILLARY BLOOD GLUCOSE      POCT Blood Glucose.: 181 mg/dL (20 Jul 2022 23:58)    PT/INR - ( 20 Jul 2022 03:00 )   PT: 12.4 sec;   INR: 1.07 ratio         PTT - ( 20 Jul 2022 03:00 )  PTT:24.0 sec    CULTURES:  Culture Results:   No growth (07-19 @ 14:10)  Culture Results:   No growth to date. (07-19 @ 14:10)  Culture Results:   No growth to date. (07-19 @ 14:10)  Culture Results:   No growth to date. (07-18 @ 22:10)  Culture Results:   No growth to date. (07-18 @ 22:10)  Culture Results:   10,000 - 49,000 CFU/mL Gram positive organisms (07-18 @ 22:01)      RADIOLOGY:   < from: CT Abdomen and Pelvis w/ IV Cont (07.20.22 @ 05:26) >  ACC: [id number] EXAM:  CT ABDOMEN AND PELVIS IC                        ACC: [id number] EXAM:  CT CHEST                          PROCEDURE DATE:  07/20/2022          INTERPRETATION:  CLINICAL INFORMATION: Respiratory failure and sepsis    COMPARISON: None.    CONTRAST/COMPLICATIONS:  IV Contrast: Omnipaque 350  60 cc administered   0 cc discarded  Oral Contrast: NONE  Complications: None reported at time of study completion    PROCEDURE:  CT of the Chest, Abdomen and Pelvis was performed.  Images through the chest were obtained without intravenous contrast.   Images through the abdomen and pelvis were obtained after administration   of intravenous contrast.  Sagittal and coronal reformats were performed.    FINDINGS:  CHEST:  LUNGS AND LARGE AIRWAYS: Endotracheal tube tip above norm. Bilateral   lower lobe consolidations.  PLEURA: Small bilateral pleural effusions.  VESSELS: Atherosclerotic calcifications of thoracic aorta and coronary   arteries.  HEART: Cardiomegaly. Aortic valve calcifications. No pericardial effusion.  MEDIASTINUM AND ISABELLE: Mildly enlarged mediastinal lymph node measuring   1.8 x 1.3 cm.  CHEST WALL AND LOWER NECK: Within normal limits.    ABDOMEN AND PELVIS:  LIVER: Within normal limits.  BILE DUCTS: Normal caliber.  GALLBLADDER: Mild wall thickening.  SPLEEN: Within normal limits.  PANCREAS: Within normal limits.  ADRENALS: Within normal limits.  KIDNEYS/URETERS: Within normal limits.    BLADDER: Lyles catheter.  REPRODUCTIVE ORGANS: Anterior uterine fibroid.    BOWEL: Enteric tube with tip in the duodenum. No bowel obstruction.   Appendix is not visualized. No evidence of inflammation in the pericecal   region. Colonic diverticulosis. Rectum mildly distended by stool.  PERITONEUM: No ascites.  VESSELS: Atherosclerotic changes.  RETROPERITONEUM/LYMPH NODES: No lymphadenopathy.  ABDOMINAL WALL: Within normal limits.  BONES: Degenerative changes.    IMPRESSION:    1. Bilateral lower lobe consolidations, which may represent atelectasis   and/or pneumonia.  2. Small bilateral pleural effusions.  3. Mild gallbladder wall thickening. Recommend ultrasound for further   assessment.  4. No CT evidence of bowel obstruction, diverticulitis, or colitis.      --- End of Report ---      TEMI ROTH MD;Attending Radiologist  This document has been electronically signed. Jul 20 2022  1:03PM    < end of copied text >    < from: US Abdomen Upper Quadrant Right (07.20.22 @ 14:31) >  ACC: [id number] EXAM:  US ABDOMEN RT UPR QUADRANT                          PROCEDURE DATE:  07/20/2022          INTERPRETATION:  CLINICAL INFORMATION: Follow-up evaluation of   gallbladder wall edema on CT imaging.    COMPARISON: CT dated 07/20/2022.    TECHNIQUE: Sonography of the right upper quadrant.    FINDINGS:  Liver: Increased echogenicity. Mildly enlarged measuring approximately   17.9 cm. No focal hepatic mass lesion. Normal portal venous flow.  Bile ducts: Normal caliber. Common bile duct measures 6 mm.  Gallbladder: Nonspecific borderline wall thickening. No evidence of   gallstones or pericholecystic fluid. Unable to evaluate Bañuelos's sign   considering that the patient was not awake during the study.  Pancreas: Visualized portions are within normal limits.  Right kidney: 9.6 cm. No hydronephrosis.  Ascites: None.  IVC: Visualized portions are within normal limits.    IMPRESSION:  No evidence of cholelithiasis or acute cholecystitis.  Mildly enlarged fatty liver.      --- End of Report ---      DIANA MARTIN MD; Attending Radiologist  This document has been electronically signed. Jul 20 2022  3:28PM    < end of copied text >    < from: CT Head No Cont (07.19.22 @ 12:55) >  ACC: [id number] EXAM:  CT BRAIN                          PROCEDURE DATE:  07/19/2022          INTERPRETATION:  Noncontrast CT of the brain.    CLINICAL INDICATION:  Altered mental status    TECHNIQUE : Axial CT scanning of the brain was obtained fromthe skull   base to the vertex without the administration of intravenous contrast.   Sagittal and coronal reformats were provided.    COMPARISON: CT brain 7/18/2022    FINDINGS:    Ventricles are persistently enlarged out of proportion to the sulci, and   there is crowding of the sulci at the vertex. Findings are consistent   with moderate normal pressure hydrocephalus.    Similar mild leftward midline shift. No effacement of basal cisterns.    No acute intracranial hemorrhage or brain edema.    Mild white matter microvascular ischemic disease. 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    IMPRESSION:    Findings consistent with persistent moderate normal pressure   hydrocephalus.    No acute intracranial hemorrhage or brain edema.    --- End of Report ---    CHELSEA GASPAR MD; Attending Radiologist  This document has been electronically signed. Jul 19 2022  2:19PM    < end of copied text >        SUPPLEMENTAL O2: AC/VC   LINES: Peripheral   IVF: LR  ARABELLA: CATY  PPx: Lovenox  CONTACT: N

## 2022-07-21 NOTE — SWALLOW BEDSIDE ASSESSMENT ADULT - NS SPL SWALLOW CLINIC TRIAL FT
Pt only accepting of minimal PO (1/2 tsp puree) which was independently expectorated by pt 2' dislike of flavor. Pt then politely but adamantly refusing any other PO trials/consistencies.

## 2022-07-22 NOTE — PROGRESS NOTE ADULT - SUBJECTIVE AND OBJECTIVE BOX
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 Pt admitted to medical service, had a WBC count of 15, empirically started on Zosyn. On 7/19/22 patient was eating, noted ot have a coughing episode and suddenly became unresponsive prompting RRT, and was intubated for airway protection. CT showed moderate normal pressure hydrocephalus. She became febrile and hypotensive necessitating pressors.     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 Pt admitted to medical service, had a WBC count of 15, empirically started on Zosyn. On 7/19/22 patient was eating, noted ot have a coughing episode and suddenly became unresponsive prompting RRT, and was intubated for airway protection. CT showed moderate normal pressure hydrocephalus. She became febrile and hypotensive necessitating pressors.     EVENTS LAST 24 HOURS-   Patient seen and examined at bedside, no acute complaints. Mentating appropriately, MRI brain pending. Neurology following, will see patient as an outpatient for normal pressure hydrocephalus. Oxygenating well on RA s/p extubation on 7/22. BPs stable, remains off pressors. BGs stable with ISS and Lantus. Cardiology following for severe AS, currently not a candidate for intervention due to dementia. Speech and swallow completed this am, advanced to Puree Diet as recommended     PAST MEDICAL & SURGICAL HISTORY:  Essential hypertension  Hypothyroidism, unspecified type  Diabetes mellitus of other type without complication  Anemia  S/P cataract surgery both eyes  S/P appendectomy    MEDICATIONS  (STANDING):  ampicillin/sulbactam  IVPB      ampicillin/sulbactam  IVPB 3 Gram(s) IV Intermittent every 6 hours  dextrose 5%. 1000 milliLiter(s) (50 mL/Hr) IV Continuous <Continuous>  dextrose 5%. 1000 milliLiter(s) (100 mL/Hr) IV Continuous <Continuous>  dextrose 50% Injectable 25 Gram(s) IV Push once  dextrose 50% Injectable 12.5 Gram(s) IV Push once  dextrose 50% Injectable 25 Gram(s) IV Push once  enoxaparin Injectable 40 milliGRAM(s) SubCutaneous every 24 hours  glucagon  Injectable 1 milliGRAM(s) IntraMuscular once  insulin glargine Injectable (LANTUS) 20 Unit(s) SubCutaneous two times a day  insulin lispro (ADMELOG) corrective regimen sliding scale   SubCutaneous every 6 hours  insulin lispro Injectable (ADMELOG) 3 Unit(s) SubCutaneous every 6 hours  levothyroxine 137 MICROGram(s) Oral daily  lisinopril 40 milliGRAM(s) Oral daily  memantine 10 milliGRAM(s) Oral two times a day    MEDICATIONS  (PRN):  dextrose Oral Gel 15 Gram(s) Oral once PRN Blood Glucose LESS THAN 70 milliGRAM(s)/deciliter  hydrALAZINE Injectable 10 milliGRAM(s) IV Push every 6 hours PRN SBP > 170  ondansetron Injectable 4 milliGRAM(s) IV Push every 6 hours PRN Nausea and/or Vomiting    ALLERGIES-  No Known Allergies    Intolerances    REVIEW OF SYSTEMS-   CONSTITUTIONAL: No fever, weight loss, or fatigue.  EYES: No eye pain, visual disturbances, or discharge.  ENMT:  No difficulty hearing, tinnitus, vertigo; No sinus or throat pain.  NECK: No pain or stiffness.  BREASTS: No pain, masses, or nipple discharge.  RESPIRATORY: No cough, wheezing, chills or hemoptysis; No shortness of breath.  CARDIOVASCULAR: No chest pain, palpitations, dizziness, or leg swelling.  GASTROINTESTINAL: No abdominal or epigastric pain. No nausea, vomiting, or hematemesis; No diarrhea or constipation. No melena or hematochezia.  GENITOURINARY: No dysuria, frequency, hematuria, or incontinence.  NEUROLOGICAL: No headaches, memory loss, loss of strength, numbness, or tremors.  SKIN: No itching, burning, rashes, or lesions.  LYMPH NODES: No enlarged glands.  ENDOCRINE: No heat or cold intolerance; No hair loss.  MUSCULOSKELETAL: No joint pain or swelling; No muscle, back, or extremity pain.  PSYCHIATRIC: No depression, anxiety, mood swings, or difficulty sleeping.  HEME/LYMPH: No easy bruising, or bleeding gums.    ICU Vital Signs Last 24 Hrs-  T(C): 36.8 (22 Jul 2022 12:00), Max: 37.1 (21 Jul 2022 19:11)  T(F): 98.2 (22 Jul 2022 12:00), Max: 98.7 (21 Jul 2022 19:11)  HR: 82 (22 Jul 2022 14:00) (64 - 89)  BP: 161/149 (22 Jul 2022 14:00) (131/60 - 194/70)  BP(mean): 155 (22 Jul 2022 14:00) (67 - 155)  ABP: --  ABP(mean): --  RR: 18 (22 Jul 2022 14:00) (12 - 24)  SpO2: 94% (22 Jul 2022 14:00) (93% - 99%)    O2 Parameters below as of 22 Jul 2022 10:00  Patient On (Oxygen Delivery Method): room air    PHYSICAL EXAM-  GENERAL: NAD, well-groomed, well-developed.  HEAD:  Atraumatic, Normocephalic.  EYES: EOMI, PERRLA, conjunctiva and sclera clear.  ENMT: No tonsillar erythema, exudates, or enlargement; Moist mucous membranes, Good dentition, No lesions.  NECK: Supple, No JVD, Normal thyroid.  NERVOUS SYSTEM:  Alert & Oriented X2, Good concentration; Moving all extremities.   CHEST/LUNG: Clear to percussion bilaterally; No rales, rhonchi, wheezing, or rubs.  HEART: Regular rate and rhythm; No murmurs, rubs, or gallops.  ABDOMEN: Soft, Nontender, Nondistended; Bowel sounds present.  EXTREMITIES:  2+ Peripheral Pulses, No clubbing, cyanosis, or edema.  LYMPH: No lymphadenopathy noted.  SKIN: No rashes or lesions.    LABS-  Lab Results:  CBC  CBC Full  -  ( 22 Jul 2022 04:00 )  WBC Count : 14.86 K/uL  RBC Count : 3.28 M/uL  Hemoglobin : 8.1 g/dL  Hematocrit : 26.7 %  Platelet Count - Automated : 334 K/uL  Mean Cell Volume : 81.4 fl  Mean Cell Hemoglobin : 24.7 pg  Mean Cell Hemoglobin Concentration : 30.3 gm/dL  Auto Neutrophil # : x  Auto Lymphocyte # : x  Auto Monocyte # : x  Auto Eosinophil # : x  Auto Basophil # : x  Auto Neutrophil % : x  Auto Lymphocyte % : x  Auto Monocyte % : x  Auto Eosinophil % : x  Auto Basophil % : x    .		Differential:	[] Automated		[] Manual  Chemistry                        8.1    14.86 )-----------( 334      ( 22 Jul 2022 04:00 )             26.7     07-22    134<L>  |  100  |  12.4  ----------------------------<  244<H>  3.9   |  19.0<L>  |  0.67    Ca    8.8      22 Jul 2022 04:00  Phos  2.4     07-22  Mg     2.1     07-22      MICROBIOLOGY/CULTURES:  Culture Results:   No growth (07-19 @ 14:10)  Culture Results:   No growth to date. (07-19 @ 14:10)  Culture Results:   No growth to date. (07-19 @ 14:10)  Culture Results:   No growth to date. (07-18 @ 22:10)  Culture Results:   No growth to date. (07-18 @ 22:10)  Culture Results:   10,000 - 49,000 CFU/mL Streptococcus agalactiae (Group B) isolated  Group B streptococci are susceptible to ampicillin,  penicillin and cefazolin, but may be resistant to  erythromycin and clindamycin.  Recommendations for intrapartum prophylaxis for Group B  streptococci are penicillin or ampicillin. (07-18 @ 22:01)      RADIOLOGY RESULTS:  < from: CT Chest No Cont (07.20.22 @ 05:23) >  ACC: [id number] EXAM:  CT ABDOMEN AND PELVIS IC                        ACC: [id number] EXAM:  CT CHEST                          PROCEDURE DATE:  07/20/2022      INTERPRETATION:  CLINICAL INFORMATION: Respiratory failure and sepsis    COMPARISON: None.    CONTRAST/COMPLICATIONS:  IV Contrast: Omnipaque 350  60 cc administered   0 cc discarded  Oral Contrast: NONE  Complications: None reported at time of study completion    PROCEDURE:  CT of the Chest, Abdomen and Pelvis was performed.  Images through the chest were obtained without intravenous contrast.   Images through the abdomen and pelvis were obtained after administration   of intravenous contrast.  Sagittal and coronal reformats were performed.    FINDINGS:  CHEST:  LUNGS AND LARGE AIRWAYS: Endotracheal tube tip above norm. Bilateral   lower lobe consolidations.  PLEURA: Small bilateral pleural effusions.  VESSELS: Atherosclerotic calcifications of thoracic aorta and coronary   arteries.  HEART: Cardiomegaly. Aortic valve calcifications. No pericardial effusion.  MEDIASTINUM AND ISABELLE: Mildly enlarged mediastinal lymph node measuring   1.8 x 1.3 cm.  CHEST WALL AND LOWER NECK: Within normal limits.    ABDOMEN AND PELVIS:  LIVER: Within normal limits.  BILE DUCTS: Normal caliber.  GALLBLADDER: Mild wall thickening.  SPLEEN: Within normal limits.  PANCREAS: Within normal limits.  ADRENALS: Within normal limits.  KIDNEYS/URETERS: Within normal limits.    BLADDER: Lyles catheter.  REPRODUCTIVE ORGANS: Anterior uterine fibroid.    BOWEL: Enteric tube with tip in the duodenum. No bowel obstruction.   Appendix is not visualized. No evidence of inflammation in the pericecal   region. Colonic diverticulosis. Rectum mildly distended by stool.  PERITONEUM: No ascites.  VESSELS: Atherosclerotic changes.  RETROPERITONEUM/LYMPH NODES: No lymphadenopathy.  ABDOMINAL WALL: Within normal limits.  BONES: Degenerative changes.    IMPRESSION:    1. Bilateral lower lobe consolidations, which may represent atelectasis   and/or pneumonia.  2. Small bilateral pleural effusions.  3. Mild gallbladder wall thickening. Recommend ultrasound for further   assessment.  4. No CT evidence of bowel obstruction, diverticulitis, or colitis.      --- End of Report ---    < end of copied text >    < from: US Abdomen Upper Quadrant Right (07.20.22 @ 14:31) >  ACC: [id number] EXAM:  US ABDOMEN RT UPR QUADRANT                          PROCEDURE DATE:  07/20/2022        INTERPRETATION:  CLINICAL INFORMATION: Follow-up evaluation of   gallbladder wall edema on CT imaging.    COMPARISON: CT dated 07/20/2022.    TECHNIQUE: Sonography of the right upper quadrant.    FINDINGS:  Liver: Increased echogenicity. Mildly enlarged measuring approximately   17.9 cm. No focal hepatic mass lesion. Normal portal venous flow.  Bile ducts: Normal caliber. Common bile duct measures 6 mm.  Gallbladder: Nonspecific borderline wall thickening. No evidence of   gallstones or pericholecystic fluid. Unable to evaluate Bañuelos's sign   considering that the patient was not awake during the study.  Pancreas: Visualized portions are within normal limits.  Right kidney: 9.6 cm. No hydronephrosis.  Ascites: None.  IVC: Visualized portions are within normal limits.    IMPRESSION:  No evidence of cholelithiasis or acute cholecystitis.  Mildly enlarged fatty liver.        --- End of Report ---

## 2022-07-22 NOTE — PROGRESS NOTE ADULT - ASSESSMENT
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 Pt admitted to medical service, had a WBC count of 15, empirically started on Zosyn. On 7/19/22 patient was eating, noted ot have a coughing episode and suddenly became unresponsive prompting RRT, and was intubated for airway protection. CT showed moderate normal pressure hydrocephalus. She became febrile and hypotensive necessitating pressors.     AMS  Sudden unresponsiveness - possible vagal event post vomiting  Possible component of septic encephalopathy  -BPs stable, remains off pressors  -Hydralazine PRN for SBP>160    Normal pressure hydrocephalus - moderate  - CTH with moderate hydrocephalus. Per family, no known history of NPH  - Awaiting MRI brain to exclude secondary causes.  - Neurology following, will see as outpatient   - Continue to monitor mentation closely    Sepsis - with shock  Leukocytosis, fevers  - Unclear source at this time; CXR grossly clear, UA negative  - C  - F/u BCx, UCx (in lab)  - C/w Zosyn (7/18- )  - On Levophed, titrate for SBP >90 or MAP >65; currently running peripherally  - When sinus bradycardia improves can start Midodrine  - LR bolus 500cc given overnight, will monitor hemodynamic response  - PRN APAP  - TTE performed, f/u    Hyperglycemia  - FSS with ISS and Lantus. 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 Pt admitted to medical service, had a WBC count of 15, empirically started on Zosyn. On 7/19/22 patient was eating, noted ot have a coughing episode and suddenly became unresponsive prompting RRT, and was intubated for airway protection. CT showed moderate normal pressure hydrocephalus. She became febrile and hypotensive necessitating pressors.     Sepsis - with shock  Leukocytosis, fevers  AMS  Sudden unresponsiveness - possible vagal event post vomiting  Possible component of septic encephalopathy  - F/u BCx and UCx negative  - WBC downtrending  - C/W Unasyn  - BP stable, remains off pressors    Normal pressure hydrocephalus - moderate  - CTH with moderate hydrocephalus. Per family, no known history of NPH  - Awaiting MRI brain to exclude secondary causes  - Neurology following, will see as outpatient   - Continue to monitor mentation closely    Hyperglycemia  - FSS with ISS and Lantus. Goal FSG <180  - Monitor BMP serially, and replete lytes PRN    Hypothyroidism  - C/w Synthroid    Dementia  - C/w Memantine    F/E/N/PPx/Lines  - Maintain K>4 Mg>2. Serial labs, replete lytes PRN  - Diet puree as recommended by Speech  - Lovenox for VTE PPX    Ethics/Dispo  - Full code  - Downgrade to Medicine with  and Telemetry    Plan discussed with MICU Attending

## 2022-07-22 NOTE — PROGRESS NOTE ADULT - SUBJECTIVE AND OBJECTIVE BOX
[facility] - [facility]                                   [address] [place] [address]                                                      PHONE: [phone]                                                         FAX: [phone]  [email or web address]  ---------------------------------------------------------------------------------------------------------------------------------    Overnight events/patient complaints:  Pt not verbal in NAD    No Known Allergies    MEDICATIONS  (STANDING):  chlorhexidine 2% Cloths 1 Application(s) Topical daily  dextrose 5%. 1000 milliLiter(s) (50 mL/Hr) IV Continuous <Continuous>  dextrose 5%. 1000 milliLiter(s) (100 mL/Hr) IV Continuous <Continuous>  dextrose 50% Injectable 25 Gram(s) IV Push once  dextrose 50% Injectable 12.5 Gram(s) IV Push once  dextrose 50% Injectable 25 Gram(s) IV Push once  enoxaparin Injectable 40 milliGRAM(s) SubCutaneous every 24 hours  fentaNYL   Infusion. 0.5 MICROgram(s)/kG/Hr (3.01 mL/Hr) IV Continuous <Continuous>  glucagon  Injectable 1 milliGRAM(s) IntraMuscular once  hydrocortisone sodium succinate Injectable 50 milliGRAM(s) IV Push every 6 hours  insulin glargine Injectable (LANTUS) 20 Unit(s) SubCutaneous two times a day  insulin lispro (ADMELOG) corrective regimen sliding scale   SubCutaneous every 6 hours  insulin lispro Injectable (ADMELOG) 3 Unit(s) SubCutaneous every 6 hours  lactated ringers. 1000 milliLiter(s) (50 mL/Hr) IV Continuous <Continuous>  levothyroxine 137 MICROGram(s) Oral daily  lisinopril 40 milliGRAM(s) Oral daily  memantine 10 milliGRAM(s) Oral two times a day  meropenem  IVPB 1000 milliGRAM(s) IV Intermittent every 12 hours  pantoprazole  Injectable 40 milliGRAM(s) IV Push daily    MEDICATIONS  (PRN):  dextrose Oral Gel 15 Gram(s) Oral once PRN Blood Glucose LESS THAN 70 milliGRAM(s)/deciliter  hydrALAZINE Injectable 10 milliGRAM(s) IV Push every 6 hours PRN SBP > 170  ondansetron Injectable 4 milliGRAM(s) IV Push every 6 hours PRN Nausea and/or Vomiting      Vital Signs Last 24 Hrs  T(C): 36.6 (22 Jul 2022 08:00), Max: 37.1 (21 Jul 2022 19:11)  T(F): 97.9 (22 Jul 2022 08:00), Max: 98.7 (21 Jul 2022 19:11)  HR: 89 (22 Jul 2022 07:00) (68 - 89)  BP: 137/40 (22 Jul 2022 06:00) (131/60 - 194/70)  BP(mean): 68 (22 Jul 2022 06:00) (68 - 118)  RR: 24 (22 Jul 2022 07:00) (13 - 24)  SpO2: 98% (22 Jul 2022 07:00) (95% - 98%)    Parameters below as of 22 Jul 2022 04:00  Patient On (Oxygen Delivery Method): room air      ICU Vital Signs Last 24 Hrs  SHARIFA ENGLE  I&O's Detail    21 Jul 2022 07:01  -  22 Jul 2022 07:00  --------------------------------------------------------  IN:    Lactated Ringers: 650 mL  Total IN: 650 mL    OUT:  Total OUT: 0 mL    Total NET: 650 mL        Drug Dosing Weight  SHARIFA ENGLE      PHYSICAL EXAM:  General:  alert   HEENT: Head; normocephalic, atraumatic.  Eyes: Pupils reactive, cornea wnl.  Neck: Supple, no nodes adenopathy, no NVD or carotid bruit or thyromegaly.  CARDIOVASCULAR: Normal S1 and S2, No murmur, rub, gallop or lift.   LUNGS: No rales, rhonchi or wheeze. Normal breath sounds bilaterally.  ABDOMEN: Soft, nontender without mass or organomegaly. bowel sounds normoactive.  EXTREMITIES: No clubbing, cyanosis or edema. Distal pulses wnl.   SKIN: warm and dry with normal turgor.  NEURO: not able to respond to questions  PSYCH: normal affect.        LABS:                        8.1    14.86 )-----------( 334      ( 22 Jul 2022 04:00 )             26.7     07-22    134<L>  |  100  |  12.4  ----------------------------<  244<H>  3.9   |  19.0<L>  |  0.67    Ca    8.8      22 Jul 2022 04:00  Phos  2.4     07-22  Mg     2.1     07-22      SHARIFA ENGLE            RADIOLOGY & ADDITIONAL STUDIES:    INTERPRETATION OF TELEMETRY (personally reviewed): no events  ECHO: < from: TTE Echo Complete w/o Contrast w/ Doppler (07.19.22 @ 20:08) >  Summary:   1. Technically suboptimal study.   2. Estimated pulmonary artery systolic pressure is 38.9 mmHg assuming a   right atrial pressure of 8 mmHg, which is consistent with borderline   pulmonary hypertension.   3. Hyperdynamic global left ventricular systolic function.   4. Left ventricular ejection fraction, by visual estimation, is >75%.   5. Severely enlarged left atrium.   6. Spectral Doppler shows impaired relaxation pattern of left   ventricular myocardial filling (Grade I diastolic dysfunction).   7. Mildly enlarged right atrium.   8. Mild mitral annular calcification.   9. Trace mitral valve regurgitation.  10. Mild tricuspid regurgitation.  11. Endocardial visualization was enhanced with intravenous echo contrast.  12. Sclerotic aortic valve with decreased opening.  13. Stroke volume across LVOT is 31 ml. ERROL by continuity equation is 0.6   cm^2. DVI of 0.21 also suggestive of severe aortic valve stenosis.  14. There is no evidence of pericardial effusion.    MD Cosmo Electronically signed on 7/20/2022 at 10:31:13 AM    < end of copied text >       Assessment and Plan:  In summary, SHARIFA ENGLE is an 87y Female with PMHx Dementia, HTN, DM pw fever and respiratory distress requiring intubation and subsequent extubation.  Pt found to have severe AS on workup.    1) Not a candidate for intervention of AS with Dementia  2) Careful with fluids as severe AS  3) extubated and doing well  4) Please recall with questions

## 2022-07-22 NOTE — PROGRESS NOTE ADULT - SUBJECTIVE AND OBJECTIVE BOX
INTERVAL HPI/OVERNIGHT EVENTS:    ICU ACCEPTANCE NOTE:    CC: S/P acute hypoxic respiratory failure sec aspiration, hypertension, aortic stenosis, dementia, hydrocephalus      Chart and course reviewed.  Patient seen at bedside, pleasant, states she feels ok  no pain or shortness of breath    Vital Signs Last 24 Hrs  T(C): 37.1 (22 Jul 2022 15:00), Max: 37.1 (21 Jul 2022 19:11)  T(F): 98.7 (22 Jul 2022 15:00), Max: 98.7 (21 Jul 2022 19:11)  HR: 82 (22 Jul 2022 15:00) (64 - 89)  BP: 160/49 (22 Jul 2022 15:00) (131/60 - 194/70)  BP(mean): 78 (22 Jul 2022 15:00) (67 - 118)  RR: 17 (22 Jul 2022 15:00) (12 - 24)  SpO2: 93% (22 Jul 2022 15:00) (93% - 99%)    Parameters below as of 22 Jul 2022 10:00  Patient On (Oxygen Delivery Method): room air        PHYSICAL EXAM:    GENERAL: lethargic, easily arousable, not in distress, oriented x 1  CHEST/LUNG: b/l air entry  HEART: reg  ABDOMEN: soft, bs+, non tender  EXTREMITIES:  no edema, tenderness    MEDICATIONS  (STANDING):  ampicillin/sulbactam  IVPB      ampicillin/sulbactam  IVPB 3 Gram(s) IV Intermittent every 6 hours  dextrose 5%. 1000 milliLiter(s) (50 mL/Hr) IV Continuous <Continuous>  dextrose 5%. 1000 milliLiter(s) (100 mL/Hr) IV Continuous <Continuous>  dextrose 50% Injectable 25 Gram(s) IV Push once  dextrose 50% Injectable 12.5 Gram(s) IV Push once  dextrose 50% Injectable 25 Gram(s) IV Push once  enoxaparin Injectable 40 milliGRAM(s) SubCutaneous every 24 hours  glucagon  Injectable 1 milliGRAM(s) IntraMuscular once  insulin glargine Injectable (LANTUS) 20 Unit(s) SubCutaneous two times a day  insulin lispro (ADMELOG) corrective regimen sliding scale   SubCutaneous every 6 hours  insulin lispro Injectable (ADMELOG) 3 Unit(s) SubCutaneous every 6 hours  levothyroxine 137 MICROGram(s) Oral daily  memantine 10 milliGRAM(s) Oral two times a day    MEDICATIONS  (PRN):  dextrose Oral Gel 15 Gram(s) Oral once PRN Blood Glucose LESS THAN 70 milliGRAM(s)/deciliter  hydrALAZINE Injectable 10 milliGRAM(s) IV Push every 6 hours PRN SBP > 170  ondansetron Injectable 4 milliGRAM(s) IV Push every 6 hours PRN Nausea and/or Vomiting      Allergies    No Known Allergies    Intolerances          LABS:                          8.1    14.86 )-----------( 334      ( 22 Jul 2022 04:00 )             26.7     07-22    134<L>  |  100  |  12.4  ----------------------------<  244<H>  3.9   |  19.0<L>  |  0.67    Ca    8.8      22 Jul 2022 04:00  Phos  2.4     07-22  Mg     2.1     07-22            RADIOLOGY & ADDITIONAL TESTS:

## 2022-07-22 NOTE — PROGRESS NOTE ADULT - ASSESSMENT
87 yr old female with hypertension, hypothyroidism, diabetes mellitus, anemia presented with complaints of worsening weakness, fever, poor oral intake. Noted to be hypoxic, hypertensive on admission, with leucocytosis. CT head without acute stroke. Cultures were sent, empiric antibiotics were initiated. On 7/19, she developed nausea, vomiting, subsequently developed worsening hypotension and bradycardia, RRT was called, and she was intubated for airway protection and transferred to ICU for further management, pressors were initiated. Repeat CT head with hydrocephalus, CT chest/abdo was done, revealed Bilateral lower lobe consolidations, which may represent atelectasis and/or pneumonia. Small bilateral pleural effusions. Mild gallbladder wall thickening. Recommend ultrasound for further assessment. No CT evidence of bowel obstruction, diverticulitis, or colitis.   Her cultures were negative. US abdomen, negative for cholecystitis, ECHO was done, revealed severe aortic stenosis, cardiology was consulted, not a candidate for intervention given dementia. Neurology consulted for NPH, option of LP was discussed, deferred for now. Pressors were weaned, she was extubated on 7/21 and was transferred to medical floor on 7/22.      1. S/p septic shock:  continue Unasyn  cultures negative  off pressors    2. hydrocephalus:  Neurology eval noted  CT discussed.  MRI canceled for now by neurology.  outpatient follow up  PT evaluation.    3. Aortic stenosis:  Cardiology eval done  not a candidate for intervention    4. Hypothyroidism:  Continue Synthroid.    5. Hypertension:  Monitor BP  has been hypertensive today  discussed with ICU, start Lisinopril in am  will resume low dose Labetalol now with parameters.    6.Diabetes mellitus:  Continue Lantus 20 units and pre meal 3 units.  monitor FS  sliding scale coverage.    7. Dementia;   Supportive care  continue Namenda    8. DVT ppx:  hepairn.    Discussed with family at bedside.

## 2022-07-23 NOTE — PROGRESS NOTE ADULT - SUBJECTIVE AND OBJECTIVE BOX
Chief Complaint:  AMS    SUBJECTIVE / OVERNIGHT EVENTS: No acute events reported overnight.  Pt febrile today w/ tmax 101.3, , leukocytosis.  Pt offered no acute complaints at time of evaluation.  Pt is a poor historian but denies chest pain, SOB, abd pain, N/V, fever, chills, dysuria or any other complaints. All remainder ROS negative.       I&O's Summary    22 Jul 2022 07:01  -  23 Jul 2022 07:00  --------------------------------------------------------  IN: 400 mL / OUT: 700 mL / NET: -300 mL    23 Jul 2022 07:01  -  23 Jul 2022 21:08  --------------------------------------------------------  IN: 0 mL / OUT: 100 mL / NET: -100 mL          PHYSICAL EXAM:  Vital Signs Last 24 Hrs  T(C): 37.8 (23 Jul 2022 20:00), Max: 38.5 (23 Jul 2022 15:50)  T(F): 100.1 (23 Jul 2022 20:00), Max: 101.3 (23 Jul 2022 15:50)  HR: 94 (23 Jul 2022 16:39) (73 - 103)  BP: 134/62 (23 Jul 2022 16:39) (134/62 - 193/86)  BP(mean): 83 (23 Jul 2022 01:00) (73 - 94)  RR: 20 (23 Jul 2022 16:39) (13 - 21)  SpO2: 94% (23 Jul 2022 16:39) (90% - 97%)    Parameters below as of 23 Jul 2022 16:39  Patient On (Oxygen Delivery Method): nasal cannula  O2 Flow (L/min): 2      GENERAL: pt examined bedside, laying comfortably in bed in NAD  HEENT: NC/AT, dry oral mucosa, clear conjunctiva, sclera nonicteric, 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  RESPIRATORY: poor inspiratory effort, no wheezing, rhonchi, rales  CARDIOVASCULAR: RRR, normal S1 and S2, +systolic murmur  ABDOMEN: soft, NT/ND, normoactive bowel sounds, no rebound/guarding  EXTREMITIES: No cynaosis, no clubbing, RLE nonpitting edema, pulses are 2+ bilaterally  PSYCH: affect flat but cooperative  NEUROLOGY: A+O x2, no focal neurologic deficits appreciated   SKIN: +varicose veins RLE>LLE        LABS:                        8.8    11.45 )-----------( 342      ( 23 Jul 2022 06:35 )             28.5     07-23    144  |  107  |  9.0  ----------------------------<  184<H>  3.2<L>   |  20.0<L>  |  0.63    Ca    8.8      23 Jul 2022 06:35  Phos  1.8     07-23  Mg     1.8     07-23    TPro  5.6<L>  /  Alb  2.6<L>  /  TBili  0.4  /  DBili  x   /  AST  21  /  ALT  15  /  AlkPhos  80  07-23              CAPILLARY BLOOD GLUCOSE      POCT Blood Glucose.: 212 mg/dL (23 Jul 2022 18:00)  POCT Blood Glucose.: 185 mg/dL (23 Jul 2022 12:49)  POCT Blood Glucose.: 186 mg/dL (23 Jul 2022 09:07)  POCT Blood Glucose.: 176 mg/dL (23 Jul 2022 05:55)  POCT Blood Glucose.: 166 mg/dL (22 Jul 2022 23:03)        RADIOLOGY & ADDITIONAL TESTS:    < from: US Abdomen Upper Quadrant Right (07.20.22 @ 14:31) >  IMPRESSION:  No evidence of cholelithiasis or acute cholecystitis.  Mildly enlarged fatty liver.    < end of copied text >  < from: CT Abdomen and Pelvis w/ IV Cont (07.20.22 @ 05:26) >  IMPRESSION:    1. Bilateral lower lobe consolidations, which may represent atelectasis   and/or pneumonia.  2. Small bilateral pleural effusions.  3. Mild gallbladder wall thickening. Recommend ultrasound for further   assessment.  4. No CT evidence of bowel obstruction, diverticulitis, or colitis.    < end of copied text >      < from: CT Head No Cont (07.19.22 @ 12:55) >    IMPRESSION:    Findings consistent with persistent moderate normal pressure   hydrocephalus.    No acute intracranial hemorrhage or brain edema.    < end of copied text >      < from: TTE Echo Complete w/o Contrast w/ Doppler (07.19.22 @ 20:08) >  Summary:   1. Technically suboptimal study.   2. Estimated pulmonary artery systolic pressure is 38.9 mmHg assuming a   right atrial pressure of 8 mmHg, which is consistent with borderline   pulmonary hypertension.   3. Hyperdynamic global left ventricular systolic function.   4. Left ventricular ejection fraction, by visual estimation, is >75%.   5. Severely enlarged left atrium.   6. Spectral Doppler shows impaired relaxation pattern of left   ventricular myocardial filling (Grade I diastolic dysfunction).   7. Mildly enlarged right atrium.   8. Mild mitral annular calcification.   9. Trace mitral valve regurgitation.  10. Mild tricuspid regurgitation.  11. Endocardial visualization was enhanced with intravenous echo contrast.  12. Sclerotic aortic valve with decreased opening.  13. Stroke volume across LVOT is 31 ml. ERROL by continuity equation is 0.6   cm^2. DVI of 0.21 also suggestive of severe aortic valve stenosis.  14. There is no evidence of pericardial effusion.    < end of copied text >    MEDICATIONS  (STANDING):  ampicillin/sulbactam  IVPB      ampicillin/sulbactam  IVPB 3 Gram(s) IV Intermittent every 6 hours  dextrose 5%. 1000 milliLiter(s) (50 mL/Hr) IV Continuous <Continuous>  dextrose 5%. 1000 milliLiter(s) (100 mL/Hr) IV Continuous <Continuous>  dextrose 50% Injectable 25 Gram(s) IV Push once  dextrose 50% Injectable 12.5 Gram(s) IV Push once  dextrose 50% Injectable 25 Gram(s) IV Push once  enoxaparin Injectable 40 milliGRAM(s) SubCutaneous every 24 hours  glucagon  Injectable 1 milliGRAM(s) IntraMuscular once  insulin glargine Injectable (LANTUS) 20 Unit(s) SubCutaneous two times a day  insulin lispro (ADMELOG) corrective regimen sliding scale   SubCutaneous Before meals and at bedtime  insulin lispro Injectable (ADMELOG) 3 Unit(s) SubCutaneous three times a day before meals  labetalol 100 milliGRAM(s) Oral two times a day  levothyroxine 137 MICROGram(s) Oral daily  lisinopril 10 milliGRAM(s) Oral daily  memantine 10 milliGRAM(s) Oral two times a day  potassium phosphate / sodium phosphate Powder (PHOS-NaK) 1 Packet(s) Oral once    MEDICATIONS  (PRN):  aluminum hydroxide/magnesium hydroxide/simethicone Suspension 30 milliLiter(s) Oral every 6 hours PRN Dyspepsia  dextrose Oral Gel 15 Gram(s) Oral once PRN Blood Glucose LESS THAN 70 milliGRAM(s)/deciliter  hydrALAZINE Injectable 10 milliGRAM(s) IV Push every 6 hours PRN SBP > 170  ondansetron Injectable 4 milliGRAM(s) IV Push every 6 hours PRN Nausea and/or Vomiting

## 2022-07-23 NOTE — PROGRESS NOTE ADULT - ASSESSMENT
88 y/o F w/ PMH of HTN, hypothyroid, DM, anemia, hypothyroidism, was brought ihn by family for AMS and progressing generalized weakness.  Pt also had associated subjective feevers and poor po intake.  Pt was hypoxic on admission and w/ hypertensive urgency.  Pt was polaced on emperic abxs.  CTH significant for findings of NPH.  On 7/19 pt she developed nausea and had episodes of NBNB emesis at that time w bradycardia and hypotension; RRT was called.  Pt was subsequenty intubated for airway protection and upgraded to MICU.  Pressors were initiated.  CT chest/abdo was done, revealed Bilateral lower lobe consolidations, which may represent atelectasis and/or pneumonia. Small bilateral pleural effusions.  All cultures have had NGTD.  Pt remains on Unasyn for emperic therapy.  Pt also noted to have severe AS on TTE however no inervention planned per cardiology.  Pt was extubated 7/21 and downgraded to medicine 7/22.  Hospital course further complicated by febrile event today and sepsis w/u again ordered.  Persistent encephalopathy noted, likely mutlifactorial 2/2 sepsis, dehydration and suspicion for NPH.          Sepsis POA, s/p septic shock possibley 2/2 aspiration PNA   Acute hypoxic respiratory failure 2/p intubation w/ mechanical ventilation possibley 2/2 aspiration vs atelectasis   - s/p pressor support and steroid course for hypotension (since d/c'd)  - Leukocytosis trending down and could be reactive 2/2 recent steroid use   - Febrile today and tachycardic, source unclear, UA and blood cxs repeated   - Contiues requiring supplemental O2 to maintain O2 sats  - Pt is on VTE ppx however if unable to wean off O2 and remains tachycardic +/- fevers will order CTA chest to r/o PE    - Wet read of CXR appears to have bibasilar atelectasis which could account for fever and persistent need for O2  - Maintain on Unasy for empiric coverage   - Trend CBC and monitor temperature       Acute metabolic encephalopathy, multifactorial 2/2 dehydration sepsis and NPH in the setting of underlying dementia   - High suspicion for NPH given CTH findings and recent hx of ataxia and incontinence   - Neurology discussed w/ family LP needed to diagnoses and tx is  shunt; family still unsure if they want to pursue LP but if amenable will reach out to neuro   - CTH reveiwed and noted above   - MRI canceled for now by neurology and recommended outpt f/u   - c/w memantine   - Fall and aspiration precautions   - PT evaluation pending       Severe Aortic stenosis  - TTE reviewed and noted above   - Per cardio pt is not a candidate for intervention given her dementia   - Caution w/ IVFs   - Cardio following and recs noted      Normocytic anemia   - Suspect mixed anemia given RDW, will order iron panel   - H/H trending down since admission however no evidence of hypotension and suspect tachycardia 2/2 fever given HR normalize w/ fever resolved   - Will trend CBC closely and transfuse if Hb<8       Hypertensive urgency   - Resumed ACEI today given SBP>190  - prn hydralazine for sustained SBP >160  - Monitor vitals and titrate antihypertensives as needed       Hypokalemia / Hypophosphatemia   - Supplemented   - Monitor lytes and replete as needed  - Maintain K>4, Mg>2 and phos>3      Diabetes mellitus, uncontrolled  - A1c 10.8  - On basal/bolus insulin regimen  - Titrate insulin to maintain BG<180  - ISS and hypoglycemic protocol in place       Hypothyroidism:  - On Synthroid        VTE ppx: hepairn.    Dispo: Pt remains acute requiring inpt hospitalization.  PT eval pending.  Anticipate may need MARITZA once medically stable for d/c.     Family bedside and updated.  All questions answered at length.

## 2022-07-24 NOTE — PROGRESS NOTE ADULT - SUBJECTIVE AND OBJECTIVE BOX
Chief Complaint:  AMS    SUBJECTIVE / OVERNIGHT EVENTS: No acute events reported overnight.  Pt afebrile overnight.  She is a poor historian but offers no acute complaints a this time.  She denies chest pain, SOB, abd pain, N/V, fever, chills, dysuria or any other complaints. All remainder ROS negative.       I&O's Summary    22 Jul 2022 07:01  -  23 Jul 2022 07:00  --------------------------------------------------------  IN: 400 mL / OUT: 700 mL / NET: -300 mL    23 Jul 2022 07:01  -  23 Jul 2022 21:08  --------------------------------------------------------  IN: 0 mL / OUT: 100 mL / NET: -100 mL          PHYSICAL EXAM:  Vital Signs Last 24 Hrs  T(C): 37.8 (24 Jul 2022 05:50), Max: 38.5 (23 Jul 2022 15:50)  T(F): 100 (24 Jul 2022 05:50), Max: 101.3 (23 Jul 2022 15:50)  HR: 96 (24 Jul 2022 06:42) (80 - 102)  BP: 131/75 (24 Jul 2022 06:42) (131/75 - 193/86)  BP(mean): --  RR: 18 (24 Jul 2022 06:42) (18 - 21)  SpO2: 93% (24 Jul 2022 06:42) (93% - 94%)    Parameters below as of 23 Jul 2022 23:00  Patient On (Oxygen Delivery Method): nasal cannula  O2 Flow (L/min): 2        GENERAL: pt examined bedside, laying comfortably in bed in NAD  HEENT: NC/AT, dry oral mucosa, clear conjunctiva, sclera nonicteric, 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  RESPIRATORY: poor inspiratory effort, no wheezing, rhonchi, rales  CARDIOVASCULAR: RRR, normal S1 and S2, +systolic murmur  ABDOMEN: soft, NT/ND, +bowel sounds, no rebound/guarding  EXTREMITIES: No cynaosis, no clubbing, RLE nonpitting edema, pulses are 2+ bilaterally  PSYCH: affect flat but cooperative  NEUROLOGY: A+O x2, no focal neurologic deficits appreciated   SKIN: +varicose veins RLE>LLE        LABS:                                            9.2    11.01 )-----------( 394      ( 24 Jul 2022 06:34 )             30.1       07-24    145  |  110<H>  |  7.4<L>  ----------------------------<  167<H>  3.6   |  24.0  |  0.63    Ca    8.6      24 Jul 2022 06:34  Phos  2.7     07-24  Mg     1.7     07-24    TPro  5.7<L>  /  Alb  2.7<L>  /  TBili  0.5  /  DBili  x   /  AST  19  /  ALT  15  /  AlkPhos  81  07-24        CAPILLARY BLOOD GLUCOSE      POCT Blood Glucose.: 212 mg/dL (23 Jul 2022 18:00)  POCT Blood Glucose.: 185 mg/dL (23 Jul 2022 12:49)  POCT Blood Glucose.: 186 mg/dL (23 Jul 2022 09:07)  POCT Blood Glucose.: 176 mg/dL (23 Jul 2022 05:55)  POCT Blood Glucose.: 166 mg/dL (22 Jul 2022 23:03)        RADIOLOGY & ADDITIONAL TESTS:    < from: US Abdomen Upper Quadrant Right (07.20.22 @ 14:31) >  IMPRESSION:  No evidence of cholelithiasis or acute cholecystitis.  Mildly enlarged fatty liver.    < end of copied text >  < from: CT Abdomen and Pelvis w/ IV Cont (07.20.22 @ 05:26) >  IMPRESSION:    1. Bilateral lower lobe consolidations, which may represent atelectasis   and/or pneumonia.  2. Small bilateral pleural effusions.  3. Mild gallbladder wall thickening. Recommend ultrasound for further   assessment.  4. No CT evidence of bowel obstruction, diverticulitis, or colitis.    < end of copied text >      < from: CT Head No Cont (07.19.22 @ 12:55) >    IMPRESSION:    Findings consistent with persistent moderate normal pressure   hydrocephalus.    No acute intracranial hemorrhage or brain edema.    < end of copied text >      < from: TTE Echo Complete w/o Contrast w/ Doppler (07.19.22 @ 20:08) >  Summary:   1. Technically suboptimal study.   2. Estimated pulmonary artery systolic pressure is 38.9 mmHg assuming a   right atrial pressure of 8 mmHg, which is consistent with borderline   pulmonary hypertension.   3. Hyperdynamic global left ventricular systolic function.   4. Left ventricular ejection fraction, by visual estimation, is >75%.   5. Severely enlarged left atrium.   6. Spectral Doppler shows impaired relaxation pattern of left   ventricular myocardial filling (Grade I diastolic dysfunction).   7. Mildly enlarged right atrium.   8. Mild mitral annular calcification.   9. Trace mitral valve regurgitation.  10. Mild tricuspid regurgitation.  11. Endocardial visualization was enhanced with intravenous echo contrast.  12. Sclerotic aortic valve with decreased opening.  13. Stroke volume across LVOT is 31 ml. ERROL by continuity equation is 0.6   cm^2. DVI of 0.21 also suggestive of severe aortic valve stenosis.  14. There is no evidence of pericardial effusion.    < end of copied text >          MEDICATIONS  (STANDING):  ampicillin/sulbactam  IVPB      ampicillin/sulbactam  IVPB 3 Gram(s) IV Intermittent every 6 hours  dextrose 5%. 1000 milliLiter(s) (50 mL/Hr) IV Continuous <Continuous>  dextrose 5%. 1000 milliLiter(s) (100 mL/Hr) IV Continuous <Continuous>  dextrose 50% Injectable 25 Gram(s) IV Push once  dextrose 50% Injectable 12.5 Gram(s) IV Push once  dextrose 50% Injectable 25 Gram(s) IV Push once  enoxaparin Injectable 40 milliGRAM(s) SubCutaneous every 24 hours  glucagon  Injectable 1 milliGRAM(s) IntraMuscular once  insulin glargine Injectable (LANTUS) 20 Unit(s) SubCutaneous two times a day  insulin lispro (ADMELOG) corrective regimen sliding scale   SubCutaneous Before meals and at bedtime  insulin lispro Injectable (ADMELOG) 3 Unit(s) SubCutaneous three times a day before meals  labetalol 100 milliGRAM(s) Oral two times a day  levothyroxine 137 MICROGram(s) Oral daily  lisinopril 10 milliGRAM(s) Oral daily  memantine 10 milliGRAM(s) Oral two times a day  potassium phosphate / sodium phosphate Powder (PHOS-NaK) 1 Packet(s) Oral once    MEDICATIONS  (PRN):  aluminum hydroxide/magnesium hydroxide/simethicone Suspension 30 milliLiter(s) Oral every 6 hours PRN Dyspepsia  dextrose Oral Gel 15 Gram(s) Oral once PRN Blood Glucose LESS THAN 70 milliGRAM(s)/deciliter  hydrALAZINE Injectable 10 milliGRAM(s) IV Push every 6 hours PRN SBP > 170  ondansetron Injectable 4 milliGRAM(s) IV Push every 6 hours PRN Nausea and/or Vomiting

## 2022-07-24 NOTE — PROVIDER CONTACT NOTE (HYPOGLYCEMIA EVENT) - NS PROVIDER CONTACT BACKGROUND-HYPO
Age: 87y    Gender: Female    POCT Blood Glucose:  68 mg/dL (07-24-22 @ 18:15)  69 mg/dL (07-24-22 @ 18:13)  122 mg/dL (07-24-22 @ 12:59)  205 mg/dL (07-24-22 @ 09:11)  190 mg/dL (07-23-22 @ 21:46)      eMAR:  insulin glargine Injectable (LANTUS)   20 Unit(s) SubCutaneous (07-24-22 @ 09:17)   20 Unit(s) SubCutaneous (07-23-22 @ 21:53)    insulin lispro (ADMELOG) corrective regimen sliding scale   2 Unit(s) SubCutaneous (07-24-22 @ 09:15)   1 Unit(s) SubCutaneous (07-23-22 @ 21:54)    insulin lispro Injectable (ADMELOG)   3 Unit(s) SubCutaneous (07-24-22 @ 13:53)   3 Unit(s) SubCutaneous (07-24-22 @ 09:15)    levothyroxine   137 MICROGram(s) Oral (07-24-22 @ 05:58)

## 2022-07-24 NOTE — PROGRESS NOTE ADULT - ASSESSMENT
88 y/o F w/ PMH of HTN, hypothyroid, DM, anemia, hypothyroidism, was brought ihn by family for AMS and progressing generalized weakness.  Pt also had associated subjective feevers and poor po intake.  Pt was hypoxic on admission and w/ hypertensive urgency.  Pt was polaced on emperic abxs.  CTH significant for findings of NPH.  On 7/19 pt she developed nausea and had episodes of NBNB emesis at that time w bradycardia and hypotension; RRT was called.  Pt was subsequenty intubated for airway protection and upgraded to MICU.  Pressors were initiated.  CT chest/abdo was done, revealed Bilateral lower lobe consolidations, which may represent atelectasis and/or pneumonia. Small bilateral pleural effusions.  All cultures have had NGTD.  Pt remains on Unasyn for emperic therapy.  Pt also noted to have severe AS on TTE however no inervention planned per cardiology.  Pt was extubated 7/21 and downgraded to medicine 7/22.  Hospital course further complicated by febrile event today and sepsis w/u again ordered.  Persistent encephalopathy noted, likely mutlifactorial 2/2 sepsis, dehydration and suspicion for NPH.          Sepsis POA, s/p septic shock possibley 2/2 aspiration PNA   Acute hypoxic respiratory failure 2/p intubation w/ mechanical ventilation possibley 2/2 aspiration vs atelectasis   - s/p pressor support and steroid course for hypotension (since d/c'd)  - Leukocytosis trending down and could still be reactive 2/2 recent steroid use   - Afebrile overnight and clinically nontoxic appearing   - UA w/ pyuria, Ucx and Bcxs pending, maintain on Unasyn pending culture resultes/sensitivities   - CXR report pending, but personally reviewed by and appears to have bibasilar atelectasis which could account for febrile episode and persistent need for O2  - Would order incentive spirometry once able to follow directions for it   - Titrate off supplemental O2 as needed   - Pt is on VTE ppx however if unable to wean off O2 and remains tachycardic +/- fevers will order CTA chest to r/o PE, but suspicion remains low at this time    - Maintain on Unasy for empiric coverage   - Trend CBC and monitor temperature       Acute metabolic encephalopathy, multifactorial 2/2 dehydration sepsis and NPH in the setting of underlying dementia   - High suspicion for NPH given CTH findings and recent hx of ataxia and incontinence   - Neurology discussed w/ family LP needed to diagnoses and tx is  shunt; family at that time unsure about pursuing LP   - Son at bedside this morning wants all interventions done including  shunt; explained to son that since there are 7 siblings and not one has power of atterney then all have to be on same page; trying to set up family meeting to discuss GOC given pt encephalopathic  - If all pts children in agreement for LP/ shunt will reach out to neurology/neurosurgery  - CTH reviewed and noted above   - MRI canceled for now by neurology and recommended outpt f/u   - c/w memantine   - Fall and aspiration precautions   - PT evaluation pending       Severe Aortic stenosis  - TTE reviewed and noted above   - Per cardio pt is not a candidate for intervention given her dementia   - Caution w/ IVFs   - Cardio following and recs noted      Normocytic anemia   - Suspect mixed anemia given RDW, will order iron panel   - H/H trending down since admission however no evidence of hypotension and suspect tachycardia 2/2 fever given HR normalize w/ fever resolved   - Will trend CBC closely and transfuse if Hb<8       Hypertensive urgency   - Resumed ACEI yesterday and BP improving   - prn hydralazine for sustained SBP >160  - Monitor vitals and titrate antihypertensives as needed       Hypokalemia / Hypophosphatemia   - Will supplement again this morning   - Monitor lytes and replete as needed  - Maintain K>4, Mg>2 and phos>3      Diabetes mellitus, uncontrolled  - A1c 10.8  - On basal/bolus insulin regimen  - Titrate insulin to maintain BG<180  - ISS and hypoglycemic protocol in place       Hypothyroidism:  - On Synthroid        VTE ppx: hepairn sq    Dispo: Pt remains acute requiring inpt hospitalization given persistent encephalopathy, sob and electrolyte derangements.  Pt likely will need MARITZA once medically stable.      86 y/o F w/ PMH of HTN, hypothyroid, DM, anemia, hypothyroidism, was brought ihn by family for AMS and progressing generalized weakness.  Pt also had associated subjective feevers and poor po intake.  Pt was hypoxic on admission and w/ hypertensive urgency.  Pt was polaced on emperic abxs.  CTH significant for findings of NPH.  On 7/19 pt she developed nausea and had episodes of NBNB emesis at that time w bradycardia and hypotension; RRT was called.  Pt was subsequenty intubated for airway protection and upgraded to MICU.  Pressors were initiated.  CT chest/abdo was done, revealed Bilateral lower lobe consolidations, which may represent atelectasis and/or pneumonia. Small bilateral pleural effusions.  All cultures have had NGTD.  Pt remains on Unasyn for emperic therapy.  Pt also noted to have severe AS on TTE however no inervention planned per cardiology.  Pt was extubated 7/21 and downgraded to medicine 7/22.  Hospital course further complicated by febrile event today and sepsis w/u again ordered.  Persistent encephalopathy noted, likely mutlifactorial 2/2 sepsis, dehydration and suspicion for NPH.          Sepsis POA, s/p septic shock possibley 2/2 aspiration PNA   Acute hypoxic respiratory failure 2/p intubation w/ mechanical ventilation possibley 2/2 aspiration vs atelectasis   - s/p pressor support and steroid course for hypotension (since d/c'd)  - Leukocytosis trending down and could still be reactive 2/2 recent steroid use   - Afebrile overnight and clinically nontoxic appearing   - UA w/ pyuria, Ucx and Bcxs pending, maintain on Unasyn pending culture resultes/sensitivities   - CXR report pending, but personally reviewed by and appears to have bibasilar atelectasis which could account for febrile episode and persistent need for O2  - Would order incentive spirometry once able to follow directions for it   - Titrate off supplemental O2 as needed   - Pt is on VTE ppx however if unable to wean off O2 and remains tachycardic +/- fevers will order CTA chest to r/o PE, but suspicion remains low at this time    - Maintain on Unasy for empiric coverage   - Trend CBC and monitor temperature       Acute metabolic encephalopathy, multifactorial 2/2 dehydration sepsis and NPH in the setting of underlying dementia   - High suspicion for NPH given CTH findings and recent hx of ataxia and incontinence   - Neurology discussed w/ family LP needed to diagnoses and tx is  shunt; family at that time unsure about pursuing LP   - Son at bedside this morning wants all interventions done including  shunt; explained to son that since there are 7 siblings and not one has power of atterney then all have to be on same page; trying to set up family meeting to discuss GOC given pt encephalopathic  - If all pts children in agreement for LP/ shunt will reach out to neurology/neurosurgery  - CTH reviewed and noted above   - MRI canceled for now by neurology and recommended outpt f/u   - c/w memantine   - Fall and aspiration precautions   - PT evaluation pending       Severe Aortic stenosis  - TTE reviewed and noted above   - Per cardio pt is not a candidate for intervention given her dementia   - Caution w/ IVFs   - Cardio following and recs noted      Normocytic anemia   - Iron panel reviewed and will give short course of venofer then po supplementation thereafter   - H/H low but stable, hemodynamically stable and no active bleeding   - Will trend CBC closely and transfuse if Hb<8       Hypertensive urgency   - Resumed ACEI yesterday and BP improving   - prn hydralazine for sustained SBP >160  - Monitor vitals and titrate antihypertensives as needed       Hypokalemia / Hypophosphatemia   - Will supplement again this morning   - Monitor lytes and replete as needed  - Maintain K>4, Mg>2 and phos>3      Diabetes mellitus, uncontrolled  - A1c 10.8  - On basal/bolus insulin regimen  - Titrate insulin to maintain BG<180  - ISS and hypoglycemic protocol in place       Hypothyroidism:  - On Synthroid        VTE ppx: hepairn sq    Dispo: Pt remains acute requiring inpt hospitalization given persistent encephalopathy, sob and electrolyte derangements.  Pt likely will need MARITZA once medically stable.

## 2022-07-25 NOTE — PROGRESS NOTE ADULT - ASSESSMENT
87y Female who is followed by neurology because of Dementia and NPH    NPH  Dr Vega spoke in detail with family about possibility of NPH, how it is diagnosed (LP) and treated ( shunt).  I would recommend neurosurgery evaluation to determine if she would be a surgical candidate.   If so, then the next step would be LP/cisternogram for diagnosis and monitor for any improvement.   Ideally would consider sending to rehab and deferring LP/imaging as outpatient once she is over her acute issues.     All above discussed with Dr Lucia.

## 2022-07-25 NOTE — PHYSICAL THERAPY INITIAL EVALUATION ADULT - ADDITIONAL COMMENTS
as per Son at the bedside: pt resides in the private house, no steps to enter, owns RW,  availble to assist as needed, no fall hx

## 2022-07-25 NOTE — PHYSICAL THERAPY INITIAL EVALUATION ADULT - PLANNED THERAPY INTERVENTIONS, PT EVAL
gait training/motor coordination training/neuromuscular re-education/postural re-education/ROM/strengthening/stretching/transfer training/wheelchair management/propulsion training

## 2022-07-25 NOTE — CHART NOTE - NSCHARTNOTEFT_GEN_A_CORE
Source: Patient [ ]  Family [ ]   other [x ]EMR, rounds    Current Diet: Pureed  total feed    PO intake:  < 50% [x ]   50-75%  [ ]   %  [ ]  other :    Source for PO intake [ ] Patient [ ] family [ x] chart [ ] staff [ ] other    Enteral /Parenteral Nutrition:     Current Weight: 132# 7/19, 150.7# 7/21  none noted    % Weight Change     Pertinent Medications: MEDICATIONS  (STANDING):  dextrose 5%. 1000 milliLiter(s) (50 mL/Hr) IV Continuous <Continuous>  dextrose 5%. 1000 milliLiter(s) (100 mL/Hr) IV Continuous <Continuous>  dextrose 50% Injectable 25 Gram(s) IV Push once  dextrose 50% Injectable 12.5 Gram(s) IV Push once  dextrose 50% Injectable 25 Gram(s) IV Push once  enoxaparin Injectable 40 milliGRAM(s) SubCutaneous every 24 hours  glucagon  Injectable 1 milliGRAM(s) IntraMuscular once  insulin glargine Injectable (LANTUS) 20 Unit(s) SubCutaneous two times a day  insulin lispro (ADMELOG) corrective regimen sliding scale   SubCutaneous Before meals and at bedtime  insulin lispro Injectable (ADMELOG) 3 Unit(s) SubCutaneous three times a day before meals  iron sucrose IVPB 200 milliGRAM(s) IV Intermittent every 24 hours  labetalol 100 milliGRAM(s) Oral two times a day  levothyroxine 137 MICROGram(s) Oral daily  lisinopril 10 milliGRAM(s) Oral daily  memantine 10 milliGRAM(s) Oral two times a day  nystatin Powder 1 Application(s) Topical two times a day    MEDICATIONS  (PRN):  aluminum hydroxide/magnesium hydroxide/simethicone Suspension 30 milliLiter(s) Oral every 6 hours PRN Dyspepsia  dextrose Oral Gel 15 Gram(s) Oral once PRN Blood Glucose LESS THAN 70 milliGRAM(s)/deciliter  hydrALAZINE Injectable 10 milliGRAM(s) IV Push every 6 hours PRN SBP > 170  ondansetron Injectable 4 milliGRAM(s) IV Push every 6 hours PRN Nausea and/or Vomiting    Pertinent Labs: CBC Full  -  ( 25 Jul 2022 08:34 )  WBC Count : x  RBC Count : x  Hemoglobin : 7.8 g/dL  Hematocrit : 25.8 %  Platelet Count - Automated : x  Mean Cell Volume : x  Mean Cell Hemoglobin : x  Mean Cell Hemoglobin Concentration : x  Auto Neutrophil # : x  Auto Lymphocyte # : x  Auto Monocyte # : x  Auto Eosinophil # : x  Auto Basophil # : x  Auto Neutrophil % : x  Auto Lymphocyte % : x  Auto Monocyte % : x  Auto Eosinophil % : x  Auto Basophil % : x      07-25 Na144 mmol/L Glu 92 mg/dL K+ 3.3 mmol/L<L> Cr  0.57 mg/dL BUN 8.2 mg/dL Phos 3.4 mg/dL Alb n/a   PAB n/a   A1C 10.8 noted.         Skin:     Nutrition focused physical exam conducted - found signs of malnutrition [ ]absent [x ]present    Subcutaneous fat loss: [x ] Orbital fat pads region, [ ]Buccal fat region, [ ]Triceps region,  [ ]Ribs region    Muscle wasting: [ x]Temples region, [ x]Clavicle region, [x ]Shoulder region, [ ]Scapula region, [ ]Interosseous region,  [ ]thigh region, [ ]Calf region    Estimated Needs:   [x ] no change since previous assessment  [ ] recalculated:     Current Nutrition Diagnosis: Malnutrition...  severe, acute  related to inability to meet sufficient protein-energy in setting of advanced age, dementia, declining functional status, failure to thrive, poor po intake  as evidenced by meeting <50% nutrient needs >5 days and NFPE findings. Pt is a total feed. Plan is MARITZA.       Recommendations: Rec Glucerna shake TID to optimize po intake and provide an additional 220 kcal, 10g protein per serving     Monitoring and Evaluation:   [ x] PO intake [ x] Tolerance to diet prescription [X] Weights  [X] Follow up per protocol [X] Labs:

## 2022-07-25 NOTE — PHYSICAL THERAPY INITIAL EVALUATION ADULT - LEVEL OF CONSCIOUSNESS, REHAB EVAL
8:49 AM    Reason for Call: OVERBOOK    Patient is having the following symptoms: lump on waist has apt on 10/11.  She would like to be seen sooner/prefers to see Dr EVENS Ball    The patient is requesting an appointment for this month with Dr EVENS Ball.    Was an appointment offered for this call? No  If yes : Appointment type              Date    Preferred method for responding to this message: Telephone Call  What is your phone number ?[phone]    If we cannot reach you directly, may we leave a detailed response at the number you provided? Yes    Can this message wait until your PCP/provider returns, if unavailable today? Not applicable    Mikayla Rice     on and off sleeping throughout the evaluation

## 2022-07-25 NOTE — PROGRESS NOTE ADULT - SUBJECTIVE AND OBJECTIVE BOX
[facility]                                        Ne[facility] [place]                                 Silvia Roman, & Keron                                  [address]                                        [place], [place], [address]                                             [phone]        CC: Dementia/Normal Pressure hydrocephalus     HPI:   The patient is a 87y Female who presented with failure to thrive and was found to be hypoxic and hypotensive.  She was intubated and admitted to MIICU.  Head CT x 2 showed NPH.  She has a 8 year history of dementia, diagnosed by PMD as vascular dementia.  She has history of gait troubles for past year or more and wears a pad for incontinence.  Her dementia has worsened over past 3 years.  She has been taking memantine as prescribed by her PMD.  Neurology is asked to evaluate. (AR).    She was admitted on 7/19/22 developed aspiration and was intubated and transferred to ICU.   She was ultimately extubated and transferred to medical floor where she was seen by Dr Vega on 7/21/22.    Interim history:  On 5 Tower.   Family now requesting further work up for Normal Pressure hydrocephalus diagnosis.    ROS:   Denies headache or dizziness.  Denies chest pain.  Denies shortness of breath.    MEDICATIONS  (STANDING):  ampicillin/sulbactam  IVPB 3 Gram(s) IV Intermittent every 6 hours  ampicillin/sulbactam  IVPB      dextrose 5%. 1000 milliLiter(s) (50 mL/Hr) IV Continuous <Continuous>  dextrose 5%. 1000 milliLiter(s) (100 mL/Hr) IV Continuous <Continuous>  enoxaparin Injectable 40 milliGRAM(s) SubCutaneous every 24 hours  glucagon  Injectable 1 milliGRAM(s) IntraMuscular once  insulin glargine Injectable (LANTUS) 20 Unit(s) SubCutaneous two times a day  insulin lispro (ADMELOG) corrective regimen sliding scale   SubCutaneous Before meals and at bedtime  insulin lispro Injectable (ADMELOG) 3 Unit(s) SubCutaneous three times a day before meals  iron sucrose IVPB 200 milliGRAM(s) IV Intermittent every 24 hours  labetalol 100 milliGRAM(s) Oral two times a day  levothyroxine 137 MICROGram(s) Oral daily  lisinopril 10 milliGRAM(s) Oral daily  memantine 10 milliGRAM(s) Oral two times a day  potassium chloride  10 mEq/100 mL IVPB 10 milliEquivalent(s) IV Intermittent every 1 hour    Vital Signs Last 24 Hrs  T(C): 37.9 (25 Jul 2022 05:04), Max: 38.3 (24 Jul 2022 23:55)  T(F): 100.2 (25 Jul 2022 05:04), Max: 100.9 (24 Jul 2022 23:55)  HR: 87 (25 Jul 2022 11:01) (79 - 92)  BP: 177/71 (25 Jul 2022 11:01) (152/63 - 186/68)  RR: 18 (25 Jul 2022 11:01) (18 - 18)  SpO2: 93% (25 Jul 2022 11:01) (93% - 95%)    Parameters below as of 25 Jul 2022 05:04  Patient On (Oxygen Delivery Method): room air    Detailed Neurologic Exam:    Mental status: The patient is awake and alert. Speaks in short phrases. Follows instructions.     Cranial nerves: Right eye opacified. Left pupil reacts. There is no visual field deficit in left eye to threat. Extraocular motion is full with no nystagmus. Facial sensation is intact. Facial musculature is symmetric. Palate elevates symmetrically. Tongue is midline.    Motor: There is normal bulk and tone.  There is no tremor.  Strength grossly symmetric although diffusely weak.    Sensation: Grossly intact to light touch and pin.    Reflexes: Trace throughout and plantar responses are flexor.    Cerebellar: Difficult to assess secondary to visual impairment.     Labs:     07-25    144  |  108<H>  |  8.2  ----------------------------<  92  3.3<L>   |  26.0  |  0.57    Ca    7.7<L>      25 Jul 2022 06:41  Phos  3.4     07-25  Mg     2.1     07-25    TPro  5.7<L>  /  Alb  2.7<L>  /  TBili  0.5  /  DBili  x   /  AST  19  /  ALT  15  /  AlkPhos  81  07-24                            7.8    x     )-----------( x        ( 25 Jul 2022 08:34 )             25.8

## 2022-07-25 NOTE — CONSULT NOTE ADULT - ASSESSMENT
88 y/o F was brought in by family for AMS and progressing generalized weakness. CTH with findings of NPH. Due to discover of NPH on this admission family now requesting further work up including an LP. Neurosurgery called for evaluation of possible shunt placement.     -Neurology following, recommend neurosurgery evaluation to determine if she would be a surgical candidate and need for LP if not a candidate for VPS   -Cardiology following due to severe AS  -Admitted primarily to medicine in the setting or respiratory failure and sepsis work up   -Overall picture of patients current status is a poor participant for surgery but ultimate clearance would be determined by cardiology and medicine  -If both cardiology and medicine believe the patient to be at low to moderate risk for surgery then would recommend an LP by neurology, but if patient is not a surgical candidate based on her current medical condition there would be no role for neurosurgical intervention without clearance    -Overall unlikely to offer a VPS in the setting of sepsis with PNA, severe AS and would defer LP if surgery in not an option as she again would need to be cleared by both medicine and cardiology

## 2022-07-25 NOTE — PROGRESS NOTE ADULT - ASSESSMENT
88 y/o F w/ PMH of HTN, hypothyroid, DM, anemia, hypothyroidism, was brought ihn by family for AMS and progressing generalized weakness.  Pt also had associated subjective feevers and poor po intake.  Pt was hypoxic on admission and w/ hypertensive urgency.  Pt was polaced on emperic abxs.  CTH significant for findings of NPH.  On 7/19 pt she developed nausea and had episodes of NBNB emesis at that time w bradycardia and hypotension; RRT was called.  Pt was subsequenty intubated for airway protection and upgraded to MICU.  Pressors were initiated.  CT chest/abdo was done, revealed Bilateral lower lobe consolidations, which may represent atelectasis and/or pneumonia. Small bilateral pleural effusions.  All cultures have had NGTD.  Pt remains on Unasyn for emperic therapy.  Pt also noted to have severe AS on TTE however no inervention planned per cardiology.  Pt was extubated 7/21 and downgraded to medicine 7/22.  Hospital course further complicated by recurring fevers and w/u again for sepsis; final results still pending.   Persistent encephalopathy, likely multifactorial 2/2 sepsis, dehydration and suspicion for NPH.          Sepsis POA, s/p septic shock possibley 2/2 aspiration PNA   Acute hypoxic respiratory failure 2/p intubation w/ mechanical ventilation possibly 2/2 aspiration vs atelectasis   - s/p pressor support and steroid course for hypotension (since d/c'd)  - Leukocytosis resolved but continued to have febrile episodes; tmax overnight 100.9    - UA w/ pyuria, Ucx pending, Bcxs NGTD x2; On Unasyn pending final cx results   - CXR report pending, but personally reviewed by and appears to have bibasilar atelectasis which could account for febrile episode and persistent need for O2  - Would order incentive spirometry once able to follow directions for it   - Titrate off supplemental O2 as tolerated   - Pt is on VTE ppx however if unable to wean off O2 and remains tachycardic +/- fevers will order CTA chest to r/o PE, but suspicion remains low at this time    - Trend CBC and monitor temperature       Acute metabolic encephalopathy, multifactorial 2/2 dehydration sepsis and NPH in the setting of underlying dementia   - High suspicion for NPH given CTH findings and recent hx of ataxia and incontinence   - Had family meeting with  and all 7 children 07/24 and family would like to pursue workup/tx for NPH   - Reconsulted neurology and will consult neurosurgery; if pt is not a candidate for  shunt then no benefit to pursuing LP   - CTH reviewed and noted above   - MRI canceled for now by neurology and recommended outpt f/u   - c/w memantine   - Fall and aspiration precautions   - PT evaluation pending       Severe Aortic stenosis  - TTE reviewed and noted above   - Per cardio pt is not a candidate for intervention given her dementia   - Caution w/ IVFs   - Cardio following and recs noted      Normocytic anemia   - Hb <8, will transfuse 1unit and f/u post transfusion cbc   - H/H trending down, etiology unclear but no active bleeding noted and hemodynamically stable at this time   - Smear pending, will order retic count and c/w serial CBCs  - On short course of venofer then po supplementation thereafter   - Low suspicion for GIB at this time but if concern increases I will consult GI  - Will trend CBC closely and transfuse if Hb<8       Hypertensive urgency   - Resumed ACEI and BP improving   - prn hydralazine for sustained SBP >160  - Monitor vitals and titrate antihypertensives as needed       Hypokalemia / Hypophosphatemia   - K low again, phos normalized  - Will supplement K this morning   - Monitor lytes and replete as needed  - Maintain K>4, Mg>2 and phos>3      Diabetes mellitus, uncontrolled  - A1c 10.8  - On basal/bolus insulin regimen  - Titrate insulin to maintain BG<180  - ISS and hypoglycemic protocol in place       Hypothyroidism:  - On Synthroid        VTE ppx: hepairn sq    Dispo: Pt remains acute requiring inpt hospitalization given persistent encephalopathy and worsening anemia.  Pt likely will need MARITZA once medically stable.

## 2022-07-25 NOTE — PHYSICAL THERAPY INITIAL EVALUATION ADULT - ACTIVE RANGE OF MOTION EXAMINATION, REHAB EVAL
assessed during functional mobility, resistance not applied at least 2-/5 throughout/bilateral upper extremity Active ROM was WFL (within functional limits)/bilateral  lower extremity Active ROM was WFL (within functional limits)

## 2022-07-25 NOTE — PROGRESS NOTE ADULT - SUBJECTIVE AND OBJECTIVE BOX
Chief Complaint:  AMS    SUBJECTIVE / OVERNIGHT EVENTS: Pt hypoglycemic overnight, hypoglycemic protocol initiated and now resolved.  Pt also febrile overnight, tmax 100.9.   Pt is a poor historian but offers no acute complaints a this time.  She denies chest pain, SOB, abd pain, N/V, fever, chills, dysuria or any other complaints. All remainder ROS negative.       I&O's Summary    22 Jul 2022 07:01  -  23 Jul 2022 07:00  --------------------------------------------------------  IN: 400 mL / OUT: 700 mL / NET: -300 mL    23 Jul 2022 07:01  -  23 Jul 2022 21:08  --------------------------------------------------------  IN: 0 mL / OUT: 100 mL / NET: -100 mL          PHYSICAL EXAM:  Vital Signs Last 24 Hrs  T(C): 37.9 (25 Jul 2022 05:04), Max: 38.3 (24 Jul 2022 23:55)  T(F): 100.2 (25 Jul 2022 05:04), Max: 100.9 (24 Jul 2022 23:55)  HR: 87 (25 Jul 2022 11:01) (79 - 92)  BP: 177/71 (25 Jul 2022 11:01) (152/63 - 186/68)  BP(mean): --  RR: 18 (25 Jul 2022 11:01) (18 - 18)  SpO2: 93% (25 Jul 2022 11:01) (93% - 95%)    Parameters below as of 25 Jul 2022 05:04  Patient On (Oxygen Delivery Method): room air      GENERAL: pt examined bedside, laying comfortably in bed in NAD  HEENT: NC/AT, dry oral mucosa, clear conjunctiva, sclera nonicteric, 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  RESPIRATORY: poor inspiratory effort, no wheezing, rhonchi, rales  CARDIOVASCULAR: RRR, normal S1 and S2, +systolic murmur  ABDOMEN: soft, NT/ND, +bowel sounds, no rebound/guarding  EXTREMITIES: No cynaosis, no clubbing, RLE nonpitting edema, pulses are 2+ bilaterally  PSYCH: affect flat but cooperative  NEUROLOGY: A+O x2, no focal neurologic deficits appreciated   SKIN: +varicose veins RLE>LLE        LABS:                                                          7.8    x     )-----------( x        ( 25 Jul 2022 08:34 )             25.8       07-25    144  |  108<H>  |  8.2  ----------------------------<  92  3.3<L>   |  26.0  |  0.57    Ca    7.7<L>      25 Jul 2022 06:41  Phos  3.4     07-25  Mg     2.1     07-25    TPro  5.7<L>  /  Alb  2.7<L>  /  TBili  0.5  /  DBili  x   /  AST  19  /  ALT  15  /  AlkPhos  81  07-24        CAPILLARY BLOOD GLUCOSE      POCT Blood Glucose.: 212 mg/dL (23 Jul 2022 18:00)  POCT Blood Glucose.: 185 mg/dL (23 Jul 2022 12:49)  POCT Blood Glucose.: 186 mg/dL (23 Jul 2022 09:07)  POCT Blood Glucose.: 176 mg/dL (23 Jul 2022 05:55)  POCT Blood Glucose.: 166 mg/dL (22 Jul 2022 23:03)        RADIOLOGY & ADDITIONAL TESTS:    < from: US Abdomen Upper Quadrant Right (07.20.22 @ 14:31) >  IMPRESSION:  No evidence of cholelithiasis or acute cholecystitis.  Mildly enlarged fatty liver.    < end of copied text >  < from: CT Abdomen and Pelvis w/ IV Cont (07.20.22 @ 05:26) >  IMPRESSION:    1. Bilateral lower lobe consolidations, which may represent atelectasis   and/or pneumonia.  2. Small bilateral pleural effusions.  3. Mild gallbladder wall thickening. Recommend ultrasound for further   assessment.  4. No CT evidence of bowel obstruction, diverticulitis, or colitis.    < end of copied text >      < from: CT Head No Cont (07.19.22 @ 12:55) >    IMPRESSION:    Findings consistent with persistent moderate normal pressure   hydrocephalus.    No acute intracranial hemorrhage or brain edema.    < end of copied text >      < from: TTE Echo Complete w/o Contrast w/ Doppler (07.19.22 @ 20:08) >  Summary:   1. Technically suboptimal study.   2. Estimated pulmonary artery systolic pressure is 38.9 mmHg assuming a   right atrial pressure of 8 mmHg, which is consistent with borderline   pulmonary hypertension.   3. Hyperdynamic global left ventricular systolic function.   4. Left ventricular ejection fraction, by visual estimation, is >75%.   5. Severely enlarged left atrium.   6. Spectral Doppler shows impaired relaxation pattern of left   ventricular myocardial filling (Grade I diastolic dysfunction).   7. Mildly enlarged right atrium.   8. Mild mitral annular calcification.   9. Trace mitral valve regurgitation.  10. Mild tricuspid regurgitation.  11. Endocardial visualization was enhanced with intravenous echo contrast.  12. Sclerotic aortic valve with decreased opening.  13. Stroke volume across LVOT is 31 ml. ERROL by continuity equation is 0.6   cm^2. DVI of 0.21 also suggestive of severe aortic valve stenosis.  14. There is no evidence of pericardial effusion.    < end of copied text >          MEDICATIONS  (STANDING):  ampicillin/sulbactam  IVPB      ampicillin/sulbactam  IVPB 3 Gram(s) IV Intermittent every 6 hours  dextrose 5%. 1000 milliLiter(s) (50 mL/Hr) IV Continuous <Continuous>  dextrose 5%. 1000 milliLiter(s) (100 mL/Hr) IV Continuous <Continuous>  dextrose 50% Injectable 25 Gram(s) IV Push once  dextrose 50% Injectable 12.5 Gram(s) IV Push once  dextrose 50% Injectable 25 Gram(s) IV Push once  enoxaparin Injectable 40 milliGRAM(s) SubCutaneous every 24 hours  glucagon  Injectable 1 milliGRAM(s) IntraMuscular once  insulin glargine Injectable (LANTUS) 20 Unit(s) SubCutaneous two times a day  insulin lispro (ADMELOG) corrective regimen sliding scale   SubCutaneous Before meals and at bedtime  insulin lispro Injectable (ADMELOG) 3 Unit(s) SubCutaneous three times a day before meals  labetalol 100 milliGRAM(s) Oral two times a day  levothyroxine 137 MICROGram(s) Oral daily  lisinopril 10 milliGRAM(s) Oral daily  memantine 10 milliGRAM(s) Oral two times a day  potassium phosphate / sodium phosphate Powder (PHOS-NaK) 1 Packet(s) Oral once    MEDICATIONS  (PRN):  aluminum hydroxide/magnesium hydroxide/simethicone Suspension 30 milliLiter(s) Oral every 6 hours PRN Dyspepsia  dextrose Oral Gel 15 Gram(s) Oral once PRN Blood Glucose LESS THAN 70 milliGRAM(s)/deciliter  hydrALAZINE Injectable 10 milliGRAM(s) IV Push every 6 hours PRN SBP > 170  ondansetron Injectable 4 milliGRAM(s) IV Push every 6 hours PRN Nausea and/or Vomiting

## 2022-07-25 NOTE — CONSULT NOTE ADULT - SUBJECTIVE AND OBJECTIVE BOX
88 y/o F w/ PMH of HTN, hypothyroid, DM, anemia, hypothyroidism, was brought in by family for AMS and progressing generalized weakness.  Pt also had associated subjective fevers and poor po intake.  Pt was hypoxic on admission and w/ hypertensive urgency.  Pt was placed on emperic abxs.  CTH significant for findings of NPH.  On 7/19 pt she developed nausea and had episodes of NBNB emesis at that time w bradycardia and hypotension; RRT was called.  Pt was subsequenty intubated for airway protection and upgraded to MICU.  Pressors were initiated. CT chest/abd was done, revealed Bilateral lower lobe consolidations, which may represent atelectasis and/or pneumonia. Small bilateral pleural effusions.  All cultures have had NGTD.  Pt remains on Unasyn for emperic therapy.  Pt also noted to have severe AS on TTE however no intervention planned per cardiology.  Pt was extubated 7/21 and downgraded to medicine 7/22.  Hospital course further complicated by recurring fevers and w/u again for sepsis.  Persistent encephalopathy, likely multifactorial 2/2 sepsis, dehydration and suspicion for NPH.  Neurology following has a 8 year history of dementia, diagnosed by PMD as vascular dementia.  She has history of gait troubles for past year or more and wears a pad for incontinence.  Her dementia has worsened over past 3 years.  She has been taking memantine as prescribed by her PMD. Due to discover of NPH on this admission family now requesting further work up including an LP. Neurosurgery called for evaluation of possible shunt placement.       Exam:   The patient is awake and alert, oriented x2-3.  Speaks in short phrases.  Follows commands.   Right eye opacified.   Left pupil reacts.   Face is symmetric.  Tongue is midline.  Motor: There is normal bulk and tone.  There is no tremor.  Strength grossly symmetric although diffusely weak.  Sensation: Grossly intact to light touch and pin.

## 2022-07-26 NOTE — PROGRESS NOTE ADULT - ASSESSMENT
86 y/o female brought in by female for generalized weakness, workup included CTB which demonstrated ventriculomegaly. Neurosurgery consulted for possible VPS     1. Imaging reviewed by Dr. Ramirez from  outside imaging facility, done 3 yrs ago & is unchanged from imaging done at [facility]  2. Pt is not a surgical candidate for a neurosurgical intervention, workup or placement of VPS  3. Dr Ramirez explained & discussed with the family  4. No further neurosurgical intervention needed at this time  5. Signing off

## 2022-07-26 NOTE — PROGRESS NOTE ADULT - SUBJECTIVE AND OBJECTIVE BOX
HPI: 87F who presented from home when her  had noted that she had profound weakness and was unable to stand or feed herself. The patient is noted to have a history of progressive dementia and was limited in terms of providing a history. Additional information was obtained from the patient's daughter at the bedside. The patient was note to have had progressive dementia over the past few years. She lives with her  at home and there was no recent fevers, chills, or sick contacts. She is reported to be compliant with her medications at home which her daughter prepares for her. She is noted to have a history of poorly controlled blood pressures despite her cardiac medications. At baseline she was noted to be able to ambulate independently at a slow rate. There were no complaints of chest pain, palpitations, dyspnea, or cough. The patient was otherwise in her usual state of health. Upon arrival of EMS, the patient was reported to have been hypotensive and hypoxic. In the emergency department, the patient was noted to have fever and leukocytosis. There were no similar episodes in the past and no known aggravating or relieving factors. The patient's daughter did note that the patient had vision loss and pain in the right eye in the past and was planned for surgery but the pain resolved spontaneously. She was also noted to have occasional swelling and weeping wounds on the left leg for which she would be treated with antibiotics. (18 Jul 2022 18:28)      INTERVAL OVERNIGHT EVENTS: 7/26   87y Female seen sitting up in bed. Family at bedside. No acute overnight events    Vital Signs Last 24 Hrs  T(C): 36.6 (26 Jul 2022 05:39), Max: 37.3 (25 Jul 2022 16:40)  T(F): 97.8 (26 Jul 2022 05:39), Max: 99.1 (25 Jul 2022 16:40)  HR: 74 (26 Jul 2022 05:39) (74 - 96)  BP: 168/74 (26 Jul 2022 05:39) (145/71 - 192/68)  BP(mean): --  RR: 16 (26 Jul 2022 05:39) (16 - 18)  SpO2: 91% (26 Jul 2022 05:39) (91% - 96%)    Parameters below as of 26 Jul 2022 05:39  Patient On (Oxygen Delivery Method): nasal cannula  O2 Flow (L/min): 4      PHYSICAL EXAM:  GENERAL: NAD, well-groomed, well-developed  HEAD:  Atraumatic, normocephalic  MENTAL STATUS: Awake, alert, oriented to name & partial situation,  conversant without aphasia,  answers in short two word sentences, following simple commands.   Face symmetrical, right eye ptosis  BEAL anti-gravity     LABS:                        9.3    10.51 )-----------( 377      ( 26 Jul 2022 04:32 )             29.9     07-26    141  |  108<H>  |  9.9  ----------------------------<  46<LL>  4.1   |  24.0  |  0.71    Ca    8.1<L>      26 Jul 2022 04:32  Phos  3.3     07-26  Mg     2.1     07-26    TPro  5.3<L>  /  Alb  2.5<L>  /  TBili  0.9  /  DBili  x   /  AST  24  /  ALT  14  /  AlkPhos  74  07-26    PTT - ( 25 Jul 2022 14:20 )  PTT:30.6 sec      07-25 @ 07:01  -  07-26 @ 07:00  --------------------------------------------------------  IN: 50 mL / OUT: 475 mL / NET: -425 mL        RADIOLOGY & ADDITIONAL TESTS:    CT Head No Cont (07.19.22 @ 12:55)   entricles are persistently enlarged out of proportion to the sulci, and   there is crowding of the sulci at the vertex. Findings are consistent   with moderate normal pressure hydrocephalus.    Similar mild leftward midline shift. No effacement of basal cisterns.    No acute intracranial hemorrhage or brain edema.    Mild white matter microvascular ischemic disease. 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    IMPRESSION:    Findings consistent with persistent moderate normal pressure   hydrocephalus.    No acute intracranial hemorrhage or brain edema.            CAPRINI SCORE [CLOT]:  Patient has an estimated Caprini score of greater than 5.  However, the patient's unique clinical situation will be addressed in an individual manner to determine appropriate anticoagulation treatment, if any.

## 2022-07-26 NOTE — ASU PREOP CHECKLIST - VERIFY SURGICAL SITE/SIDE WITH PATIENT
July 26, 2022     Patient: Nel Coleman   YOB: 1973   Date of Visit: 7/26/2022       To Whom it May Concern:    Nel Coleman was seen in my clinic on 7/26/2022 at 1:00 pm.  Please excuse Nel for her absence from work from 7/26/22-8/2/22 to be able to make her appointment and to give time for her injury to heal.    Sincerely,       Dr. Carlie Perry, DO    Medical information is confidential and cannot be disclosed without the written consent of the patient or her representative.       done

## 2022-07-26 NOTE — CONSULT NOTE ADULT - SUBJECTIVE AND OBJECTIVE BOX
INFECTIOUS DISEASES AND INTERNAL MEDICINE at [place]  =======================================================  Julio Ariza MD  Diplomates American Board of Internal Medicine and Infectious Diseases  Telephone [phone]  Fax            [phone]  =======================================================    SHARIFA [id number]      HPI:  87F who presented from home when her  had noted that she had profound weakness and was unable to stand or feed herself. The patient is noted to have a history of progressive dementia and was limited in terms of providing a history. Additional information was obtained from the patient's daughter at the bedside. The patient was note to have had progressive dementia over the past few years. She lives with her  at home and there was no recent fevers, chills, or sick contacts. She is reported to be compliant with her medications at home which her daughter prepares for her. She is noted to have a history of poorly controlled blood pressures despite her cardiac medications. At baseline she was noted to be able to ambulate independently at a slow rate. There were no complaints of chest pain, palpitations, dyspnea, or cough. The patient was otherwise in her usual state of health. Upon arrival of EMS, the patient was reported to have been hypotensive and hypoxic. In the emergency department, the patient was noted to have fever and leukocytosis. There were no similar episodes in the past and no known aggravating or relieving factors. The patient's daughter did note that the patient had vision loss and pain in the right eye in the past and was planned for surgery but the pain resolved spontaneously. She was also noted to have occasional swelling and weeping wounds on the left leg for which she would be treated with antibiotics.   AS ABOVE PT ADMITTED ON 7/18 PT WTIH HYPOTENSION ADN WAS INTUBATED  PLACED IN ICU EVENTUAL EXTUBATION P[T FOUND TO HAVE SEVERE AORTIC STENOSIS  ALSO WITH  CT SCAN WITH PROBABLE NPH   PT WITH RECETN COURSE OF ABX FOR POSSIBLE PNEUMONIA  ASKED TO EVALUATE FROM ID STANDPOINT        PAST MEDICAL & SURGICAL HISTORY:  Essential hypertension      Hypothyroidism, unspecified type      Diabetes mellitus of other type without complication      Anemia      S/P cataract surgery  both eyes      S/P appendectomy          ANTIBIOTICS      Allergies    No Known Allergies    Intolerances        SOCIAL HISTORY:     FAMILY HX   FAMILY HISTORY:      Vital Signs Last 24 Hrs  T(C): 36.6 (26 Jul 2022 05:39), Max: 38 (25 Jul 2022 12:00)  T(F): 97.8 (26 Jul 2022 05:39), Max: 100.4 (25 Jul 2022 12:00)  HR: 74 (26 Jul 2022 05:39) (74 - 96)  BP: 168/74 (26 Jul 2022 05:39) (145/71 - 192/68)  BP(mean): --  RR: 16 (26 Jul 2022 05:39) (16 - 18)  SpO2: 91% (26 Jul 2022 05:39) (91% - 96%)    Parameters below as of 26 Jul 2022 05:39  Patient On (Oxygen Delivery Method): nasal cannula  O2 Flow (L/min): 4    Drug Dosing Weight  Height (cm): 162.6 (18 Jul 2022 15:44)  Weight (kg): 60.1 (19 Jul 2022 09:03)  BMI (kg/m2): 22.7 (19 Jul 2022 09:03)  BSA (m2): 1.64 (19 Jul 2022 09:03)      REVIEW OF SYSTEMS:  CONFUSED UNABLE TO OBTAIN                PHYSICAL EXAMINATION:    GENERAL: The patient is a _ _in no apparent distress.      VITAL SIGNS: T(C): 36.6 (07-26-22 @ 05:39), Max: 38 (07-25-22 @ 12:00)  HR: 74 (07-26-22 @ 05:39) (74 - 96)  BP: 168/74 (07-26-22 @ 05:39) (145/71 - 192/68)  RR: 16 (07-26-22 @ 05:39) (16 - 18)  SpO2: 91% (07-26-22 @ 05:39) (91% - 96%)  Wt(kg): --    HEENT: Head is normocephalic and atraumatic.  ANICTERIC  NECK: Supple. No carotid bruits.  No lymphadenopathy or thyromegaly.    LUNGS:COARSE BREATH SOUNDS    HEART: Regular rate and rhythm without murmur.    ABDOMEN: Soft, nontender, and nondistended.  Positive bowel sounds.  No hepatosplenomegaly was noted. NO REBOUND NO GUARDING    EXTREMITIES: NO EDEMA NO ERYTHEMA    NEUROLOGIC: NON FOCAL CONFUSED ANSWERS SIMPLE QUESTIONS      SKIN: No ulceration or induration present. NO RASH        BLOOD CULTURES  Culture Results:   <10,000 CFU/mL Normal Urogenital Irene (07-23 @ 16:50)  Culture Results:   No growth to date. (07-23 @ 16:48)  Culture Results:   No growth to date. (07-23 @ 16:41)  Culture Results:   No growth (07-19 @ 14:10)  Culture Results:   No Growth Final (07-19 @ 14:10)  Culture Results:   No Growth Final (07-19 @ 14:10)       URINE CX          LABS:                        9.3    10.51 )-----------( 377      ( 26 Jul 2022 04:32 )             29.9     07-26    141  |  108<H>  |  9.9  ----------------------------<  46<LL>  4.1   |  24.0  |  0.71    Ca    8.1<L>      26 Jul 2022 04:32  Phos  3.3     07-26  Mg     2.1     07-26    TPro  5.3<L>  /  Alb  2.5<L>  /  TBili  0.9  /  DBili  x   /  AST  24  /  ALT  14  /  AlkPhos  74  07-26    PTT - ( 25 Jul 2022 14:20 )  PTT:30.6 sec      RADIOLOGY & ADDITIONAL STUDIES:      ASSESSMENT/PLAN    87F who presented from home when her  had noted that she had profound weakness and was unable to stand or feed herself. The patient is noted to have a history of progressive dementia and was limited in terms of providing a history. Additional information was obtained from the patient's daughter at the bedside. The patient was note to have had progressive dementia over the past few years. She lives with her  at home and there was no recent fevers, chills, or sick contacts. She is reported to be compliant with her medications at home which her daughter prepares for her. She is noted to have a history of poorly controlled blood pressures despite her cardiac medications. At baseline she was noted to be able to ambulate independently at a slow rate. There were no complaints of chest pain, palpitations, dyspnea, or cough. The patient was otherwise in her usual state of health. Upon arrival of EMS, the patient was reported to have been hypotensive and hypoxic. In the emergency department, the patient was noted to have fever and leukocytosis. There were no similar episodes in the past and no known aggravating or relieving factors. The patient's daughter did note that the patient had vision loss and pain in the right eye in the past and was planned for surgery but the pain resolved spontaneously. She was also noted to have occasional swelling and weeping wounds on the left leg for which she would be treated with antibiotics.   AS ABOVE PT ADMITTED ON 7/18 PT WTIH HYPOTENSION ADN WAS INTUBATED  PLACED IN ICU EVENTUAL EXTUBATION P[T FOUND TO HAVE SEVERE AORTIC STENOSIS  ALSO WITH  CT SCAN WITH PROBABLE NPH   PT WITH RECENT COURSE OF ABX FOR POSSIBLE ASP PNEUMONIA  CURRENTLY OFF ABX   IF SPIKES WOULD REPEAT CX OFF ABX   IF CONTINUES TO SPIKE  CONSIDER REPEATING IMAGING   DEFER ABX UNLESS SIGINFICANT CLINICAL CHAGNE  WILL FOLLOW UP          VIRGINIA HERRERA MD

## 2022-07-26 NOTE — PROVIDER CONTACT NOTE (CRITICAL VALUE NOTIFICATION) - SITUATION
Received glucose value of 46, rechecked on glucometer and level was 44. Activated hypoglycemic protocol.

## 2022-07-26 NOTE — PROGRESS NOTE ADULT - SUBJECTIVE AND OBJECTIVE BOX
Chief Complaint:  AMS    SUBJECTIVE / OVERNIGHT EVENTS: Pt hypoglycemic this morning, hypoglycemic protocol initiated.  Afebrile.  Pt is a poor historian but offers no acute complaints a this time.        I&O's Summary    22 Jul 2022 07:01  -  23 Jul 2022 07:00  --------------------------------------------------------  IN: 400 mL / OUT: 700 mL / NET: -300 mL    23 Jul 2022 07:01  -  23 Jul 2022 21:08  --------------------------------------------------------  IN: 0 mL / OUT: 100 mL / NET: -100 mL        PHYSICAL EXAM:  Vital Signs Last 24 Hrs  T(C): 36.6 (26 Jul 2022 05:39), Max: 37.3 (25 Jul 2022 16:40)  T(F): 97.8 (26 Jul 2022 05:39), Max: 99.1 (25 Jul 2022 16:40)  HR: 74 (26 Jul 2022 05:39) (74 - 96)  BP: 168/74 (26 Jul 2022 05:39) (145/71 - 192/68)  BP(mean): --  RR: 16 (26 Jul 2022 05:39) (16 - 18)  SpO2: 91% (26 Jul 2022 05:39) (91% - 96%)    Parameters below as of 26 Jul 2022 05:39  Patient On (Oxygen Delivery Method): nasal cannula  O2 Flow (L/min): 4      GENERAL: pt examined bedside, laying comfortably in bed in NAD  HEENT: NC/AT, dry oral mucosa, pale conjunctiva, sclera nonicteric, 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  RESPIRATORY: poor inspiratory effort, no wheezing, rhonchi, rales  CARDIOVASCULAR: RRR, normal S1 and S2, +systolic murmur  ABDOMEN: soft, NT/ND, +bowel sounds, no rebound/guarding  EXTREMITIES: No cynaosis, no clubbing, RLE nonpitting edema, pulses are 2+ bilaterally  PSYCH: affect flat but cooperative  NEUROLOGY: A+O x2, no focal neurologic deficits appreciated   SKIN: +varicose veins RLE>LLE, pallor        LABS:                                          9.3    10.51 )-----------( 377      ( 26 Jul 2022 04:32 )             29.9       07-26    141  |  108<H>  |  9.9  ----------------------------<  46<LL>  4.1   |  24.0  |  0.71    Ca    8.1<L>      26 Jul 2022 04:32  Phos  3.3     07-26  Mg     2.1     07-26    TPro  5.3<L>  /  Alb  2.5<L>  /  TBili  0.9  /  DBili  x   /  AST  24  /  ALT  14  /  AlkPhos  74  07-26      CAPILLARY BLOOD GLUCOSE      POCT Blood Glucose.: 212 mg/dL (23 Jul 2022 18:00)  POCT Blood Glucose.: 185 mg/dL (23 Jul 2022 12:49)  POCT Blood Glucose.: 186 mg/dL (23 Jul 2022 09:07)  POCT Blood Glucose.: 176 mg/dL (23 Jul 2022 05:55)  POCT Blood Glucose.: 166 mg/dL (22 Jul 2022 23:03)        RADIOLOGY & ADDITIONAL TESTS:    < from: US Abdomen Upper Quadrant Right (07.20.22 @ 14:31) >  IMPRESSION:  No evidence of cholelithiasis or acute cholecystitis.  Mildly enlarged fatty liver.    < end of copied text >  < from: CT Abdomen and Pelvis w/ IV Cont (07.20.22 @ 05:26) >  IMPRESSION:    1. Bilateral lower lobe consolidations, which may represent atelectasis   and/or pneumonia.  2. Small bilateral pleural effusions.  3. Mild gallbladder wall thickening. Recommend ultrasound for further   assessment.  4. No CT evidence of bowel obstruction, diverticulitis, or colitis.    < end of copied text >      < from: CT Head No Cont (07.19.22 @ 12:55) >    IMPRESSION:    Findings consistent with persistent moderate normal pressure   hydrocephalus.    No acute intracranial hemorrhage or brain edema.    < end of copied text >      < from: TTE Echo Complete w/o Contrast w/ Doppler (07.19.22 @ 20:08) >  Summary:   1. Technically suboptimal study.   2. Estimated pulmonary artery systolic pressure is 38.9 mmHg assuming a   right atrial pressure of 8 mmHg, which is consistent with borderline   pulmonary hypertension.   3. Hyperdynamic global left ventricular systolic function.   4. Left ventricular ejection fraction, by visual estimation, is >75%.   5. Severely enlarged left atrium.   6. Spectral Doppler shows impaired relaxation pattern of left   ventricular myocardial filling (Grade I diastolic dysfunction).   7. Mildly enlarged right atrium.   8. Mild mitral annular calcification.   9. Trace mitral valve regurgitation.  10. Mild tricuspid regurgitation.  11. Endocardial visualization was enhanced with intravenous echo contrast.  12. Sclerotic aortic valve with decreased opening.  13. Stroke volume across LVOT is 31 ml. ERROL by continuity equation is 0.6   cm^2. DVI of 0.21 also suggestive of severe aortic valve stenosis.  14. There is no evidence of pericardial effusion.    < end of copied text >          MEDICATIONS  (STANDING):  ampicillin/sulbactam  IVPB      ampicillin/sulbactam  IVPB 3 Gram(s) IV Intermittent every 6 hours  dextrose 5%. 1000 milliLiter(s) (50 mL/Hr) IV Continuous <Continuous>  dextrose 5%. 1000 milliLiter(s) (100 mL/Hr) IV Continuous <Continuous>  dextrose 50% Injectable 25 Gram(s) IV Push once  dextrose 50% Injectable 12.5 Gram(s) IV Push once  dextrose 50% Injectable 25 Gram(s) IV Push once  enoxaparin Injectable 40 milliGRAM(s) SubCutaneous every 24 hours  glucagon  Injectable 1 milliGRAM(s) IntraMuscular once  insulin glargine Injectable (LANTUS) 20 Unit(s) SubCutaneous two times a day  insulin lispro (ADMELOG) corrective regimen sliding scale   SubCutaneous Before meals and at bedtime  insulin lispro Injectable (ADMELOG) 3 Unit(s) SubCutaneous three times a day before meals  labetalol 100 milliGRAM(s) Oral two times a day  levothyroxine 137 MICROGram(s) Oral daily  lisinopril 10 milliGRAM(s) Oral daily  memantine 10 milliGRAM(s) Oral two times a day  potassium phosphate / sodium phosphate Powder (PHOS-NaK) 1 Packet(s) Oral once    MEDICATIONS  (PRN):  aluminum hydroxide/magnesium hydroxide/simethicone Suspension 30 milliLiter(s) Oral every 6 hours PRN Dyspepsia  dextrose Oral Gel 15 Gram(s) Oral once PRN Blood Glucose LESS THAN 70 milliGRAM(s)/deciliter  hydrALAZINE Injectable 10 milliGRAM(s) IV Push every 6 hours PRN SBP > 170  ondansetron Injectable 4 milliGRAM(s) IV Push every 6 hours PRN Nausea and/or Vomiting

## 2022-07-26 NOTE — PROGRESS NOTE ADULT - ASSESSMENT
86 y/o F w/ PMH of HTN, hypothyroid, DM, anemia, hypothyroidism, was brought ihn by family for AMS and progressing generalized weakness.  Pt also had associated subjective feevers and poor po intake.  Pt was hypoxic on admission and w/ hypertensive urgency.  Pt was polaced on emperic abxs.  CTH significant for findings of NPH.  On 7/19 pt she developed nausea and had episodes of NBNB emesis at that time w bradycardia and hypotension; RRT was called.  Pt was subsequenty intubated for airway protection and upgraded to MICU.  Pressors were initiated.  CT chest/abdo was done, revealed Bilateral lower lobe consolidations, which may represent atelectasis and/or pneumonia. Small bilateral pleural effusions.  All cultures have had NGTD.  Pt remains on Unasyn for emperic therapy.  Pt also noted to have severe AS on TTE however no inervention planned per cardiology.  Pt was extubated 7/21 and downgraded to medicine 7/22.  Hospital course further complicated by recurring fevers but w/u negative to date.  Persistent encephalopathy, likely multifactorial 2/2 sepsis, dehydration and suspicion for NPH.        Sepsis POA, s/p septic shock possibley 2/2 aspiration PNA   Acute hypoxic respiratory failure 2/p intubation w/ mechanical ventilation possibly 2/2 aspiration vs atelectasis   - s/p pressor support and steroid course for hypotension (since d/c'd)  - Leukocytosis again but remains afebrile   - UA w/ pyuria, Ucx (-), Bcxs NGTD x2  - Completed course of Unasyn 7/24  - Will maintain off Abx for now per ID recs  - If febrile will repeat sepsis w/u   - CXR w/ reporting some pulm vasc congestion, O2 remains stable for now    - Would order incentive spirometry once able to follow directions for it   - Titrate off supplemental O2 as tolerated   - Pt is on VTE ppx however if unable to wean off O2 and remains tachycardic +/- fevers will order CTA chest to r/o PE, but suspicion remains low at this time    - Trend CBC and monitor temperature       Acute metabolic encephalopathy, multifactorial 2/2 dehydration sepsis and NPH in the setting of underlying dementia   - Suspicion for NPH given CTH findings and hx of ataxia and incontinence   - Had family meeting with  and all 7 children 07/24 and family would like to pursue workup/tx for NPH   - Neurosurgery evaluated pt and note pt is a poor candidate for  shunt; will therefor not pursue LP as per neurology   - CTH reviewed and noted above   - MRI canceled for now by neurology and recommended outpt f/u   - c/w memantine   - Fall and aspiration precautions   - PT recommending MARITZA  - Will have family meeting to discuss plan moving forward; will introduce idea of palliative medicine and if agreeable will consult team       Severe Aortic stenosis  - TTE reviewed and noted above   - Per cardio pt is not a candidate for intervention given her dementia   - Caution w/ IVFs   - Cardio following and recs noted      Normocytic anemia   - s/p 1u PRBC transfusion yesterday w/ adequate response   - Etiology unclear but no active bleeding noted and hemodynamically stable at this time   - Smear pending, will order retic count and c/w serial CBCs  - On short course of venofer then po supplementation thereafter   - Low suspicion for GIB at this time but if concern increases I will consult GI  - Will trend CBC closely and transfuse if Hb<8       Hypertensive urgency   - Resumed ACEI and BP improving   - prn hydralazine for sustained SBP >160  - Monitor vitals and titrate antihypertensives as needed       Hypokalemia / Hypophosphatemia   - Resolved   - Monitor lytes and replete as needed  - Maintain K>4, Mg>2 and phos>3      Diabetes mellitus / hypoglycemia   - A1c 10.8  - BG low this morning  - Lowered basal insulin regimen and d/c'd bolus   - Titrate insulin regimen to maintain BG<180  - ISS and hypoglycemic protocol in place       Hypothyroidism:  - On Synthroid        VTE ppx: hepairn sq    Dispo: Pt remains acute requiring inpt hospitalization given persistent encephalopathy.  Pt likely will need MARITZA once medically stable.

## 2022-07-27 NOTE — CONSULT NOTE ADULT - SUBJECTIVE AND OBJECTIVE BOX
HPI:  87F who presented from home when her  had noted that she had profound weakness and was unable to stand or feed herself. The patient is noted to have a history of progressive dementia and was limited in terms of providing a history. Additional information was obtained from the patient's daughter at the bedside. The patient was note to have had progressive dementia over the past few years. She lives with her  at home and there was no recent fevers, chills, or sick contacts. She is reported to be compliant with her medications at home which her daughter prepares for her. She is noted to have a history of poorly controlled blood pressures despite her cardiac medications. At baseline she was noted to be able to ambulate independently at a slow rate. There were no complaints of chest pain, palpitations, dyspnea, or cough. The patient was otherwise in her usual state of health. Upon arrival of EMS, the patient was reported to have been hypotensive and hypoxic. In the emergency department, the patient was noted to have fever and leukocytosis. There were no similar episodes in the past and no known aggravating or relieving factors. The patient's daughter did note that the patient had vision loss and pain in the right eye in the past and was planned for surgery but the pain resolved spontaneously. She was also noted to have occasional swelling and weeping wounds on the left leg for which she would be treated with antibiotics. (18 Jul 2022 18:28)      PERTINENT PMH REVIEWED: Yes No    PAST MEDICAL & SURGICAL HISTORY:  Essential hypertension      Hypothyroidism, unspecified type      Diabetes mellitus of other type without complication      Anemia      S/P cataract surgery  both eyes      S/P appendectomy          SOCIAL HISTORY:                      Substance history:                    Admitted from:  home  [facility], [place]                     Roman Catholic/spirituality:                    Cultural concerns:                      Surrogate/HCP/Guardian: Phone#:    FAMILY HISTORY:      Allergies    No Known Allergies    Intolerances        ADVANCE DIRECTIVES/TREATMENT PREFERENCES:  Full code, all aggressive measures desired   DNR/DNI - MOLST, continue all other medical treatments  DNR/DNI - MOLST, comfort measures only     Baseline ADLs (prior to admission):  Independent/ Dependent      Karnofsky/Palliative Performance Status Version 2:  %  http://npcrc.org/files/news/palliative_performance_scale_ppsv2.pdf    Present Symptoms:     Dyspnea: Mild Moderate Severe  Nausea/Vomiting: Yes No  Anxiety:  Yes No  Depression: Yes No  Fatigue: Yes No  Loss of appetite: Yes No  Constipation:     Pain:             Character-            Duration-            Effect-            Factors-            Frequency-            Location-            Severity-    Pain AD Score:  [email or web address] (press ctrl + left click to view)    Review of Systems: Reviewed                     Negative:                     Positive:  Unable to obtain due to poor mentation   All others negative    MEDICATIONS  (STANDING):  dextrose 5%. 1000 milliLiter(s) (50 mL/Hr) IV Continuous <Continuous>  dextrose 5%. 1000 milliLiter(s) (100 mL/Hr) IV Continuous <Continuous>  dextrose 50% Injectable 25 Gram(s) IV Push once  dextrose 50% Injectable 12.5 Gram(s) IV Push once  dextrose 50% Injectable 25 Gram(s) IV Push once  enoxaparin Injectable 40 milliGRAM(s) SubCutaneous every 24 hours  glucagon  Injectable 1 milliGRAM(s) IntraMuscular once  insulin glargine Injectable (LANTUS) 10 Unit(s) SubCutaneous at bedtime  insulin lispro (ADMELOG) corrective regimen sliding scale   SubCutaneous Before meals and at bedtime  labetalol 100 milliGRAM(s) Oral two times a day  levothyroxine 137 MICROGram(s) Oral daily  lisinopril 10 milliGRAM(s) Oral daily  memantine 10 milliGRAM(s) Oral two times a day  nystatin Powder 1 Application(s) Topical two times a day    MEDICATIONS  (PRN):  aluminum hydroxide/magnesium hydroxide/simethicone Suspension 30 milliLiter(s) Oral every 6 hours PRN Dyspepsia  dextrose Oral Gel 15 Gram(s) Oral once PRN Blood Glucose LESS THAN 70 milliGRAM(s)/deciliter  hydrALAZINE Injectable 10 milliGRAM(s) IV Push every 6 hours PRN SBP > 170  ondansetron Injectable 4 milliGRAM(s) IV Push every 6 hours PRN Nausea and/or Vomiting      PHYSICAL EXAM:    Vital Signs Last 24 Hrs  T(C): 36.6 (27 Jul 2022 05:16), Max: 37.6 (26 Jul 2022 16:41)  T(F): 97.8 (27 Jul 2022 05:16), Max: 99.6 (26 Jul 2022 16:41)  HR: 75 (27 Jul 2022 05:16) (72 - 82)  BP: 140/66 (27 Jul 2022 05:16) (122/58 - 144/83)  BP(mean): --  RR: 18 (27 Jul 2022 05:16) (18 - 18)  SpO2: 98% (27 Jul 2022 05:16) (91% - 98%)    Parameters below as of 26 Jul 2022 21:00  Patient On (Oxygen Delivery Method): nasal cannula  O2 Flow (L/min): 4      General: alert  oriented x ____ lethargic agitated delirious                  cachexia  nonverbal  coma    HEENT: normal  dry mouth  ET tube/trach    Lungs: comfortable tachypnea/labored breathing  excessive secretions    CV: normal  tachycardia    GI: normal  distended  tender  no BS               PEG/NG/OG tube  constipation  last BM:     : normal  incontinent  oliguria/anuria  marcano    MSK: normal  weakness  edema             ambulatory  bedbound/wheelchair bound    Neuro: no focal deficits cognitive impairment dysphagia dysarthria hemiplegia     Skin: normal  pressure ulcers- Stage_____  no rash    LABS:                        9.2    7.97  )-----------( 362      ( 27 Jul 2022 05:52 )             30.0     07-27    137  |  102  |  9.8  ----------------------------<  75  4.2   |  24.0  |  0.66    Ca    8.0<L>      27 Jul 2022 05:52  Phos  3.3     07-27  Mg     2.1     07-27    TPro  5.3<L>  /  Alb  2.5<L>  /  TBili  0.9  /  DBili  x   /  AST  24  /  ALT  14  /  AlkPhos  74  07-26    PTT - ( 25 Jul 2022 14:20 )  PTT:30.6 sec    I&O's Summary    26 Jul 2022 07:01  -  27 Jul 2022 07:00  --------------------------------------------------------  IN: 0 mL / OUT: 1100 mL / NET: -1100 mL        RADIOLOGY & ADDITIONAL STUDIES:       HPI: 87F with PMH as listed admitted 7/18 with worsening weakness.       PERTINENT PMH REVIEWED: Yes     PAST MEDICAL & SURGICAL HISTORY:  Essential hypertension  Hypothyroidism, unspecified type  Diabetes mellitus of other type without complication  Anemia  S/P cataract surgery both eyes  S/P appendectomy    SOCIAL HISTORY:                      Substance history:                    Admitted from:  home                      Spiritism/spirituality:                    Cultural concerns:                      Surrogate  Kane     FAMILY HISTORY: unable to obtain due to poor mental status     Allergies    No Known Allergies    ADVANCE DIRECTIVES/TREATMENT PREFERENCES:  Full code, all aggressive measures desired     Baseline ADLs (prior to admission):  Dependent      Karnofsky/Palliative Performance Status Version 2:  %  http://npcrc.org/files/news/palliative_performance_scale_ppsv2.pdf    Present Symptoms:     Dyspnea:  none   Nausea/Vomiting: No  Anxiety:  No agtiation  Depression: unable   Fatigue: none   Loss of appetite: none   Constipation: none     Pain: none currently             Character-            Duration-            Effect-            Factors-            Frequency-            Location-            Severity-    Pain AD Score:  [email or web address]/[email or web address] (press ctrl + left click to view)    Review of Systems: Reviewed                   Unable to obtain due to poor mentation   All others negative    MEDICATIONS  (STANDING):  dextrose 5%. 1000 milliLiter(s) (50 mL/Hr) IV Continuous <Continuous>  dextrose 5%. 1000 milliLiter(s) (100 mL/Hr) IV Continuous <Continuous>  dextrose 50% Injectable 25 Gram(s) IV Push once  dextrose 50% Injectable 12.5 Gram(s) IV Push once  dextrose 50% Injectable 25 Gram(s) IV Push once  enoxaparin Injectable 40 milliGRAM(s) SubCutaneous every 24 hours  glucagon  Injectable 1 milliGRAM(s) IntraMuscular once  insulin glargine Injectable (LANTUS) 10 Unit(s) SubCutaneous at bedtime  insulin lispro (ADMELOG) corrective regimen sliding scale   SubCutaneous Before meals and at bedtime  labetalol 100 milliGRAM(s) Oral two times a day  levothyroxine 137 MICROGram(s) Oral daily  lisinopril 10 milliGRAM(s) Oral daily  memantine 10 milliGRAM(s) Oral two times a day  nystatin Powder 1 Application(s) Topical two times a day    MEDICATIONS  (PRN):  aluminum hydroxide/magnesium hydroxide/simethicone Suspension 30 milliLiter(s) Oral every 6 hours PRN Dyspepsia  dextrose Oral Gel 15 Gram(s) Oral once PRN Blood Glucose LESS THAN 70 milliGRAM(s)/deciliter  hydrALAZINE Injectable 10 milliGRAM(s) IV Push every 6 hours PRN SBP > 170  ondansetron Injectable 4 milliGRAM(s) IV Push every 6 hours PRN Nausea and/or Vomiting    PHYSICAL EXAM:    Vital Signs Last 24 Hrs  T(C): 36.6 (27 Jul 2022 05:16), Max: 37.6 (26 Jul 2022 16:41)  T(F): 97.8 (27 Jul 2022 05:16), Max: 99.6 (26 Jul 2022 16:41)  HR: 75 (27 Jul 2022 05:16) (72 - 82)  BP: 140/66 (27 Jul 2022 05:16) (122/58 - 144/83)  BP(mean): --  RR: 18 (27 Jul 2022 05:16) (18 - 18)  SpO2: 98% (27 Jul 2022 05:16) (91% - 98%)    Parameters below as of 26 Jul 2022 21:00  Patient On (Oxygen Delivery Method): nasal cannula  O2 Flow (L/min): 4    General: alert, not oriented     HEENT: normal      Lungs: comfortable     CV: normal      GI: normal      : marcano     MSK: weakness    Neuro: no focal deficits     Skin: normal      LABS:                      9.2    7.97  )-----------( 362      ( 27 Jul 2022 05:52 )             30.0     07-27    137  |  102  |  9.8  ----------------------------<  75  4.2   |  24.0  |  0.66    Ca    8.0<L>      27 Jul 2022 05:52  Phos  3.3     07-27  Mg     2.1     07-27    TPro  5.3<L>  /  Alb  2.5<L>  /  TBili  0.9  /  DBili  x   /  AST  24  /  ALT  14  /  AlkPhos  74  07-26    PTT - ( 25 Jul 2022 14:20 )  PTT:30.6 sec    I&O's Summary    26 Jul 2022 07:01  -  27 Jul 2022 07:00  --------------------------------------------------------  IN: 0 mL / OUT: 1100 mL / NET: -1100 mL    RADIOLOGY & ADDITIONAL STUDIES:    < from: US Duplex Venous Lower Ext Complete, Bilateral (07.24.22 @ 15:36) >    INTERPRETATION:  CLINICAL INFORMATION: Right lower extremity edema,   evaluate for DVT.    COMPARISON: None available.    TECHNIQUE: Duplex sonography of the BILATERAL LOWER extremity veins with   color and spectral Doppler, with and without compression.    FINDINGS:    RIGHT:  Normal compressibility of the RIGHT common femoral, femoral and popliteal   veins.  Doppler examination shows normal spontaneous and phasic flow.  No RIGHT calf vein thrombosis is detected.    LEFT:  Normal compressibility of the LEFT common femoral, femoral and popliteal   veins.  Doppler examination shows normal spontaneous and phasic flow.  No LEFT calf vein thrombosis is detected.    IMPRESSION:  No evidence of deep venous thrombosis in either lower extremity.    --- End of Report ---    < end of copied text >    < from: CT Abdomen and Pelvis w/ IV Cont (07.20.22 @ 05:26) >  IMPRESSION:    1. Bilateral lower lobe consolidations, which may represent atelectasis   and/or pneumonia.  2. Small bilateral pleural effusions.  3. Mild gallbladder wall thickening. Recommend ultrasound for further   assessment.  4. No CT evidence of bowel obstruction, diverticulitis, or colitis.    --- End of Report ---    < end of copied text >    < from: US Abdomen Upper Quadrant Right (07.20.22 @ 14:31) >  IMPRESSION:  No evidence of cholelithiasis or acute cholecystitis.  Mildly enlarged fatty liver.    --- End of Report ---    < end of copied text >    < from: CT Head No Cont (07.19.22 @ 12:55) >  IMPRESSION:    Findings consistent with persistent moderate normal pressure   hydrocephalus.    No acute intracranial hemorrhage or brain edema.    --- End of Report ---    < end of copied text >    < from: CT Head No Cont (07.18.22 @ 16:17) >  MPRESSION:    No hydrocephalus, acute intracranial hemorrhage, mass effect, or brain   edema.  Chronic bilateral basal ganglia infarcts.    --- End of Report ---    ***Please see the addendum at the top of this report. It may contain   additional important information or changes.****        CHELSEA GASPAR MD; Attending Radiologist    < end of copied text >       HPI: 87F with PMH as listed admitted 7/18 with worsening weakness. Patient has had a complicated course, required ICU stay for aspiration pneumonia, fever, shock, required intubation. CT head revealed moderate normal pressure hydrocephalus. s/p extubation 7/21. Speech and swallow evaluation cleared for puree diet. Further workup here revealed severe AS, seen by cardiology and deemed not a candidate for valve surgery. Palliative consulted to help with goals of therapy.     PERTINENT PMH REVIEWED: Yes     PAST MEDICAL & SURGICAL HISTORY:  Essential hypertension  Hypothyroidism, unspecified type  Diabetes mellitus of other type without complication  Anemia  S/P cataract surgery both eyes  S/P appendectomy    SOCIAL HISTORY:                      Substance history: lives home with  Kane                     Admitted from:  home                      Church/spirituality:                    Cultural concerns: none                       Surrogate  Kane - [phone]     FAMILY HISTORY: unable to obtain due to poor mental status     Allergies    No Known Allergies    ADVANCE DIRECTIVES/TREATMENT PREFERENCES:  Full code, all aggressive measures desired     Baseline ADLs (prior to admission):  Dependent      Karnofsky/Palliative Performance Status Version 2:  %  http://npcrc.org/files/news/palliative_performance_scale_ppsv2.pdf    Present Symptoms:     Dyspnea:  none   Nausea/Vomiting: No  Anxiety:  No agtiation  Depression: unable   Fatigue: none   Loss of appetite: none   Constipation: none     Pain: none currently             Character-            Duration-            Effect-            Factors-            Frequency-            Location-            Severity-    Pain AD Score:  [email or web address] (press ctrl + left click to view)    Review of Systems: Reviewed                   Unable to obtain due to poor mentation   All others negative    MEDICATIONS  (STANDING):  dextrose 5%. 1000 milliLiter(s) (50 mL/Hr) IV Continuous <Continuous>  dextrose 5%. 1000 milliLiter(s) (100 mL/Hr) IV Continuous <Continuous>  dextrose 50% Injectable 25 Gram(s) IV Push once  dextrose 50% Injectable 12.5 Gram(s) IV Push once  dextrose 50% Injectable 25 Gram(s) IV Push once  enoxaparin Injectable 40 milliGRAM(s) SubCutaneous every 24 hours  glucagon  Injectable 1 milliGRAM(s) IntraMuscular once  insulin glargine Injectable (LANTUS) 10 Unit(s) SubCutaneous at bedtime  insulin lispro (ADMELOG) corrective regimen sliding scale   SubCutaneous Before meals and at bedtime  labetalol 100 milliGRAM(s) Oral two times a day  levothyroxine 137 MICROGram(s) Oral daily  lisinopril 10 milliGRAM(s) Oral daily  memantine 10 milliGRAM(s) Oral two times a day  nystatin Powder 1 Application(s) Topical two times a day    MEDICATIONS  (PRN):  aluminum hydroxide/magnesium hydroxide/simethicone Suspension 30 milliLiter(s) Oral every 6 hours PRN Dyspepsia  dextrose Oral Gel 15 Gram(s) Oral once PRN Blood Glucose LESS THAN 70 milliGRAM(s)/deciliter  hydrALAZINE Injectable 10 milliGRAM(s) IV Push every 6 hours PRN SBP > 170  ondansetron Injectable 4 milliGRAM(s) IV Push every 6 hours PRN Nausea and/or Vomiting    PHYSICAL EXAM:    Vital Signs Last 24 Hrs  T(C): 36.6 (27 Jul 2022 05:16), Max: 37.6 (26 Jul 2022 16:41)  T(F): 97.8 (27 Jul 2022 05:16), Max: 99.6 (26 Jul 2022 16:41)  HR: 75 (27 Jul 2022 05:16) (72 - 82)  BP: 140/66 (27 Jul 2022 05:16) (122/58 - 144/83)  BP(mean): --  RR: 18 (27 Jul 2022 05:16) (18 - 18)  SpO2: 98% (27 Jul 2022 05:16) (91% - 98%)    Parameters below as of 26 Jul 2022 21:00  Patient On (Oxygen Delivery Method): nasal cannula  O2 Flow (L/min): 4    General: alert, not oriented     HEENT: normal      Lungs: comfortable     CV: normal      GI: normal      : marcano     MSK: weakness    Neuro: no focal deficits     Skin: normal      LABS:                      9.2    7.97  )-----------( 362      ( 27 Jul 2022 05:52 )             30.0     07-27    137  |  102  |  9.8  ----------------------------<  75  4.2   |  24.0  |  0.66    Ca    8.0<L>      27 Jul 2022 05:52  Phos  3.3     07-27  Mg     2.1     07-27    TPro  5.3<L>  /  Alb  2.5<L>  /  TBili  0.9  /  DBili  x   /  AST  24  /  ALT  14  /  AlkPhos  74  07-26    PTT - ( 25 Jul 2022 14:20 )  PTT:30.6 sec    I&O's Summary    26 Jul 2022 07:01  -  27 Jul 2022 07:00  --------------------------------------------------------  IN: 0 mL / OUT: 1100 mL / NET: -1100 mL    RADIOLOGY & ADDITIONAL STUDIES:    < from: US Duplex Venous Lower Ext Complete, Bilateral (07.24.22 @ 15:36) >    INTERPRETATION:  CLINICAL INFORMATION: Right lower extremity edema,   evaluate for DVT.    COMPARISON: None available.    TECHNIQUE: Duplex sonography of the BILATERAL LOWER extremity veins with   color and spectral Doppler, with and without compression.    FINDINGS:    RIGHT:  Normal compressibility of the RIGHT common femoral, femoral and popliteal   veins.  Doppler examination shows normal spontaneous and phasic flow.  No RIGHT calf vein thrombosis is detected.    LEFT:  Normal compressibility of the LEFT common femoral, femoral and popliteal   veins.  Doppler examination shows normal spontaneous and phasic flow.  No LEFT calf vein thrombosis is detected.    IMPRESSION:  No evidence of deep venous thrombosis in either lower extremity.    --- End of Report ---    < end of copied text >    < from: CT Abdomen and Pelvis w/ IV Cont (07.20.22 @ 05:26) >  IMPRESSION:    1. Bilateral lower lobe consolidations, which may represent atelectasis   and/or pneumonia.  2. Small bilateral pleural effusions.  3. Mild gallbladder wall thickening. Recommend ultrasound for further   assessment.  4. No CT evidence of bowel obstruction, diverticulitis, or colitis.    --- End of Report ---    < end of copied text >    < from: US Abdomen Upper Quadrant Right (07.20.22 @ 14:31) >  IMPRESSION:  No evidence of cholelithiasis or acute cholecystitis.  Mildly enlarged fatty liver.    --- End of Report ---    < end of copied text >    < from: CT Head No Cont (07.19.22 @ 12:55) >  IMPRESSION:    Findings consistent with persistent moderate normal pressure   hydrocephalus.    No acute intracranial hemorrhage or brain edema.    --- End of Report ---    < end of copied text >    < from: CT Head No Cont (07.18.22 @ 16:17) >  MPRESSION:    No hydrocephalus, acute intracranial hemorrhage, mass effect, or brain   edema.  Chronic bilateral basal ganglia infarcts.    --- End of Report ---    ***Please see the addendum at the top of this report. It may contain   additional important information or changes.****    CHELSEA GASPAR MD; Attending Radiologist    < end of copied text >    < from: TTE Echo Complete w/o Contrast w/ Doppler (07.19.22 @ 20:08) >    Summary:   1. Technically suboptimal study.   2. Estimated pulmonary artery systolic pressure is 38.9 mmHg assuming a   right atrial pressure of 8 mmHg, which is consistent with borderline   pulmonary hypertension.   3. Hyperdynamic global left ventricular systolic function.   4. Left ventricular ejection fraction, by visual estimation, is >75%.   5. Severely enlarged left atrium.   6. Spectral Doppler shows impaired relaxation pattern of left   ventricular myocardial filling (Grade I diastolic dysfunction).   7. Mildly enlarged right atrium.   8. Mild mitral annular calcification.   9. Trace mitral valve regurgitation.  10. Mild tricuspid regurgitation.  11. Endocardial visualization was enhanced with intravenous echo contrast.  12. Sclerotic aortic valve with decreased opening.  13. Stroke volume across LVOT is 31 ml. ERROL by continuity equation is 0.6   cm^2. DVI of 0.21 also suggestive of severe aortic valve stenosis.  14. There is no evidence of pericardial effusion.    MD Cosmo Electronically signed on 7/20/2022 at 10:31:13 AM    < end of copied text >

## 2022-07-27 NOTE — CONSULT NOTE ADULT - ASSESSMENT
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     #1 Dementia  - seems to have been progressing over the last 3 years - per  has been sleeping more, eating less, etc   - poor overall prognosis  - supportive measures, frequent reorientation    #2 NPH  - not a candidate for shunting  - appreciate neurosurgery input  - given not a candidate for shunting, would not make sense to subject her to an LP    #3 Severe AS    - poor prognosis  - not a candidate for valve surgery   - hospice appropriate    #4 Encounter for palliative care  - see goals of care

## 2022-07-27 NOTE — CONSULT NOTE ADULT - CONVERSATION DETAILS
discussion held with , and multiple children via facetime call with daughter Zakiya who was at bedside along with Dr. Lucia. discussion held with , and multiple children via facetime call with daughter Zakiya who was at bedside along with Dr. Lucia. We explained overall poor prognosis in consideration with multiple chronic underlying conditions without any acute reversible processes. We discussed progression of dementia, heart valve that cannot be fixed as well as possible NPH for which she is not a candidate for neurosurgery. we discussed options from here. We discussed that she is unlikely to be a good candidate for rehab given all these chronic issues. We discussed home hospice care and shifting focus to quality of life and comfort measures. We addressed advanced issuses and that these issues are likely going to re-occur and staying in the hospital longer will not prevent or fix these issues. We discussed poor eating condition, and given dementia, this is one of the signs of progressive illness. We discussed pleasure feeds with the understanding of risks of aspiration. We discussed short term nature of fluid hydration and that it is not a long term solution, additionally, she may run into trouble with fluid overload given valvular diease. Options:     1. Home with hospice ( our recommendation)  2. home with home care  3. rehab ( unlikely to make meaningful progress and high risk for readmissions)    Family would like to talk things over as a family and get back to us. Will touch base again tomorrow.     A lot of emotional support provided at difficulties in facing advancing illnesses and approaching end of life.

## 2022-07-27 NOTE — PROGRESS NOTE ADULT - SUBJECTIVE AND OBJECTIVE BOX
Chief Complaint:  AMS    SUBJECTIVE / OVERNIGHT EVENTS: No acute events reported overnight.  No further episodes of hypoglycemia since insulin regimen adjusted.  Pt remains afebrile.  Pt is a poor historian but offers no acute complaints a this time.        I&O's Summary    22 Jul 2022 07:01  -  23 Jul 2022 07:00  --------------------------------------------------------  IN: 400 mL / OUT: 700 mL / NET: -300 mL    23 Jul 2022 07:01  -  23 Jul 2022 21:08  --------------------------------------------------------  IN: 0 mL / OUT: 100 mL / NET: -100 mL        PHYSICAL EXAM:  Vital Signs Last 24 Hrs  T(C): 37.9 (27 Jul 2022 10:25), Max: 37.9 (27 Jul 2022 10:25)  T(F): 100.3 (27 Jul 2022 10:25), Max: 100.3 (27 Jul 2022 10:25)  HR: 75 (27 Jul 2022 10:25) (72 - 82)  BP: 164/68 (27 Jul 2022 10:25) (122/58 - 164/68)  BP(mean): --  RR: 18 (27 Jul 2022 10:25) (18 - 18)  SpO2: 91% (27 Jul 2022 10:25) (91% - 98%)    Parameters below as of 27 Jul 2022 10:25  Patient On (Oxygen Delivery Method): nasal cannula  O2 Flow (L/min): 4        GENERAL: pt examined bedside, laying comfortably in bed in NAD  HEENT: NC/AT, dry oral mucosa, pale conjunctiva, sclera nonicteric, 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  RESPIRATORY: poor inspiratory effort, no wheezing, rhonchi, rales  CARDIOVASCULAR: RRR, normal S1 and S2, +systolic murmur  ABDOMEN: soft, NT/ND, +bowel sounds, no rebound/guarding  EXTREMITIES: No cynaosis, no clubbing, mild RLE nonpitting edema, pulses are 2+ bilaterally  PSYCH: affect flat but cooperative  NEUROLOGY: A+O x2, no focal neurologic deficits appreciated   SKIN: +varicose veins RLE>LLE, pallor        LABS:                                          9.2    7.97  )-----------( 362      ( 27 Jul 2022 05:52 )             30.0       07-27    137  |  102  |  9.8  ----------------------------<  75  4.2   |  24.0  |  0.66    Ca    8.0<L>      27 Jul 2022 05:52  Phos  3.3     07-27  Mg     2.1     07-27    TPro  5.3<L>  /  Alb  2.5<L>  /  TBili  0.9  /  DBili  x   /  AST  24  /  ALT  14  /  AlkPhos  74  07-26      CAPILLARY BLOOD GLUCOSE      POCT Blood Glucose.: 212 mg/dL (23 Jul 2022 18:00)  POCT Blood Glucose.: 185 mg/dL (23 Jul 2022 12:49)  POCT Blood Glucose.: 186 mg/dL (23 Jul 2022 09:07)  POCT Blood Glucose.: 176 mg/dL (23 Jul 2022 05:55)  POCT Blood Glucose.: 166 mg/dL (22 Jul 2022 23:03)        RADIOLOGY & ADDITIONAL TESTS:    < from: US Abdomen Upper Quadrant Right (07.20.22 @ 14:31) >  IMPRESSION:  No evidence of cholelithiasis or acute cholecystitis.  Mildly enlarged fatty liver.    < end of copied text >  < from: CT Abdomen and Pelvis w/ IV Cont (07.20.22 @ 05:26) >  IMPRESSION:    1. Bilateral lower lobe consolidations, which may represent atelectasis   and/or pneumonia.  2. Small bilateral pleural effusions.  3. Mild gallbladder wall thickening. Recommend ultrasound for further   assessment.  4. No CT evidence of bowel obstruction, diverticulitis, or colitis.    < end of copied text >      < from: CT Head No Cont (07.19.22 @ 12:55) >    IMPRESSION:    Findings consistent with persistent moderate normal pressure   hydrocephalus.    No acute intracranial hemorrhage or brain edema.    < end of copied text >      < from: TTE Echo Complete w/o Contrast w/ Doppler (07.19.22 @ 20:08) >  Summary:   1. Technically suboptimal study.   2. Estimated pulmonary artery systolic pressure is 38.9 mmHg assuming a   right atrial pressure of 8 mmHg, which is consistent with borderline   pulmonary hypertension.   3. Hyperdynamic global left ventricular systolic function.   4. Left ventricular ejection fraction, by visual estimation, is >75%.   5. Severely enlarged left atrium.   6. Spectral Doppler shows impaired relaxation pattern of left   ventricular myocardial filling (Grade I diastolic dysfunction).   7. Mildly enlarged right atrium.   8. Mild mitral annular calcification.   9. Trace mitral valve regurgitation.  10. Mild tricuspid regurgitation.  11. Endocardial visualization was enhanced with intravenous echo contrast.  12. Sclerotic aortic valve with decreased opening.  13. Stroke volume across LVOT is 31 ml. ERROL by continuity equation is 0.6   cm^2. DVI of 0.21 also suggestive of severe aortic valve stenosis.  14. There is no evidence of pericardial effusion.    < end of copied text >          MEDICATIONS  (STANDING):  ampicillin/sulbactam  IVPB      ampicillin/sulbactam  IVPB 3 Gram(s) IV Intermittent every 6 hours  dextrose 5%. 1000 milliLiter(s) (50 mL/Hr) IV Continuous <Continuous>  dextrose 5%. 1000 milliLiter(s) (100 mL/Hr) IV Continuous <Continuous>  dextrose 50% Injectable 25 Gram(s) IV Push once  dextrose 50% Injectable 12.5 Gram(s) IV Push once  dextrose 50% Injectable 25 Gram(s) IV Push once  enoxaparin Injectable 40 milliGRAM(s) SubCutaneous every 24 hours  glucagon  Injectable 1 milliGRAM(s) IntraMuscular once  insulin glargine Injectable (LANTUS) 20 Unit(s) SubCutaneous two times a day  insulin lispro (ADMELOG) corrective regimen sliding scale   SubCutaneous Before meals and at bedtime  insulin lispro Injectable (ADMELOG) 3 Unit(s) SubCutaneous three times a day before meals  labetalol 100 milliGRAM(s) Oral two times a day  levothyroxine 137 MICROGram(s) Oral daily  lisinopril 10 milliGRAM(s) Oral daily  memantine 10 milliGRAM(s) Oral two times a day  potassium phosphate / sodium phosphate Powder (PHOS-NaK) 1 Packet(s) Oral once    MEDICATIONS  (PRN):  aluminum hydroxide/magnesium hydroxide/simethicone Suspension 30 milliLiter(s) Oral every 6 hours PRN Dyspepsia  dextrose Oral Gel 15 Gram(s) Oral once PRN Blood Glucose LESS THAN 70 milliGRAM(s)/deciliter  hydrALAZINE Injectable 10 milliGRAM(s) IV Push every 6 hours PRN SBP > 170  ondansetron Injectable 4 milliGRAM(s) IV Push every 6 hours PRN Nausea and/or Vomiting

## 2022-07-27 NOTE — PROGRESS NOTE ADULT - ASSESSMENT
86 y/o F w/ PMH of HTN, hypothyroid, DM, anemia, hypothyroidism, was brought ihn by family for AMS and progressing generalized weakness.  Pt also had associated subjective feevers and poor po intake.  Pt was hypoxic on admission and w/ hypertensive urgency.  Pt was polaced on emperic abxs.  CTH significant for findings of NPH.  On 7/19 pt she developed nausea and had episodes of NBNB emesis at that time w bradycardia and hypotension; RRT was called.  Pt was subsequenty intubated for airway protection and upgraded to MICU.  Pressors were initiated.  CT chest/abdo was done, revealed Bilateral lower lobe consolidations, which may represent atelectasis and/or pneumonia. Small bilateral pleural effusions.  All cultures have had NGTD.  Pt remains on Unasyn for emperic therapy.  Pt also noted to have severe AS on TTE however no inervention planned per cardiology.  Pt was extubated 7/21 and downgraded to medicine 7/22.  Hospital course further complicated by recurring fevers but w/u negative to date.  Persistent encephalopathy, likely multifactorial, initially 2/2 sepsis and dehydration, in the setting of underlying dementia w/ suspicion for NPH.        Sepsis POA, s/p septic shock possibly 2/2 aspiration PNA   Acute hypoxic respiratory failure s/p intubation w/ mechanical ventilation possibly 2/2 aspiration vs atelectasis   - s/p pressor support and steroid course for hypotension (since d/c'd)  - Mild leukocytosis resolved and pt remains afebrile   - UA w/ pyuria, Ucx (-) and all Bcxs NGTD  - Completed course of Unasyn 7/24  - Will maintain off Abx for now per ID recs  - If febrile will repeat sepsis w/u   - CXR w/ reporting some pulm vasc congestion therefor IVFs held  - Would order incentive spirometry once able to follow directions for it   - Titrate off supplemental O2 as tolerated    - Trend CBC and monitor temperature       Acute metabolic encephalopathy, multifactorial 2/2 dehydration, sepsis and NPH in the setting of underlying dementia   - Sepsis and dehydration POA resolved   - Suspicion for NPH given CTH findings and hx of ataxia and incontinence   - Neurosurgery evaluated pt and noted pt is not a candidate for any intracranial surgery such as  shunt due to mx chronic comorbidities therefor as per neurology will not pursue an LP   - CTH reviewed and noted above   - MRI canceled by neurology and recommended outpt f/u for dementia management    - c/w memantine   - Fall and aspiration precautions   - PT recommending [facility] if family agreeable   - Had family meeting today w/ palliative medicine given medically pt is stable and no interventions planned       Severe Aortic stenosis  - TTE reviewed and noted above   - Per cardio pt is not a candidate for intervention given her dementia   - Caution w/ IVFs   - Cardio following and recs noted      Normocytic anemia   - s/p 1u PRBC transfusion yesterday w/ adequate response   - Etiology unclear but no active bleeding noted and hemodynamically stable at this time   - Smear pending, will order retic count and c/w serial CBCs  - On short course of venofer then po supplementation thereafter   - Low suspicion for GIB at this time but if concern increases I will consult GI  - Will trend CBC closely and transfuse if Hb<8       Hypertensive urgency   - Resumed ACEI and BP improving   - prn hydralazine for sustained SBP >160  - Monitor vitals and titrate antihypertensives as needed       Hypokalemia / Hypophosphatemia   - Resolved   - Monitor lytes and replete as needed  - Maintain K>4, Mg>2 and phos>3      Diabetes mellitus / hypoglycemia   - A1c 10.8  - No further episodes of hypoglycemis since adjustment of insulin regimen  - Will continue to monitor BG and titrate insulin regimen as needed to maintain BG<180  - ISS and hypoglycemic protocol in place       Hypothyroidism  - On Synthroid        VTE ppx: hepairn sq    Dispo:  Pending clinical course.  Awaiting family decision on if they want [facility] vs home.  Introduced idea of home hospice for additional services if they choose to take her home.

## 2022-07-27 NOTE — CONSULT NOTE ADULT - TIME BILLING
chart review, lab review, imaging review, notes review. Discussion with  and coordination of care with all relevant providers and consultants caring for patient. discussion held with family at bedside regarding goals of care.

## 2022-07-28 NOTE — PROGRESS NOTE ADULT - SUBJECTIVE AND OBJECTIVE BOX
Chief Complaint:  AMS    SUBJECTIVE / OVERNIGHT EVENTS: No acute events reported overnight.  Pt remains afebrile.  Pt is a poor historian but offers no acute complaints a this time.        I&O's Summary    22 Jul 2022 07:01  -  23 Jul 2022 07:00  --------------------------------------------------------  IN: 400 mL / OUT: 700 mL / NET: -300 mL    23 Jul 2022 07:01  -  23 Jul 2022 21:08  --------------------------------------------------------  IN: 0 mL / OUT: 100 mL / NET: -100 mL        PHYSICAL EXAM:  Vital Signs Last 24 Hrs  T(C): 38.1 (28 Jul 2022 14:00), Max: 38.1 (28 Jul 2022 14:00)  T(F): 100.5 (28 Jul 2022 14:00), Max: 100.5 (28 Jul 2022 14:00)  HR: 80 (28 Jul 2022 14:00) (72 - 85)  BP: 130/70 (28 Jul 2022 06:48) (130/70 - 189/70)  BP(mean): --  RR: 18 (28 Jul 2022 14:00) (18 - 18)  SpO2: 91% (28 Jul 2022 05:34) (91% - 95%)    Parameters below as of 27 Jul 2022 20:45  Patient On (Oxygen Delivery Method): nasal cannula  O2 Flow (L/min): 4      GENERAL: pt examined bedside, laying comfortably in bed in NAD  HEENT: NC/AT, dry oral mucosa, pale conjunctiva, sclera nonicteric, 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  RESPIRATORY: poor inspiratory effort, no wheezing, rhonchi, rales  CARDIOVASCULAR: RRR, normal S1 and S2, +systolic murmur  ABDOMEN: soft, NT/ND, +bowel sounds, no rebound/guarding  EXTREMITIES: No cynaosis, no clubbing, mild RLE nonpitting edema, pulses are 2+ bilaterally  PSYCH: affect flat but cooperative  NEUROLOGY: A+O x1-2, alertness waxes and waynes, no focal neurologic deficits appreciated   SKIN: +varicose veins RLE>LLE, pallor        LABS:                                     10.0   8.54  )-----------( 396      ( 28 Jul 2022 04:32 )             32.8     07-28    134<L>  |  99  |  10.0  ----------------------------<  116<H>  4.1   |  21.0<L>  |  0.78    Ca    8.1<L>      28 Jul 2022 04:32  Phos  3.3     07-27  Mg     2.1     07-27        CAPILLARY BLOOD GLUCOSE      POCT Blood Glucose.: 212 mg/dL (23 Jul 2022 18:00)  POCT Blood Glucose.: 185 mg/dL (23 Jul 2022 12:49)  POCT Blood Glucose.: 186 mg/dL (23 Jul 2022 09:07)  POCT Blood Glucose.: 176 mg/dL (23 Jul 2022 05:55)  POCT Blood Glucose.: 166 mg/dL (22 Jul 2022 23:03)        RADIOLOGY & ADDITIONAL TESTS:    < from: US Abdomen Upper Quadrant Right (07.20.22 @ 14:31) >  IMPRESSION:  No evidence of cholelithiasis or acute cholecystitis.  Mildly enlarged fatty liver.    < end of copied text >  < from: CT Abdomen and Pelvis w/ IV Cont (07.20.22 @ 05:26) >  IMPRESSION:    1. Bilateral lower lobe consolidations, which may represent atelectasis   and/or pneumonia.  2. Small bilateral pleural effusions.  3. Mild gallbladder wall thickening. Recommend ultrasound for further   assessment.  4. No CT evidence of bowel obstruction, diverticulitis, or colitis.    < end of copied text >      < from: CT Head No Cont (07.19.22 @ 12:55) >    IMPRESSION:    Findings consistent with persistent moderate normal pressure   hydrocephalus.    No acute intracranial hemorrhage or brain edema.    < end of copied text >      < from: TTE Echo Complete w/o Contrast w/ Doppler (07.19.22 @ 20:08) >  Summary:   1. Technically suboptimal study.   2. Estimated pulmonary artery systolic pressure is 38.9 mmHg assuming a   right atrial pressure of 8 mmHg, which is consistent with borderline   pulmonary hypertension.   3. Hyperdynamic global left ventricular systolic function.   4. Left ventricular ejection fraction, by visual estimation, is >75%.   5. Severely enlarged left atrium.   6. Spectral Doppler shows impaired relaxation pattern of left   ventricular myocardial filling (Grade I diastolic dysfunction).   7. Mildly enlarged right atrium.   8. Mild mitral annular calcification.   9. Trace mitral valve regurgitation.  10. Mild tricuspid regurgitation.  11. Endocardial visualization was enhanced with intravenous echo contrast.  12. Sclerotic aortic valve with decreased opening.  13. Stroke volume across LVOT is 31 ml. ERROL by continuity equation is 0.6   cm^2. DVI of 0.21 also suggestive of severe aortic valve stenosis.  14. There is no evidence of pericardial effusion.    < end of copied text >          MEDICATIONS  (STANDING):  ampicillin/sulbactam  IVPB      ampicillin/sulbactam  IVPB 3 Gram(s) IV Intermittent every 6 hours  dextrose 5%. 1000 milliLiter(s) (50 mL/Hr) IV Continuous <Continuous>  dextrose 5%. 1000 milliLiter(s) (100 mL/Hr) IV Continuous <Continuous>  dextrose 50% Injectable 25 Gram(s) IV Push once  dextrose 50% Injectable 12.5 Gram(s) IV Push once  dextrose 50% Injectable 25 Gram(s) IV Push once  enoxaparin Injectable 40 milliGRAM(s) SubCutaneous every 24 hours  glucagon  Injectable 1 milliGRAM(s) IntraMuscular once  insulin glargine Injectable (LANTUS) 20 Unit(s) SubCutaneous two times a day  insulin lispro (ADMELOG) corrective regimen sliding scale   SubCutaneous Before meals and at bedtime  insulin lispro Injectable (ADMELOG) 3 Unit(s) SubCutaneous three times a day before meals  labetalol 100 milliGRAM(s) Oral two times a day  levothyroxine 137 MICROGram(s) Oral daily  lisinopril 10 milliGRAM(s) Oral daily  memantine 10 milliGRAM(s) Oral two times a day  potassium phosphate / sodium phosphate Powder (PHOS-NaK) 1 Packet(s) Oral once    MEDICATIONS  (PRN):  aluminum hydroxide/magnesium hydroxide/simethicone Suspension 30 milliLiter(s) Oral every 6 hours PRN Dyspepsia  dextrose Oral Gel 15 Gram(s) Oral once PRN Blood Glucose LESS THAN 70 milliGRAM(s)/deciliter  hydrALAZINE Injectable 10 milliGRAM(s) IV Push every 6 hours PRN SBP > 170  ondansetron Injectable 4 milliGRAM(s) IV Push every 6 hours PRN Nausea and/or Vomiting

## 2022-07-28 NOTE — CHART NOTE - NSCHARTNOTEFT_GEN_A_CORE
87F with dementia, DM, anemia, hypothyroidism, admitted for weakness, s/p ICU for possible aspiration event necessitating intubation/shock on pressors/fevers, s/p extubation 7/21, workup here revealed moderate NPH, and severe valvular heart disease. Hospital course further complicated by recurring fevers but w/u negative to date.    Called by RN , patient with rectal temp 101   Patient seen and evaluated at bedside  Poor historian, denies any acute complaints   ROS: No chest pain, palpitations, SOB, light headedness, dizziness    Vital Signs   T(F): 101  HR: 79   BP: 136/63  RR: 18  SpO2: 91%     GENERAL: NAD  HEAD: Atraumatic, Normocephalic  NERVOUS SYSTEM: Follows commands and answers questions. Not orientated.   CHEST/LUNG: Clear to auscultation b/l no wheezing or rhonchi appreciated; Unlabored respirations  HEART: S1S2+, Regular rate and rhythm +murmur   ABDOMEN: Soft, Nontender, Nondistended; BS+   EXTREMITIES:  2+ Peripheral Pulses, No LE edema  SKIN: Warm and dry    Completed course of Unasyn 7/24 for possible aspiration PNA  Previous CXR w/ reporting some pulm vasc congestion so IVFs held  ID consulted on 7/26 - repeat cx if pt spikes a temp off abx and also hold off on further abx unless significant clinical change  Fever work up ordered - CBC, CMP, coags, Procal, lactate, blood cx x2, CXR and UA w/ reflex cx   RN to notify with any acute changes 87F with dementia, DM, anemia, hypothyroidism, admitted for weakness, s/p ICU for possible aspiration event necessitating intubation/shock on pressors/fevers, s/p extubation 7/21, workup here revealed moderate NPH, and severe valvular heart disease. Hospital course further complicated by recurring fevers but w/u negative to date.    Called by RN , patient with rectal temp 101   Patient seen and evaluated at bedside  Poor historian, denies any acute complaints   ROS: No chest pain, palpitations, SOB, light headedness, dizziness    Vital Signs   T(F): 101  HR: 79   BP: 136/63  RR: 18  SpO2: 91%     GENERAL: NAD  HEAD: Atraumatic, Normocephalic  NERVOUS SYSTEM: Follows commands and answers questions. Not orientated.   CHEST/LUNG: Clear to auscultation b/l no wheezing or rhonchi appreciated; Unlabored respirations  HEART: S1S2+, Regular rate and rhythm +murmur   ABDOMEN: Soft, Nontender, Nondistended; BS+   EXTREMITIES:  2+ Peripheral Pulses, No LE edema  SKIN: Warm and dry    Completed course of Unasyn 7/24 for possible aspiration PNA  Previous CXR w/ reporting some pulm vasc congestion so IVFs held  ID consulted on 7/26 - repeat cx if pt spikes a temp off abx and also hold off on further abx unless significant clinical change  Fever work up ordered - CBC, CMP, coags, Procal, lactate, blood cx x2, CXR and UA w/ reflex cx   RN to notify with any acute changes    Addendum 23:10   No leukocytosis on CBC  Lactate neg  CXR w/ pulm congestion, effusion seen in R lung. Pending final read.  Respiratory status intact O2 saturation 90's, would consider CT chest if respiratory status worsens   UA positive, urine culture ordered   Asymptomatic but poor historian   Will deter antibiotics at this time as per ID notes 87F with dementia, DM, anemia, hypothyroidism, admitted for weakness, s/p ICU for possible aspiration event necessitating intubation/shock on pressors/fevers, s/p extubation 7/21, workup here revealed moderate NPH, and severe valvular heart disease. Hospital course further complicated by recurring fevers but w/u negative to date.    Called by RN , patient with rectal temp 101   Patient seen and evaluated at bedside  Poor historian, denies any acute complaints   ROS: No chest pain, palpitations, SOB, light headedness, dizziness    Vital Signs   T(F): 101  HR: 79   BP: 136/63  RR: 18  SpO2: 91%     GENERAL: NAD  HEAD: Atraumatic, Normocephalic  NERVOUS SYSTEM: Follows commands and answers questions. Not orientated.   CHEST/LUNG: Clear to auscultation b/l no wheezing or rhonchi appreciated; Unlabored respirations  HEART: S1S2+, Regular rate and rhythm +murmur   ABDOMEN: Soft, Nontender, Nondistended; BS+   EXTREMITIES:  2+ Peripheral Pulses, No LE edema  SKIN: Warm and dry    Completed course of Unasyn 7/24 for possible aspiration PNA  Previous CXR w/ reporting some pulm vasc congestion so IVFs held  ID consulted on 7/26 - repeat cx if pt spikes a temp off abx and also hold off on further abx unless significant clinical change  Fever work up ordered - CBC, CMP, coags, Procal, lactate, blood cx x2, CXR and UA w/ reflex cx   RN to notify with any acute changes    Addendum 23:10   No leukocytosis on CBC  Lactate neg  Procal 2.21  CXR w/ pulm congestion, effusion seen in R lung. Pending final read.  Respiratory status intact O2 saturation 90's, would consider CT chest if respiratory status worsens   UA positive, urine culture ordered   Asymptomatic but poor historian   Will deter antibiotics at this time as per ID notes

## 2022-07-29 NOTE — PROGRESS NOTE ADULT - ASSESSMENT
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     #1 Dementia  - noted to seems to have been progressing over the last 3 years - per notes,  reports that has been sleeping more, eating less.  Also noted accidental fall, per chart review  - supportive measures, frequent reorientation    #NPH  - not a candidate for shunting  - neurosurgery input noted, appreciated    #3 Severe AS    - poor prognosis  - not a candidate for valve surgery   - hospice appropriate    #4 +UA  -fever overnight   -urine culture ordered  -antibx started  -trend WBC, monitor for s/s of infection    #5 debility  -supportive care  -PT involved    #6 Encounter for palliative care  -full code orders  -, surrogate decision maker  -see [facility] note  -pt reviewed with primary, palliative teams

## 2022-07-29 NOTE — PROGRESS NOTE ADULT - SUBJECTIVE AND OBJECTIVE BOX
Palliative care follow up:    OVERNIGHT EVENTS:   Febrile overnight, positive UA, urine culture ordered.    Patient seen at the bedside at 11:10 am.  , son, John, and daughter, Di, at bedside.    Answer few, simple questions. Denies HA, dizziness, pain, CP, SOB, nausea, vomiting.  Endorses that she feels a little chilly; writer pulled blanket to cover exposed arms.  When asked if there is anything troubling her, patient denies.      Review of Systems: Per HPI, all other ROS negative    MEDICATIONS  (STANDING):  dextrose 5%. 1000 milliLiter(s) (50 mL/Hr) IV Continuous <Continuous>  dextrose 5%. 1000 milliLiter(s) (100 mL/Hr) IV Continuous <Continuous>  dextrose 50% Injectable 25 Gram(s) IV Push once  dextrose 50% Injectable 12.5 Gram(s) IV Push once  dextrose 50% Injectable 25 Gram(s) IV Push once  enoxaparin Injectable 40 milliGRAM(s) SubCutaneous every 24 hours  ferrous    sulfate 325 milliGRAM(s) Oral daily  glucagon  Injectable 1 milliGRAM(s) IntraMuscular once  insulin glargine Injectable (LANTUS) 10 Unit(s) SubCutaneous at bedtime  insulin lispro (ADMELOG) corrective regimen sliding scale   SubCutaneous Before meals and at bedtime  labetalol 100 milliGRAM(s) Oral two times a day  levothyroxine 137 MICROGram(s) Oral daily  lisinopril 10 milliGRAM(s) Oral daily  memantine 10 milliGRAM(s) Oral two times a day  nystatin Powder 1 Application(s) Topical two times a day  piperacillin/tazobactam IVPB.. 3.375 Gram(s) IV Intermittent every 8 hours    MEDICATIONS  (PRN):  acetaminophen     Tablet .. 650 milliGRAM(s) Oral every 6 hours PRN Temp greater or equal to 38C (100.4F), Mild Pain (1 - 3)  aluminum hydroxide/magnesium hydroxide/simethicone Suspension 30 milliLiter(s) Oral every 6 hours PRN Dyspepsia  dextrose Oral Gel 15 Gram(s) Oral once PRN Blood Glucose LESS THAN 70 milliGRAM(s)/deciliter  hydrALAZINE Injectable 10 milliGRAM(s) IV Push every 6 hours PRN SBP > 170  ondansetron Injectable 4 milliGRAM(s) IV Push every 6 hours PRN Nausea and/or Vomiting      PHYSICAL EXAM:      Vital Signs Last 24 Hrs  T(C): 37.9 (2022 07:00), Max: 38.3 (2022 20:54)  T(F): 100.3 (2022 07:00), Max: 101 (2022 20:54)  HR: 74 (2022 05:15) (74 - 79)  BP: 135/53 (2022 05:15) (120/62 - 136/63)  BP(mean): --  RR: 20 (2022 05:15) (18 - 20)  SpO2: 95% (2022 05:15) (91% - 95%)    Parameters below as of 2022 20:38  Patient On (Oxygen Delivery Method): nasal cannula  O2 Flow (L/min): 4  Karnofsky: 30 %    Gen: In NAD.  No pain behaviors or work of breathing noted  Neuro: awake, alert, makes few, simple needs known.  Head: NC/AT  Eyes: sclerae non-icteric  Resp: unlabored, on oxygen via NC  CV: S1, S2  Abd: soft, non-tender, + bowels sounds  Peripheral Vasc: warm  Int: warm and dry  Psych: no psychomotor agitation      LABS:                          9.5    7.97  )-----------( 381      ( 2022 04:40 )             31.4     07-29    131<L>  |  98  |  10.2  ----------------------------<  123<H>  4.0   |  22.0  |  0.74    Ca    8.1<L>      2022 04:40    TPro  5.5<L>  /  Alb  2.7<L>  /  TBili  0.4  /  DBili  x   /  AST  28  /  ALT  16  /  AlkPhos  81  07-28    PT/INR - ( 2022 22:26 )   PT: 12.7 sec;   INR: 1.09 ratio         PTT - ( 2022 22:26 )  PTT:44.2 sec  Urinalysis Basic - ( 2022 21:14 )    Color: Yellow / Appearance: Slightly Turbid / S.020 / pH: x  Gluc: x / Ketone: Moderate  / Bili: Negative / Urobili: 1 mg/dL   Blood: x / Protein: Negative / Nitrite: Negative   Leuk Esterase: Moderate / RBC: 0-2 /HPF / WBC 26-50 /HPF   Sq Epi: x / Non Sq Epi: Moderate / Bacteria: Occasional      I&O's Summary    2022 07:  -  2022 07:00  --------------------------------------------------------  IN: 0 mL / OUT: 1175 mL / NET: -1175 mL    2022 07:  -  2022 15:19  --------------------------------------------------------  IN: 0 mL / OUT: 225 mL / NET: -225 mL        RADIOLOGY & ADDITIONAL STUDIES: reviewed    ADVANCE DIRECTIVES:   Full code orders   surrogate

## 2022-07-29 NOTE — PROGRESS NOTE ADULT - SUBJECTIVE AND OBJECTIVE BOX
Chief Complaint:  AMS    SUBJECTIVE / OVERNIGHT EVENTS: Pt febrile overnight and sepsis w/u ordered.  This morning pt is lethargic but rousable however unable to obtain ros.       I&O's Summary    22 Jul 2022 07:01  -  23 Jul 2022 07:00  --------------------------------------------------------  IN: 400 mL / OUT: 700 mL / NET: -300 mL    23 Jul 2022 07:01  -  23 Jul 2022 21:08  --------------------------------------------------------  IN: 0 mL / OUT: 100 mL / NET: -100 mL        PHYSICAL EXAM:  Vital Signs Last 24 Hrs  T(C): 36.9 (29 Jul 2022 16:37), Max: 38.3 (28 Jul 2022 20:54)  T(F): 98.4 (29 Jul 2022 16:37), Max: 101 (28 Jul 2022 20:54)  HR: 78 (29 Jul 2022 16:37) (74 - 79)  BP: 157/60 (29 Jul 2022 16:37) (135/53 - 157/60)  BP(mean): --  RR: 20 (29 Jul 2022 05:15) (18 - 20)  SpO2: 93% (29 Jul 2022 16:37) (91% - 95%)    Parameters below as of 29 Jul 2022 16:37  Patient On (Oxygen Delivery Method): nasal cannula          GENERAL: pt examined bedside, laying comfortably in bed in NAD  HEENT: NC/AT, dry oral mucosa, pale conjunctiva, sclera nonicteric, 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  RESPIRATORY: poor inspiratory effort, bibasilar crackles, no wheezing or rhonchi  CARDIOVASCULAR: RRR, normal S1 and S2, +systolic murmur  ABDOMEN: soft, NT/ND, +bowel sounds, no rebound/guarding  EXTREMITIES: No cynaosis, no clubbing, 1+ pretibial edema, pulses are 2+ bilaterally  NEUROLOGY: Lethargic but easily rousable to tactile stim, unable to assess for focal deficits   SKIN: +varicose veins RLE>LLE, pallor        LABS:                                                9.5    7.97  )-----------( 381      ( 29 Jul 2022 04:40 )             31.4       07-29    131<L>  |  98  |  10.2  ----------------------------<  123<H>  4.0   |  22.0  |  0.74    Ca    8.1<L>      29 Jul 2022 04:40    TPro  5.5<L>  /  Alb  2.7<L>  /  TBili  0.4  /  DBili  x   /  AST  28  /  ALT  16  /  AlkPhos  81  07-28        RADIOLOGY & ADDITIONAL TESTS:    < from: Xray Chest 1 View AP/PA. (07.28.22 @ 21:48) >  There is a mild to moderate right effusion new since July 23.    < end of copied text >      < from: US Abdomen Upper Quadrant Right (07.20.22 @ 14:31) >  IMPRESSION:  No evidence of cholelithiasis or acute cholecystitis.  Mildly enlarged fatty liver.    < end of copied text >  < from: CT Abdomen and Pelvis w/ IV Cont (07.20.22 @ 05:26) >  IMPRESSION:    1. Bilateral lower lobe consolidations, which may represent atelectasis   and/or pneumonia.  2. Small bilateral pleural effusions.  3. Mild gallbladder wall thickening. Recommend ultrasound for further   assessment.  4. No CT evidence of bowel obstruction, diverticulitis, or colitis.    < end of copied text >      < from: CT Head No Cont (07.19.22 @ 12:55) >    IMPRESSION:    Findings consistent with persistent moderate normal pressure   hydrocephalus.    No acute intracranial hemorrhage or brain edema.    < end of copied text >      < from: TTE Echo Complete w/o Contrast w/ Doppler (07.19.22 @ 20:08) >  Summary:   1. Technically suboptimal study.   2. Estimated pulmonary artery systolic pressure is 38.9 mmHg assuming a   right atrial pressure of 8 mmHg, which is consistent with borderline   pulmonary hypertension.   3. Hyperdynamic global left ventricular systolic function.   4. Left ventricular ejection fraction, by visual estimation, is >75%.   5. Severely enlarged left atrium.   6. Spectral Doppler shows impaired relaxation pattern of left   ventricular myocardial filling (Grade I diastolic dysfunction).   7. Mildly enlarged right atrium.   8. Mild mitral annular calcification.   9. Trace mitral valve regurgitation.  10. Mild tricuspid regurgitation.  11. Endocardial visualization was enhanced with intravenous echo contrast.  12. Sclerotic aortic valve with decreased opening.  13. Stroke volume across LVOT is 31 ml. ERROL by continuity equation is 0.6   cm^2. DVI of 0.21 also suggestive of severe aortic valve stenosis.  14. There is no evidence of pericardial effusion.    < end of copied text >          MEDICATIONS  (STANDING):  ampicillin/sulbactam  IVPB      ampicillin/sulbactam  IVPB 3 Gram(s) IV Intermittent every 6 hours  dextrose 5%. 1000 milliLiter(s) (50 mL/Hr) IV Continuous <Continuous>  dextrose 5%. 1000 milliLiter(s) (100 mL/Hr) IV Continuous <Continuous>  dextrose 50% Injectable 25 Gram(s) IV Push once  dextrose 50% Injectable 12.5 Gram(s) IV Push once  dextrose 50% Injectable 25 Gram(s) IV Push once  enoxaparin Injectable 40 milliGRAM(s) SubCutaneous every 24 hours  glucagon  Injectable 1 milliGRAM(s) IntraMuscular once  insulin glargine Injectable (LANTUS) 20 Unit(s) SubCutaneous two times a day  insulin lispro (ADMELOG) corrective regimen sliding scale   SubCutaneous Before meals and at bedtime  insulin lispro Injectable (ADMELOG) 3 Unit(s) SubCutaneous three times a day before meals  labetalol 100 milliGRAM(s) Oral two times a day  levothyroxine 137 MICROGram(s) Oral daily  lisinopril 10 milliGRAM(s) Oral daily  memantine 10 milliGRAM(s) Oral two times a day  potassium phosphate / sodium phosphate Powder (PHOS-NaK) 1 Packet(s) Oral once    MEDICATIONS  (PRN):  aluminum hydroxide/magnesium hydroxide/simethicone Suspension 30 milliLiter(s) Oral every 6 hours PRN Dyspepsia  dextrose Oral Gel 15 Gram(s) Oral once PRN Blood Glucose LESS THAN 70 milliGRAM(s)/deciliter  hydrALAZINE Injectable 10 milliGRAM(s) IV Push every 6 hours PRN SBP > 170  ondansetron Injectable 4 milliGRAM(s) IV Push every 6 hours PRN Nausea and/or Vomiting

## 2022-07-29 NOTE — PROGRESS NOTE ADULT - ASSESSMENT
86 y/o F w/ PMH of HTN, hypothyroid, DM, anemia, hypothyroidism, was brought ihn by family for AMS and progressing generalized weakness.  Pt also had associated subjective feevers and poor po intake.  Pt was hypoxic on admission and w/ hypertensive urgency.  Pt was polaced on emperic abxs.  CTH significant for findings of NPH.  On 7/19 pt she developed nausea and had episodes of NBNB emesis at that time w bradycardia and hypotension; RRT was called.  Pt was subsequenty intubated for airway protection and upgraded to MICU.  Pressors were initiated.  CT chest/abdo was done, revealed Bilateral lower lobe consolidations, which may represent atelectasis and/or pneumonia. Small bilateral pleural effusions.  All cultures have had NGTD.  Pt remains on Unasyn for emperic therapy.  Pt also noted to have severe AS on TTE however no inervention planned per cardiology.  Pt was extubated 7/21 and downgraded to medicine 7/22.  Hospital course further complicated by recurring fevers but w/u negative to date.  Persistent encephalopathy, likely multifactorial, initially 2/2 sepsis and dehydration, in the setting of underlying dementia w/ suspicion for NPH.  Pt febrile overnight and sepsis w/u ordered again.  Lethargic this morning likely 2/2 sespis suspected 2/2 UTI.        Sepsis POA, s/p septic shock possibly 2/2 aspiration PNA (resolved) now w/ suspicion for CAUTI  Acute hypoxic respiratory failure s/p intubation w/ mechanical ventilation possibly 2/2 aspiration vs atelectasis   - Pt remains acute requiring supplemental O2 now likely from atelectasis and volume overload   - s/p pressor support and steroid course for hypotension while in MICU (since d/c'd)  - No leukocytosis at this time however febrile overnight w/ lethargy therefor sepsis w/u ordered   - UA w/ pyuria concerning for CAUTI; family had refused removal of marcano despite risks of UTI   - Marcano removed for tov, bladder scans ordered q6h and pt started on Zosyn eperically pending cultures   - Procalcitonin 2.21 but lactic acid 1.2   - CXR reviewed and noted to be more congested from prior and clinically overloaded on exam; will hold off on IVFs for now   - If hydration needed administer cautiously   - Had completed 7 day course of Unasyn 7/24  - Will maintain off Abx for now per ID recs  - Would order incentive spirometry once able to follow directions for it   - Titrate off supplemental O2 as tolerated    - Trend CBC and monitor temperature       Acute metabolic encephalopathy, multifactorial 2/2 dehydration, sepsis and NPH in the setting of underlying dementia   - Worsening encephalopathy likely 2/2 suspected CAUTI   - Anticipate mentation will improve w/ tx of underlying infection   - Suspicion for NPH given CTH findings and hx of ataxia and incontinence   - Made NPO given risk for aspiration w/ decline in mental status  - Staff reported seeing pts family trying to feed pt despite being informed she is to be NPO for now pending improvement of mentation w/ tx of infection given risk of aspiration    - Neurosurgery evaluated pt and noted pt is not a candidate for any intracranial surgery such as  shunt due to mx chronic comorbidities therefor as per neurology will not pursue an LP   - CTH reviewed and noted above   - MRI canceled by neurology and recommended outpt f/u for dementia management    - c/w memantine   - Fall and aspiration precautions   - PT recommending [facility] if family agreeable   - Had family meetings along side palliative this week given medically pt is stable and no interventions planned      Severe Aortic stenosis  - TTE reviewed and noted above   - Per cardio pt is not a candidate for intervention given her dementia   - Caution w/ IVFs given risk of volume overload  - Cardio following and recs noted      Normocytic anemia   - s/p 1u PRBC transfusion during hospital course w/ adequate response and h/h remains stable  - Etiology unclear but no active bleeding noted and hemodynamically stable at this time   - s/p short course of venofer, transition to po supplementation once able to tolerate po   - Low suspicion for GIB at this time but if concern increases I will consult GI  - Will trend CBC closely and transfuse if Hb<8       Hypertensive urgency   - Resolved      Hypokalemia / Hypophosphatemia   - Resolved   - Monitor lytes and replete as needed  - Maintain K>4, Mg>2 and phos>3      Diabetes mellitus / hypoglycemia   - A1c 10.8  - No further episodes of hypoglycemis since adjustment of insulin regimen  - Will continue to monitor BG and titrate insulin regimen as needed to maintain BG<180  - ISS and hypoglycemic protocol in place       Hypothyroidism  - On Synthroid      Urinary retention  - Marcano removed today for TOV and bladder scan ordered q6h  - Attempted to do TOV before but family refused to have marcano removed despite risk of uti  - Started on flomax       VTE ppx: hepairn sq    Dispo:  Pending clinical course.  Awaiting family decision on if they want [facility] vs home.  Introduced idea of home hospice for additional services if they choose to take her home.

## 2022-07-30 NOTE — PROGRESS NOTE ADULT - SUBJECTIVE AND OBJECTIVE BOX
Chief Complaint:  AMS    SUBJECTIVE / OVERNIGHT EVENTS: Patient seen and eval. daughter and son John at bedside. patient is awake , knows name, slow to respond to year , doesnot know location. follows commands while awake, falls asleep but easily arousable.     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  RESPIRATORY: occasional basal rhonchi , no wheezing , good airflow   CARDIOVASCULAR: RRR, normal S1 and S2, +systolic murmur  ABDOMEN: soft, NT/ND, +bowel sounds, no rebound/guarding  EXTREMITIES: No cynaosis, no clubbing, 1+ pretibial edema, pulses are 2+ bilaterally  NEUROLOGY: Lethargic but easily rousable to tactile stim, unable to assess for focal deficits   SKIN: no edema noted                           9.3    5.87  )-----------( 383      ( 30 Jul 2022 06:30 )             29.6   07-30    139  |  105  |  6.6<L>  ----------------------------<  143<H>  3.7   |  22.0  |  0.86    Ca    8.2<L>      30 Jul 2022 06:30  Phos  3.6     07-30  Mg     2.3     07-30    TPro  5.5<L>  /  Alb  2.7<L>  /  TBili  0.4  /  DBili  x   /  AST  28  /  ALT  16  /  AlkPhos  81  07-28      RADIOLOGY & ADDITIONAL TESTS:    < from: Xray Chest 1 View AP/PA. (07.28.22 @ 21:48) >  There is a mild to moderate right effusion new since July 23.    < end of copied text >      < from: US Abdomen Upper Quadrant Right (07.20.22 @ 14:31) >  IMPRESSION:  No evidence of cholelithiasis or acute cholecystitis.  Mildly enlarged fatty liver.    < end of copied text >  < from: CT Abdomen and Pelvis w/ IV Cont (07.20.22 @ 05:26) >  IMPRESSION:    1. Bilateral lower lobe consolidations, which may represent atelectasis   and/or pneumonia.  2. Small bilateral pleural effusions.  3. Mild gallbladder wall thickening. Recommend ultrasound for further   assessment.  4. No CT evidence of bowel obstruction, diverticulitis, or colitis.    < end of copied text >      < from: CT Head No Cont (07.19.22 @ 12:55) >    IMPRESSION:    Findings consistent with persistent moderate normal pressure   hydrocephalus.    No acute intracranial hemorrhage or brain edema.    < end of copied text >      < from: TTE Echo Complete w/o Contrast w/ Doppler (07.19.22 @ 20:08) >  Summary:   1. Technically suboptimal study.   2. Estimated pulmonary artery systolic pressure is 38.9 mmHg assuming a   right atrial pressure of 8 mmHg, which is consistent with borderline   pulmonary hypertension.   3. Hyperdynamic global left ventricular systolic function.   4. Left ventricular ejection fraction, by visual estimation, is >75%.   5. Severely enlarged left atrium.   6. Spectral Doppler shows impaired relaxation pattern of left   ventricular myocardial filling (Grade I diastolic dysfunction).   7. Mildly enlarged right atrium.   8. Mild mitral annular calcification.   9. Trace mitral valve regurgitation.  10. Mild tricuspid regurgitation.  11. Endocardial visualization was enhanced with intravenous echo contrast.  12. Sclerotic aortic valve with decreased opening.  13. Stroke volume across LVOT is 31 ml. ERROL by continuity equation is 0.6   cm^2. DVI of 0.21 also suggestive of severe aortic valve stenosis.  14. There is no evidence of pericardial effusion.    < end of copied text >          MEDICATIONS  (STANDING):  dextrose 5% + sodium chloride 0.9%. 1000 milliLiter(s) (75 mL/Hr) IV Continuous <Continuous>  dextrose 5%. 1000 milliLiter(s) (50 mL/Hr) IV Continuous <Continuous>  dextrose 5%. 1000 milliLiter(s) (100 mL/Hr) IV Continuous <Continuous>  dextrose 50% Injectable 25 Gram(s) IV Push once  dextrose 50% Injectable 12.5 Gram(s) IV Push once  dextrose 50% Injectable 25 Gram(s) IV Push once  enoxaparin Injectable 40 milliGRAM(s) SubCutaneous every 24 hours  ferrous    sulfate 325 milliGRAM(s) Oral daily  glucagon  Injectable 1 milliGRAM(s) IntraMuscular once  insulin lispro (ADMELOG) corrective regimen sliding scale   SubCutaneous Before meals and at bedtime  labetalol 100 milliGRAM(s) Oral two times a day  levothyroxine 137 MICROGram(s) Oral daily  lisinopril 10 milliGRAM(s) Oral daily  memantine 10 milliGRAM(s) Oral two times a day  nystatin Powder 1 Application(s) Topical two times a day  piperacillin/tazobactam IVPB.. 3.375 Gram(s) IV Intermittent every 8 hours    MEDICATIONS  (PRN):  acetaminophen     Tablet .. 650 milliGRAM(s) Oral every 6 hours PRN Temp greater or equal to 38C (100.4F), Mild Pain (1 - 3)  aluminum hydroxide/magnesium hydroxide/simethicone Suspension 30 milliLiter(s) Oral every 6 hours PRN Dyspepsia  dextrose Oral Gel 15 Gram(s) Oral once PRN Blood Glucose LESS THAN 70 milliGRAM(s)/deciliter  hydrALAZINE Injectable 10 milliGRAM(s) IV Push every 6 hours PRN SBP > 170  ondansetron Injectable 4 milliGRAM(s) IV Push every 6 hours PRN Nausea and/or Vomiting

## 2022-07-30 NOTE — PROGRESS NOTE ADULT - ASSESSMENT
88 y/o F w/ PMH of HTN, hypothyroid, DM, anemia, hypothyroidism, was brought ihn by family for AMS and progressing generalized weakness.  Pt also had associated subjective feevers and poor po intake.  Pt was hypoxic on admission and w/ hypertensive urgency.  Pt was polaced on emperic abxs.  CTH significant for findings of NPH.  On 7/19 pt she developed nausea and had episodes of NBNB emesis at that time w bradycardia and hypotension; RRT was called.  Pt was subsequenty intubated for airway protection and upgraded to MICU.  Pressors were initiated.  CT chest/abdo was done, revealed Bilateral lower lobe consolidations, which may represent atelectasis and/or pneumonia. Small bilateral pleural effusions.  All cultures have had NGTD.   Pt also noted to have severe AS on TTE however no inervention planned per cardiology.  Pt was extubated 7/21 and downgraded to medicine 7/22.  Hospital course further complicated by recurring fevers but w/u negative to date.  Persistent encephalopathy, likely multifactorial, initially 2/2 sepsis and dehydration, in the setting of underlying dementia w/ suspicion for NPH.       >Sepsis POA, s/p septic shock possibly 2/2 aspiration PNA  -Acute hypoxic respiratory failure s/p intubation w/ mechanical ventilation possibly 2/2 aspiration vs atelectasis   - Pt remains acute requiring supplemental O2 now likely from atelectasis    - s/p pressor support and steroid course for hypotension while in MICU (since d/c'd)  - No leukocytosis at this time  - aspiration precautions.   - patient was npo yesterday due to concern for aspiration while more somnolent   - speech re eval requested yoday   - will keep on gentle ivf while npo   - IS     >concern for Cauti , UA w/ pyuria concerning for CAUTI; family had refused removal of marcano as per chart records, now dcd.   - Marcano removed for tov, bladder scans ordered q6h and pt started on Zosyn   - Procalcitonin 2.21 but lactic acid 1.2   - bcxs from 7/28 neg   - urine cxs 50-99k ecoli   - resumed on iv  zosyn 7/29   - Trend CBC and monitor temperature       >Acute metabolic encephalopathy, multifactorial 2/2 dehydration, sepsis and NPH in the setting of underlying dementia   - Worsening encephalopathy likely 2/2 suspected CAUTI   - Anticipate mentation will improve w/ tx of underlying infection   - Suspicion for NPH given CTH findings and hx of ataxia and incontinence   - Made NPO given risk for aspiration w/ decline in mental status  - Neurosurgery evaluated pt and noted pt is not a candidate for any intracranial surgery such as  shunt due to mx chronic comorbidities therefor as per neurology will not pursue an LP   - MRI canceled by neurology and recommended outpt f/u for dementia management    - c/w memantine   - Fall and aspiration precautions   - PT recommending [facility] if family agreeable       >Severe Aortic stenosis  - TTE reviewed and noted above   - Per cardio pt is not a candidate for intervention given her dementia   - Caution w/ IVFs given risk of volume overload  - Cardio following and recs noted      >Normocytic anemia   - s/p 1u PRBC transfusion during hospital course w/ adequate response and h/h remains stable  - Etiology unclear but no active bleeding noted and hemodynamically stable at this time   - s/p short course of venofer, transition to po supplementation once able to tolerate po   - Low suspicion for GIB at this time but if concern increases I will consult GI  - trend CBC closely and transfuse if Hb<8       >Hypertensive urgency   - Resolved      >Hypokalemia / Hypophosphatemia   - Resolved   - Monitor lytes and replete as needed  - Maintain K>4, Mg>2 and phos>3      >Diabetes mellitus / hypoglycemia   - A1c 10.8  - No further episodes of hypoglycemis since adjustment of insulin regimen  - Will continue to monitor BG and titrate insulin regimen as needed to maintain BG<180  - ISS and hypoglycemic protocol in place   - hold long acting insulin for now while npo , only ssi       >Hypothyroidism  - On Synthroid      >Urinary retention  - Marcano removed today for TOV and bladder scan ordered q6h  - Attempted to do TOV before but family refused to have marcano removed despite risk of uti  - Started on Flomax       >VTE ppx: sq lovenox     Dispo:  Pending clinical course.  on going goc discussion remains full code. family not sure about home care vs [facility] at this time

## 2022-07-31 NOTE — PROGRESS NOTE ADULT - ASSESSMENT
86 y/o F w/ PMH of HTN, hypothyroid, DM, anemia, hypothyroidism, was brought ihn by family for AMS and progressing generalized weakness.  Pt also had associated subjective feevers and poor po intake.  Pt was hypoxic on admission and w/ hypertensive urgency.  Pt was polaced on emperic abxs.  CTH significant for findings of NPH.  On 7/19 pt she developed nausea and had episodes of NBNB emesis at that time w bradycardia and hypotension; RRT was called.  Pt was subsequenty intubated for airway protection and upgraded to MICU.  Pressors were initiated.  CT chest/abdo was done, revealed Bilateral lower lobe consolidations, which may represent atelectasis and/or pneumonia. Small bilateral pleural effusions.  All cultures have had NGTD.   Pt also noted to have severe AS on TTE however no inervention planned per cardiology.  Pt was extubated 7/21 and downgraded to medicine 7/22.  Hospital course further complicated by recurring fevers but w/u negative to date.  Persistent encephalopathy, likely multifactorial, initially 2/2 sepsis and dehydration, in the setting of underlying dementia w/ suspicion for NPH.   now possible cauti , resumed on 7 day course of zosyn 7/29.     >Sepsis POA, s/p septic shock possibly 2/2 aspiration PNA  -Acute hypoxic respiratory failure s/p intubation w/ mechanical ventilation possibly 2/2 aspiration / airway protection   - Pt remains acute requiring supplemental O2 now likely from atelectasis    - s/p pressor support and steroid course for hypotension while in MICU (since d/c'd)  - No leukocytosis at this time  - resumed on purred diet / thickened liquids after speech re eval. tolerating well   - aspiration precautions.   - IS   - s/p abxs course for asp pna   - repeat bcxs 7/28 neg     >concern for Cauti , UA w/ pyuria concerning for CAUTI;   - family had refused removal of marcano as per chart records, now dcd.   - Marcano removed for tov, bladder scans ordered q6h   - Procalcitonin 2.21 but lactic acid 1.2   - bcxs from 7/28 neg   - ua positive , urine cxs 50-99k ecoli , awaiting sensitivities   - resumed on iv  zosyn 7/29 , complete x 7 days total ,   - Trend CBC and monitor temperature   - c/w flomax       >Acute metabolic encephalopathy, multifactorial 2/2 dehydration, sepsis and NPH in the setting of underlying dementia / slowly improving   - Suspicion for NPH given CTH findings and hx of ataxia and incontinence   - Made NPO given risk for aspiration w/ decline in mental status  - Neurosurgery evaluated pt and noted pt is not a candidate for any intracranial surgery such as  shunt due to mx chronic comorbidities therefor as per neurology will not pursue an LP   - 7/29 with Worsening encephalopathy likely 2/2 suspected CAUTI , resumed on iv zosyn , now improving   - Anticipate mentation will improve w/ tx of underlying infection   - MRI canceled by neurology and recommended outpt f/u for dementia management    - c/w memantine   - Fall and aspiration precautions   - PT re eval       >Severe Aortic stenosis  - TTE reviewed and noted above   - Per cardio pt is not a candidate for intervention given her dementia   - Caution w/ IVFs given risk of volume overload  - Cardio following and recs noted      >Normocytic anemia   - s/p 1u PRBC transfusion during hospital course w/ adequate response and h/h remains stable  - Etiology unclear but no active bleeding noted and hemodynamically stable at this time   - s/p short course of venofer, transition to po supplementation once able to tolerate po   - Low suspicion for GIB at this time but if concern increases I will consult GI  - trend CBC closely and transfuse if Hb<8       >Hypertensive urgency   - Resolved  - increased lisinopril to 20 mg po qd for better bp control   - c/w labetalol   - c/w hydralazine prn        >Hypokalemia / Hypophosphatemia   - Resolved   - Monitor lytes and replete as needed  - Maintain K>4, Mg>2 and phos>3      >Diabetes mellitus / hypoglycemia   - A1c 10.8  - No further episodes of hypoglycemis since adjustment of insulin regimen  - resumed  on lantus 10 units qhs  as  now on pureed diet   - monitor fsg       >Hypothyroidism  - c/w  Synthroid      >VTE ppx: sq lovenox     Dispo:  pt re eval , oob to chair as tolerated. plan of care discussed in detail with patient and son at [facility].

## 2022-07-31 NOTE — PROGRESS NOTE ADULT - SUBJECTIVE AND OBJECTIVE BOX
Chief Complaint:  AMS    SUBJECTIVE / OVERNIGHT EVENTS: patient seen and eval. comfortable. in no acute distress. patient is more awake today , alert to self , person. slwo in responding to place but knows shes in hospital. tamx in last 24 hrs 99.6.  started on pureed diet and thickended liquids overnight , tolerating well.     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 slwo in responding to place but knows shes in hospital. moving all ext , has generalized deconditioning   SKIN: no edema noted                            RADIOLOGY & ADDITIONAL TESTS:    < from: Xray Chest 1 View AP/PA. (07.28.22 @ 21:48) >  There is a mild to moderate right effusion new since July 23.    < end of copied text >      < from: US Abdomen Upper Quadrant Right (07.20.22 @ 14:31) >  IMPRESSION:  No evidence of cholelithiasis or acute cholecystitis.  Mildly enlarged fatty liver.    < end of copied text >  < from: CT Abdomen and Pelvis w/ IV Cont (07.20.22 @ 05:26) >  IMPRESSION:    1. Bilateral lower lobe consolidations, which may represent atelectasis   and/or pneumonia.  2. Small bilateral pleural effusions.  3. Mild gallbladder wall thickening. Recommend ultrasound for further   assessment.  4. No CT evidence of bowel obstruction, diverticulitis, or colitis.    < end of copied text >      < from: CT Head No Cont (07.19.22 @ 12:55) >    IMPRESSION:    Findings consistent with persistent moderate normal pressure   hydrocephalus.    No acute intracranial hemorrhage or brain edema.    < end of copied text >      < from: TTE Echo Complete w/o Contrast w/ Doppler (07.19.22 @ 20:08) >  Summary:   1. Technically suboptimal study.   2. Estimated pulmonary artery systolic pressure is 38.9 mmHg assuming a   right atrial pressure of 8 mmHg, which is consistent with borderline   pulmonary hypertension.   3. Hyperdynamic global left ventricular systolic function.   4. Left ventricular ejection fraction, by visual estimation, is >75%.   5. Severely enlarged left atrium.   6. Spectral Doppler shows impaired relaxation pattern of left   ventricular myocardial filling (Grade I diastolic dysfunction).   7. Mildly enlarged right atrium.   8. Mild mitral annular calcification.   9. Trace mitral valve regurgitation.  10. Mild tricuspid regurgitation.  11. Endocardial visualization was enhanced with intravenous echo contrast.  12. Sclerotic aortic valve with decreased opening.  13. Stroke volume across LVOT is 31 ml. ERROL by continuity equation is 0.6   cm^2. DVI of 0.21 also suggestive of severe aortic valve stenosis.  14. There is no evidence of pericardial effusion.    < end of copied text >                                9.3    7.30  )-----------( 408      ( 31 Jul 2022 04:45 )             31.6   07-31    142  |  109<H>  |  3.8<L>  ----------------------------<  221<H>  4.0   |  22.0  |  0.83    Ca    8.0<L>      31 Jul 2022 04:45  Phos  3.6     07-30  Mg     2.1     07-31    TPro  5.6<L>  /  Alb  2.5<L>  /  TBili  0.3<L>  /  DBili  x   /  AST  30  /  ALT  17  /  AlkPhos  76  07-31      MEDICATIONS  (STANDING):  dextrose 5%. 1000 milliLiter(s) (50 mL/Hr) IV Continuous <Continuous>  dextrose 5%. 1000 milliLiter(s) (100 mL/Hr) IV Continuous <Continuous>  dextrose 50% Injectable 25 Gram(s) IV Push once  dextrose 50% Injectable 12.5 Gram(s) IV Push once  dextrose 50% Injectable 25 Gram(s) IV Push once  enoxaparin Injectable 40 milliGRAM(s) SubCutaneous every 24 hours  ferrous    sulfate 325 milliGRAM(s) Oral daily  glucagon  Injectable 1 milliGRAM(s) IntraMuscular once  insulin glargine Injectable (LANTUS) 10 Unit(s) SubCutaneous at bedtime  insulin lispro (ADMELOG) corrective regimen sliding scale   SubCutaneous Before meals and at bedtime  labetalol 100 milliGRAM(s) Oral two times a day  levothyroxine 137 MICROGram(s) Oral daily  lisinopril 20 milliGRAM(s) Oral daily  memantine 10 milliGRAM(s) Oral two times a day  nystatin Powder 1 Application(s) Topical two times a day    MEDICATIONS  (PRN):  acetaminophen     Tablet .. 650 milliGRAM(s) Oral every 6 hours PRN Temp greater or equal to 38C (100.4F), Mild Pain (1 - 3)  aluminum hydroxide/magnesium hydroxide/simethicone Suspension 30 milliLiter(s) Oral every 6 hours PRN Dyspepsia  dextrose Oral Gel 15 Gram(s) Oral once PRN Blood Glucose LESS THAN 70 milliGRAM(s)/deciliter  hydrALAZINE Injectable 10 milliGRAM(s) IV Push every 6 hours PRN SBP > 170  ondansetron Injectable 4 milliGRAM(s) IV Push every 6 hours PRN Nausea and/or Vomiting

## 2022-08-01 NOTE — CHART NOTE - NSCHARTNOTEFT_GEN_A_CORE
Medicine PA-  Cd @ 2020 for pt that was hypotensive 90/51 > 101/55, asymptomatic. Pt received x2 doses hydralazine 10mg IVP 0900/ 1500 for SBP >170 during the day. Pt's son is bedside and concerned, discussed pt's meds and answered questions, agreed to decrease hydralazine to 5mg PRN and monitor vitals.    Vital Signs Last 24 Hrs  T(C): 36.8 (31 Jul 2022 20:40), Max: 37.6 (31 Jul 2022 10:41)  T(F): 98.2 (31 Jul 2022 20:40), Max: 99.6 (31 Jul 2022 10:41)  HR: 77 (31 Jul 2022 20:40) (77 - 101)  BP: 122/78 (31 Jul 2022 22:05) (90/50 - 184/67)  BP(mean): --  RR: 20 (31 Jul 2022 20:40) (18 - 20)  SpO2: 93% (31 Jul 2022 20:40) (93% - 94%)    >Hypotension  -decreased hydralazine to 5mg IVP for SBP>170  -continue to monitor

## 2022-08-01 NOTE — PROGRESS NOTE ADULT - SUBJECTIVE AND OBJECTIVE BOX
INFECTIOUS DISEASES AND INTERNAL MEDICINE at [place]  =======================================================  Julio Ariza MD  Diplomates American Board of Internal Medicine and Infectious Diseases  Telephone [phone]  Fax            [phone]  =======================================================    KADIE SHARIFA [id number]    Follow up:    Allergies:  No Known Allergies      Medications:  acetaminophen     Tablet .. 650 milliGRAM(s) Oral every 6 hours PRN  aluminum hydroxide/magnesium hydroxide/simethicone Suspension 30 milliLiter(s) Oral every 6 hours PRN  amLODIPine   Tablet 2.5 milliGRAM(s) Oral daily  dextrose 5%. 1000 milliLiter(s) IV Continuous <Continuous>  dextrose 5%. 1000 milliLiter(s) IV Continuous <Continuous>  dextrose 50% Injectable 25 Gram(s) IV Push once  dextrose 50% Injectable 12.5 Gram(s) IV Push once  dextrose 50% Injectable 25 Gram(s) IV Push once  dextrose Oral Gel 15 Gram(s) Oral once PRN  enoxaparin Injectable 40 milliGRAM(s) SubCutaneous every 24 hours  ferrous    sulfate 325 milliGRAM(s) Oral daily  glucagon  Injectable 1 milliGRAM(s) IntraMuscular once  hydrALAZINE Injectable 5 milliGRAM(s) IV Push every 6 hours PRN  insulin glargine Injectable (LANTUS) 10 Unit(s) SubCutaneous at bedtime  insulin lispro (ADMELOG) corrective regimen sliding scale   SubCutaneous Before meals and at bedtime  labetalol 100 milliGRAM(s) Oral two times a day  levothyroxine 137 MICROGram(s) Oral daily  lisinopril 10 milliGRAM(s) Oral daily  memantine 10 milliGRAM(s) Oral two times a day  nystatin Powder 1 Application(s) Topical two times a day  ondansetron Injectable 4 milliGRAM(s) IV Push every 6 hours PRN  piperacillin/tazobactam IVPB.. 3.375 Gram(s) IV Intermittent every 8 hours    SOCIAL       FAMILY   FAMILY HISTORY:    REVIEW OF SYSTEMS:   UNABLE TO OBTAIN               Physical Exam:  ICU Vital Signs Last 24 Hrs  T(C): 36.6 (01 Aug 2022 10:15), Max: 37 (31 Jul 2022 14:30)  T(F): 97.8 (01 Aug 2022 10:15), Max: 98.6 (31 Jul 2022 14:30)  HR: 75 (01 Aug 2022 10:15) (75 - 101)  BP: 108/57 (01 Aug 2022 10:15) (90/50 - 173/70)  BP(mean): --  ABP: --  ABP(mean): --  RR: 18 (01 Aug 2022 10:15) (18 - 20)  SpO2: 100% (01 Aug 2022 10:15) (93% - 100%)    O2 Parameters below as of 01 Aug 2022 10:15  Patient On (Oxygen Delivery Method): room air          GEN: NAD, LEHTARIGC  HEENT: normocephalic and atraumatic. EOMI. WALDEMAR.    NECK: Supple. No carotid bruits.  No lymphadenopathy or thyromegaly.  LUNGS: Clear to auscultation.  HEART: Regular rate and rhythm without murmur.  ABDOMEN: Soft, nontender, and nondistended.  Positive bowel sounds.    : No CVA tenderness  EXTREMITIES: Without any cyanosis, clubbing, rash, lesions or edema.  MSK: no joint swelling  NEUROLOGIC LETHARGIC ANSWERS SIMPLE QUESTIONS         Labs:  Vitals:  ============  T(F): 97.8 (01 Aug 2022 10:15), Max: 98.6 (31 Jul 2022 14:30)  HR: 75 (01 Aug 2022 10:15)  BP: 108/57 (01 Aug 2022 10:15)  RR: 18 (01 Aug 2022 10:15)  SpO2: 100% (01 Aug 2022 10:15) (93% - 100%)  temp max in last 48H T(F): , Max: 99.6 (07-31-22 @ 10:41)    =======================================================  Current Antibiotics:  piperacillin/tazobactam IVPB.. 3.375 Gram(s) IV Intermittent every 8 hours    Other medications:  amLODIPine   Tablet 2.5 milliGRAM(s) Oral daily  dextrose 5%. 1000 milliLiter(s) IV Continuous <Continuous>  dextrose 5%. 1000 milliLiter(s) IV Continuous <Continuous>  dextrose 50% Injectable 25 Gram(s) IV Push once  dextrose 50% Injectable 12.5 Gram(s) IV Push once  dextrose 50% Injectable 25 Gram(s) IV Push once  enoxaparin Injectable 40 milliGRAM(s) SubCutaneous every 24 hours  ferrous    sulfate 325 milliGRAM(s) Oral daily  glucagon  Injectable 1 milliGRAM(s) IntraMuscular once  insulin glargine Injectable (LANTUS) 10 Unit(s) SubCutaneous at bedtime  insulin lispro (ADMELOG) corrective regimen sliding scale   SubCutaneous Before meals and at bedtime  labetalol 100 milliGRAM(s) Oral two times a day  levothyroxine 137 MICROGram(s) Oral daily  lisinopril 10 milliGRAM(s) Oral daily  memantine 10 milliGRAM(s) Oral two times a day  nystatin Powder 1 Application(s) Topical two times a day      =======================================================  Labs:                        9.5    8.07  )-----------( 406      ( 01 Aug 2022 06:43 )             31.7     08-01    146<H>  |  112<H>  |  3.7<L>  ----------------------------<  125<H>  3.6   |  22.0  |  0.83    Ca    8.3<L>      01 Aug 2022 06:43  Phos  3.3     08-01  Mg     2.2     08-01    TPro  5.6<L>  /  Alb  2.5<L>  /  TBili  0.3<L>  /  DBili  x   /  AST  30  /  ALT  17  /  AlkPhos  76  07-31      Culture - Urine (collected 07-28-22 @ 23:30)  Source: Catheterized Catheterized  Final Report (08-01-22 @ 10:34):    50,000 - 99,000 CFU/mL Escherichia coli  Organism: Escherichia coli (08-01-22 @ 10:34)  Organism: Escherichia coli (08-01-22 @ 10:34)    Sensitivities:      -  Amikacin: S <=16      -  Amoxicillin/Clavulanic Acid: R >16/8      -  Ampicillin: R >16 These ampicillin results predict results for amoxicillin      -  Ampicillin/Sulbactam: R >16/8 Enterobacter, Klebsiella aerogenes, Citrobacter, and Serratia may develop resistance during prolonged therapy (3-4 days)      -  Aztreonam: S <=4      -  Cefazolin: R >16 ([id number]) For uncomplicated UTI with K. pneumoniae, E. coli, or P. mirablis: LORI <=16 is sensitive and LORI >=32 is resistant. This also predicts results for oral agents cefaclor, cefdinir, cefpodoxime, cefprozil, cefuroxime axetil, cephalexin and locarbef for uncomplicated UTI. Note that some isolates may be susceptible to these agents while testing resistant to cefazolin.      -  Cefepime: S <=2      -  Cefoxitin: S <=8      -  Ceftriaxone: S <=1 Enterobacter, Klebsiella aerogenes, Citrobacter, and Serratia may develop resistance during prolonged therapy      -  Ciprofloxacin: S <=0.25      -  Ertapenem: S <=0.5      -  Gentamicin: S <=2      -  Imipenem: S <=1      -  Levofloxacin: S <=0.5      -  Meropenem: S <=1      -  Nitrofurantoin: S <=32 Should not be used to treat pyelonephritis      -  Piperacillin/Tazobactam: R >64      -  Tigecycline: S <=2      -  Tobramycin: S <=2      -  Trimethoprim/Sulfamethoxazole: R >2/38      Method Type: LORI    Culture - Blood (collected 07-28-22 @ 22:26)  Source: .Blood Blood    Culture - Blood (collected 07-28-22 @ 22:16)  Source: .Blood Blood    Culture - Urine (collected 07-23-22 @ 16:50)  Source: Clean Catch Clean Catch (Midstream)  Final Report (07-25-22 @ 10:20):    <10,000 CFU/mL Normal Urogenital Irene    Culture - Blood (collected 07-23-22 @ 16:48)  Source: .Blood Blood  Final Report (07-29-22 @ 02:01):    No Growth Final    Culture - Blood (collected 07-23-22 @ 16:41)  Source: .Blood Blood  Final Report (07-29-22 @ 02:00):    No Growth Final    Culture - Blood (collected 07-19-22 @ 14:10)  Source: .Blood Blood-Peripheral  Final Report (07-24-22 @ 19:01):    No Growth Final    Culture - Blood (collected 07-19-22 @ 14:10)  Source: .Blood Blood-Peripheral  Final Report (07-24-22 @ 19:01):    No Growth Final    Culture - Urine (collected 07-19-22 @ 14:10)  Source: Catheterized Catheterized  Final Report (07-20-22 @ 17:23):    No growth    Culture - Blood (collected 07-18-22 @ 22:10)  Source: .Blood Blood-Venous  Final Report (07-23-22 @ 23:00):    No Growth Final    Culture - Blood (collected 07-18-22 @ 22:10)  Source: .Blood Blood-Venous  Final Report (07-23-22 @ 23:00):    No Growth Final    Culture - Urine (collected 07-18-22 @ 22:01)  Source: Clean Catch Clean Catch (Midstream)  Final Report (07-21-22 @ 08:33):    10,000 - 49,000 CFU/mL Streptococcus agalactiae (Group B) isolated    Group B streptococci are susceptible to ampicillin,    penicillin and cefazolin, but may be resistant to    erythromycin and clindamycin.    Recommendations for intrapartum prophylaxis for Group B    streptococci are penicillin or ampicillin.      Creatinine, Serum: 0.83 mg/dL (08-01-22 @ 06:43)  Creatinine, Serum: 0.83 mg/dL (07-31-22 @ 04:45)  Creatinine, Serum: 0.86 mg/dL (07-30-22 @ 06:30)  Creatinine, Serum: 0.74 mg/dL (07-29-22 @ 04:40)  Creatinine, Serum: 0.85 mg/dL (07-28-22 @ 22:26)  Creatinine, Serum: 0.78 mg/dL (07-28-22 @ 04:32)    Procalcitonin, Serum: 2.21 ng/mL (07-28-22 @ 22:26)  Procalcitonin, Serum: 0.15 ng/mL (07-19-22 @ 14:00)      Ferritin, Serum: 85 ng/mL (07-24-22 @ 06:34)      WBC Count: 8.07 K/uL (08-01-22 @ 06:43)  WBC Count: 7.30 K/uL (07-31-22 @ 04:45)  WBC Count: 5.87 K/uL (07-30-22 @ 06:30)  WBC Count: 7.97 K/uL (07-29-22 @ 04:40)  WBC Count: 7.91 K/uL (07-28-22 @ 22:26)  WBC Count: 8.54 K/uL (07-28-22 @ 04:32)    COVID-19 PCR: NotDetec (07-31-22 @ 06:00)  COVID-19 PCR: NotDetec (07-25-22 @ 05:00)  SARS-CoV-2: NotDetec (07-18-22 @ 17:07)      Alkaline Phosphatase, Serum: 76 U/L (07-31-22 @ 04:45)  Alkaline Phosphatase, Serum: 81 U/L (07-28-22 @ 22:26)  Alanine Aminotransferase (ALT/SGPT): 17 U/L (07-31-22 @ 04:45)  Alanine Aminotransferase (ALT/SGPT): 16 U/L (07-28-22 @ 22:26)  Aspartate Aminotransferase (AST/SGOT): 30 U/L (07-31-22 @ 04:45)  Aspartate Aminotransferase (AST/SGOT): 28 U/L (07-28-22 @ 22:26)  Bilirubin Total, Serum: 0.3 mg/dL (07-31-22 @ 04:45)  Bilirubin Total, Serum: 0.4 mg/dL (07-28-22 @ 22:26)

## 2022-08-01 NOTE — PROGRESS NOTE ADULT - ASSESSMENT
86 y/o F w/ PMH of HTN, hypothyroid, DM, anemia, hypothyroidism, was brought ihn by family for AMS and progressing generalized weakness.  Pt also had associated subjective feevers and poor po intake.  Pt was hypoxic on admission and w/ hypertensive urgency.  Pt was polaced on emperic abxs.  CTH significant for findings of NPH.  On 7/19 pt she developed nausea and had episodes of NBNB emesis at that time w bradycardia and hypotension; RRT was called.  Pt was subsequenty intubated for airway protection and upgraded to MICU.  Pressors were initiated.  CT chest/abdo was done, revealed Bilateral lower lobe consolidations, which may represent atelectasis and/or pneumonia. Small bilateral pleural effusions.  All cultures have had NGTD.   Pt also noted to have severe AS on TTE however no inervention planned per cardiology.  Pt was extubated 7/21 and downgraded to medicine 7/22.  Hospital course further complicated by recurring fevers but w/u negative to date.  Persistent encephalopathy, likely multifactorial, initially 2/2 sepsis and dehydration, in the setting of underlying dementia w/ suspicion for NPH.   now possible cauti , resumed on 7 day course of zosyn 7/29.     >Sepsis POA, s/p septic shock possibly 2/2 aspiration PNA  -Acute hypoxic respiratory failure s/p intubation w/ mechanical ventilation possibly 2/2 aspiration / airway protection   - Pt remains acute requiring supplemental O2 now likely from atelectasis    - s/p pressor support and steroid course for hypotension while in MICU (since d/c'd)  - No leukocytosis at this time  - resumed on purred diet / thickened liquids after speech re eval. tolerating well , needs to be reminded  occasionally to swallow food in mouth.   - aspiration precautions.   - IS   - s/p abxs course for asp pna   - repeat bcxs 7/28 neg     >concern for Cauti , UA w/ pyuria concerning for CAUTI;   - family had refused removal of marcano as per chart records, now dcd.   - Marcano removed for tov, bladder scans ordered q6h   - Procalcitonin 2.21 but lactic acid 1.2   - bcxs from 7/28 neg   - ua positive , urine cxs 50-99k ecoli , awaiting sensitivities   - resumed on iv  zosyn 7/29 , duration as per id    - Trend CBC and monitor temperature   - c/w flomax       >Acute metabolic encephalopathy, multifactorial 2/2 dehydration, sepsis and NPH in the setting of underlying dementia / slowly improving   - Suspicion for NPH given CTH findings and hx of ataxia and incontinence   - Made NPO given risk for aspiration w/ decline in mental status  - Neurosurgery evaluated pt and noted pt is not a candidate for any intracranial surgery such as  shunt due to mx chronic comorbidities therefor as per neurology will not pursue an LP   - 7/29 with Worsening encephalopathy likely 2/2 suspected CAUTI , resumed on iv zosyn , now improving   - Anticipate mentation will improve w/ tx of underlying infection   - MRI canceled by neurology and recommended outpt f/u for dementia management    - c/w memantine   - Fall and aspiration precautions   - PT re eval       >Severe Aortic stenosis  - TTE reviewed and noted above   - Per cardio pt is not a candidate for intervention given her dementia   - Caution w/ IVFs given risk of volume overload  - Cardio following and recs noted      >Normocytic anemia   - s/p 1u PRBC transfusion during hospital course w/ adequate response and h/h remains stable  - Etiology unclear but no active bleeding noted and hemodynamically stable at this time   - s/p short course of venofer, transition to po supplementation once able to tolerate po   - Low suspicion for GIB at this time but if concern increases I will consult GI  - trend CBC closely and transfuse if Hb<8       >Hypertensive urgency   - Resolved  - increased lisinopril to 20 mg po qd for better bp control 7/31 , patient had episode of low bp  overnight. will change lisinopril back to 10 mg po qd and add norvasc 2.5 with holding parametes   - c/w labetalol   - c/w hydralazine prn        >Hypokalemia / Hypophosphatemia   - Resolved   - Monitor lytes and replete as needed  - Maintain K>4, Mg>2 and phos>3      >Diabetes mellitus / hypoglycemia   - A1c 10.8  - No further episodes of hypoglycemis since adjustment of insulin regimen  - resumed  on lantus 10 units qhs  as  now on pureed diet 7/31   - monitor fsg       >Hypothyroidism  - c/w  Synthroid      >VTE ppx: sq lovenox     Dispo:  pt re eval , oob to chair as tolerated. plan of care discussed in detail with patient and son and  at [facility]. family is now open to [facility] for short term    86 y/o F w/ PMH of HTN, hypothyroid, DM, anemia, hypothyroidism, was brought ihn by family for AMS and progressing generalized weakness.  Pt also had associated subjective feevers and poor po intake.  Pt was hypoxic on admission and w/ hypertensive urgency.  Pt was polaced on emperic abxs.  CTH significant for findings of NPH.  On 7/19 pt she developed nausea and had episodes of NBNB emesis at that time w bradycardia and hypotension; RRT was called.  Pt was subsequenty intubated for airway protection and upgraded to MICU.  Pressors were initiated.  CT chest/abdo was done, revealed Bilateral lower lobe consolidations, which may represent atelectasis and/or pneumonia. Small bilateral pleural effusions.  All cultures have had NGTD.   Pt also noted to have severe AS on TTE however no inervention planned per cardiology.  Pt was extubated 7/21 and downgraded to medicine 7/22.  Hospital course further complicated by recurring fevers but w/u negative to date.  Persistent encephalopathy, likely multifactorial, initially 2/2 sepsis and dehydration, in the setting of underlying dementia w/ suspicion for NPH.   now possible cauti , resumed on 7 day course of zosyn 7/29.     >Sepsis POA, s/p septic shock possibly 2/2 aspiration PNA  -Acute hypoxic respiratory failure s/p intubation w/ mechanical ventilation possibly 2/2 aspiration / airway protection   - Pt remains acute requiring supplemental O2 now likely from atelectasis    - s/p pressor support and steroid course for hypotension while in MICU (since d/c'd)  - No leukocytosis at this time  - resumed on purred diet / thickened liquids after speech re eval. tolerating well , needs to be reminded  occasionally to swallow food in mouth.   - aspiration precautions.   - IS   - s/p abxs course for asp pna   - repeat bcxs 7/28 neg     >concern for Cauti , UA w/ pyuria concerning for CAUTI;   - family had refused removal of marcano as per chart records, now dcd.   - Marcano removed for tov, bladder scans ordered q6h   - Procalcitonin 2.21 but lactic acid 1.2   - bcxs from 7/28 neg   - ua positive , urine cxs 50-99k ecoli , awaiting sensitivities   - resumed on iv  zosyn 7/29 , duration as per id    - Trend CBC and monitor temperature   - c/w flomax       >Acute metabolic encephalopathy, multifactorial 2/2 dehydration, sepsis and NPH in the setting of underlying dementia / slowly improving   - Suspicion for NPH given CTH findings and hx of ataxia and incontinence   - Made NPO given risk for aspiration w/ decline in mental status  - Neurosurgery evaluated pt and noted pt is not a candidate for any intracranial surgery such as  shunt due to mx chronic comorbidities therefor as per neurology will not pursue an LP   - 7/29 with Worsening encephalopathy likely 2/2 suspected CAUTI , resumed on iv zosyn , now improving   - Anticipate mentation will improve w/ tx of underlying infection   - MRI canceled by neurology and recommended outpt f/u for dementia management    - c/w memantine   - Fall and aspiration precautions   - PT re eval       >Severe Aortic stenosis  - TTE reviewed and noted above   - Per cardio pt is not a candidate for intervention given her dementia   - Caution w/ IVFs given risk of volume overload  - Cardio following and recs noted      >Normocytic anemia   - s/p 1u PRBC transfusion during hospital course w/ adequate response and h/h remains stable  - Etiology unclear but no active bleeding noted and hemodynamically stable at this time   - s/p short course of venofer, transition to po supplementation once able to tolerate po   - Low suspicion for GIB at this time but if concern increases I will consult GI  - trend CBC closely and transfuse if Hb<8       >Hypertensive urgency   - Resolved  - increased lisinopril to 20 mg po qd for better bp control 7/31 , patient had episode of low bp  overnight. will change lisinopril back to 10 mg po qd and add norvasc 2.5 with holding parametes   - c/w labetalol   - c/w hydralazine prn        >Hypokalemia / Hypophosphatemia   - Resolved   - Monitor lytes and replete as needed  - Maintain K>4, Mg>2 and phos>3      >Diabetes mellitus / hypoglycemia   - A1c 10.8  - No further episodes of hypoglycemis since adjustment of insulin regimen  - resumed  on lantus 10 units qhs  as  now on pureed diet 7/31   - monitor fsg       >Hypothyroidism  - c/w  Synthroid      >VTE ppx: sq lovenox     Dispo:  pt re eval , oob to chair as tolerated. plan of care discussed in detail with patient and son and  at [facility]. family is now open to [facility] for short term . discussed with cm / yumiko for dc planning. [email or web address] to discuss with family about rehab options

## 2022-08-01 NOTE — PROGRESS NOTE ADULT - SUBJECTIVE AND OBJECTIVE BOX
Chief Complaint:  AMS    SUBJECTIVE / OVERNIGHT EVENTS: patient seen and eval. comfortable.  and son at bedside. ate 40% food. as per nursing report patient was pocketing some apple sauce when meds were given earlier. family states that was able to swallow all food with assit . needs to be reminded to swallow food intermittently. family open to rehab options.     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 slwo in responding to place but knows shes in hospital. moving all ext , has generalized deconditioning   SKIN: no edema noted                            RADIOLOGY & ADDITIONAL TESTS:    < from: Xray Chest 1 View AP/PA. (07.28.22 @ 21:48) >  There is a mild to moderate right effusion new since July 23.    < end of copied text >      < from: US Abdomen Upper Quadrant Right (07.20.22 @ 14:31) >  IMPRESSION:  No evidence of cholelithiasis or acute cholecystitis.  Mildly enlarged fatty liver.    < end of copied text >  < from: CT Abdomen and Pelvis w/ IV Cont (07.20.22 @ 05:26) >  IMPRESSION:    1. Bilateral lower lobe consolidations, which may represent atelectasis   and/or pneumonia.  2. Small bilateral pleural effusions.  3. Mild gallbladder wall thickening. Recommend ultrasound for further   assessment.  4. No CT evidence of bowel obstruction, diverticulitis, or colitis.    < end of copied text >      < from: CT Head No Cont (07.19.22 @ 12:55) >    IMPRESSION:    Findings consistent with persistent moderate normal pressure   hydrocephalus.    No acute intracranial hemorrhage or brain edema.    < end of copied text >      < from: TTE Echo Complete w/o Contrast w/ Doppler (07.19.22 @ 20:08) >  Summary:   1. Technically suboptimal study.   2. Estimated pulmonary artery systolic pressure is 38.9 mmHg assuming a   right atrial pressure of 8 mmHg, which is consistent with borderline   pulmonary hypertension.   3. Hyperdynamic global left ventricular systolic function.   4. Left ventricular ejection fraction, by visual estimation, is >75%.   5. Severely enlarged left atrium.   6. Spectral Doppler shows impaired relaxation pattern of left   ventricular myocardial filling (Grade I diastolic dysfunction).   7. Mildly enlarged right atrium.   8. Mild mitral annular calcification.   9. Trace mitral valve regurgitation.  10. Mild tricuspid regurgitation.  11. Endocardial visualization was enhanced with intravenous echo contrast.  12. Sclerotic aortic valve with decreased opening.  13. Stroke volume across LVOT is 31 ml. ERROL by continuity equation is 0.6   cm^2. DVI of 0.21 also suggestive of severe aortic valve stenosis.  14. There is no evidence of pericardial effusion.    < end of copied text >                                         9.5    8.07  )-----------( 406      ( 01 Aug 2022 06:43 )             31.7   08-01    146<H>  |  112<H>  |  3.7<L>  ----------------------------<  125<H>  3.6   |  22.0  |  0.83    Ca    8.3<L>      01 Aug 2022 06:43  Phos  3.3     08-01  Mg     2.2     08-01    TPro  5.6<L>  /  Alb  2.5<L>  /  TBili  0.3<L>  /  DBili  x   /  AST  30  /  ALT  17  /  AlkPhos  76  07-31      MEDICATIONS  (STANDING):  amLODIPine   Tablet 2.5 milliGRAM(s) Oral daily  dextrose 5%. 1000 milliLiter(s) (50 mL/Hr) IV Continuous <Continuous>  dextrose 5%. 1000 milliLiter(s) (100 mL/Hr) IV Continuous <Continuous>  dextrose 50% Injectable 25 Gram(s) IV Push once  dextrose 50% Injectable 12.5 Gram(s) IV Push once  dextrose 50% Injectable 25 Gram(s) IV Push once  enoxaparin Injectable 40 milliGRAM(s) SubCutaneous every 24 hours  ferrous    sulfate 325 milliGRAM(s) Oral daily  glucagon  Injectable 1 milliGRAM(s) IntraMuscular once  insulin glargine Injectable (LANTUS) 10 Unit(s) SubCutaneous at bedtime  insulin lispro (ADMELOG) corrective regimen sliding scale   SubCutaneous Before meals and at bedtime  labetalol 100 milliGRAM(s) Oral two times a day  levothyroxine 137 MICROGram(s) Oral daily  lisinopril 10 milliGRAM(s) Oral daily  memantine 10 milliGRAM(s) Oral two times a day  nystatin Powder 1 Application(s) Topical two times a day  piperacillin/tazobactam IVPB.. 3.375 Gram(s) IV Intermittent every 8 hours    MEDICATIONS  (PRN):  acetaminophen     Tablet .. 650 milliGRAM(s) Oral every 6 hours PRN Temp greater or equal to 38C (100.4F), Mild Pain (1 - 3)  aluminum hydroxide/magnesium hydroxide/simethicone Suspension 30 milliLiter(s) Oral every 6 hours PRN Dyspepsia  dextrose Oral Gel 15 Gram(s) Oral once PRN Blood Glucose LESS THAN 70 milliGRAM(s)/deciliter  hydrALAZINE Injectable 5 milliGRAM(s) IV Push every 6 hours PRN SBP> 170  ondansetron Injectable 4 milliGRAM(s) IV Push every 6 hours PRN Nausea and/or Vomiting

## 2022-08-01 NOTE — PROGRESS NOTE ADULT - SUBJECTIVE AND OBJECTIVE BOX
OVERNIGHT EVENTS: had some asymptomatic hypotension last night     Present Symptoms:     Dyspnea: none   Nausea/Vomiting: No  Anxiety:  No  Depression: unable   Fatigue: unable   Loss of appetite: unable   Constipation: none     Pain: none             Character-            Duration-            Effect-            Factors-            Frequency-            Location-            Severity-    Pain AD Score:  [email or web address] (press ctrl + left click to view)    Review of Systems: Reviewed                     Negative: no chest pain                      All others negative    MEDICATIONS  (STANDING):  dextrose 5%. 1000 milliLiter(s) (50 mL/Hr) IV Continuous <Continuous>  dextrose 5%. 1000 milliLiter(s) (100 mL/Hr) IV Continuous <Continuous>  dextrose 50% Injectable 25 Gram(s) IV Push once  dextrose 50% Injectable 12.5 Gram(s) IV Push once  dextrose 50% Injectable 25 Gram(s) IV Push once  enoxaparin Injectable 40 milliGRAM(s) SubCutaneous every 24 hours  ferrous    sulfate 325 milliGRAM(s) Oral daily  glucagon  Injectable 1 milliGRAM(s) IntraMuscular once  insulin glargine Injectable (LANTUS) 10 Unit(s) SubCutaneous at bedtime  insulin lispro (ADMELOG) corrective regimen sliding scale   SubCutaneous Before meals and at bedtime  labetalol 100 milliGRAM(s) Oral two times a day  levothyroxine 137 MICROGram(s) Oral daily  lisinopril 20 milliGRAM(s) Oral daily  memantine 10 milliGRAM(s) Oral two times a day  nystatin Powder 1 Application(s) Topical two times a day  piperacillin/tazobactam IVPB.. 3.375 Gram(s) IV Intermittent every 8 hours    MEDICATIONS  (PRN):  acetaminophen     Tablet .. 650 milliGRAM(s) Oral every 6 hours PRN Temp greater or equal to 38C (100.4F), Mild Pain (1 - 3)  aluminum hydroxide/magnesium hydroxide/simethicone Suspension 30 milliLiter(s) Oral every 6 hours PRN Dyspepsia  dextrose Oral Gel 15 Gram(s) Oral once PRN Blood Glucose LESS THAN 70 milliGRAM(s)/deciliter  hydrALAZINE Injectable 5 milliGRAM(s) IV Push every 6 hours PRN SBP> 170  ondansetron Injectable 4 milliGRAM(s) IV Push every 6 hours PRN Nausea and/or Vomiting    PHYSICAL EXAM:    Vital Signs Last 24 Hrs  T(C): 36.6 (01 Aug 2022 05:07), Max: 37 (31 Jul 2022 14:30)  T(F): 97.9 (01 Aug 2022 05:07), Max: 98.6 (31 Jul 2022 14:30)  HR: 75 (01 Aug 2022 07:31) (75 - 101)  BP: 113/62 (01 Aug 2022 07:31) (90/50 - 173/70)  BP(mean): --  RR: 18 (01 Aug 2022 05:07) (18 - 20)  SpO2: 94% (01 Aug 2022 05:07) (93% - 94%)    Parameters below as of 31 Jul 2022 20:40  Patient On (Oxygen Delivery Method): nasal cannula  O2 Flow (L/min): 2    General: alert, knows she is in the hospital     Karnofsky:  20-30 %    HEENT: normal     Lungs: comfortable     CV: normal      GI: normal    : incontinent      MSK: bedbound/wheelchair bound    Skin: no rash    LABS:                      9.5    8.07  )-----------( 406      ( 01 Aug 2022 06:43 )             31.7     08-01    146<H>  |  112<H>  |  3.7<L>  ----------------------------<  125<H>  3.6   |  22.0  |  0.83    Ca    8.3<L>      01 Aug 2022 06:43  Phos  3.3     08-01  Mg     2.2     08-01    TPro  5.6<L>  /  Alb  2.5<L>  /  TBili  0.3<L>  /  DBili  x   /  AST  30  /  ALT  17  /  AlkPhos  76  07-31    I&O's Summary    RADIOLOGY & ADDITIONAL STUDIES:    < from: Xray Chest 1 View AP/PA. (07.28.22 @ 21:48) >  ACC: [id number] EXAM:  XR CHEST AP OR PA 1V                          PROCEDURE DATE:  07/28/2022          INTERPRETATION:  AP chest on July 28, 2022 at 9:43 PM. Patient has fever.    Heart likely enlarged.    There is a mild to moderate right effusion new since July 23.    Heart obscures left base.    IMPRESSION: As above.    --- End of Report ---    < end of copied text >      ADVANCE DIRECTIVES/TREATMENT PREFERENCES:  Full code

## 2022-08-01 NOTE — PROGRESS NOTE ADULT - ASSESSMENT
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     #1 Dementia  - seems to have been progressing over the last 3 years - per  has been sleeping more, eating less, etc   - poor overall prognosis  - supportive measures, frequent reorientation    #2 NPH  - not a candidate for shunting  - appreciate neurosurgery input  - given not a candidate for shunting, would not make sense to subject her to an LP    #3 Severe AS    - poor prognosis  - not a candidate for valve surgery   - hospice appropriate    #4 Debility  - needs assist with ADLs  - PT/OT, out of bed encouragement     #5 Encounter for palliative care  - see goals of care

## 2022-08-01 NOTE — CHART NOTE - NSCHARTNOTEFT_GEN_A_CORE
Source: Patient [ ]  Family [ x ]   other [ ]    Current Diet: Diet, Pureed:   Mildly Thick Liquids (MILDTHICKLIQS) (07-30-22 @ 15:49)    PO intake:  < 50% [ x ]   50-75%  [ ]   %  [ ]  other :    Source for PO intake [ ] Patient [ x ] family [ ] chart [ ] staff [ ] other    Current Weight: 150.7lbs (7/20)  141lbs (7/21)      Pertinent Medications: MEDICATIONS  (STANDING):  amLODIPine   Tablet 2.5 milliGRAM(s) Oral daily  dextrose 5%. 1000 milliLiter(s) (50 mL/Hr) IV Continuous <Continuous>  dextrose 5%. 1000 milliLiter(s) (100 mL/Hr) IV Continuous <Continuous>  dextrose 50% Injectable 25 Gram(s) IV Push once  dextrose 50% Injectable 12.5 Gram(s) IV Push once  dextrose 50% Injectable 25 Gram(s) IV Push once  enoxaparin Injectable 40 milliGRAM(s) SubCutaneous every 24 hours  ferrous    sulfate 325 milliGRAM(s) Oral daily  glucagon  Injectable 1 milliGRAM(s) IntraMuscular once  insulin glargine Injectable (LANTUS) 10 Unit(s) SubCutaneous at bedtime  insulin lispro (ADMELOG) corrective regimen sliding scale   SubCutaneous Before meals and at bedtime  labetalol 100 milliGRAM(s) Oral two times a day  levothyroxine 137 MICROGram(s) Oral daily  lisinopril 10 milliGRAM(s) Oral daily  memantine 10 milliGRAM(s) Oral two times a day  nystatin Powder 1 Application(s) Topical two times a day  piperacillin/tazobactam IVPB.. 3.375 Gram(s) IV Intermittent every 8 hours    MEDICATIONS  (PRN):  acetaminophen     Tablet .. 650 milliGRAM(s) Oral every 6 hours PRN Temp greater or equal to 38C (100.4F), Mild Pain (1 - 3)  aluminum hydroxide/magnesium hydroxide/simethicone Suspension 30 milliLiter(s) Oral every 6 hours PRN Dyspepsia  dextrose Oral Gel 15 Gram(s) Oral once PRN Blood Glucose LESS THAN 70 milliGRAM(s)/deciliter  hydrALAZINE Injectable 5 milliGRAM(s) IV Push every 6 hours PRN SBP> 170  ondansetron Injectable 4 milliGRAM(s) IV Push every 6 hours PRN Nausea and/or Vomiting    Pertinent Labs: CBC Full  -  ( 01 Aug 2022 06:43 )  WBC Count : 8.07 K/uL  RBC Count : 3.63 M/uL  Hemoglobin : 9.5 g/dL  Hematocrit : 31.7 %  Platelet Count - Automated : 406 K/uL  Mean Cell Volume : 87.3 fl  Mean Cell Hemoglobin : 26.2 pg  Mean Cell Hemoglobin Concentration : 30.0 gm/dL    08-01 Na146 mmol/L<H> Glu 125 mg/dL<H> K+ 3.6 mmol/L Cr  0.83 mg/dL BUN 3.7 mg/dL<L> Phos 3.3 mg/dL Alb n/a   PAB n/a         Skin: no skin breakdown  Edema: no edema    Nutrition focused physical exam previously conducted - found signs of malnutrition [ ]absent [ x ]present    Subcutaneous fat loss: [ x mod ] Orbital fat pads region, [x mod ]Buccal fat region, [ ]Triceps region,  [ ]Ribs region    Muscle wasting: [x sev ]Temples region, [ x mod]Clavicle region, [ x mod]Shoulder region, [ ]Scapula region, [ ]Interosseous region,  [ ]thigh region, [ ]Calf region    Estimated Needs:   [ x ] no change since previous assessment  [ ] recalculated:     CURRENT NUTRITION DIAGNOSIS: Patient remains at high nutrition risk secondary to Malnutrition (severe, acute)  related to inability to meet sufficient protein-energy in setting of advanced age, dementia, declining functional status, failure to thrive, poor po intake as evidenced by meeting <50% nutrient needs >5 days and NFPE findings.    Patient seen at bedside. Sister and son both present at time of interview. Currently tolerating feeds of pureed and mildly thick liquids. Discussed with family that PO intake of food is often more accepted with family. Also discussed PO intake and appetite may increase in more comfortable and familiar setting. Recommended feeding patient preferred foods as tolerated. Provided support to family as needed. Per documentation, poor prognosis noted. RD to remain available.      RECOMMENDATIONS:   1) Honor family and patient wishes  2) Encourage PO intake as tolerated  3) monitor tolerance to diet consistency        Monitoring and Evaluation:   [ x ] PO intake [ x ] Tolerance to diet prescription [X] Weights  [X] Follow up per protocol [X] Labs: Source: Patient [ ]  Family [ x ]   other [ ]    Current Diet: Diet, Pureed:   Mildly Thick Liquids (MILDTHICKLIQS) (07-30-22 @ 15:49)    PO intake:  < 50% [ x ]   50-75%  [ ]   %  [ ]  other :    Source for PO intake [ ] Patient [ x ] family [ ] chart [ ] staff [ ] other    Current Weight: 150.7lbs (7/20)  141lbs (7/21)      Pertinent Medications: MEDICATIONS  (STANDING):  amLODIPine   Tablet 2.5 milliGRAM(s) Oral daily  dextrose 5%. 1000 milliLiter(s) (50 mL/Hr) IV Continuous <Continuous>  dextrose 5%. 1000 milliLiter(s) (100 mL/Hr) IV Continuous <Continuous>  dextrose 50% Injectable 25 Gram(s) IV Push once  dextrose 50% Injectable 12.5 Gram(s) IV Push once  dextrose 50% Injectable 25 Gram(s) IV Push once  enoxaparin Injectable 40 milliGRAM(s) SubCutaneous every 24 hours  ferrous    sulfate 325 milliGRAM(s) Oral daily  glucagon  Injectable 1 milliGRAM(s) IntraMuscular once  insulin glargine Injectable (LANTUS) 10 Unit(s) SubCutaneous at bedtime  insulin lispro (ADMELOG) corrective regimen sliding scale   SubCutaneous Before meals and at bedtime  labetalol 100 milliGRAM(s) Oral two times a day  levothyroxine 137 MICROGram(s) Oral daily  lisinopril 10 milliGRAM(s) Oral daily  memantine 10 milliGRAM(s) Oral two times a day  nystatin Powder 1 Application(s) Topical two times a day  piperacillin/tazobactam IVPB.. 3.375 Gram(s) IV Intermittent every 8 hours    MEDICATIONS  (PRN):  acetaminophen     Tablet .. 650 milliGRAM(s) Oral every 6 hours PRN Temp greater or equal to 38C (100.4F), Mild Pain (1 - 3)  aluminum hydroxide/magnesium hydroxide/simethicone Suspension 30 milliLiter(s) Oral every 6 hours PRN Dyspepsia  dextrose Oral Gel 15 Gram(s) Oral once PRN Blood Glucose LESS THAN 70 milliGRAM(s)/deciliter  hydrALAZINE Injectable 5 milliGRAM(s) IV Push every 6 hours PRN SBP> 170  ondansetron Injectable 4 milliGRAM(s) IV Push every 6 hours PRN Nausea and/or Vomiting    Pertinent Labs: CBC Full  -  ( 01 Aug 2022 06:43 )  WBC Count : 8.07 K/uL  RBC Count : 3.63 M/uL  Hemoglobin : 9.5 g/dL  Hematocrit : 31.7 %  Platelet Count - Automated : 406 K/uL  Mean Cell Volume : 87.3 fl  Mean Cell Hemoglobin : 26.2 pg  Mean Cell Hemoglobin Concentration : 30.0 gm/dL    08-01 Na146 mmol/L<H> Glu 125 mg/dL<H> K+ 3.6 mmol/L Cr  0.83 mg/dL BUN 3.7 mg/dL<L> Phos 3.3 mg/dL Alb n/a   PAB n/a         Skin: no skin breakdown  Edema: no edema    Nutrition focused physical exam previously conducted - found signs of malnutrition [ ]absent [ x ]present    Subcutaneous fat loss: [ x mod ] Orbital fat pads region, [x mod ]Buccal fat region, [ ]Triceps region,  [ ]Ribs region    Muscle wasting: [x sev ]Temples region, [ x mod]Clavicle region, [ x mod]Shoulder region, [ ]Scapula region, [ ]Interosseous region,  [ ]thigh region, [ ]Calf region    Estimated Needs:   [ x ] no change since previous assessment  [ ] recalculated:     CURRENT NUTRITION DIAGNOSIS: Patient remains at high nutrition risk secondary to Malnutrition (severe, acute)  related to inability to meet sufficient protein-energy in setting of advanced age, dementia, declining functional status, failure to thrive, poor po intake as evidenced by meeting <50% nutrient needs >5 days and NFPE findings.    Patient seen at bedside. Sister and son both present at time of interview. Currently tolerating feeds of pureed and mildly thick liquids. Discussed with family that PO intake of food is often more accepted with family. Also discussed PO intake and appetite may increase in more comfortable and familiar setting out of hospital. Recommended feeding patient preferred foods as tolerated. Provided support to family as needed. Per documentation, poor prognosis noted. RD to remain available.      RECOMMENDATIONS:   1) Honor family and patient wishes  2) Encourage PO intake as tolerated  3) monitor tolerance to diet consistency        Monitoring and Evaluation:   [ x ] PO intake [ x ] Tolerance to diet prescription [X] Weights  [X] Follow up per protocol [X] Labs:

## 2022-08-01 NOTE — PROGRESS NOTE ADULT - ASSESSMENT
87F who presented from home when her  had noted that she had profound weakness and was unable to stand or feed herself. The patient is noted to have a history of progressive dementia and was limited in terms of providing a history. Additional information was obtained from the patient's daughter at the bedside. The patient was note to have had progressive dementia over the past few years. She lives with her  at home and there was no recent fevers, chills, or sick contacts. She is reported to be compliant with her medications at home which her daughter prepares for her. She is noted to have a history of poorly controlled blood pressures despite her cardiac medications. At baseline she was noted to be able to ambulate independently at a slow rate. There were no complaints of chest pain, palpitations, dyspnea, or cough. The patient was otherwise in her usual state of health. Upon arrival of EMS, the patient was reported to have been hypotensive and hypoxic. In the emergency department, the patient was noted to have fever and leukocytosis. There were no similar episodes in the past and no known aggravating or relieving factors. The patient's daughter did note that the patient had vision loss and pain in the right eye in the past and was planned for surgery but the pain resolved spontaneously. She was also noted to have occasional swelling and weeping wounds on the left leg for which she would be treated with antibiotics.   AS ABOVE PT ADMITTED ON 7/18 PT WTIH HYPOTENSION ADN WAS INTUBATED  PLACED IN ICU EVENTUAL EXTUBATION P[T FOUND TO HAVE SEVERE AORTIC STENOSIS  ALSO WITH  CT SCAN WITH PROBABLE NPH   PT WITH RECENT COURSE OF ABX FOR POSSIBLE ASP PNEUMONIA  WAS  OFF ABX    URINE CX DONE 50-99K ECOLI   UNCLEAR HOW IT WAS COLLECTED AND COLONY COUNT  IS LESS THAN 100K  HOWEVER  PT UNABLE TO DESCRIBE  SX  WOULD SUGGEST CHANGE TO ROCEPHIN   EVENTUAL CHANGE TO ORAL ABX TO COMPLETE 7 DAYS   D/W PA  SPOKE TO FAMILY AT BEDSIDE      87F who presented from home when her  had noted that she had profound weakness and was unable to stand or feed herself. The patient is noted to have a history of progressive dementia and was limited in terms of providing a history. Additional information was obtained from the patient's daughter at the bedside. The patient was note to have had progressive dementia over the past few years. She lives with her  at home and there was no recent fevers, chills, or sick contacts. She is reported to be compliant with her medications at home which her daughter prepares for her. She is noted to have a history of poorly controlled blood pressures despite her cardiac medications. At baseline she was noted to be able to ambulate independently at a slow rate. There were no complaints of chest pain, palpitations, dyspnea, or cough. The patient was otherwise in her usual state of health. Upon arrival of EMS, the patient was reported to have been hypotensive and hypoxic. In the emergency department, the patient was noted to have fever and leukocytosis. There were no similar episodes in the past and no known aggravating or relieving factors. The patient's daughter did note that the patient had vision loss and pain in the right eye in the past and was planned for surgery but the pain resolved spontaneously. She was also noted to have occasional swelling and weeping wounds on the left leg for which she would be treated with antibiotics.   AS ABOVE PT ADMITTED ON 7/18 PT WTIH HYPOTENSION ADN WAS INTUBATED  PLACED IN ICU EVENTUAL EXTUBATION P[T FOUND TO HAVE SEVERE AORTIC STENOSIS  ALSO WITH  CT SCAN WITH PROBABLE NPH   PT WITH RECENT COURSE OF ABX FOR POSSIBLE ASP PNEUMONIA  WAS  OFF ABX    URINE CX DONE 50-99K ECOLI   UNCLEAR HOW IT WAS COLLECTED AND COLONY COUNT  IS LESS THAN 100K  HOWEVER  PT UNABLE TO DESCRIBE  SX  WOULD SUGGEST CHANGE TO ROCEPHIN   EVENTUAL CHANGE TO ORAL ABX TO COMPLETE 7 DAYS   D/W PA  SPOKE TO FAMILY AT BEDSIDE   WILL FOLLOW UP AS NEEDED PLEASE CALL IF QUESTIONS

## 2022-08-02 NOTE — PROGRESS NOTE ADULT - ASSESSMENT
88 y/o F w/ PMH of HTN, hypothyroid, DM, anemia, hypothyroidism, was brought ihn by family for AMS and progressing generalized weakness.  Pt also had associated subjective feevers and poor po intake.  Pt was hypoxic on admission and w/ hypertensive urgency.  Pt was polaced on emperic abxs.  CTH significant for findings of NPH.  On 7/19 pt she developed nausea and had episodes of NBNB emesis at that time w bradycardia and hypotension; RRT was called.  Pt was subsequenty intubated for airway protection and upgraded to MICU.  Pressors were initiated.  CT chest/abdo was done, revealed Bilateral lower lobe consolidations, which may represent atelectasis and/or pneumonia. Small bilateral pleural effusions.  All cultures have had NGTD.   Pt also noted to have severe AS on TTE however no inervention planned per cardiology.  Pt was extubated 7/21 and downgraded to medicine 7/22.  Hospital course further complicated by recurring fevers but w/u negative to date.  Persistent encephalopathy, likely multifactorial, initially 2/2 sepsis and dehydration, in the setting of underlying dementia w/ suspicion for NPH.   now possible cauti , now on ceftraixone , can be switched to po on dc for total course of 7 days as per id. patient inconsistent with meal size. borderline hypoglycemic this am again. dcd lantus , c/w ssi only.     >Sepsis POA, s/p septic shock possibly 2/2 aspiration PNA  -Acute hypoxic respiratory failure s/p intubation w/ mechanical ventilation possibly 2/2 aspiration / airway protection   - s/p pressor support and steroid course for hypotension while in MICU (since d/c'd)  - No leukocytosis at this time  - resumed on purred diet / thickened liquids after speech re eval.   - aspiration precautions,  IS   - s/p abxs course for asp pna     >concern for Cauti , UA w/ pyuria concerning for CAUTI;   - family had refused removal of marcano as per chart records, now dcd.   - Marcano removed for tov, bladder scans ordered q6h   - Procalcitonin 2.21 but lactic acid 1.2   - bcxs from 7/28 neg   - ua positive , urine cxs 50-99k ecoli , awaiting sensitivities   - resumed on iv  zosyn 7/29 , now changed to ceftraxone iv , can be changed to po abxs  vantin 200 mg po bid to comeplete x 7 days course on dc   - Trend CBC and monitor temperature   - c/w flomax       >Acute metabolic encephalopathy, multifactorial 2/2 dehydration, sepsis and NPH in the setting of underlying dementia / slowly improving   - Suspicion for NPH given CTH findings and hx of ataxia and incontinence   - Neurosurgery evaluated pt and noted pt is not a candidate for any intracranial surgery such as  shunt due to mx chronic comorbidities therefor as per neurology will not pursue an LP   - 7/29 with Worsening encephalopathy likely 2/2 suspected CAUTI , resumed on abxs for 7 days   - MRI canceled by neurolo sx and recommended outpt f/u for dementia management    - c/w memantine   - Fall and aspiration precautions  - son wants to discuss option of using acetazolamide for nph. requested neuro sx follow up to discuss if possible option.        >Severe Aortic stenosis  - TTE reviewed and noted above   - Per cardio pt is not a candidate for intervention given her dementia   - Caution w/ IVFs given risk of volume overload  - Cardio consulted. no further interventions at this time.       > hypernatremia / mild   -likel due to dec po intake.   - will give gentle ivf  x24 hours       >Normocytic anemia   - s/p 1u PRBC transfusion during hospital course w/ adequate response and h/h remains stable  - Etiology unclear but no active bleeding noted and hemodynamically stable at this time   - s/p short course of venofer, transition to po supplementation once able to tolerate po   - Low suspicion for GIB at this time but if concern increases I will consult GI  - trend CBC closely and transfuse if Hb<8       >Hypertensive urgency   - Resolved  - c/w lisinopril 10 mg po qd in am   - will  change norvasc 2.5 mg po to pm dose to avoid all meds given at same time hence causing low bp   - c/w labetalol   - c/w hydralazine prn        >Diabetes mellitus / hypoglycemia   - A1c 10.8  -blood glucose on am labs 72, fsg 77   - will dc lantus , c/w ssi only due to inconsistent meal amount patient is eating   - monitor fsg       >Hypothyroidism  - c/w  Synthroid  -Thyroid Stimulating Hormone, Serum: 0.81 uIU/mL (07.19.22 @ 02:47)      >VTE ppx: sq lovenox     Dispo:  pt re eval , oob to chair as tolerated. plan of care discussed in detail with patient and son John  at [facility].  now family is now open to eventual [facility] for short term once medically stable.  discussed with freya / yumiko for dc planning. [email or web address] to discuss with family about rehab options

## 2022-08-02 NOTE — PROGRESS NOTE ADULT - SUBJECTIVE AND OBJECTIVE BOX
Chief Complaint:  AMS    SUBJECTIVE / OVERNIGHT EVENTS: patient seen and eval. comfortable. sleepy but arousable. son and sister at bedside. son states that patient didnot tolerate dinner last night due to texture not completely pureed. patient noted to be borderline hypoglycemic this am. had some breakfast. repeat fsg 103.  no new fever episodes.   no worsening of sob.   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 slow in responding to place but knows shes in hospital. moving all ext , has generalized deconditioning   SKIN: no edema noted                            RADIOLOGY & ADDITIONAL TESTS:    < from: Xray Chest 1 View AP/PA. (07.28.22 @ 21:48) >  There is a mild to moderate right effusion new since July 23.    < end of copied text >      < from: US Abdomen Upper Quadrant Right (07.20.22 @ 14:31) >  IMPRESSION:  No evidence of cholelithiasis or acute cholecystitis.  Mildly enlarged fatty liver.    < end of copied text >  < from: CT Abdomen and Pelvis w/ IV Cont (07.20.22 @ 05:26) >  IMPRESSION:    1. Bilateral lower lobe consolidations, which may represent atelectasis   and/or pneumonia.  2. Small bilateral pleural effusions.  3. Mild gallbladder wall thickening. Recommend ultrasound for further   assessment.  4. No CT evidence of bowel obstruction, diverticulitis, or colitis.    < end of copied text >      < from: CT Head No Cont (07.19.22 @ 12:55) >    IMPRESSION:    Findings consistent with persistent moderate normal pressure   hydrocephalus.    No acute intracranial hemorrhage or brain edema.    < end of copied text >      < from: TTE Echo Complete w/o Contrast w/ Doppler (07.19.22 @ 20:08) >  Summary:   1. Technically suboptimal study.   2. Estimated pulmonary artery systolic pressure is 38.9 mmHg assuming a   right atrial pressure of 8 mmHg, which is consistent with borderline   pulmonary hypertension.   3. Hyperdynamic global left ventricular systolic function.   4. Left ventricular ejection fraction, by visual estimation, is >75%.   5. Severely enlarged left atrium.   6. Spectral Doppler shows impaired relaxation pattern of left   ventricular myocardial filling (Grade I diastolic dysfunction).   7. Mildly enlarged right atrium.   8. Mild mitral annular calcification.   9. Trace mitral valve regurgitation.  10. Mild tricuspid regurgitation.  11. Endocardial visualization was enhanced with intravenous echo contrast.  12. Sclerotic aortic valve with decreased opening.  13. Stroke volume across LVOT is 31 ml. ERROL by continuity equation is 0.6   cm^2. DVI of 0.21 also suggestive of severe aortic valve stenosis.  14. There is no evidence of pericardial effusion.    < end of copied text >        MEDICATIONS  (STANDING):  cefTRIAXone   IVPB 1000 milliGRAM(s) IV Intermittent every 24 hours  dextrose 5%. 1000 milliLiter(s) (50 mL/Hr) IV Continuous <Continuous>  dextrose 5%. 1000 milliLiter(s) (100 mL/Hr) IV Continuous <Continuous>  dextrose 50% Injectable 25 Gram(s) IV Push once  dextrose 50% Injectable 12.5 Gram(s) IV Push once  dextrose 50% Injectable 25 Gram(s) IV Push once  enoxaparin Injectable 40 milliGRAM(s) SubCutaneous every 24 hours  ferrous    sulfate 325 milliGRAM(s) Oral daily  glucagon  Injectable 1 milliGRAM(s) IntraMuscular once  insulin lispro (ADMELOG) corrective regimen sliding scale   SubCutaneous Before meals and at bedtime  labetalol 100 milliGRAM(s) Oral two times a day  levothyroxine 137 MICROGram(s) Oral daily  lisinopril 10 milliGRAM(s) Oral daily  memantine 10 milliGRAM(s) Oral two times a day  nystatin Powder 1 Application(s) Topical two times a day  sodium chloride 0.45%. 1000 milliLiter(s) (75 mL/Hr) IV Continuous <Continuous>    MEDICATIONS  (PRN):  acetaminophen     Tablet .. 650 milliGRAM(s) Oral every 6 hours PRN Temp greater or equal to 38C (100.4F), Mild Pain (1 - 3)  aluminum hydroxide/magnesium hydroxide/simethicone Suspension 30 milliLiter(s) Oral every 6 hours PRN Dyspepsia  dextrose Oral Gel 15 Gram(s) Oral once PRN Blood Glucose LESS THAN 70 milliGRAM(s)/deciliter  hydrALAZINE Injectable 5 milliGRAM(s) IV Push every 6 hours PRN SBP> 170  ondansetron Injectable 4 milliGRAM(s) IV Push every 6 hours PRN Nausea and/or Vomiting                            9.7    7.85  )-----------( 412      ( 02 Aug 2022 04:37 )             33.0   08-02    146<H>  |  112<H>  |  6.1<L>  ----------------------------<  72  3.8   |  21.0<L>  |  0.89    Ca    8.5      02 Aug 2022 04:37  Phos  3.3     08-01  Mg     2.1     08-02

## 2022-08-02 NOTE — CHART NOTE - NSCHARTNOTEFT_GEN_A_CORE
Chart note  Family wanted to speak to neurosurgery with reference to hydrocephalus and the usage of acetazolamide for nph.   Pt's son was informed that normally the Neurosurgery team has not used acetazolamide for treatment of NPH  Pt's son was spoken to by Dr Ramirez and was informed that hydrocephalus was not a concern  The zwanger persiri film from 2019 were compared by Dr Ramirez and the ventricular size was unchanged when comparing 2019 t0 2022    Pt son was informed that if neurosurgery were to treat a shunt is our expertise and necessarily medication usage. Pt will continue to follow with the medicine service and neurology. Chart note  Family wanted to speak to neurosurgery with reference to hydrocephalus and the usage of acetazolamide for nph.   Pt's son was informed that normally the Neurosurgery team has not used acetazolamide for treatment of NPH  Pt's son was spoken to by Dr Ramirez and was informed that hydrocephalus was not a concern  The zwanger persiri film from 2019 were compared by Dr Ramirez and the ventricular size was unchanged when comparing 2019 t0 2022    Pt son was informed that if neurosurgery were to treat a shunt is our expertise and not necessarily medication usage. Pt will continue to follow with the medicine service and neurology.    NSGY Attg:    see above    patient seen and discussion had with family by PA staff    family has continued questions with regard to ventriculomegaly which has been stable from previous imaging    overall, the patient remains a poor candidate for consideration of  shunting  patient with AS  multiple medical co-morbidities  no CSF drainage trial with improvement in MS and gait  recall prn

## 2022-08-03 NOTE — CHART NOTE - NSCHARTNOTEFT_GEN_A_CORE
Hospitalist Note    Case discussed with daytime hospitalist  Imaging reviewed  CTH WO +Possible CVA   MRI pending  TTE pending  EEG pending  ASA suppository ordered  Add Statin  Check A1C/TSH/CE  PT/OT/Speech  Remain NPO  Tele/  Neurocheck q4  Epileptology following  Neuro Consulted for AM for CVA

## 2022-08-03 NOTE — PROGRESS NOTE ADULT - SUBJECTIVE AND OBJECTIVE BOX
Chief Complaint:  AMS    SUBJECTIVE / OVERNIGHT EVENTS: patient seen and eval. patient noted to be more lethargic this am. responds to name , says ' yes ' to name  , keeps eyes closed . tmax 100.9 this am.   patient was noted to have nc o2 off this am with o2 sats in 80s as per family , nc o2 3 L placed back on with o2 sats in 90s.     Vital Signs Last 24 Hrs  T(C): 38.3 (03 Aug 2022 10:48), Max: 38.3 (03 Aug 2022 10:48)  T(F): 100.9 (03 Aug 2022 10:48), Max: 100.9 (03 Aug 2022 10:48)  HR: 86 (03 Aug 2022 10:48) (77 - 86)  BP: 145/60 (03 Aug 2022 10:48) (110/65 - 147/62)  BP(mean): 88 (03 Aug 2022 10:48) (88 - 88)  RR: 20 (03 Aug 2022 10:48) (18 - 20)  SpO2: 92% (03 Aug 2022 10:48) (92% - 96%)    Parameters below as of 03 Aug 2022 10:48  Patient On (Oxygen Delivery Method): nasal cannula  O2 Flow (L/min): 3      GENERAL: more lethargic this am. 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 responds to name , says ' yes ' to name  , keeps eyes closed. unable to  fully asses motor or sensory exam.                          RADIOLOGY & ADDITIONAL TESTS:    < from: Xray Chest 1 View AP/PA. (07.28.22 @ 21:48) >  There is a mild to moderate right effusion new since July 23.    < end of copied text >      < from: US Abdomen Upper Quadrant Right (07.20.22 @ 14:31) >  IMPRESSION:  No evidence of cholelithiasis or acute cholecystitis.  Mildly enlarged fatty liver.    < end of copied text >  < from: CT Abdomen and Pelvis w/ IV Cont (07.20.22 @ 05:26) >  IMPRESSION:    1. Bilateral lower lobe consolidations, which may represent atelectasis   and/or pneumonia.  2. Small bilateral pleural effusions.  3. Mild gallbladder wall thickening. Recommend ultrasound for further   assessment.  4. No CT evidence of bowel obstruction, diverticulitis, or colitis.    < end of copied text >      < from: CT Head No Cont (07.19.22 @ 12:55) >    IMPRESSION:    Findings consistent with persistent moderate normal pressure   hydrocephalus.    No acute intracranial hemorrhage or brain edema.    < end of copied text >      < from: TTE Echo Complete w/o Contrast w/ Doppler (07.19.22 @ 20:08) >  Summary:   1. Technically suboptimal study.   2. Estimated pulmonary artery systolic pressure is 38.9 mmHg assuming a   right atrial pressure of 8 mmHg, which is consistent with borderline   pulmonary hypertension.   3. Hyperdynamic global left ventricular systolic function.   4. Left ventricular ejection fraction, by visual estimation, is >75%.   5. Severely enlarged left atrium.   6. Spectral Doppler shows impaired relaxation pattern of left   ventricular myocardial filling (Grade I diastolic dysfunction).   7. Mildly enlarged right atrium.   8. Mild mitral annular calcification.   9. Trace mitral valve regurgitation.  10. Mild tricuspid regurgitation.  11. Endocardial visualization was enhanced with intravenous echo contrast.  12. Sclerotic aortic valve with decreased opening.  13. Stroke volume across LVOT is 31 ml. ERROL by continuity equation is 0.6   cm^2. DVI of 0.21 also suggestive of severe aortic valve stenosis.  14. There is no evidence of pericardial effusion.    < end of copied text >                              9.4    5.93  )-----------( 382      ( 03 Aug 2022 05:54 )             31.1   08-03    139  |  108<H>  |  5.7<L>  ----------------------------<  104<H>  4.1   |  21.0<L>  |  0.77    Ca    8.1<L>      03 Aug 2022 05:54  Mg     2.0     08-03    Thyroid Stimulating Hormone, Serum: 0.81 uIU/mL (07.19.22 @ 02:47)        MEDICATIONS  (STANDING):  amLODIPine   Tablet 2.5 milliGRAM(s) Oral at bedtime  cefTRIAXone   IVPB 1000 milliGRAM(s) IV Intermittent every 24 hours  dextrose 5% + sodium chloride 0.45%. 1000 milliLiter(s) (75 mL/Hr) IV Continuous <Continuous>  dextrose 5%. 1000 milliLiter(s) (50 mL/Hr) IV Continuous <Continuous>  dextrose 5%. 1000 milliLiter(s) (100 mL/Hr) IV Continuous <Continuous>  dextrose 50% Injectable 25 Gram(s) IV Push once  dextrose 50% Injectable 12.5 Gram(s) IV Push once  dextrose 50% Injectable 25 Gram(s) IV Push once  enoxaparin Injectable 40 milliGRAM(s) SubCutaneous every 24 hours  ferrous    sulfate 325 milliGRAM(s) Oral daily  glucagon  Injectable 1 milliGRAM(s) IntraMuscular once  insulin lispro (ADMELOG) corrective regimen sliding scale   SubCutaneous Before meals and at bedtime  labetalol 100 milliGRAM(s) Oral two times a day  levothyroxine 137 MICROGram(s) Oral daily  lisinopril 10 milliGRAM(s) Oral daily  memantine 10 milliGRAM(s) Oral two times a day  nystatin Powder 1 Application(s) Topical two times a day    MEDICATIONS  (PRN):  acetaminophen     Tablet .. 650 milliGRAM(s) Oral every 6 hours PRN Temp greater or equal to 38C (100.4F), Mild Pain (1 - 3)  aluminum hydroxide/magnesium hydroxide/simethicone Suspension 30 milliLiter(s) Oral every 6 hours PRN Dyspepsia  dextrose Oral Gel 15 Gram(s) Oral once PRN Blood Glucose LESS THAN 70 milliGRAM(s)/deciliter  hydrALAZINE Injectable 5 milliGRAM(s) IV Push every 6 hours PRN SBP> 170  ondansetron Injectable 4 milliGRAM(s) IV Push every 6 hours PRN Nausea and/or Vomiting                             Chief Complaint:  AMS    SUBJECTIVE / OVERNIGHT EVENTS: patient seen and eval. patient noted to be more lethargic this am. responds to name , says ' yes ' to name  , keeps eyes closed . tmax 100.9 this am.   patient was noted to have nc o2 off this am with o2 sats in 80s as per family , nc o2 3 L placed back on with o2 sats in 90s. patient was on nc 3 L when am meds , vs were done as per nursing staff.     Vital Signs Last 24 Hrs  T(C): 38.3 (03 Aug 2022 10:48), Max: 38.3 (03 Aug 2022 10:48)  T(F): 100.9 (03 Aug 2022 10:48), Max: 100.9 (03 Aug 2022 10:48)  HR: 86 (03 Aug 2022 10:48) (77 - 86)  BP: 145/60 (03 Aug 2022 10:48) (110/65 - 147/62)  BP(mean): 88 (03 Aug 2022 10:48) (88 - 88)  RR: 20 (03 Aug 2022 10:48) (18 - 20)  SpO2: 92% (03 Aug 2022 10:48) (92% - 96%)    Parameters below as of 03 Aug 2022 10:48  Patient On (Oxygen Delivery Method): nasal cannula  O2 Flow (L/min): 3      GENERAL: more lethargic this am. 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 responds to name , says ' yes ' to name  , keeps eyes closed. unable to  fully asses motor or sensory exam.                          RADIOLOGY & ADDITIONAL TESTS:    < from: Xray Chest 1 View AP/PA. (07.28.22 @ 21:48) >  There is a mild to moderate right effusion new since July 23.    < end of copied text >      < from: US Abdomen Upper Quadrant Right (07.20.22 @ 14:31) >  IMPRESSION:  No evidence of cholelithiasis or acute cholecystitis.  Mildly enlarged fatty liver.    < end of copied text >  < from: CT Abdomen and Pelvis w/ IV Cont (07.20.22 @ 05:26) >  IMPRESSION:    1. Bilateral lower lobe consolidations, which may represent atelectasis   and/or pneumonia.  2. Small bilateral pleural effusions.  3. Mild gallbladder wall thickening. Recommend ultrasound for further   assessment.  4. No CT evidence of bowel obstruction, diverticulitis, or colitis.    < end of copied text >      < from: CT Head No Cont (07.19.22 @ 12:55) >    IMPRESSION:    Findings consistent with persistent moderate normal pressure   hydrocephalus.    No acute intracranial hemorrhage or brain edema.    < end of copied text >      < from: TTE Echo Complete w/o Contrast w/ Doppler (07.19.22 @ 20:08) >  Summary:   1. Technically suboptimal study.   2. Estimated pulmonary artery systolic pressure is 38.9 mmHg assuming a   right atrial pressure of 8 mmHg, which is consistent with borderline   pulmonary hypertension.   3. Hyperdynamic global left ventricular systolic function.   4. Left ventricular ejection fraction, by visual estimation, is >75%.   5. Severely enlarged left atrium.   6. Spectral Doppler shows impaired relaxation pattern of left   ventricular myocardial filling (Grade I diastolic dysfunction).   7. Mildly enlarged right atrium.   8. Mild mitral annular calcification.   9. Trace mitral valve regurgitation.  10. Mild tricuspid regurgitation.  11. Endocardial visualization was enhanced with intravenous echo contrast.  12. Sclerotic aortic valve with decreased opening.  13. Stroke volume across LVOT is 31 ml. ERROL by continuity equation is 0.6   cm^2. DVI of 0.21 also suggestive of severe aortic valve stenosis.  14. There is no evidence of pericardial effusion.    < end of copied text >                              9.4    5.93  )-----------( 382      ( 03 Aug 2022 05:54 )             31.1   08-03    139  |  108<H>  |  5.7<L>  ----------------------------<  104<H>  4.1   |  21.0<L>  |  0.77    Ca    8.1<L>      03 Aug 2022 05:54  Mg     2.0     08-03    Thyroid Stimulating Hormone, Serum: 0.81 uIU/mL (07.19.22 @ 02:47)        MEDICATIONS  (STANDING):  amLODIPine   Tablet 2.5 milliGRAM(s) Oral at bedtime  cefTRIAXone   IVPB 1000 milliGRAM(s) IV Intermittent every 24 hours  dextrose 5% + sodium chloride 0.45%. 1000 milliLiter(s) (75 mL/Hr) IV Continuous <Continuous>  dextrose 5%. 1000 milliLiter(s) (50 mL/Hr) IV Continuous <Continuous>  dextrose 5%. 1000 milliLiter(s) (100 mL/Hr) IV Continuous <Continuous>  dextrose 50% Injectable 25 Gram(s) IV Push once  dextrose 50% Injectable 12.5 Gram(s) IV Push once  dextrose 50% Injectable 25 Gram(s) IV Push once  enoxaparin Injectable 40 milliGRAM(s) SubCutaneous every 24 hours  ferrous    sulfate 325 milliGRAM(s) Oral daily  glucagon  Injectable 1 milliGRAM(s) IntraMuscular once  insulin lispro (ADMELOG) corrective regimen sliding scale   SubCutaneous Before meals and at bedtime  labetalol 100 milliGRAM(s) Oral two times a day  levothyroxine 137 MICROGram(s) Oral daily  lisinopril 10 milliGRAM(s) Oral daily  memantine 10 milliGRAM(s) Oral two times a day  nystatin Powder 1 Application(s) Topical two times a day    MEDICATIONS  (PRN):  acetaminophen     Tablet .. 650 milliGRAM(s) Oral every 6 hours PRN Temp greater or equal to 38C (100.4F), Mild Pain (1 - 3)  aluminum hydroxide/magnesium hydroxide/simethicone Suspension 30 milliLiter(s) Oral every 6 hours PRN Dyspepsia  dextrose Oral Gel 15 Gram(s) Oral once PRN Blood Glucose LESS THAN 70 milliGRAM(s)/deciliter  hydrALAZINE Injectable 5 milliGRAM(s) IV Push every 6 hours PRN SBP> 170  ondansetron Injectable 4 milliGRAM(s) IV Push every 6 hours PRN Nausea and/or Vomiting                             Chief Complaint:  AMS    SUBJECTIVE / OVERNIGHT EVENTS: patient seen and eval. patient noted to be more lethargic this am. responds to name , says ' yes ' to name  , keeps eyes closed . tmax 100.9 this am.   patient was noted to have nc o2 off this am with o2 sats in 80s as per family , unclear if patient pulled o2 off.  nc o2 3 L placed back on with o2 sats in 90s. patient was on nc 3 L when am meds , vs were done as per nursing staff.     Vital Signs Last 24 Hrs  T(C): 38.3 (03 Aug 2022 10:48), Max: 38.3 (03 Aug 2022 10:48)  T(F): 100.9 (03 Aug 2022 10:48), Max: 100.9 (03 Aug 2022 10:48)  HR: 86 (03 Aug 2022 10:48) (77 - 86)  BP: 145/60 (03 Aug 2022 10:48) (110/65 - 147/62)  BP(mean): 88 (03 Aug 2022 10:48) (88 - 88)  RR: 20 (03 Aug 2022 10:48) (18 - 20)  SpO2: 92% (03 Aug 2022 10:48) (92% - 96%)    Parameters below as of 03 Aug 2022 10:48  Patient On (Oxygen Delivery Method): nasal cannula  O2 Flow (L/min): 3      GENERAL: more lethargic this am. 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 responds to name , says ' yes ' to name  , keeps eyes closed. unable to  fully asses motor or sensory exam.                          RADIOLOGY & ADDITIONAL TESTS:    < from: Xray Chest 1 View AP/PA. (07.28.22 @ 21:48) >  There is a mild to moderate right effusion new since July 23.    < end of copied text >      < from: US Abdomen Upper Quadrant Right (07.20.22 @ 14:31) >  IMPRESSION:  No evidence of cholelithiasis or acute cholecystitis.  Mildly enlarged fatty liver.    < end of copied text >  < from: CT Abdomen and Pelvis w/ IV Cont (07.20.22 @ 05:26) >  IMPRESSION:    1. Bilateral lower lobe consolidations, which may represent atelectasis   and/or pneumonia.  2. Small bilateral pleural effusions.  3. Mild gallbladder wall thickening. Recommend ultrasound for further   assessment.  4. No CT evidence of bowel obstruction, diverticulitis, or colitis.    < end of copied text >      < from: CT Head No Cont (07.19.22 @ 12:55) >    IMPRESSION:    Findings consistent with persistent moderate normal pressure   hydrocephalus.    No acute intracranial hemorrhage or brain edema.    < end of copied text >      < from: TTE Echo Complete w/o Contrast w/ Doppler (07.19.22 @ 20:08) >  Summary:   1. Technically suboptimal study.   2. Estimated pulmonary artery systolic pressure is 38.9 mmHg assuming a   right atrial pressure of 8 mmHg, which is consistent with borderline   pulmonary hypertension.   3. Hyperdynamic global left ventricular systolic function.   4. Left ventricular ejection fraction, by visual estimation, is >75%.   5. Severely enlarged left atrium.   6. Spectral Doppler shows impaired relaxation pattern of left   ventricular myocardial filling (Grade I diastolic dysfunction).   7. Mildly enlarged right atrium.   8. Mild mitral annular calcification.   9. Trace mitral valve regurgitation.  10. Mild tricuspid regurgitation.  11. Endocardial visualization was enhanced with intravenous echo contrast.  12. Sclerotic aortic valve with decreased opening.  13. Stroke volume across LVOT is 31 ml. ERROL by continuity equation is 0.6   cm^2. DVI of 0.21 also suggestive of severe aortic valve stenosis.  14. There is no evidence of pericardial effusion.    < end of copied text >                              9.4    5.93  )-----------( 382      ( 03 Aug 2022 05:54 )             31.1   08-03    139  |  108<H>  |  5.7<L>  ----------------------------<  104<H>  4.1   |  21.0<L>  |  0.77    Ca    8.1<L>      03 Aug 2022 05:54  Mg     2.0     08-03    Thyroid Stimulating Hormone, Serum: 0.81 uIU/mL (07.19.22 @ 02:47)        MEDICATIONS  (STANDING):  amLODIPine   Tablet 2.5 milliGRAM(s) Oral at bedtime  cefTRIAXone   IVPB 1000 milliGRAM(s) IV Intermittent every 24 hours  dextrose 5% + sodium chloride 0.45%. 1000 milliLiter(s) (75 mL/Hr) IV Continuous <Continuous>  dextrose 5%. 1000 milliLiter(s) (50 mL/Hr) IV Continuous <Continuous>  dextrose 5%. 1000 milliLiter(s) (100 mL/Hr) IV Continuous <Continuous>  dextrose 50% Injectable 25 Gram(s) IV Push once  dextrose 50% Injectable 12.5 Gram(s) IV Push once  dextrose 50% Injectable 25 Gram(s) IV Push once  enoxaparin Injectable 40 milliGRAM(s) SubCutaneous every 24 hours  ferrous    sulfate 325 milliGRAM(s) Oral daily  glucagon  Injectable 1 milliGRAM(s) IntraMuscular once  insulin lispro (ADMELOG) corrective regimen sliding scale   SubCutaneous Before meals and at bedtime  labetalol 100 milliGRAM(s) Oral two times a day  levothyroxine 137 MICROGram(s) Oral daily  lisinopril 10 milliGRAM(s) Oral daily  memantine 10 milliGRAM(s) Oral two times a day  nystatin Powder 1 Application(s) Topical two times a day    MEDICATIONS  (PRN):  acetaminophen     Tablet .. 650 milliGRAM(s) Oral every 6 hours PRN Temp greater or equal to 38C (100.4F), Mild Pain (1 - 3)  aluminum hydroxide/magnesium hydroxide/simethicone Suspension 30 milliLiter(s) Oral every 6 hours PRN Dyspepsia  dextrose Oral Gel 15 Gram(s) Oral once PRN Blood Glucose LESS THAN 70 milliGRAM(s)/deciliter  hydrALAZINE Injectable 5 milliGRAM(s) IV Push every 6 hours PRN SBP> 170  ondansetron Injectable 4 milliGRAM(s) IV Push every 6 hours PRN Nausea and/or Vomiting

## 2022-08-03 NOTE — PROGRESS NOTE ADULT - ASSESSMENT
86 y/o F w/ PMH of HTN, hypothyroid, DM, anemia, hypothyroidism, was brought ihn by family for AMS and progressing generalized weakness.  Pt also had associated subjective feevers and poor po intake.  Pt was hypoxic on admission and w/ hypertensive urgency.  Pt was polaced on emperic abxs.  CTH significant for findings of NPH.  On 7/19 pt she developed nausea and had episodes of NBNB emesis at that time w bradycardia and hypotension; RRT was called.  Pt was subsequenty intubated for airway protection and upgraded to MICU.  Pressors were initiated.  CT chest/abdo was done, revealed Bilateral lower lobe consolidations, which may represent atelectasis and/or pneumonia. Small bilateral pleural effusions.    Pt also noted to have severe AS on TTE however no inervention planned per cardiology.  Pt was extubated 7/21 and downgraded to medicine 7/22.  Hospital course further complicated by recurring fevers , resumed on antibiotics 7/29 for possible uti.  patient with Persistent encephalopathy, likely multifactorial, initially 2/2 sepsis and dehydration, in the setting of underlying dementia w/ suspicion for NPH. had some improvement in mental status intermittently.  patient inconsistent with meal intakes. allso noted to pocket food and keep in her mouth while eating. concern for risk of aspiration brought up mutiple times to the family, family wishes to c/w po feeds. 8/3 patient with new fever episode of 100.9. patient also noted to be more lethargic. discussed with neurology. will get mr, eeg and repeat cth as per neuro recs.     >Sepsis POA, s/p septic shock possibly 2/2 aspiration PNA  -Acute hypoxic respiratory failure s/p intubation w/ mechanical ventilation possibly 2/2 aspiration / airway protection   - s/p pressor support and steroid course for hypotension while in MICU (since d/c'd)  - resumed on purred diet / thickened liquids after speech re eval, hold feeds while sleeping   - aspiration precautions,  IS , hold feeds while asleep   - 8/3 fever episode with tmax 100.9. concern for recurrent aspiration   - will check cxr , abgs   - id follow up     >Acute metabolic encephalopathy, multifactorial 2/2 dehydration, sepsis and NPH in the setting of underlying dementia / with some intermittent improvement / now worse again 8/3   - Suspicion for NPH given CTH findings and hx of ataxia and incontinence   - Neurosurgery evaluated pt and noted pt is not a candidate for any intracranial surgery such as  shunt due to mx chronic comorbidities therefor as per neurology will not pursue an LP   - 7/29 with Worsening encephalopathy possible due to uti. patient was started on abxs at that time with some mental status improvement  - 8/3 patient noted to be more lethargic , episode of fever , concern for recurrent infection/ possible aspiration    - MRI initially was canceled by neurolo sx and recommended outpt f/u. family wanted to discuss usage of acetazolamide for nph with neuro sx team. as per neuro sx team  acetazolamide not used for treatment of NPH. acetazolamide not recommended by neurology team as well.   - discussed with neurology - even though patient's mental status did improve initially with antibiotics , its not back to her baseline as per family.  repeat cth, mri , eeg as per neuro   - c/w memantine , patient has been started on it since 2019        >concern for possible uti  - family had refused removal of marcano as per chart records, now dcd.   - Marcano removed for tov, bladder scans ordered q6h   - bcxs from 7/28 neg   - urine cxs 50-99k ecoli , awaiting sensitivities   - resumed on iv  zosyn 7/29 , now changed to ceftraxone iv , can be changed to po abxs  vantin 200 mg po bid to comeplete x 7 days course on dc   - c/w flomax   - 8/3 new episode of fever , ? possible aspiration   - will follow up with id       >Severe Aortic stenosis  - TTE reviewed and noted above   - Per cardio pt is not a candidate for intervention given her dementia   - Caution w/ IVFs given risk of volume overload  - Cardio consulted. no further interventions at this time.       > hypernatremia / improved   -likel due to dec po intake.   - c/w gentle ivf       >Normocytic anemia   - s/p 1u PRBC transfusion during hospital course w/ adequate response and h/h remains stable  - Etiology unclear but no active bleeding noted and hemodynamically stable at this time   - s/p short course of venofer, transition to po supplementation once able to tolerate po   - Low suspicion for GIB at this time but if concern increases I will consult GI  - trend CBC closely and transfuse if Hb<8       >Hypertensive urgency / improved   - c/w lisinopril 10 mg po qd in am   -  norvasc 2.5 mg po to pm dose to avoid all meds given at same time hence causing low bp   - c/w labetalol   - c/w hydralazine prn        >Diabetes mellitus  - A1c 10.8  - will dc lantus , c/w ssi only due to inconsistent meal amount patient is eating  and to avoid hypoglycemia episodes.   - monitor fsg       >Hypothyroidism  - c/w  Synthroid  -Thyroid Stimulating Hormone, Serum: 0.81 uIU/mL (07.19.22 @ 02:47)      >VTE ppx: sq lovenox     Dispo:  remains acute , episode of fever 8/3 , worsening of mental status , cxs sent  88 y/o F w/ PMH of HTN, hypothyroid, DM, anemia, hypothyroidism, was brought ihn by family for AMS and progressing generalized weakness.  Pt also had associated subjective feevers and poor po intake.  Pt was hypoxic on admission and w/ hypertensive urgency.  Pt was polaced on emperic abxs.  CTH significant for findings of NPH.  On 7/19 pt she developed nausea and had episodes of NBNB emesis at that time w bradycardia and hypotension; RRT was called.  Pt was subsequenty intubated for airway protection and upgraded to MICU.  Pressors were initiated.  CT chest/abdo was done, revealed Bilateral lower lobe consolidations, which may represent atelectasis and/or pneumonia. Small bilateral pleural effusions.    Pt also noted to have severe AS on TTE however no inervention planned per cardiology.  Pt was extubated 7/21 and downgraded to medicine 7/22.  Hospital course further complicated by recurring fevers , resumed on antibiotics 7/29 for possible uti.  patient with Persistent encephalopathy, likely multifactorial, initially 2/2 sepsis and dehydration, in the setting of underlying dementia w/ suspicion for NPH. had some improvement in mental status intermittently.  patient inconsistent with meal intakes. allso noted to pocket food and keep in her mouth while eating. concern for risk of aspiration brought up mutiple times to the family, family wishes to c/w po feeds. 8/3 patient with new fever episode of 100.9. patient also noted to be more lethargic. discussed with neurology. will get mr, eeg and repeat cth as per neuro recs.     >Sepsis POA, s/p septic shock possibly 2/2 aspiration PNA  -Acute hypoxic respiratory failure s/p intubation w/ mechanical ventilation possibly 2/2 aspiration / airway protection   - s/p pressor support and steroid course for hypotension while in MICU (since d/c'd)  - resumed on purred diet / thickened liquids after speech re eval, hold feeds while sleeping . patient has been noted to pocket food/ apple sauce in her mouth by nursing staff. patient 's family has been assisting with feeds as per their wishes, patient's family has been explained multiple times about risks of aspiration with feeding while patient is more somnolent and by keeping food in her mouth. . family insisted that patient wakes up when redirected and swallows food.   - aspiration precautions,  IS , hold feeds while asleep   - 8/3 fever episode with tmax 100.9. concern for recurrent aspiration   - will check chest imaging  , abgs   - started on iv zosyn 8/3 for fever and possible recurrent aspiration   - f/u bcxs , ct abd chest pelvis     >Acute metabolic encephalopathy, multifactorial 2/2 dehydration, sepsis and NPH in the setting of underlying dementia / with some intermittent improvement / now worse again 8/3   - Suspicion for NPH given CTH findings and hx of ataxia and incontinence   - Neurosurgery evaluated pt and noted pt is not a candidate for any intracranial surgery such as  shunt due to mx chronic comorbidities therefor as per neurology will not pursue an LP   - 7/29 with Worsening encephalopathy possible due to uti. patient was started on abxs at that time with some mental status improvement  - 8/3 patient noted to be more lethargic , episode of fever , concern for recurrent infection/ possible aspiration    - MRI initially was canceled by neurolo sx and recommended outpt f/u. family wanted to discuss usage of acetazolamide for nph with neuro sx team. as per neuro sx team  acetazolamide not used for treatment of NPH. acetazolamide not recommended by neurology team as well.   - discussed with neurology - even though patient's mental status did improve initially with antibiotics , its not back to her baseline as per family.  repeat cth, mri , eeg as per neuro   - c/w memantine , patient has been started on it since 2019        >concern for possible uti  - family had refused removal of marcano as per chart records, now dcd.   - Marcano removed for tov, bladder scans ordered q6h   - bcxs from 7/28 neg   - urine cxs 50-99k ecoli , awaiting sensitivities   - c/w flomax   - 8/3 new episode of fever , possible aspiration   - id following . changed iv ceftriaxone  to iv zosyn 8/3 for broader coverage       >Severe Aortic stenosis  - TTE reviewed and noted above   - Per cardio pt is not a candidate for intervention given her dementia   - Caution w/ IVFs given risk of volume overload  - Cardio consulted. no further interventions at this time.       > hypernatremia / improved   -likel due to dec po intake.   - c/w gentle ivf       >Normocytic anemia   - s/p 1u PRBC transfusion during hospital course w/ adequate response and h/h remains stable  - Etiology unclear but no active bleeding noted and hemodynamically stable at this time   - s/p short course of venofer, transition to po supplementation once able to tolerate po   - Low suspicion for GIB at this time but if concern increases I will consult GI  - trend CBC closely and transfuse if Hb<8       >Hypertensive urgency / improved   - c/w lisinopril 10 mg po qd in am   -  norvasc 2.5 mg po to pm dose to avoid all meds given at same time hence causing low bp   - c/w labetalol   - c/w hydralazine prn        >Diabetes mellitus  - A1c 10.8  - will dc lantus , c/w ssi only due to inconsistent meal amount patient is eating  and to avoid hypoglycemia episodes.   - monitor fsg       >Hypothyroidism  - c/w  Synthroid  -Thyroid Stimulating Hormone, Serum: 0.81 uIU/mL (07.19.22 @ 02:47)      >VTE ppx: sq lovenox     Dispo:  remains acute , episode of fever 8/3 , worsening of mental status , cxs sent  88 y/o F w/ PMH of HTN, hypothyroid, DM, anemia, hypothyroidism, was brought ihn by family for AMS and progressing generalized weakness.  Pt also had associated subjective feevers and poor po intake.  Pt was hypoxic on admission and w/ hypertensive urgency.  Pt was polaced on emperic abxs.  CTH significant for findings of NPH.  On 7/19 pt she developed nausea and had episodes of NBNB emesis at that time w bradycardia and hypotension; RRT was called.  Pt was subsequenty intubated for airway protection and upgraded to MICU.  Pressors were initiated.  CT chest/abdo was done, revealed Bilateral lower lobe consolidations, which may represent atelectasis and/or pneumonia. Small bilateral pleural effusions.    Pt also noted to have severe AS on TTE however no inervention planned per cardiology.  Pt was extubated 7/21 and downgraded to medicine 7/22.  Hospital course further complicated by recurring fevers , resumed on antibiotics 7/29 for possible uti.  patient with Persistent encephalopathy, likely multifactorial, initially 2/2 sepsis and dehydration, in the setting of underlying dementia w/ suspicion for NPH. had some improvement in mental status intermittently.  patient inconsistent with meal intakes. allso noted to pocket food and keep in her mouth while eating. concern for risk of aspiration brought up mutiple times to the family, family wishes to c/w po feeds. 8/3 patient with new fever episode of 100.9. patient also noted to be more lethargic. discussed with neurology. will get mr, eeg and repeat cth as per neuro recs.     >Sepsis POA, s/p septic shock possibly 2/2 aspiration PNA  -Acute hypoxic respiratory failure s/p intubation w/ mechanical ventilation possibly 2/2 aspiration / airway protection   - s/p pressor support and steroid course for hypotension while in MICU (since d/c'd)  - resumed on purred diet / thickened liquids after speech re eval, hold feeds while sleeping . patient has been noted to pocket food/ apple sauce in her mouth by nursing staff. patient 's family has been assisting with feeds as per their wishes, patient's family has been explained multiple times about risks of aspiration with feeding while patient is more somnolent and by keeping food in her mouth. . family insisted that patient wakes up when redirected and swallows food.   - aspiration precautions,  IS , hold feeds while asleep   - 8/3 fever episode with tmax 100.9. concern for recurrent aspiration   - will check chest imaging  , abgs   - started on iv zosyn 8/3 for fever and possible recurrent aspiration   - f/u bcxs , ct abd chest pelvis     >Acute metabolic encephalopathy, multifactorial 2/2 dehydration, sepsis and NPH in the setting of underlying dementia / with some intermittent improvement / now worse again 8/3   - Suspicion for NPH given CTH findings and hx of ataxia and incontinence   - Neurosurgery evaluated pt and noted pt is not a candidate for any intracranial surgery such as  shunt due to mx chronic comorbidities therefor as per neurology will not pursue an LP   - 7/29 with Worsening encephalopathy possible due to uti. patient was started on abxs at that time with some mental status improvement  - 8/3 patient noted to be more lethargic , episode of fever , concern for recurrent infection/ possible aspiration    - MRI initially was canceled by neurolo sx and recommended outpt f/u. family wanted to discuss usage of acetazolamide for nph with neuro sx team. as per neuro sx team  acetazolamide not used for treatment of NPH. acetazolamide not recommended by neurology team as well.   - discussed with neurology - even though patient's mental status did improve initially with antibiotics , its not back to her baseline as per family.  repeat cth, mri , eeg as per neuro   - c/w memantine , patient has been started on it since 2019        >concern for possible uti  - family had refused removal of marcano as per chart records, now dcd.   - Marcano removed for tov, bladder scans ordered q6h   - bcxs from 7/28 neg   - urine cxs 50-99k ecoli , awaiting sensitivities   - c/w flomax   - 8/3 new episode of fever , possible aspiration   - id following . changed iv ceftriaxone  to iv zosyn 8/3 for broader coverage       >Severe Aortic stenosis  - TTE reviewed and noted above   - Per cardio pt is not a candidate for intervention given her dementia   - Caution w/ IVFs given risk of volume overload  - Cardio consulted. no further interventions at this time.       > hypernatremia / improved   -likel due to dec po intake.   - c/w gentle ivf       >Normocytic anemia   - s/p 1u PRBC transfusion during hospital course w/ adequate response and h/h remains stable  - Etiology unclear but no active bleeding noted and hemodynamically stable at this time   - s/p short course of venofer, transition to po supplementation once able to tolerate po   - Low suspicion for GIB at this time but if concern increases I will consult GI  - trend CBC closely and transfuse if Hb<8       >Hypertensive urgency / improved   - c/w lisinopril 10 mg po qd in am   -  norvasc 2.5 mg po to pm dose to avoid all meds given at same time hence causing low bp   - c/w labetalol   - c/w hydralazine prn        >Diabetes mellitus  - A1c 10.8  - will dc lantus , c/w ssi only due to inconsistent meal amount patient is eating  and to avoid hypoglycemia episodes.   - monitor fsg       >Hypothyroidism  - c/w  Synthroid  -Thyroid Stimulating Hormone, Serum: 0.81 uIU/mL (07.19.22 @ 02:47)      >VTE ppx: sq lovenox     Dispo:  remains acute , episode of fever 8/3 , worsening of mental status , cxs sent . plan of care discussed in detail with son John  , sister and  at bedside

## 2022-08-03 NOTE — EEG REPORT - NS EEG TEXT BOX
[facility]   COMPREHENSIVE EPILEPSY CENTER   REPORT OF ROUTINE VIDEO EEG     [facility]: [address], [place] [address], Ph#: [phone]  [facility]: [id number], [place] [address], Ph#: [phone]  [facility]: [address] [place] [address], Ph#: [phone]    Patient Name: SHARIFA ENGLE  Age and : 87y (35)  MRN #: [id number]  Location: [facility] [id number]  Referring Physician: Ness Murillo    Study Date: 22    _____________________________________________________________  TECHNICAL INFORMATION    Placement and Labeling of Electrodes:  The EEG was performed utilizing 20 channels referential EEG connections (coronal over temporal over parasagittal montage) using all standard 10-20 electrode placements with EKG.  Recording was at a sampling rate of 256 samples per second per channel.  Time synchronized digital video recording was done simultaneously with EEG recording.  A low light infrared camera was used for low light recording.  Aman and seizure detection algorithms were utilized.    _____________________________________________________________  HISTORY    Patient is a 87y old  Female who presents with a chief complaint of ams (03 Aug 2022 11:19)      PERTINENT MEDICATION:  none    _____________________________________________________________  STUDY INTERPRETATION    Findings: The background was continuous and reactive. During wakefulness, the posterior dominant rhythm consisted of symmetric, poorly-modulated 8 Hz activity, with amplitude to 30 uV, that attenuated to eye opening.      Background Slowing:  Background predominantly consisted of theta, delta and faster activities.    Focal Slowing:   None were present.    Sleep Background:  Drowsiness was characterized by fragmentation, attenuation, and slowing of the background activity.    Stage II sleep transients were not recorded.    Other Non-Epileptiform Findings:  None were present.    Interictal Epileptiform Activity:   Occasional multifocal sharp wave discharges with triphasic morphology in the right frontal (Fp2/F4), left frontal (Fp1/F3) and left occipital (O1) regions    Events:  No event or seizure recorded.    Activation Procedures:   Hyperventilation was not performed.    Photic stimulation was performed and did not elicit any abnormality.     Artifacts:  Intermittent myogenic and movement artifacts were noted.    ECG:  The heart rate on single channel ECG was predominantly between 70-80 BPM.    _____________________________________________________________  EEG SUMMARY/CLASSIFICATION    Abnormal EEG in the awake, drowsy states.  - Occasional multifocal sharp wave discharges with triphasic morphology in the right frontal (Fp2/F4), left frontal (Fp1/F3) and left occipital (O1) regions  - Mild to moderate generalized slowing.    _____________________________________________________________  EEG IMPRESSION/CLINICAL CORRELATE    Abnormal EEG study.  1. Multifocal sharp wave discharges with triphasic morphology, which are nonspecific in etiology and may be seen in diverse clinical settings including toxic, metabolic, post-anoxic and epileptic encephalopathies. Please correlate clinically.  2. Mild to moderate nonspecific diffuse or multifocal cerebral dysfunction.   3. No seizure seen.    _____________________________________________________________    John Arango MD  Director, Epilepsy/EMU - [facility]

## 2022-08-04 NOTE — OCCUPATIONAL THERAPY INITIAL EVALUATION ADULT - GENERAL OBSERVATIONS, REHAB EVAL
Received pt semifowler in bed, NAD, +IV, +Tele/, +4LO2NC, +bilateral VCB, A&Ox1-2 with sister bedside. Dr. Rubin present during exam. No c/o pain pre/post. Patient agreeable to OT evaluation

## 2022-08-04 NOTE — PROGRESS NOTE ADULT - ASSESSMENT
88 y/o F w/ PMH of HTN, hypothyroid, DM, anemia, hypothyroidism, was brought ihn by family for AMS and progressing generalized weakness.  Pt also had associated subjective feevers and poor po intake.  Pt was hypoxic on admission and w/ hypertensive urgency.  cth didnot show any acute findings on admission. Pt was polaced on emperic abxs.  On 7/19 pt she developed nausea and had episodes of NBNB emesis at that time w bradycardia and hypotension; RRT was called.  Pt was subsequenty intubated for airway protection and upgraded to MICU.  Pressors were initiated.  CT revealed Bilateral lower lobe consolidations, which may represent atelectasis and/or pneumonia. Small bilateral pleural effusions.  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 imaging reviewed by neurology and neuro sx . as per neuro sx no significant changes from previous imaging and didnot recommend any surgical interventions. Pt also noted to have severe AS on TTE however no inervention planned per cardiology.  Pt was extubated 7/21 and downgraded to medicine 7/22.  Hospital course further complicated by recurring fevers , resumed on antibiotics 7/29 for sirs , possible uti.  patient with Persistent encephalopathy, likely multifactorial, initially 2/2 sepsis and dehydration, in the setting of underlying dementia w/ suspicion for NPH. had some improvement in mental status intermittently.  patient inconsistent with meal intakes. noted to pocket food and keep in her mouth while eating. concern for risk of aspiration brought up mutiple times to the family, family wanted  to c/w po feeds, speech re eval done. and patient was started on pureed diet. patient was noted to fall asleep during  while family trying to feed her. aspiration risk explained to patient's family. 8/3 patient with new fever episode of 100.9. patient also noted to be more lethargic. repeat cth 8/3 showed new left cerebellus density / possible acute cva, discussed with neurology. not candidate for tpa due to unclear time of onset of cva. stroke protocol initiated 8/3. neuro stroke consulted. on aspirin and statin. mri pending. eeg no acute seizures. abxs changed to zosyn 8/3 for possible new recurrent aspiration pna. pending bcxs. son concerned about possible strep throat , strep pcr ordered 8/4.     >>Acute  encephalopathy, likely  multifactorial , sepsis,  NPH in the setting of underlying dementia / with some intermittent improvement / now worse again 8/3 with cth with new cva   - Suspicion for NPH given CTH 2/19  findings and hx of ataxia and incontinence   - Neurosurgery evaluated pt and noted pt is not a candidate for any intracranial surgery such as  shunt due to mx chronic comorbidities therefor as per neurology will not pursue an LP   - 8/3 patient noted to be more lethargic , episode of fever , concern for recurrent infection/ possible aspiration    - MRI initially was canceled by neurolo sx and recommended outpt f/u. family wanted to discuss usage of acetazolamide for nph with neuro sx team. as per neuro sx team  acetazolamide not used for treatment of NPH. acetazolamide not recommended by neurology team as well.   - discussed with neurology - even though patient's mental status did improve initially with antibiotics , its not back to her baseline as per family  - eeg 8/3 neg for seizures    - 8/3 repeat cth with new CVA lef cerebellum , neurology dr. camejo aware   - neuro stroke called   - neurochecks   - c.w aspirin , statin   - MRI pending   - pt/ot/speech       >Sepsis POA, s/p septic shock possibly 2/2 aspiration PNA  -Acute hypoxic respiratory failure s/p intubation w/ mechanical ventilation possibly 2/2 aspiration / airway protection   -s/p pressor support and steroid course for hypotension while in MICU (since d/c'd)  - resumed on purred diet / thickened liquids after speech re eval, hold feeds while sleeping . patient has been noted to pocket food/ apple sauce in her mouth by nursing staff. patient 's family has been assisting with feeds as per their wishes, patient's family has been explained multiple times about risks of aspiration with feeding while patient is more somnolent and by keeping food in her mouth.   - 8/3 fever episode with tmax 100.9. concern for recurrent aspiration   - 8/3 ct chest shows new upper opacity / basal atelectasis / possible aspiration   - started on iv zosyn 8/3 for fever and possible recurrent aspiration   - f/u bcxs   - id follwoing   - 8/3 made npo again due to concern for recurrent aspirations   - started on gentle ivf with dextrose         >concern for possible uti  - family had refused removal of marcano as per chart records, now dcd.   - Marcano removed for tov, bladder scans ordered q6h   - bcxs from 7/28 neg   - urine cxs 50-99k ecoli , awaiting sensitivities   - c/w flomax   - 8/3 new episode of fever , possible aspiration   - id following . changed iv ceftriaxone  to iv zosyn 8/3 for broader coverage       >Severe Aortic stenosis  - TTE reviewed and noted above   - Per cardio pt is not a candidate for intervention given her dementia   - Caution w/ IVFs given risk of volume overload  - Cardio consulted. no further interventions at this time.       > hypernatremia / improved   -likel due to dec po intake.   - c/w gentle ivf       >Normocytic anemia   - s/p 1u PRBC transfusion during hospital course w/ adequate response and h/h remains stable  - Etiology unclear but no active bleeding noted and hemodynamically stable at this time   - s/p short course of venofer, transition to po supplementation once able to tolerate po   - Low suspicion for GIB at this time but if concern increases I will consult GI  - trend CBC closely and transfuse if Hb<8       >Hypertensive urgency / improved   - c/w lisinopril 10 mg and norvasc 2.5 mg po to pm and labetalol with holding parmeters.   - c/w hydralazine prn    -discussed with neurology. acute cva likely >24 hours and recommended to c/w current meds       >Diabetes mellitus  - A1c 10.8  - dc long acting insulin   - c.w ssi only    - monitor fsg       >Hypothyroidism  -Thyroid Stimulating Hormone, Serum: 0.81 uIU/mL (07.19.22 @ 02:47)  - repeat tsh low Thyroid Stimulating Hormone, Serum: <0.10 uIU/mL (08.04.22 @ 05:39)  - lowered synthrydoid dose to 112 mcg po qd     > code status : dnr / dni.  see goc note     >VTE ppx: sq lovenox     Dispo:  remains acute ,  8/3/22 possible new cva. mri pending. pt/ot / speech eval  88 y/o F w/ PMH of HTN, hypothyroid, DM, anemia, hypothyroidism, was brought ihn by family for AMS and progressing generalized weakness.  Pt also had associated subjective feevers and poor po intake.  Pt was hypoxic on admission and w/ hypertensive urgency.  cth didnot show any acute findings on admission. Pt was polaced on emperic abxs.  On 7/19 pt she developed nausea and had episodes of NBNB emesis at that time w bradycardia and hypotension; RRT was called.  Pt was subsequenty intubated for airway protection and upgraded to MICU.  Pressors were initiated.  CT revealed Bilateral lower lobe consolidations, which may represent atelectasis and/or pneumonia. Small bilateral pleural effusions.  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 imaging reviewed by neurology and neuro sx . as per neuro sx no significant changes from previous imaging and didnot recommend any surgical interventions. Pt also noted to have severe AS on TTE however no inervention planned per cardiology.  Pt was extubated 7/21 and downgraded to medicine 7/22.  Hospital course further complicated by recurring fevers , resumed on antibiotics 7/29 for sirs , possible uti.  patient with Persistent encephalopathy, likely multifactorial, initially 2/2 sepsis and dehydration, in the setting of underlying dementia w/ suspicion for NPH. had some improvement in mental status intermittently.  patient inconsistent with meal intakes. noted to pocket food and keep in her mouth while eating. concern for risk of aspiration brought up mutiple times to the family, family wanted  to c/w po feeds, speech re eval done. and patient was started on pureed diet. patient was noted to fall asleep during  while family trying to feed her. aspiration risk explained to patient's family. 8/3 patient with new fever episode of 100.9. patient also noted to be more lethargic. repeat cth 8/3 showed new left cerebellus density / possible acute cva, discussed with neurology. not candidate for tpa due to unclear time of onset of cva. stroke protocol initiated 8/3. neuro stroke consulted. on aspirin and statin. mri pending. eeg no acute seizures. abxs changed to zosyn 8/3 for possible new recurrent aspiration pna. pending bcxs. son concerned about possible strep throat , strep pcr ordered 8/4.     >>Acute  encephalopathy, likely  multifactorial , sepsis,  NPH in the setting of underlying dementia / with some intermittent improvement / now worse again 8/3 with cth with new cva   - Suspicion for NPH given CTH 2/19  findings and hx of ataxia and incontinence   - Neurosurgery evaluated pt and noted pt is not a candidate for any intracranial surgery such as  shunt due to mx chronic comorbidities therefor as per neurology will not pursue an LP   - 8/3 patient noted to be more lethargic , episode of fever , concern for recurrent infection/ possible aspiration    - MRI initially was canceled by neurolo sx and recommended outpt f/u. family wanted to discuss usage of acetazolamide for nph with neuro sx team. as per neuro sx team  acetazolamide not used for treatment of NPH. acetazolamide not recommended by neurology team as well.   - discussed with neurology - even though patient's mental status did improve initially with antibiotics , its not back to her baseline as per family  - eeg 8/3 neg for seizures    - 8/3 repeat cth with new CVA lef cerebellum , neurology dr. camejo aware   - neuro stroke called   - neurochecks   - c.w aspirin , statin   - MRI pending   - pt/ot/speech       >Sepsis POA, s/p septic shock possibly 2/2 aspiration PNA  -Acute hypoxic respiratory failure s/p intubation w/ mechanical ventilation possibly 2/2 aspiration / airway protection   -s/p pressor support and steroid course for hypotension while in MICU (since d/c'd)  - resumed on purred diet / thickened liquids after speech re eval, hold feeds while sleeping . patient has been noted to pocket food/ apple sauce in her mouth by nursing staff. patient 's family has been assisting with feeds as per their wishes, patient's family has been explained multiple times about risks of aspiration with feeding while patient is more somnolent and by keeping food in her mouth.   - 8/3 fever episode with tmax 100.9. concern for recurrent aspiration   - 8/3 ct chest shows new upper opacity / basal atelectasis / possible aspiration   - was on zosyn/ ceftriaxone , now changed to cefepime to cover pna and uti  - f/u bcxs   - id follwoing   - 8/3 made npo again due to concern for recurrent aspirations   - started on gentle ivf with dextrose         >concern for possible uti  - family had refused removal of marcano as per chart records, now dcd.   - Marcano removed for tov, bladder scans ordered q6h   - bcxs from 7/28 neg   - urine cxs 50-99k ecoli , awaiting sensitivities , resistant to zosyn   - c/w flomax   - 8/3 new episode of fever , possible aspiration   - id following   - c/w cefepime       >Severe Aortic stenosis  - TTE reviewed and noted above   - Per cardio pt is not a candidate for intervention given her dementia   - Caution w/ IVFs given risk of volume overload  - Cardio consulted. no further interventions at this time.       > hypernatremia / improved   -likel due to dec po intake.   - c/w gentle ivf       >Normocytic anemia   - s/p 1u PRBC transfusion during hospital course w/ adequate response and h/h remains stable  - Etiology unclear but no active bleeding noted and hemodynamically stable at this time   - s/p short course of venofer, transition to po supplementation once able to tolerate po   - Low suspicion for GIB at this time but if concern increases I will consult GI  - trend CBC closely and transfuse if Hb<8       >Hypertensive urgency / improved   - c/w lisinopril 10 mg and norvasc 2.5 mg po to pm and labetalol with holding parmeters.   - c/w hydralazine prn    -discussed with neurology. acute cva likely >24 hours and recommended to c/w current meds       >Diabetes mellitus  - A1c 10.8  - dc long acting insulin   - c.w ssi only    - monitor fsg       >Hypothyroidism  -Thyroid Stimulating Hormone, Serum: 0.81 uIU/mL (07.19.22 @ 02:47)  - repeat tsh low Thyroid Stimulating Hormone, Serum: <0.10 uIU/mL (08.04.22 @ 05:39)  - lowered synthrydoid dose to 112 mcg po qd     > code status : dnr / dni.  see goc note     >VTE ppx: sq lovenox     Dispo:  remains acute ,  8/3/22 possible new cva. mri pending. pt/ot / speech eval

## 2022-08-04 NOTE — CONSULT NOTE ADULT - SUBJECTIVE AND OBJECTIVE BOX
Neurology consult    SHARIFA ENGLE 87y Female           HPI:  87F who presented from home when her  had noted that she had profound weakness and was unable to stand or feed herself. The patient is noted to have a history of progressive dementia and was limited in terms of providing a history. Additional information was obtained from the patient's daughter at the bedside. The patient was note to have had progressive dementia over the past few years. She lives with her  at home and there was no recent fevers, chills, or sick contacts. She is reported to be compliant with her medications at home which her daughter prepares for her. She is noted to have a history of poorly controlled blood pressures despite her cardiac medications. At baseline she was noted to be able to ambulate independently at a slow rate. There were no complaints of chest pain, palpitations, dyspnea, or cough. The patient was otherwise in her usual state of health. Upon arrival of EMS, the patient was reported to have been hypotensive and hypoxic. In the emergency department, the patient was noted to have fever and leukocytosis. There were no similar episodes in the past and no known aggravating or relieving factors. The patient's daughter did note that the patient had vision loss and pain in the right eye in the past and was planned for surgery but the pain resolved spontaneously. She was also noted to have occasional swelling and weeping wounds on the left leg for which she would be treated with antibiotics. (18 Jul 2022 18:28)    PMH: Essential hypertension    Hypothyroidism, unspecified type    Diabetes mellitus of other type without complication    Anemia         PSH: S/P cataract surgery    S/P appendectomy          FAMILY HISTORY:      SOCIAL HISTORY:  No history of tobacco or alcohol use     Allergies    No Known Allergies    Intolerances            Vital Signs Last 24 Hrs  T(C): 36.7 (04 Aug 2022 08:59), Max: 37.2 (03 Aug 2022 20:37)  T(F): 98.1 (04 Aug 2022 08:59), Max: 99 (03 Aug 2022 20:37)  HR: 85 (04 Aug 2022 08:59) (77 - 87)  BP: 114/65 (04 Aug 2022 08:59) (114/65 - 165/66)  BP(mean): 81 (04 Aug 2022 08:59) (81 - 99)  RR: 18 (04 Aug 2022 08:59) (18 - 18)  SpO2: 96% (04 Aug 2022 08:59) (93% - 98%)    Parameters below as of 04 Aug 2022 08:59  Patient On (Oxygen Delivery Method): nasal cannula  O2 Flow (L/min): 4    MEDICATIONS    acetaminophen  Suppository .. 650 milliGRAM(s) Rectal every 8 hours PRN  aluminum hydroxide/magnesium hydroxide/simethicone Suspension 30 milliLiter(s) Oral every 6 hours PRN  aspirin Suppository 300 milliGRAM(s) Rectal daily  atorvastatin 80 milliGRAM(s) Oral at bedtime  cefepime   IVPB 2000 milliGRAM(s) IV Intermittent every 8 hours  dextrose 5%. 1000 milliLiter(s) IV Continuous <Continuous>  dextrose 5%. 1000 milliLiter(s) IV Continuous <Continuous>  dextrose 50% Injectable 25 Gram(s) IV Push once  dextrose 50% Injectable 12.5 Gram(s) IV Push once  dextrose 50% Injectable 25 Gram(s) IV Push once  dextrose Oral Gel 15 Gram(s) Oral once PRN  enoxaparin Injectable 40 milliGRAM(s) SubCutaneous every 24 hours  ferrous    sulfate 325 milliGRAM(s) Oral daily  glucagon  Injectable 1 milliGRAM(s) IntraMuscular once  hydrALAZINE Injectable 5 milliGRAM(s) IV Push every 6 hours PRN  insulin lispro (ADMELOG) corrective regimen sliding scale   SubCutaneous Before meals and at bedtime  labetalol 100 milliGRAM(s) Oral two times a day  levothyroxine 112 MICROGram(s) Oral daily  lisinopril 10 milliGRAM(s) Oral daily  memantine 10 milliGRAM(s) Oral two times a day  nystatin Powder 1 Application(s) Topical two times a day  ondansetron Injectable 4 milliGRAM(s) IV Push every 6 hours PRN  sodium bicarbonate  Infusion 0.062 mEq/kG/Hr IV Continuous <Continuous>        LABS:  CBC Full  -  ( 04 Aug 2022 05:39 )  WBC Count : 5.02 K/uL  RBC Count : 3.40 M/uL  Hemoglobin : 8.8 g/dL  Hematocrit : 29.5 %  Platelet Count - Automated : 322 K/uL  Mean Cell Volume : 86.8 fl  Mean Cell Hemoglobin : 25.9 pg  Mean Cell Hemoglobin Concentration : 29.8 gm/dL  Auto Neutrophil # : x  Auto Lymphocyte # : x  Auto Monocyte # : x  Auto Eosinophil # : x  Auto Basophil # : x  Auto Neutrophil % : x  Auto Lymphocyte % : x  Auto Monocyte % : x  Auto Eosinophil % : x  Auto Basophil % : x      08-04    142  |  112<H>  |  5.5<L>  ----------------------------<  187<H>  3.9   |  18.0<L>  |  0.74    Ca    7.9<L>      04 Aug 2022 05:39  Phos  3.5     08-04  Mg     1.8     08-04        Hemoglobin A1C:   Lipid Panel 08-04 @ 05:39  Total Cholesterol, Serum 158  LDL --  Triglycerides 210            On Neurological Examination:    Mental Status - Patient is  lethargic but arousable. She knows her name and taht she is in a hospital. Did not now the date. Also could not tell me her sisters name who was standing next to her. She follows commands and named simple object correctly.    Cranial Nerves - Pupils R has corneal opacity with no vision. Left eye also has poor vision by report  pupil is 2 and reactive. VF seems OK on the left.  Face is symmetric.    Motor Exam - Seems limited by effort.  Both UE ---atleast 4-/5 with drift  Both LE ---atleast 3/5 with drift   nml bulk/tone    Sensory    Intact to light touch and pinprick bilaterally    Coord: FTN  seems ok ( limited by poor vision)     Gait -  Not assessed.    RADIOLOGY ( All neurological imaging studies were independently reviewed and interpreted by me)  CTH   CTA  CTP  < from: CT Head No Cont (08.03.22 @ 16:42) >    IMPRESSION:   mild periventricular white matter ischemia. Hypodensity in   the LEFT cerebellum which may reflect an infarction. 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    Prominent lateral ventricles due to   central atrophy.    ANA MASTERS MD; Attending Radiologist          MRI:  TTE     TTE Echo Complete w/o Contrast w/ Doppler (07.19.22 @ 20:08) >    Summary:   1. Technically suboptimal study.   2. Estimated pulmonary artery systolic pressure is 38.9 mmHg assuming a   right atrial pressure of 8 mmHg, which is consistent with borderline   pulmonary hypertension.   3. Hyperdynamic global left ventricular systolic function.   4. Left ventricular ejection fraction, by visual estimation, is >75%.   5. Severely enlarged left atrium.   6. Spectral Doppler shows impaired relaxation pattern of left   ventricular myocardial filling (Grade I diastolic dysfunction).   7. Mildly enlarged right atrium.   8. Mild mitral annular calcification.   9. Trace mitral valve regurgitation.  10. Mild tricuspid regurgitation.  11. Endocardial visualization was enhanced with intravenous echo contrast.  12. Sclerotic aortic valve with decreased opening.  13. Stroke volume across LVOT is 31 ml. ERROL by continuity equation is 0.6   cm^2. DVI of 0.21 also suggestive of severe aortic valve stenosis.  14. There is no evidence of pericardial effusion.    MD Cosmo Electronically signed on 7/20/2022 at 10:31:13 AM        _____________________________________________________________  EEG IMPRESSION/CLINICAL CORRELATE    Abnormal EEG study.  1. Multifocal sharp wave discharges with triphasic morphology, which are nonspecific in etiology and may be seen in diverse clinical settings including toxic, metabolic, post-anoxic and epileptic encephalopathies. Please correlate clinically.  2. Mild to moderate nonspecific diffuse or multifocal cerebral dysfunction.   3. No seizure seen.    _____________________________________________________________    John Arango MD  Director, Epilepsy/EMU - [facility]

## 2022-08-04 NOTE — OCCUPATIONAL THERAPY INITIAL EVALUATION ADULT - LIVES WITH, PROFILE
All info taken from pt sister bedside, pt poor historian. Pt lives in a house with her 92 year old spouse. Pt family can assist but unsure level can provide. [address] with hand rail through the back, no stairs inside to negotiate/spouse

## 2022-08-04 NOTE — PROGRESS NOTE ADULT - ASSESSMENT
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     #1 Dementia  - seems to have been progressing over the last 3 years - per  has been sleeping more, eating less, etc   - poor overall prognosis  - supportive measures, frequent reorientation  - now CT head as above - possible acute stroke - supportive measures     #2 possible stroke  - supportive measures   - started on aspirin  - PT/OT  - MRI pending     #3 NPH  - not a candidate for shunting  - appreciate neurosurgery input  - given not a candidate for shunting, would not make sense to subject her to an LP    #4 Severe AS    - poor prognosis  - not a candidate for valve surgery   - hospice appropriate    #5 Debility  - needs assist with ADLs  - PT/OT, out of bed encouragement     #6 Encounter for palliative care  - see goals of care above

## 2022-08-04 NOTE — PROGRESS NOTE ADULT - SUBJECTIVE AND OBJECTIVE BOX
OVERNIGHT EVENTS: overnight events reviewed, more lethargic, CT head possibel acute stroke, more alert today and responsive     Present Symptoms:     Dyspnea: none   Nausea/Vomiting: No  Anxiety:  No  Depression: No  Fatigue: No  Loss of appetite: No  Constipation: none     Pain: none             Character-            Duration-            Effect-            Factors-            Frequency-            Location-            Severity-    Pain AD Score:  [email or web address].[email or web address]/[email or web address] (press ctrl + left click to view)    Review of Systems: Reviewed                     Negative: no chest pain                      All others negative    MEDICATIONS  (STANDING):  aspirin Suppository 300 milliGRAM(s) Rectal daily  atorvastatin 80 milliGRAM(s) Oral at bedtime  dextrose 5%. 1000 milliLiter(s) (50 mL/Hr) IV Continuous <Continuous>  dextrose 5%. 1000 milliLiter(s) (100 mL/Hr) IV Continuous <Continuous>  dextrose 50% Injectable 25 Gram(s) IV Push once  dextrose 50% Injectable 12.5 Gram(s) IV Push once  dextrose 50% Injectable 25 Gram(s) IV Push once  enoxaparin Injectable 40 milliGRAM(s) SubCutaneous every 24 hours  ferrous    sulfate 325 milliGRAM(s) Oral daily  glucagon  Injectable 1 milliGRAM(s) IntraMuscular once  insulin lispro (ADMELOG) corrective regimen sliding scale   SubCutaneous Before meals and at bedtime  labetalol 100 milliGRAM(s) Oral two times a day  levothyroxine 112 MICROGram(s) Oral daily  lisinopril 10 milliGRAM(s) Oral daily  memantine 10 milliGRAM(s) Oral two times a day  nystatin Powder 1 Application(s) Topical two times a day  piperacillin/tazobactam IVPB.. 3.375 Gram(s) IV Intermittent every 8 hours  sodium bicarbonate  Infusion 0.062 mEq/kG/Hr (75 mL/Hr) IV Continuous <Continuous>    MEDICATIONS  (PRN):  acetaminophen  Suppository .. 650 milliGRAM(s) Rectal every 8 hours PRN Temp greater or equal to 38C (100.4F), Mild Pain (1 - 3)  aluminum hydroxide/magnesium hydroxide/simethicone Suspension 30 milliLiter(s) Oral every 6 hours PRN Dyspepsia  dextrose Oral Gel 15 Gram(s) Oral once PRN Blood Glucose LESS THAN 70 milliGRAM(s)/deciliter  hydrALAZINE Injectable 5 milliGRAM(s) IV Push every 6 hours PRN SBP> 170  ondansetron Injectable 4 milliGRAM(s) IV Push every 6 hours PRN Nausea and/or Vomiting    PHYSICAL EXAM:    Vital Signs Last 24 Hrs  T(C): 36.7 (04 Aug 2022 08:59), Max: 37.2 (03 Aug 2022 20:37)  T(F): 98.1 (04 Aug 2022 08:59), Max: 99 (03 Aug 2022 20:37)  HR: 85 (04 Aug 2022 08:59) (76 - 87)  BP: 114/65 (04 Aug 2022 08:59) (101/62 - 165/66)  BP(mean): 81 (04 Aug 2022 08:59) (75 - 99)  RR: 18 (04 Aug 2022 08:59) (17 - 18)  SpO2: 96% (04 Aug 2022 08:59) (93% - 98%)    Parameters below as of 04 Aug 2022 08:59  Patient On (Oxygen Delivery Method): nasal cannula  O2 Flow (L/min): 4      General: alert , sitting up in bed in no acute distress. not oriented     Karnofsky:  30 %    HEENT: normal     Lungs: comfortable     CV: normal      GI: normal     : normal      MSK: bedbound/wheelchair bound    Skin: no rash    LABS:                      8.8    5.02  )-----------( 322      ( 04 Aug 2022 05:39 )             29.5     08-04    142  |  112<H>  |  5.5<L>  ----------------------------<  187<H>  3.9   |  18.0<L>  |  0.74    Ca    7.9<L>      04 Aug 2022 05:39  Phos  3.5     08-04  Mg     1.8     08-04    I&O's Summary    RADIOLOGY & ADDITIONAL STUDIES:    ADVANCE DIRECTIVES/TREATMENT PREFERENCES:  now DNR and DNI

## 2022-08-04 NOTE — OCCUPATIONAL THERAPY INITIAL EVALUATION ADULT - LEVEL OF INDEPENDENCE: SIT/STAND, REHAB EVAL
not safe at this time, pt with limited command following, lethargic, weakness/unable to perform Home

## 2022-08-04 NOTE — OCCUPATIONAL THERAPY INITIAL EVALUATION ADULT - ORIENTATION, REHAB EVAL
Pt able to state full name only, unable to state . Pt able to state location as "[facility]" but unable to state any other information despite choices. Pt repeatedly stating  as the year./person/place

## 2022-08-04 NOTE — PROGRESS NOTE ADULT - SUBJECTIVE AND OBJECTIVE BOX
Chief Complaint:  AMS    SUBJECTIVE / OVERNIGHT EVENTS: patient seen and eval. patient now with possible acute cva  on repeat cth. patient is more awake today ,  responsive to name ,person , able to recognize sister, son,  at bedside , following simple commands . denies feeling sob . denies ha. patient noted to have some cough episodes when laying flat in bed as per son at bedside. breathing comfortable at time of encounter.     Vital Signs Last 24 Hrs  T(C): 36.7 (04 Aug 2022 08:59), Max: 37.2 (03 Aug 2022 20:37)  T(F): 98.1 (04 Aug 2022 08:59), Max: 99 (03 Aug 2022 20:37)  HR: 85 (04 Aug 2022 08:59) (77 - 87)  BP: 114/65 (04 Aug 2022 08:59) (114/65 - 165/66)  BP(mean): 81 (04 Aug 2022 08:59) (81 - 99)  RR: 18 (04 Aug 2022 08:59) (18 - 18)  SpO2: 96% (04 Aug 2022 08:59) (93% - 98%)    Parameters below as of 04 Aug 2022 08:59  Patient On (Oxygen Delivery Method): nasal cannula  O2 Flow (L/min): 4    GENERAL:  more awake , alert to slef , person,  keeps eyes open during conversations , able to follow simple commands.   HEENT: rt eye  chronic blindness , able to count fingers with left eye , mm moist , no oral thrush noted , mild redness noted on tongue  , no stridor   RESPIRATORY: occasional basal rhonchi , good airflow   CARDIOVASCULAR: RRR, normal S1 and S2, +systolic murmur  ABDOMEN: soft, NT/ND, +bowel sounds, no rebound/guarding  EXTREMITIES: No cynaosis, warm   NEUROLOGY:  more awake , alert to slef , person,  keeps eyes open during conversations , able to follow simple commands. no slurred speech or facial droop noted ,  moving all ext , strength 3-4 / 5 x 4 , sensation grossly intact                          RADIOLOGY & ADDITIONAL TESTS:    < from: Xray Chest 1 View AP/PA. (07.28.22 @ 21:48) >  There is a mild to moderate right effusion new since July 23.    < end of copied text >      < from: US Abdomen Upper Quadrant Right (07.20.22 @ 14:31) >  IMPRESSION:  No evidence of cholelithiasis or acute cholecystitis.  Mildly enlarged fatty liver.    < end of copied text >  < from: CT Abdomen and Pelvis w/ IV Cont (07.20.22 @ 05:26) >  IMPRESSION:    1. Bilateral lower lobe consolidations, which may represent atelectasis   and/or pneumonia.  2. Small bilateral pleural effusions.  3. Mild gallbladder wall thickening. Recommend ultrasound for further   assessment.  4. No CT evidence of bowel obstruction, diverticulitis, or colitis.    < end of copied text >      < from: CT Head No Cont (07.19.22 @ 12:55) >    IMPRESSION:    Findings consistent with persistent moderate normal pressure   hydrocephalus.    No acute intracranial hemorrhage or brain edema.    < end of copied text >      < from: TTE Echo Complete w/o Contrast w/ Doppler (07.19.22 @ 20:08) >  Summary:   1. Technically suboptimal study.   2. Estimated pulmonary artery systolic pressure is 38.9 mmHg assuming a   right atrial pressure of 8 mmHg, which is consistent with borderline   pulmonary hypertension.   3. Hyperdynamic global left ventricular systolic function.   4. Left ventricular ejection fraction, by visual estimation, is >75%.   5. Severely enlarged left atrium.   6. Spectral Doppler shows impaired relaxation pattern of left   ventricular myocardial filling (Grade I diastolic dysfunction).   7. Mildly enlarged right atrium.   8. Mild mitral annular calcification.   9. Trace mitral valve regurgitation.  10. Mild tricuspid regurgitation.  11. Endocardial visualization was enhanced with intravenous echo contrast.  12. Sclerotic aortic valve with decreased opening.  13. Stroke volume across LVOT is 31 ml. ERROL by continuity equation is 0.6   cm^2. DVI of 0.21 also suggestive of severe aortic valve stenosis.  14. There is no evidence of pericardial effusion.    < end of copied text >      < from: CT Abdomen and Pelvis No Cont (08.03.22 @ 16:43) >  IMPRESSION:  Small bilateral pleural effusionwith mild increase in size of right   pleural effusion.  New nonspecific groundglass opacities in the upper lobes.  Decreased bibasilar densities suggesting residual mild linear atelectasis  Stable mild mediastinal lymphadenopathy  No acute pathologyin the abdomen or pelvis      < end of copied text >      < from: CT Head No Cont (08.03.22 @ 16:42) >  IMPRESSION:   mild periventricular white matter ischemia. Hypodensity in   the LEFT cerebellum which may reflect an infarction. 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    Prominent lateral ventricles due to   central atrophy.      < end of copied text >

## 2022-08-04 NOTE — PROGRESS NOTE ADULT - SUBJECTIVE AND OBJECTIVE BOX
Neurology Progress Note  [facility] [place] [address] [place] [address]  [phone]    Interval History:  Neurology asked to reevaluate due to increased lethargy yesterday. Family at bedside states patient is much more awake today. She walks unassisted at home, but takes short steps. She has had slowly progressive problems with memory for years. She was febrile yesterday due to suspected aspiration pneumonia and was started on zosyn. Routine EEG shows mild ot mod generalized slowing with triphasic waves, no seizure.  CT head showing possible left cerebellar infarct.    MEDICATIONS  (STANDING):  aspirin Suppository 300 milliGRAM(s) Rectal daily  atorvastatin 80 milliGRAM(s) Oral at bedtime  dextrose 5%. 1000 milliLiter(s) (50 mL/Hr) IV Continuous <Continuous>  dextrose 5%. 1000 milliLiter(s) (100 mL/Hr) IV Continuous <Continuous>  dextrose 50% Injectable 25 Gram(s) IV Push once  dextrose 50% Injectable 12.5 Gram(s) IV Push once  dextrose 50% Injectable 25 Gram(s) IV Push once  enoxaparin Injectable 40 milliGRAM(s) SubCutaneous every 24 hours  ferrous    sulfate 325 milliGRAM(s) Oral daily  glucagon  Injectable 1 milliGRAM(s) IntraMuscular once  insulin lispro (ADMELOG) corrective regimen sliding scale   SubCutaneous Before meals and at bedtime  labetalol 100 milliGRAM(s) Oral two times a day  levothyroxine 112 MICROGram(s) Oral daily  lisinopril 10 milliGRAM(s) Oral daily  memantine 10 milliGRAM(s) Oral two times a day  nystatin Powder 1 Application(s) Topical two times a day  piperacillin/tazobactam IVPB.. 3.375 Gram(s) IV Intermittent every 8 hours  sodium bicarbonate  Infusion 0.062 mEq/kG/Hr (75 mL/Hr) IV Continuous <Continuous>    MEDICATIONS  (PRN):  acetaminophen  Suppository .. 650 milliGRAM(s) Rectal every 8 hours PRN Temp greater or equal to 38C (100.4F), Mild Pain (1 - 3)  aluminum hydroxide/magnesium hydroxide/simethicone Suspension 30 milliLiter(s) Oral every 6 hours PRN Dyspepsia  dextrose Oral Gel 15 Gram(s) Oral once PRN Blood Glucose LESS THAN 70 milliGRAM(s)/deciliter  hydrALAZINE Injectable 5 milliGRAM(s) IV Push every 6 hours PRN SBP> 170  ondansetron Injectable 4 milliGRAM(s) IV Push every 6 hours PRN Nausea and/or Vomiting    Physical Exam:  Vital Signs Last 24 Hrs  T(C): 36.7 (04 Aug 2022 08:59), Max: 37.2 (03 Aug 2022 20:37)  T(F): 98.1 (04 Aug 2022 08:59), Max: 99 (03 Aug 2022 20:37)  HR: 85 (04 Aug 2022 08:59) (77 - 87)  BP: 114/65 (04 Aug 2022 08:59) (114/65 - 165/66)  BP(mean): 81 (04 Aug 2022 08:59) (81 - 99)  RR: 18 (04 Aug 2022 08:59) (18 - 18)  SpO2: 96% (04 Aug 2022 08:59) (93% - 98%)    Parameters below as of 04 Aug 2022 08:59  Patient On (Oxygen Delivery Method): nasal cannula  O2 Flow (L/min): 4   General: no acute distress  HEENT:NC/AT  Lungs: no respiratory distress  Skin: no rash or ecchymoses  Lower extremities: no edema    Mental Status: lying in bed with eyes closed, arousable to verbal stimuli, oriented to person and place, follows simple commands  CN: moves eyes in both directions, no gross facial asymmetry  Motor:  5/5 bilateral upper extremities  at least 4/5 bilateral lower extremities  Sensory: intact to light touch throughout  Gait: deferred      LABS:  08-04 @ 05:39 Creatine 60 U/L [25 - 170]  cret                        8.8    5.02  )-----------( 322      ( 04 Aug 2022 05:39 )             29.5     08-04    142  |  112<H>  |  5.5<L>  ----------------------------<  187<H>  3.9   |  18.0<L>  |  0.74    Ca    7.9<L>      04 Aug 2022 05:39  Phos  3.5     08-04  Mg     1.8     08-04      CT head w/o contrast 8/3/22-  mild periventricular white matter ischemia. Hypodensity in   the LEFT cerebellum which may reflect an infarction. 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    Prominent lateral ventricles due to   central atrophy.

## 2022-08-04 NOTE — CONSULT NOTE ADULT - SUBJECTIVE AND OBJECTIVE BOX
87yF was admitted on 07-18    Patient is a 87y old  Female who presents with a chief complaint of ams    HPI: 87 year old Female with PMH of HTN, initially was brought in by family for HTN, according to her son.  He was also concerned for an infection.  She had RRT called.  She was subsequently intubated.  She was extubated 7/21 and downgraded to medicine 7/22.  Hospital course further complicated by recurring fevers, resumed on antibiotics 7/29 for possible uti.  She had persistent encephalopathy, according to records, likely multifactorial, initially due to sepsis and dehydration with suspicion for NPH.  Reported inconsistent with meal intakes.  8/3 patient with new fever episode of 100.9. patient also noted to be more lethargic.  CT head performed with concern for CVA.        Imaging reviewed showed:  ACC: [id number] EXAM:  CT BRAIN                          PROCEDURE DATE:  08/03/2022      INTERPRETATION:  CT head without IV contrast    CLINICAL INFORMATION:  Altered mental status    TECHNIQUE: Contiguous axial 5 mm sections were obtained through the head.   Sagittal and coronal 2-D reformatted images were also obtained.   This   scan was performed using automatic exposure control (radiation dose   reduction software) to obtain a diagnostic image quality scan with   patient dose as low as reasonably achievable.    FINDINGS:   CT dated 07/19/2022 available for review.    The brain demonstrates mild periventricular white matter ischemia.   Hypodensity in the LEFT cerebellum which may reflect an infarction. Old   infarctions are seen in the LEFT basal ganglia. No intracranial   hemorrhage is found.  No mass effect is found in the brain.    The ventricles, sulci and basal cisterns show prominent lateral   ventricles due to central atrophy.    The orbits show RIGHT phthisis bulbi.  The paranasal sinuses are   significant for minimal mucosal thickening in the RIGHT maxillary and   LEFT frontal sinuses.  The nasal cavity appears intact.  The nasopharynx   is symmetric.  The central skull base, petrous temporal bones and   calvarium remain intact.      IMPRESSION:   mild periventricular white matter ischemia. Hypodensity in   the LEFT cerebellum which may reflect an infarction. 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    Prominent lateral ventricles due to   central atrophy.      ACC: [id number] EXAM:  US DPLX LWR EXT VEINS COMPL BI                          PROCEDURE DATE:  07/24/2022          INTERPRETATION:  CLINICAL INFORMATION: Right lower extremity edema,   evaluate for DVT.    COMPARISON: None available.    TECHNIQUE: Duplex sonography of the BILATERAL LOWER extremity veins with   color and spectral Doppler, with and without compression.    FINDINGS:    RIGHT:  Normal compressibility of the RIGHT common femoral, femoral and popliteal   veins.  Doppler examination shows normal spontaneous and phasic flow.  No RIGHT calf vein thrombosis is detected.    LEFT:  Normal compressibility of the LEFT common femoral, femoral and popliteal   veins.  Doppler examination shows normal spontaneous and phasic flow.  No LEFT calf vein thrombosis is detected.    IMPRESSION:  No evidence of deep venous thrombosis in either lower extremity.      REVIEW OF SYSTEMS  Difficulty to obtain.      VITALS  T(C): 36.4 (08-04-22 @ 05:04), Max: 38.3 (08-03-22 @ 10:48)  HR: 87 (08-04-22 @ 05:04) (76 - 87)  BP: 132/66 (08-04-22 @ 05:04) (101/62 - 165/66)  RR: 18 (08-04-22 @ 05:04) (17 - 20)  SpO2: 98% (08-04-22 @ 05:04) (92% - 98%)  Wt(kg): --    PAST MEDICAL & SURGICAL HISTORY  Essential hypertension    Hypothyroidism, unspecified type    Diabetes mellitus of other type without complication    Anemia    S/P cataract surgery    S/P appendectomy      SOCIAL HISTORY - as per documentation/history  Smoking - None  EtOH - None  Drugs - None    FUNCTIONAL HISTORY  Previously independent, required some assistance with hand hold for mobility.  Lives with her .  5 steps to enter her home.       CURRENT FUNCTIONAL STATUS  8/1/22  Bed Mobility  Bed Mobility Training Rehab Potential: fair, will monitor progress closely  Bed Mobility Training Sit-to-Supine: maximum assist (25% patient effort);  1 person assist  Bed Mobility Training Supine-to-Sit: maximum assist (25% patient effort);  1 person assist  Bed Mobility Training Limitations: decreased ability to use arms for pushing/pulling;  decreased ability to use legs for bridging/pushing;  impaired balance;  decreased strength;  impaired postural control    Sit-Stand Transfer Training  Sit-to-Stand Transfer Training Rehab Potential: fair, will monitor progress closely  Transfer Training Sit-to-Stand Transfer: maximum assist (25% patient effort);  1 person assist;  PT assist  Transfer Training Stand-to-Sit Transfer: maximum assist (25% patient effort);  1 person assist;  PT assist  Sit-to-Stand Transfer Training Transfer Safety Analysis: decreased weight-shifting ability;  decreased strength;  impaired balance    Gait Training  Gait Training Rehab Potential: fair, will monitor progress closely  Gait Training: maximum assist (25% patient effort);  1 person assist;  PT assist;  3 lateral steps to the left at bedside  Gait Analysis: decreased step length;  decreased stride length;  decreased weight-shifting ability;  decreased strength;  impaired balance;  impaired postural control      RECENT LABS - Reviewed  CBC Full  -  ( 04 Aug 2022 05:39 )  WBC Count : 5.02 K/uL  RBC Count : 3.40 M/uL  Hemoglobin : 8.8 g/dL  Hematocrit : 29.5 %  Platelet Count - Automated : 322 K/uL  Mean Cell Volume : 86.8 fl  Mean Cell Hemoglobin : 25.9 pg  Mean Cell Hemoglobin Concentration : 29.8 gm/dL  Auto Neutrophil # : x  Auto Lymphocyte # : x  Auto Monocyte # : x  Auto Eosinophil # : x  Auto Basophil # : x  Auto Neutrophil % : x  Auto Lymphocyte % : x  Auto Monocyte % : x  Auto Eosinophil % : x  Auto Basophil % : x    08-04    142  |  112<H>  |  5.5<L>  ----------------------------<  187<H>  3.9   |  18.0<L>  |  0.74    Ca    7.9<L>      04 Aug 2022 05:39  Phos  3.5     08-04  Mg     1.8     08-04          ALLERGIES  No Known Allergies      MEDICATIONS   acetaminophen  Suppository .. 650 milliGRAM(s) Rectal every 8 hours PRN  aluminum hydroxide/magnesium hydroxide/simethicone Suspension 30 milliLiter(s) Oral every 6 hours PRN  aspirin Suppository 300 milliGRAM(s) Rectal daily  atorvastatin 80 milliGRAM(s) Oral at bedtime  dextrose 5% + sodium chloride 0.9%. 1000 milliLiter(s) IV Continuous <Continuous>  dextrose 5%. 1000 milliLiter(s) IV Continuous <Continuous>  dextrose 5%. 1000 milliLiter(s) IV Continuous <Continuous>  dextrose 50% Injectable 25 Gram(s) IV Push once  dextrose 50% Injectable 12.5 Gram(s) IV Push once  dextrose 50% Injectable 25 Gram(s) IV Push once  dextrose Oral Gel 15 Gram(s) Oral once PRN  enoxaparin Injectable 40 milliGRAM(s) SubCutaneous every 24 hours  ferrous    sulfate 325 milliGRAM(s) Oral daily  glucagon  Injectable 1 milliGRAM(s) IntraMuscular once  hydrALAZINE Injectable 5 milliGRAM(s) IV Push every 6 hours PRN  insulin lispro (ADMELOG) corrective regimen sliding scale   SubCutaneous Before meals and at bedtime  labetalol 100 milliGRAM(s) Oral two times a day  levothyroxine 137 MICROGram(s) Oral daily  lisinopril 10 milliGRAM(s) Oral daily  memantine 10 milliGRAM(s) Oral two times a day  nystatin Powder 1 Application(s) Topical two times a day  ondansetron Injectable 4 milliGRAM(s) IV Push every 6 hours PRN  piperacillin/tazobactam IVPB.. 3.375 Gram(s) IV Intermittent every 8 hours      ----------------------------------------------------------------------------------------  PHYSICAL EXAM  Constitutional - Resting comfortably.    HEENT - Opens eyes to voice and stimulation, right eye ptosis (reported chronic)   Neck - No JVD  Chest - No respiratory distress  Cardiovascular - No cyanosis   Abdomen - Soft   Extremities - No calf tenderness   Neurologic Exam -                    Cognitive - Awake to voice and stimulation.  Responds to simple questions such as name and birthday.       Motor - Limited exam due to cognition.  Does move both arms anti-gravity to command.  Some anti-gravity movement in LLE.  RLE movement limited but noted active movement in ankle dorsi-plantar flexion.        Reflexes - No clonus   Psychiatric - Resting comfortably    ----------------------------------------------------------------------------------------  ASSESSMENT/PLAN  87yFemale with functional deficits after prolonged hospitalization complicated by sepsis, encephalopathy, possible CVA.  CVA - MRI brain pending, recommend neurology evaluation  Sepsis - Zosyn, ID consultation   Dysphagia -  Continue SLP evaluation    DVT PPX - Lovenox  Rehab - Continue PT and OT to maintain passive ROM and prevent contracture.  Discussed with son at bedside, rehabilitation goal to maintain strength as much as possible while hospitalized.  Patient being medically optimized, likely would benefit from continued rehabilitation once medically stable.    Will continue to follow. Functional progress will determine ongoing rehab dispo recommendations, which may change.    Continue bedside therapy as well as OOB throughout the day with mobilization by staff to maintain cardiopulmonary function and prevention of secondary complications related to debility.

## 2022-08-04 NOTE — OCCUPATIONAL THERAPY INITIAL EVALUATION ADULT - DIAGNOSIS, OT EVAL
87 year old Female brought in by family for AMS and progressing generalized weakness. CTH sig for findings NPH. 7/19 pt dev nausea, emesis, bradycardia, hypotension RRT pt intubated and found with B lower lobe consilidation, small B pleural effusion. 7/21 extubated. Hospital course c/b recurring fevers, possible UTI persistent encephalopathy initially 2* sepsis and dehydration in setting of underlying dementia. CT possible stroke / MR pending

## 2022-08-04 NOTE — PROGRESS NOTE ADULT - ASSESSMENT
86 yo woman with AMS likely due to toxic metabolic encephalopathy    AMS  improving as per family at bedside  Routine EEG - mild to mod slowing with triphasic waves, no seizure seen  Correct metabolic abnormalities  Tx underlying infection  MRI brain w/wo contrast pending - if mental status continues to improve can hold off on MRI    NPH  pt evaluated by neurosurgery - not a candidate for workup or placement of VPS, no neurosurgical intervention    Discussed with family at bedside and answered all questions    Please recall once/if MRI brain complete    Thank you for allowing me to participate in this patient's care. 88 yo woman with AMS likely due to toxic metabolic encephalopathy    AMS  improving as per family at bedside  Routine EEG - mild to mod slowing with triphasic waves, no seizure seen  Correct metabolic abnormalities  Tx underlying infection  MRI brain w/wo contrast pending - if mental status continues to improve can hold off on MRI    NPH  pt evaluated by neurosurgery - not a candidate for workup or placement of VPS, no neurosurgical intervention    ? Left cerebellar infarct  Further management as per vascular neurology    Discussed with family at bedside and answered all questions    Please recall once/if MRI brain complete    Thank you for allowing me to participate in this patient's care.

## 2022-08-04 NOTE — OCCUPATIONAL THERAPY INITIAL EVALUATION ADULT - ADDITIONAL COMMENTS
All ADLs/transfers required additional time. Pt has a tub with a chair, curtains and grab bars. Pt owns a RW, shower chair. Pt is right handed and does not drive. Pt spouse performs IADLs. Pt has had 3-4 falls in past 6 months.

## 2022-08-04 NOTE — CONSULT NOTE ADULT - ASSESSMENT
IMPRESSION:  - R/O Left cerebellar infarct.  Vs Mass lesion.  MAS R/O toxic metabolic etiologies.      ASSESSMENT/ PLAN:     - Neuro checks and vital signs Q 4 hours.  - SBP goal keep normotensive.  - ASA 81 mg PO or 300 [place] QD pending MRI Brain.  - Statin for LDL goal of < 70 .  - CT -images and reports were reviewed.   - Please check bilateral carotid Ultrasound  - MRI Brain with and without contrast.  - TTE -Reports as above were reviewed.   - PT OT SLP evaluation.  - SCD/ SQ Lovenox for DVT prophylaxis.

## 2022-08-05 NOTE — PROGRESS NOTE ADULT - SUBJECTIVE AND OBJECTIVE BOX
OVERNIGHT EVENTS: doing poorly, lethargic     Present Symptoms:     Dyspnea: none   Nausea/Vomiting: No  Anxiety:  No  Depression: unable   Fatigue: Yes   Loss of appetite: unable   Constipation: none     Pain: none currently             Character-            Duration-            Effect-            Factors-            Frequency-            Location-            Severity-    Pain AD Score:  [email or web address]/[email or web address] (press ctrl + left click to view)    Review of Systems: Reviewed                   Unable to obtain due to poor mentation   All others negative    MEDICATIONS  (STANDING):  aspirin Suppository 300 milliGRAM(s) Rectal daily  atorvastatin 80 milliGRAM(s) Oral at bedtime  dextrose 5% + lactated ringers. 1000 milliLiter(s) (75 mL/Hr) IV Continuous <Continuous>  dextrose 5%. 1000 milliLiter(s) (50 mL/Hr) IV Continuous <Continuous>  dextrose 5%. 1000 milliLiter(s) (100 mL/Hr) IV Continuous <Continuous>  dextrose 50% Injectable 25 Gram(s) IV Push once  dextrose 50% Injectable 12.5 Gram(s) IV Push once  dextrose 50% Injectable 25 Gram(s) IV Push once  enoxaparin Injectable 40 milliGRAM(s) SubCutaneous every 24 hours  ferrous    sulfate 325 milliGRAM(s) Oral daily  glucagon  Injectable 1 milliGRAM(s) IntraMuscular once  insulin lispro (ADMELOG) corrective regimen sliding scale   SubCutaneous Before meals and at bedtime  labetalol 100 milliGRAM(s) Oral two times a day  levothyroxine 112 MICROGram(s) Oral daily  lisinopril 10 milliGRAM(s) Oral daily  memantine 10 milliGRAM(s) Oral two times a day  nystatin Powder 1 Application(s) Topical two times a day  piperacillin/tazobactam IVPB.. 3.375 Gram(s) IV Intermittent every 8 hours  potassium chloride  10 mEq/100 mL IVPB 10 milliEquivalent(s) IV Intermittent every 1 hour  potassium phosphate IVPB 30 milliMole(s) IV Intermittent once  sodium bicarbonate  Infusion 0.062 mEq/kG/Hr (75 mL/Hr) IV Continuous <Continuous>    MEDICATIONS  (PRN):  acetaminophen  Suppository .. 650 milliGRAM(s) Rectal every 8 hours PRN Temp greater or equal to 38C (100.4F), Mild Pain (1 - 3)  aluminum hydroxide/magnesium hydroxide/simethicone Suspension 30 milliLiter(s) Oral every 6 hours PRN Dyspepsia  dextrose Oral Gel 15 Gram(s) Oral once PRN Blood Glucose LESS THAN 70 milliGRAM(s)/deciliter  hydrALAZINE Injectable 5 milliGRAM(s) IV Push every 6 hours PRN SBP> 170  ondansetron Injectable 4 milliGRAM(s) IV Push every 6 hours PRN Nausea and/or Vomiting      PHYSICAL EXAM:    Vital Signs Last 24 Hrs  T(C): 38.2 (05 Aug 2022 12:45), Max: 38.7 (05 Aug 2022 11:29)  T(F): 100.8 (05 Aug 2022 12:45), Max: 101.7 (05 Aug 2022 11:29)  HR: 85 (05 Aug 2022 12:45) (82 - 103)  BP: 101/34 (05 Aug 2022 12:45) (90/60 - 169/66)  BP(mean): 49 (05 Aug 2022 12:45) (49 - 101)  RR: 28 (05 Aug 2022 12:45) (18 - 30)  SpO2: 98% (05 Aug 2022 12:45) (93% - 98%)    Parameters below as of 05 Aug 2022 12:45  Patient On (Oxygen Delivery Method): nasal cannula  O2 Flow (L/min): 3    General: lethargic     Karnofsky:  20%    HEENT: normal     Lungs: comfortable    CV: normal      GI: normal     : normal      MSK: bedbound     Skin: no rash    LABS:                      9.8    5.74  )-----------( 319      ( 05 Aug 2022 06:30 )             32.5     08-05    137  |  104  |  3.1<L>  ----------------------------<  182<H>  3.4<L>   |  20.0<L>  |  0.61    Ca    8.0<L>      05 Aug 2022 06:30  Phos  2.0     08-05  Mg     1.7     08-05    I&O's Summary    RADIOLOGY & ADDITIONAL STUDIES:    ADVANCE DIRECTIVES/TREATMENT PREFERENCES:  DNR/DNI

## 2022-08-05 NOTE — PROGRESS NOTE ADULT - ASSESSMENT
87F who presented from home when her  had noted that she had profound weakness and was unable to stand or feed herself. The patient is noted to have a history of progressive dementia and was limited in terms of providing a history. Additional information was obtained from the patient's daughter at the bedside. The patient was note to have had progressive dementia over the past few years. She lives with her  at home and there was no recent fevers, chills, or sick contacts. She is reported to be compliant with her medications at home which her daughter prepares for her. She is noted to have a history of poorly controlled blood pressures despite her cardiac medications. At baseline she was noted to be able to ambulate independently at a slow rate. There were no complaints of chest pain, palpitations, dyspnea, or cough. The patient was otherwise in her usual state of health. Upon arrival of EMS, the patient was reported to have been hypotensive and hypoxic. In the emergency department, the patient was noted to have fever and leukocytosis. There were no similar episodes in the past and no known aggravating or relieving factors. The patient's daughter did note that the patient had vision loss and pain in the right eye in the past and was planned for surgery but the pain resolved spontaneously. She was also noted to have occasional swelling and weeping wounds on the left leg for which she would be treated with antibiotics.   AS ABOVE PT ADMITTED ON 7/18 PT WTIH HYPOTENSION ADN WAS INTUBATED  PLACED IN ICU EVENTUAL EXTUBATION P[T FOUND TO HAVE SEVERE AORTIC STENOSIS  ALSO WITH  CT SCAN WITH PROBABLE NPH   PT WITH RECENT COURSE OF ABX FOR POSSIBLE ASP PNEUMONIA  WAS  OFF ABX    URINE CX DONE 50-99K ECOLI    PT DEVELOPED SOB AND PROBABLE ASPIRATION PNEUMONIA   PLACED ON ZOSYN THEN CEFEPIME  REPORTEDLY MORE LETHARGIC ON  CEFEPIME  ALTHOUGH LESS LIKELY RELATED TO ABX  AGREE  CHANGE BACK TO ZOSYN  PT HAD COMPLETED COURSE FOR PRIOR ? UTI  WILL FOLLOW UP SPOKE TO  AT BEDSIDE

## 2022-08-05 NOTE — PROGRESS NOTE ADULT - ASSESSMENT
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     #1 Dementia  - seems to have been progressing over the last 3 years - per  has been sleeping more, eating less, etc   - poor overall prognosis  - supportive measures, frequent reorientation    #2 Encephalopathy  - CT head concern for stroke, MRI no acute stroke, chronic microvascular changes, communicating hydrocephlus  - neurology following   - likely waxing and waning mental status related to underlying dementia     #3 Dysphagia  - too lethargic to swallow  - will need formal swallow evaluation  - would not recommend feeding tube due to advanced dementia, unlikely to improve condition or outcome     #4 NPH  - not a candidate for shunting  - appreciate neurosurgery input  - given not a candidate for shunting, would not make sense to subject her to an LP  - had evidence of swelling in old imaging from 2019     #5 Severe AS    - poor prognosis  - not a candidate for valve surgery   - hospice appropriate    #6 Debility  - needs assist with ADLs  - PT/OT, out of bed encouragement     #7 Encounter for palliative care  - see goals of care

## 2022-08-05 NOTE — SWALLOW BEDSIDE ASSESSMENT ADULT - SLP GENERAL OBSERVATIONS
Pt received & seen seated upright in bed, lethargic, eyes closed, varied command following, sons & sister present, largely nonverbal with some verbalizations noted, 0/10 pain pre/post
Pt recd asleep in bed, easily awoken w/ verbal stimuli, family @ the bedside, NC in place, O2SATs @ 98%, NAD, pt following simple commands and answering simple questions.

## 2022-08-05 NOTE — PROGRESS NOTE ADULT - ASSESSMENT
88 y/o F w/ PMH of HTN, hypothyroid, DM, anemia, hypothyroidism, was brought ihn by family for AMS and progressing generalized weakness.  Pt also had associated subjective feevers and poor po intake.  Pt was hypoxic on admission and w/ hypertensive urgency.  cth didnot show any acute findings on admission. Pt was polaced on emperic abxs.  On 7/19 pt she developed nausea and had episodes of NBNB emesis at that time w bradycardia and hypotension; RRT was called.  Pt was subsequenty intubated for airway protection and upgraded to MICU.  Pressors were initiated.  CT revealed Bilateral lower lobe consolidations, which may represent atelectasis and/or pneumonia. Small bilateral pleural effusions.  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 imaging reviewed by neurology and neuro sx . as per neuro sx no significant changes from previous imaging and didnot recommend any surgical interventions. Pt also noted to have severe AS on TTE however no inervention planned per cardiology.  Pt was extubated 7/21 and downgraded to medicine 7/22.  Hospital course further complicated by recurring fevers , resumed on antibiotics 7/29 for sirs , possible uti.  patient with Persistent encephalopathy, likely multifactorial, initially 2/2 sepsis and dehydration, in the setting of underlying dementia w/ suspicion for NPH. had some improvement in mental status intermittently.  patient inconsistent with meal intakes. noted to pocket food and keep in her mouth while eating. concern for risk of aspiration brought up mutiple times to the family, family wanted  to c/w po feeds, speech re eval done. and patient was started on pureed diet. patient was noted to fall asleep during  while family trying to feed her. aspiration risk explained to patient's family. 8/3 patient with new fever episode of 100.9. patient also noted to be more lethargic. repeat cth 8/3 showed new left cerebellus density / possible acute cva, discussed with neurology. not candidate for tpa due to unclear time of onset of cva. stroke protocol initiated 8/3. neuro stroke consulted. on aspirin and statin. mri pending. eeg no acute seizures. abxs changed to zosyn 8/3 for possible new recurrent aspiration pna. pending bcxs. son concerned about possible strep throat , strep pcr ordered 8/4/ pending / unable to do due to patient not follwoing commands and concern for pateint vomiting / aspiration  if swabbed. antibiotics were switched to cefepime for jim coverage for pna and uti as per iD recs on 8/4/22 but chnaged back to zosyn 8/5/22 as patient was less lethargic on zosyn.  8/4/22 mri brain done , doesnot show acute cva / redemonstartion  of communicating hydrocephalus and chronic cva. resultes discussed with neurology dr. camejo and radiology dr. barragan. family updated at bedside.     >>Acute  encephalopathy, likely  multifactorial , sepsis,  NPH in the setting of underlying dementia / with some intermittent improvement / now worse again 8/3 with cth with new cva   - Suspicion for NPH given CTH 2/19  findings and hx of ataxia and incontinence   - Neurosurgery evaluated pt and noted pt is not a candidate for any intracranial surgery such as  shunt due to mx chronic comorbidities therefor as per neurology will not pursue an LP   - 8/3 patient noted to be more lethargic , episode of fever , concern for recurrent infection/ possible aspiration    - MRI initially was canceled by neurolo sx and recommended outpt f/u. family wanted to discuss usage of acetazolamide for nph with neuro sx team. as per neuro sx team  acetazolamide not used for treatment of NPH. acetazolamide not recommended by neurology team as well.   - discussed with neurology - even though patient's mental status did improve initially with antibiotics , its not back to her baseline as per family  - eeg 8/3 neg for seizures    - 8/3 repeat cth with new CVA left cerebellum , neurology dr. camejo  aware and neuro stroke called started on neurochecks   - c.w aspirin , statin   - 8/4/22 MRI  no acute stroke , chronic ischemic changes noted , rdemonstartion of communicating hydrocephalus , rt basal ganglia non specific enhacement noted , images reviewed by neurology , recommend repeat mri in 2-4 weeks as out patient. c/w aspirin and statin , bp control   - < from: US Duplex Carotid Arteries Complete, Bilateral (08.05.22 @ 10:11) >There is mild atherosclerotic plaque at the carotid bulb/proximal internal carotid arteries bilaterally.  < from: US Duplex Carotid Arteries Complete, Bilateral (08.05.22 @ 10:11) >No significant hemodynamic stenosis of either carotid artery.  - pt/ot/speech     >Sepsis POA, s/p septic shock possibly 2/2 aspiration PNA  -Acute hypoxic respiratory failure s/p intubation w/ mechanical ventilation possibly 2/2 aspiration / airway protection   -s/p pressor support and steroid course for hypotension while in MICU (since d/c'd)  - resumed on purred diet / thickened liquids after speech re eval, hold feeds while sleeping . patient has been noted to pocket food/ apple sauce in her mouth by nursing staff. patient 's family has been assisting with feeds as per their wishes, patient's family has been explained multiple times about risks of aspiration with feeding while patient is more somnolent and by keeping food in her mouth.   - 8/3 fever episode with tmax 100.9. concern for recurrent aspiration   - 8/3 ct chest shows new upper opacity / basal atelectasis / possible aspiration   - 8/3 was on zosyn/ ceftriaxone , 8/4 changed to cefepime to cover pna and uti as per id recs , 8/5 changed back to zosyn as per id recs due to  patient had better improvement in ams while on zosyn   - bcxs 8/3 neg   - id follwoing   - 8/3 made npo again due to concern for recurrent aspirations   - c/w  gentle ivf with dextrose     >concern for possible uti/ possible colonization   - family had refused removal of marcano as per chart records, now dcd.   - Marcano removed for tov, bladder scans ordered q6h   - bcxs from 7/28 neg   - urine cxs 50-99k ecoli , awaiting sensitivities , resistant to zosyn , s/p ceftriaxoen and cefepime , see above   - c/w flomax     >Severe Aortic stenosis  - TTE reviewed and noted above   - Per cardio pt is not a candidate for intervention given her dementia   - Caution w/ IVFs given risk of volume overload  - Cardio consulted. no further interventions at this time.       > hypernatremia / hyperchloremic acidosis /   -likel due to dec po intake/  ivf / improved with ivf adjustment  - c/w gentle ivf with dextrose     >Normocytic anemia   - s/p 1u PRBC transfusion during hospital course w/ adequate response and h/h remains stable  - Etiology unclear but no active bleeding noted and hemodynamically stable at this time   - s/p short course of venofer, transition to po supplementation once able to tolerate po   - Low suspicion for GIB at this time but if concern increases I will consult GI  - trend CBC closely and transfuse if Hb<8     >Hypertensive urgency / bp better controlled   - unable to take po at this time due to ams , patient is very sensitive to small dose changes in meds   - speech eval. c/w c/w lisinopril 10 mg and norvasc 2.5 mg po to pm and labetalol with holding parmeters.   - c/w hydralazine prn  with parameters     >Diabetes mellitus  - A1c 10.8  - dc long acting insulin due to inconsistent mental status and inconsistent po intake   - c.w ssi only    - monitor fsg     >Hypothyroidism  -Thyroid Stimulating Hormone, Serum: 0.81 uIU/mL (07.19.22 @ 02:47)  - repeat tsh low Thyroid Stimulating Hormone, Serum: <0.10 uIU/mL (08.04.22 @ 05:39)  - 8/4 lowered synthrydoid dose to 112 mcg po qd . 8/5 dcd po synthyroid and will change to iv half dose till able to take po more consistently     > code status : dnr / dni.  see goc note     >VTE ppx: sq lovenox     Dispo:  plan of care explained / results updated to patient;s  , son rogelio , sister keagan at bedside along with nursing , dr. pickens ( palliative ) , dr. camejo ( neurology ) , nursing manager present at bedside

## 2022-08-05 NOTE — PROGRESS NOTE ADULT - SUBJECTIVE AND OBJECTIVE BOX
Chief Complaint:  AMS    SUBJECTIVE / OVERNIGHT EVENTS: patient seen and eval. son  ,  and sister at bedside. patient seems to be more tired and lethargic today than yesterday but able to answer ' yes , how r you ' to her name. keeping eyes closed today. opens mouth briefly. doensot follow much commands. tmax 101.7     Vital Signs Last 24 Hrs  T(C): 38.2 (05 Aug 2022 12:45), Max: 38.7 (05 Aug 2022 11:29)  T(F): 100.8 (05 Aug 2022 12:45), Max: 101.7 (05 Aug 2022 11:29)  HR: 85 (05 Aug 2022 12:45) (82 - 103)  BP: 101/34 (05 Aug 2022 12:45) (90/60 - 169/66)  BP(mean): 49 (05 Aug 2022 12:45) (49 - 101)  RR: 28 (05 Aug 2022 12:45) (18 - 30)  SpO2: 98% (05 Aug 2022 12:45) (93% - 98%)    Parameters below as of 05 Aug 2022 12:45  Patient On (Oxygen Delivery Method): nasal cannula  O2 Flow (L/min): 3      GENERAL: patient seems to be more tired and lethargic today than yesterday but able to answer ' yes , how r you ' to her name. keeping eyes closed today. opens mouth briefly. doensot follow much commands.   HEENT: rt eye  chronic blindness , mm moist ,  no oral thrush noted ,  no stridor   RESPIRATORY: occasional basal rhonchi , good airflow   CARDIOVASCULAR: RRR, normal S1 and S2, +systolic murmur  ABDOMEN: soft, NT/ND, +bowel sounds, no rebound/guarding  EXTREMITIES: No cynaosis, warm   NEUROLOGY:       patient seems to be more tired and lethargic today than yesterday but able to answer ' yes , how r you ' to her name. keeping eyes closed today. opens mouth briefly. doensot follow much commands. resists to lifting arms and legs passively and streches her extremities but not doesnot follow commands                    RADIOLOGY & ADDITIONAL TESTS:    < from: Xray Chest 1 View AP/PA. (07.28.22 @ 21:48) >  There is a mild to moderate right effusion new since July 23.    < end of copied text >      < from: US Abdomen Upper Quadrant Right (07.20.22 @ 14:31) >  IMPRESSION:  No evidence of cholelithiasis or acute cholecystitis.  Mildly enlarged fatty liver.    < end of copied text >  < from: CT Abdomen and Pelvis w/ IV Cont (07.20.22 @ 05:26) >  IMPRESSION:    1. Bilateral lower lobe consolidations, which may represent atelectasis   and/or pneumonia.  2. Small bilateral pleural effusions.  3. Mild gallbladder wall thickening. Recommend ultrasound for further   assessment.  4. No CT evidence of bowel obstruction, diverticulitis, or colitis.    < end of copied text >      < from: CT Head No Cont (07.19.22 @ 12:55) >    IMPRESSION:    Findings consistent with persistent moderate normal pressure   hydrocephalus.    No acute intracranial hemorrhage or brain edema.    < end of copied text >      < from: TTE Echo Complete w/o Contrast w/ Doppler (07.19.22 @ 20:08) >  Summary:   1. Technically suboptimal study.   2. Estimated pulmonary artery systolic pressure is 38.9 mmHg assuming a   right atrial pressure of 8 mmHg, which is consistent with borderline   pulmonary hypertension.   3. Hyperdynamic global left ventricular systolic function.   4. Left ventricular ejection fraction, by visual estimation, is >75%.   5. Severely enlarged left atrium.   6. Spectral Doppler shows impaired relaxation pattern of left   ventricular myocardial filling (Grade I diastolic dysfunction).   7. Mildly enlarged right atrium.   8. Mild mitral annular calcification.   9. Trace mitral valve regurgitation.  10. Mild tricuspid regurgitation.  11. Endocardial visualization was enhanced with intravenous echo contrast.  12. Sclerotic aortic valve with decreased opening.  13. Stroke volume across LVOT is 31 ml. ERROL by continuity equation is 0.6   cm^2. DVI of 0.21 also suggestive of severe aortic valve stenosis.  14. There is no evidence of pericardial effusion.    < end of copied text >      < from: CT Abdomen and Pelvis No Cont (08.03.22 @ 16:43) >  IMPRESSION:  Small bilateral pleural effusionwith mild increase in size of right   pleural effusion.  New nonspecific groundglass opacities in the upper lobes.  Decreased bibasilar densities suggesting residual mild linear atelectasis  Stable mild mediastinal lymphadenopathy  No acute pathologyin the abdomen or pelvis      < end of copied text >      < from: CT Head No Cont (08.03.22 @ 16:42) >  IMPRESSION:   mild periventricular white matter ischemia. Hypodensity in   the LEFT cerebellum which may reflect an infarction. 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    Prominent lateral ventricles due to   central atrophy.      < end of copied text >           < from: MR Head w/wo IV Cont (08.04.22 @ 20:05) >  FINDINGS:  Focal 1 cm area of enhancement right in the basal ganglia on image 16 of   series 12 in the region of chronic/subacute infarctions/gliosis.   Follow-up MRI in 2-4 weeks is recommended to exclude underlying lesion.    There is no abnormal restricted diffusion to suggest acute infarction.   Scattered periventricular and subcortical white matter T2 /FLAIR   hyperintensities are seen without mass effect, nonspecific, likely   representing mild to moderate chronic microvascular changes. Normal T2   flow-voids are seen within  the intracranial vasculature.    The lateral ventricles are dilated out of proportion to the cortical   sulci suggesting communicating hydrocephalus. There is no other mass,   mass effect, or extra-axial fluid collection. There is no susceptibility   artifact to suggest hemorrhage. Midline structures are normal.    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 Status post   left ocular lens replacement. Mild inflammatory mucosal changes are seen   throughout the various portions of the paranasal sinuses. The orbits and   mastoid air cells are otherwise unremarkable.    IMPRESSION: Communicating hydrocephalus. Chronic microvascular changes.   No acute infarction. 1 cm area of enhancement in left basal ganglia.    MRI 2-4 weeks is recommended.    < end of copied text >    < from: US Duplex Carotid Arteries Complete, Bilateral (08.05.22 @ 10:11) >  IMPRESSION: No significant hemodynamic stenosis of either carotid artery.      < end of copied text >

## 2022-08-05 NOTE — SWALLOW BEDSIDE ASSESSMENT ADULT - SLP PERTINENT HISTORY OF CURRENT PROBLEM
Pt was being followe dby [facility] dpet this admission for dysphagia, Last seen at bedside Pt was being followed by this dept this admission for dysphagia. Last seen at bedside 7/30 with RX for puree/mildly thick fluids. New MD order received s/p transfer in level of care

## 2022-08-05 NOTE — PROGRESS NOTE ADULT - SUBJECTIVE AND OBJECTIVE BOX
INFECTIOUS DISEASES AND INTERNAL MEDICINE at [place]  =======================================================  Julio Ariza MD  Diplomates [employer] Board of Internal Medicine and Infectious Diseases  Telephone [phone]  Fax            [phone]  =======================================================    KADIE SHARIFA [id number]    Follow up: PNEUMONIA    Allergies:  No Known Allergies      Medications:  acetaminophen     Tablet .. 650 milliGRAM(s) Oral every 6 hours PRN  aluminum hydroxide/magnesium hydroxide/simethicone Suspension 30 milliLiter(s) Oral every 6 hours PRN  amLODIPine   Tablet 2.5 milliGRAM(s) Oral daily  dextrose 5%. 1000 milliLiter(s) IV Continuous <Continuous>  dextrose 5%. 1000 milliLiter(s) IV Continuous <Continuous>  dextrose 50% Injectable 25 Gram(s) IV Push once  dextrose 50% Injectable 12.5 Gram(s) IV Push once  dextrose 50% Injectable 25 Gram(s) IV Push once  dextrose Oral Gel 15 Gram(s) Oral once PRN  enoxaparin Injectable 40 milliGRAM(s) SubCutaneous every 24 hours  ferrous    sulfate 325 milliGRAM(s) Oral daily  glucagon  Injectable 1 milliGRAM(s) IntraMuscular once  hydrALAZINE Injectable 5 milliGRAM(s) IV Push every 6 hours PRN  insulin glargine Injectable (LANTUS) 10 Unit(s) SubCutaneous at bedtime  insulin lispro (ADMELOG) corrective regimen sliding scale   SubCutaneous Before meals and at bedtime  labetalol 100 milliGRAM(s) Oral two times a day  levothyroxine 137 MICROGram(s) Oral daily  lisinopril 10 milliGRAM(s) Oral daily  memantine 10 milliGRAM(s) Oral two times a day  nystatin Powder 1 Application(s) Topical two times a day  ondansetron Injectable 4 milliGRAM(s) IV Push every 6 hours PRN  piperacillin/tazobactam IVPB.. 3.375 Gram(s) IV Intermittent every 8 hours    SOCIAL       FAMILY   FAMILY HISTORY:    REVIEW OF SYSTEMS:   UNABLE TO OBTAIN               Physical Exam:   Vital Signs Last 24 Hrs  T(C): 38.2 (05 Aug 2022 12:45), Max: 38.7 (05 Aug 2022 11:29)  T(F): 100.8 (05 Aug 2022 12:45), Max: 101.7 (05 Aug 2022 11:29)  HR: 83 (05 Aug 2022 14:00) (82 - 103)  BP: 102/38 (05 Aug 2022 14:00) (90/60 - 169/66)  BP(mean): 53 (05 Aug 2022 14:00) (49 - 101)  RR: 19 (05 Aug 2022 14:00) (18 - 30)  SpO2: 99% (05 Aug 2022 14:00) (93% - 99%)    Parameters below as of 05 Aug 2022 14:00  Patient On (Oxygen Delivery Method): nasal cannula  O2 Flow (L/min): 3       O2 Parameters below as of 01 Aug 2022 10:15  Patient On (Oxygen Delivery Method): room air          GEN:  , LETHARGIC  HEENT: normocephalic and atraumatic. EOMI. WALDEMAR.    NECK: Supple. No carotid bruits.  No lymphadenopathy or thyromegaly.  LUNGS: Clear to auscultation.  HEART: Regular rate and rhythm without murmur.  ABDOMEN: Soft, nontender, and nondistended.  Positive bowel sounds.    : No CVA tenderness  EXTREMITIES: Without any cyanosis, clubbing, rash, lesions or edema.  MSK: no joint swelling  NEUROLOGIC LETHARGIC            Labs:  Vitals:  ============   Vital Signs Last 24 Hrs  T(C): 38.2 (05 Aug 2022 12:45), Max: 38.7 (05 Aug 2022 11:29)  T(F): 100.8 (05 Aug 2022 12:45), Max: 101.7 (05 Aug 2022 11:29)  HR: 83 (05 Aug 2022 14:00) (82 - 103)  BP: 102/38 (05 Aug 2022 14:00) (90/60 - 169/66)  BP(mean): 53 (05 Aug 2022 14:00) (49 - 101)  RR: 19 (05 Aug 2022 14:00) (18 - 30)  SpO2: 99% (05 Aug 2022 14:00) (93% - 99%)    Parameters below as of 05 Aug 2022 14:00  Patient On (Oxygen Delivery Method): nasal cannula  O2 Flow (L/min): 3      =======================================================  Current Antibiotics:  piperacillin/tazobactam IVPB.. 3.375 Gram(s) IV Intermittent every 8 hours    Other medications:  amLODIPine   Tablet 2.5 milliGRAM(s) Oral daily  dextrose 5%. 1000 milliLiter(s) IV Continuous <Continuous>  dextrose 5%. 1000 milliLiter(s) IV Continuous <Continuous>  dextrose 50% Injectable 25 Gram(s) IV Push once  dextrose 50% Injectable 12.5 Gram(s) IV Push once  dextrose 50% Injectable 25 Gram(s) IV Push once  enoxaparin Injectable 40 milliGRAM(s) SubCutaneous every 24 hours  ferrous    sulfate 325 milliGRAM(s) Oral daily  glucagon  Injectable 1 milliGRAM(s) IntraMuscular once  insulin glargine Injectable (LANTUS) 10 Unit(s) SubCutaneous at bedtime  insulin lispro (ADMELOG) corrective regimen sliding scale   SubCutaneous Before meals and at bedtime  labetalol 100 milliGRAM(s) Oral two times a day  levothyroxine 137 MICROGram(s) Oral daily  lisinopril 10 milliGRAM(s) Oral daily  memantine 10 milliGRAM(s) Oral two times a day  nystatin Powder 1 Application(s) Topical two times a day      =======================================================  Labs:                                              9.8    5.74  )-----------( 319      ( 05 Aug 2022 06:30 )             32.5   08-05    137  |  104  |  3.1<L>  ----------------------------<  182<H>  3.4<L>   |  20.0<L>  |  0.61    Ca    8.0<L>      05 Aug 2022 06:30  Phos  2.0     08-05  Mg     1.7     08-05    8      Culture - Urine (collected 07-28-22 @ 23:30)  Source: Catheterized Catheterized  Final Report (08-01-22 @ 10:34):    50,000 - 99,000 CFU/mL Escherichia coli  Organism: Escherichia coli (08-01-22 @ 10:34)  Organism: Escherichia coli (08-01-22 @ 10:34)    Sensitivities:      -  Amikacin: S <=16      -  Amoxicillin/Clavulanic Acid: R >16/8      -  Ampicillin: R >16 These ampicillin results predict results for amoxicillin      -  Ampicillin/Sulbactam: R >16/8 Enterobacter, Klebsiella aerogenes, Citrobacter, and Serratia may develop resistance during prolonged therapy (3-4 days)      -  Aztreonam: S <=4      -  Cefazolin: R >16 (MIC_CL_[id number]) For uncomplicated UTI with K. pneumoniae, E. coli, or P. mirablis: LORI <=16 is sensitive and LORI >=32 is resistant. This also predicts results for oral agents cefaclor, cefdinir, cefpodoxime, cefprozil, cefuroxime axetil, cephalexin and locarbef for uncomplicated UTI. Note that some isolates may be susceptible to these agents while testing resistant to cefazolin.      -  Cefepime: S <=2      -  Cefoxitin: S <=8      -  Ceftriaxone: S <=1 Enterobacter, Klebsiella aerogenes, Citrobacter, and Serratia may develop resistance during prolonged therapy      -  Ciprofloxacin: S <=0.25      -  Ertapenem: S <=0.5      -  Gentamicin: S <=2      -  Imipenem: S <=1      -  Levofloxacin: S <=0.5      -  Meropenem: S <=1      -  Nitrofurantoin: S <=32 Should not be used to treat pyelonephritis      -  Piperacillin/Tazobactam: R >64      -  Tigecycline: S <=2      -  Tobramycin: S <=2      -  Trimethoprim/Sulfamethoxazole: R >2/38      Method Type: LORI    Culture - Blood (collected 07-28-22 @ 22:26)  Source: .Blood Blood    Culture - Blood (collected 07-28-22 @ 22:16)  Source: .Blood Blood    Culture - Urine (collected 07-23-22 @ 16:50)  Source: Clean Catch Clean Catch (Midstream)  Final Report (07-25-22 @ 10:20):    <10,000 CFU/mL Normal Urogenital Irene    Culture - Blood (collected 07-23-22 @ 16:48)  Source: .Blood Blood  Final Report (07-29-22 @ 02:01):    No Growth Final    Culture - Blood (collected 07-23-22 @ 16:41)  Source: .Blood Blood  Final Report (07-29-22 @ 02:00):    No Growth Final    Culture - Blood (collected 07-19-22 @ 14:10)  Source: .Blood Blood-Peripheral  Final Report (07-24-22 @ 19:01):    No Growth Final    Culture - Blood (collected 07-19-22 @ 14:10)  Source: .Blood Blood-Peripheral  Final Report (07-24-22 @ 19:01):    No Growth Final    Culture - Urine (collected 07-19-22 @ 14:10)  Source: Catheterized Catheterized  Final Report (07-20-22 @ 17:23):    No growth    Culture - Blood (collected 07-18-22 @ 22:10)  Source: .Blood Blood-Venous  Final Report (07-23-22 @ 23:00):    No Growth Final    Culture - Blood (collected 07-18-22 @ 22:10)  Source: .Blood Blood-Venous  Final Report (07-23-22 @ 23:00):    No Growth Final    Culture - Urine (collected 07-18-22 @ 22:01)  Source: Clean Catch Clean Catch (Midstream)  Final Report (07-21-22 @ 08:33):    10,000 - 49,000 CFU/mL Streptococcus agalactiae (Group B) isolated    Group B streptococci are susceptible to ampicillin,    penicillin and cefazolin, but may be resistant to    erythromycin and clindamycin.    Recommendations for intrapartum prophylaxis for Group B    streptococci are penicillin or ampicillin.      Creatinine, Serum: 0.83 mg/dL (08-01-22 @ 06:43)  Creatinine, Serum: 0.83 mg/dL (07-31-22 @ 04:45)  Creatinine, Serum: 0.86 mg/dL (07-30-22 @ 06:30)  Creatinine, Serum: 0.74 mg/dL (07-29-22 @ 04:40)  Creatinine, Serum: 0.85 mg/dL (07-28-22 @ 22:26)  Creatinine, Serum: 0.78 mg/dL (07-28-22 @ 04:32)    Procalcitonin, Serum: 2.21 ng/mL (07-28-22 @ 22:26)  Procalcitonin, Serum: 0.15 ng/mL (07-19-22 @ 14:00)      Ferritin, Serum: 85 ng/mL (07-24-22 @ 06:34)      WBC Count: 8.07 K/uL (08-01-22 @ 06:43)  WBC Count: 7.30 K/uL (07-31-22 @ 04:45)  WBC Count: 5.87 K/uL (07-30-22 @ 06:30)  WBC Count: 7.97 K/uL (07-29-22 @ 04:40)  WBC Count: 7.91 K/uL (07-28-22 @ 22:26)  WBC Count: 8.54 K/uL (07-28-22 @ 04:32)    COVID-19 PCR: NotDetec (07-31-22 @ 06:00)  COVID-19 PCR: NotDetec (07-25-22 @ 05:00)  SARS-CoV-2: NotDetec (07-18-22 @ 17:07)      Alkaline Phosphatase, Serum: 76 U/L (07-31-22 @ 04:45)  Alkaline Phosphatase, Serum: 81 U/L (07-28-22 @ 22:26)  Alanine Aminotransferase (ALT/SGPT): 17 U/L (07-31-22 @ 04:45)  Alanine Aminotransferase (ALT/SGPT): 16 U/L (07-28-22 @ 22:26)  Aspartate Aminotransferase (AST/SGOT): 30 U/L (07-31-22 @ 04:45)  Aspartate Aminotransferase (AST/SGOT): 28 U/L (07-28-22 @ 22:26)  Bilirubin Total, Serum: 0.3 mg/dL (07-31-22 @ 04:45)  Bilirubin Total, Serum: 0.4 mg/dL (07-28-22 @ 22:26)

## 2022-08-05 NOTE — PROGRESS NOTE ADULT - NS ATTEST RISK PROBLEM GEN_ALL_CORE FT
CAUTI, decreased mentation
Persistent metabolic encephalopathy, severe AS, persistent anemia, persistent electrolyte abnormalities
Persistent metabolic encephalopathy, sepsis today w/ source unclear at this time, severe AS, worsening anemia
ams
Persistent metabolic encephalopathy, severe AS, persistent anemia, persistent electrolyte abnormalities
ams
Persistent metabolic encephalopathy, severe AS, persistent anemia, persistent electrolyte abnormalities
ams
ams , fever ,  resp ditress
ams

## 2022-08-05 NOTE — PROGRESS NOTE ADULT - ASSESSMENT
86 yo woman with AMS likely due to toxic metabolic encephalopathy    AMS  Correct metabolic abnormalities  Tx underlying infection  MRI brain w/wo contrast pending - no acute infarct, left basal ganglia small area of enhancement - discussed with Dr. Mark, poss due to subacute infarcts in this area as well as motion, recommend repeat MRI brain w/o contrast in 1 month as an outpatient     NPH  pt evaluated by neurosurgery - not a candidate for workup or placement of VPS, no neurosurgical intervention    Discussed with family at bedside and answered all questions    No further inpt neurologic workup necessary at this time    Plan discussed with Dr. Murillo    Thank you for allowing me to participate in this patient's care.

## 2022-08-05 NOTE — SWALLOW BEDSIDE ASSESSMENT ADULT - COMMENTS
As per H&P: "87F who presented from home when her  had noted that she had profound weakness and was unable to stand or feed herself. The patient is noted to have a history of progressive dementia and was limited in terms of providing a history. Additional information was obtained from the patient's daughter at the bedside. The patient was note to have had progressive dementia over the past few years. She lives with her  at home and there was no recent fevers, chills, or sick contacts. She is reported to be compliant with her medications at home which her daughter prepares for her. She is noted to have a history of poorly controlled blood pressures despite her cardiac medications. At baseline she was noted to be able to ambulate independently at a slow rate. There were no complaints of chest pain, palpitations, dyspnea, or cough. The patient was otherwise in her usual state of health. Upon arrival of EMS, the patient was reported to have been hypotensive and hypoxic. In the emergency department, the patient was noted to have fever and leukocytosis. There were no similar episodes in the past and no known aggravating or relieving factors. The patient's daughter did note that the patient had vision loss and pain in the right eye in the past and was planned for surgery but the pain resolved spontaneously. She was also noted to have occasional swelling and weeping wounds on the left leg for which she would be treated with antibiotics."
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

## 2022-08-05 NOTE — PROGRESS NOTE ADULT - SUBJECTIVE AND OBJECTIVE BOX
Neurology Progress Note  [facility] [place] [address] [place] [address]  [phone]    Interval History:  Patient continues to be lethargic    MEDICATIONS  (STANDING):  MEDICATIONS  (STANDING):  aspirin Suppository 300 milliGRAM(s) Rectal daily  atorvastatin 80 milliGRAM(s) Oral at bedtime  dextrose 5% + lactated ringers. 1000 milliLiter(s) (75 mL/Hr) IV Continuous <Continuous>  dextrose 5%. 1000 milliLiter(s) (50 mL/Hr) IV Continuous <Continuous>  dextrose 5%. 1000 milliLiter(s) (100 mL/Hr) IV Continuous <Continuous>  dextrose 50% Injectable 25 Gram(s) IV Push once  dextrose 50% Injectable 12.5 Gram(s) IV Push once  dextrose 50% Injectable 25 Gram(s) IV Push once  enoxaparin Injectable 40 milliGRAM(s) SubCutaneous every 24 hours  ferrous    sulfate 325 milliGRAM(s) Oral daily  glucagon  Injectable 1 milliGRAM(s) IntraMuscular once  insulin lispro (ADMELOG) corrective regimen sliding scale   SubCutaneous Before meals and at bedtime  labetalol 100 milliGRAM(s) Oral two times a day  levothyroxine Injectable 50 MICROGram(s) IV Push at bedtime  lisinopril 10 milliGRAM(s) Oral daily  memantine 10 milliGRAM(s) Oral two times a day  nystatin Powder 1 Application(s) Topical two times a day  piperacillin/tazobactam IVPB.. 3.375 Gram(s) IV Intermittent every 8 hours  potassium chloride  10 mEq/100 mL IVPB 10 milliEquivalent(s) IV Intermittent every 1 hour  potassium phosphate IVPB 30 milliMole(s) IV Intermittent once  sodium bicarbonate  Infusion 0.062 mEq/kG/Hr (75 mL/Hr) IV Continuous <Continuous>    MEDICATIONS  (PRN):  acetaminophen  Suppository .. 650 milliGRAM(s) Rectal every 8 hours PRN Temp greater or equal to 38C (100.4F), Mild Pain (1 - 3)  aluminum hydroxide/magnesium hydroxide/simethicone Suspension 30 milliLiter(s) Oral every 6 hours PRN Dyspepsia  dextrose Oral Gel 15 Gram(s) Oral once PRN Blood Glucose LESS THAN 70 milliGRAM(s)/deciliter  hydrALAZINE Injectable 5 milliGRAM(s) IV Push every 6 hours PRN SBP> 170  ondansetron Injectable 4 milliGRAM(s) IV Push every 6 hours PRN Nausea and/or Vomiting      Physical Exam:  Vital Signs Last 24 Hrs  T(C): 38.2 (05 Aug 2022 12:45), Max: 38.7 (05 Aug 2022 11:29)  T(F): 100.8 (05 Aug 2022 12:45), Max: 101.7 (05 Aug 2022 11:29)  HR: 83 (05 Aug 2022 14:00) (82 - 103)  BP: 102/38 (05 Aug 2022 14:00) (90/60 - 169/66)  BP(mean): 53 (05 Aug 2022 14:00) (49 - 101)  RR: 19 (05 Aug 2022 14:00) (18 - 30)  SpO2: 99% (05 Aug 2022 14:00) (93% - 99%)    Parameters below as of 05 Aug 2022 14:00  Patient On (Oxygen Delivery Method): nasal cannula  O2 Flow (L/min): 3    General: no acute distress  HEENT:NC/AT  Lungs: no respiratory distress  Skin: no rash or ecchymoses  Lower extremities: no edema    Mental Status: lying in bed with eyes closed, resists eye opening, not following commands  CN:  resists eye opening, no gross facial asymmetry  Motor/Sensory:  Withdraws all extremities at least 3/5 throughout x 4 extremities  Gait: deferred      LABS:                          9.8    5.74  )-----------( 319      ( 05 Aug 2022 06:30 )             32.5     08-05    137  |  104  |  3.1<L>  ----------------------------<  182<H>  3.4<L>   |  20.0<L>  |  0.61    Ca    8.0<L>      05 Aug 2022 06:30  Phos  2.0     08-05  Mg     1.7     08-05    MRI brain w/o contrast  IMPRESSION: Communicating hydrocephalus. Chronic microvascular changes.   No acute infarction. 1 cm area of enhancement in left basal ganglia.    MRI 2-4 weeks is recommended.

## 2022-08-05 NOTE — PROGRESS NOTE ADULT - CONVERSATION/DISCUSSION
Diagnosis/Prognosis/MOLST Discussed/Treatment Options
Prognosis/Hospice Referral
Diagnosis/Prognosis/MOLST Discussed/Treatment Options
Diagnosis/Prognosis/Treatment Options/Hospice Referral/Palliative Care Referral
Diagnosis/Prognosis/Treatment Options

## 2022-08-05 NOTE — SWALLOW BEDSIDE ASSESSMENT ADULT - ORAL PREPARATORY PHASE
scant amount of puree placed on labial surface, due to poor oral grading. Pt with noted lingual movement to transfer bolus intraorally to anterior lingual tip. Bous however, did not progress, in spite of max multimodality input & encouragement & was subsequently removed by this ST
Within functional limits

## 2022-08-05 NOTE — PROGRESS NOTE ADULT - NS ATTEST RISK GEN_ALL_CORE
Risk Statement (NON-critical care)

## 2022-08-06 NOTE — PROGRESS NOTE ADULT - SUBJECTIVE AND OBJECTIVE BOX
SHARIFA ENGLE    [id number]    87y      Female    CC: ams    INTERVAL HPI/OVERNIGHT EVENTS: pt seen and examined. son also at bedside. as per son, more alert today compared to last 2 days     REVIEW OF SYSTEMS:  unable to obtain due to mental status     Vital Signs Last 24 Hrs  T(C): 37.6 (06 Aug 2022 16:09), Max: 38.1 (06 Aug 2022 04:10)  T(F): 99.7 (06 Aug 2022 16:09), Max: 100.6 (06 Aug 2022 04:10)  HR: 88 (06 Aug 2022 18:00) (75 - 99)  BP: 126/45 (06 Aug 2022 18:00) (105/69 - 186/63)  BP(mean): 64 (06 Aug 2022 18:00) (57 - 75)  RR: 21 (06 Aug 2022 18:00) (16 - 26)  SpO2: 96% (06 Aug 2022 18:00) (96% - 100%)    Parameters below as of 06 Aug 2022 18:00  Patient On (Oxygen Delivery Method): room air        PHYSICAL EXAM:    GENERAL: NAD  HEENT: +EOMI  NECK: soft, supple  CHEST/LUNG: b/l rhonchi; respirations unlabored on 3LNC   HEART: S1S2+, Regular rate and rhythm; +systolic murmur   ABDOMEN: Soft, Nontender, Nondistended; Bowel sounds present  SKIN: warm, dry  NEURO: drowsy but intermittently following commands, A&O x 0-1   PSYCH: calm    LABS:                        9.4    4.65  )-----------( 297      ( 06 Aug 2022 06:46 )             30.2     08-06    142  |  108<H>  |  <3.0<L>  ----------------------------<  185<H>  4.1   |  21.0<L>  |  0.66    Ca    7.6<L>      06 Aug 2022 06:46  Phos  3.4     08-06  Mg     1.5     08-06              MEDICATIONS  (STANDING):  aspirin Suppository 300 milliGRAM(s) Rectal daily  atorvastatin 80 milliGRAM(s) Oral at bedtime  dextrose 5% + lactated ringers. 1000 milliLiter(s) (75 mL/Hr) IV Continuous <Continuous>  dextrose 5%. 1000 milliLiter(s) (50 mL/Hr) IV Continuous <Continuous>  dextrose 5%. 1000 milliLiter(s) (100 mL/Hr) IV Continuous <Continuous>  dextrose 50% Injectable 25 Gram(s) IV Push once  dextrose 50% Injectable 12.5 Gram(s) IV Push once  dextrose 50% Injectable 25 Gram(s) IV Push once  enoxaparin Injectable 40 milliGRAM(s) SubCutaneous every 24 hours  ferrous    sulfate 325 milliGRAM(s) Oral daily  glucagon  Injectable 1 milliGRAM(s) IntraMuscular once  insulin lispro (ADMELOG) corrective regimen sliding scale   SubCutaneous Before meals and at bedtime  labetalol 100 milliGRAM(s) Oral two times a day  levothyroxine Injectable 50 MICROGram(s) IV Push at bedtime  lisinopril 10 milliGRAM(s) Oral daily  memantine 10 milliGRAM(s) Oral two times a day  nystatin Powder 1 Application(s) Topical two times a day  piperacillin/tazobactam IVPB.. 3.375 Gram(s) IV Intermittent every 8 hours  sodium bicarbonate  Infusion 0.062 mEq/kG/Hr (75 mL/Hr) IV Continuous <Continuous>    MEDICATIONS  (PRN):  acetaminophen  Suppository .. 650 milliGRAM(s) Rectal every 8 hours PRN Temp greater or equal to 38C (100.4F), Mild Pain (1 - 3)  aluminum hydroxide/magnesium hydroxide/simethicone Suspension 30 milliLiter(s) Oral every 6 hours PRN Dyspepsia  dextrose Oral Gel 15 Gram(s) Oral once PRN Blood Glucose LESS THAN 70 milliGRAM(s)/deciliter  hydrALAZINE Injectable 5 milliGRAM(s) IV Push every 6 hours PRN SBP> 170  ondansetron Injectable 4 milliGRAM(s) IV Push every 6 hours PRN Nausea and/or Vomiting      RADIOLOGY & ADDITIONAL TESTS:

## 2022-08-06 NOTE — PROGRESS NOTE ADULT - ASSESSMENT
87y/oF PMH HTN, hypothyroid, DM, anemia, BIB family with AMS and progressing generalized weakness. Pt aso with subjective fevers and poor PO intake. On admission, pt hypoxic and with hypertensive urgency, 7/18. CTH without acute findings, placed on empiric abx. 7/19, pt develops N/V with NBNB emesis with bradycardia and hypotension, RRT called. Pt subsequently intubated for airway protection and upgraded to MICU. Started on pressors. CT with b/l LL consolidations, poss atelectasis and/or pna, small b/l pleural effusions. Repeat CTH with NPH. Imaging reviewed by Neurology and neurosx. As per Neurosx, no significant changes from prior imaging and no surgical intervention recommended. Pt also noted to have severe AS on TTE, though no intervention planned per cardio. Pt extubated 7/21, downgraded to medicine 7/22. Hospital course further complicated by recurring fevers, resumed abx 7/29 for SIRS, possible UTI. Pt with persistent encephalopathy, likely multifactorial, initially 2/2 sepsis and dehydration in setting of underlying dementia w/suspicion for NPH. Intermittent improvement in mental status. Pt inconsistent with PO intake, noted to pocket food while eating, concern for aspiration discussed multiple times with family. Family wanted to cont PO feeds at that time. Speech re-evaluated, pt started on pureed diet. Pt noted to fall asleep while family trying to feed her. Family educated on aspiration risk. 8/3 , pt with new fever 100.9, noted to be more lethargic. Repeat CTH 8/3 with new L cerebellum density/possible acute CVA. d/w neurology, not candidate for tpa due to unclear timeline. stroke protocol initiated. asa, statin. 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 . EEG without acute seizures. Abx changed to zosyn for poss new recurrent asp pna. Son concerned about poss strep throat, pcr ordered 8/4. abx changed to cefepime for broader coverage for pna and uti as per ID 8/4, however changed back to zosyn 8/5 due to increased lethargy on cefepime.     Metabolic encephalopathy, likely multifactorial due to sepsis, NPH in setting of underlying dementia, intermittent improvement, worse again 8/3  Sepsis, POA s/p septic shock, likely 2/2 aspiration PNA   Acute hypoxic respiratory failure s/p intubation w/mechanical ventilation   -suspicion for NPH on CTH  -neurosx eval appreciated; not a candidate for any intracranial surgery such as  shunt due to multiple chronic comorbidities; therefore, as per neuro, will not pursue LP  -8/3, pt noted to be more lethargic with episode of fever; concern for recurrent infection/poss aspiration   -MRI initially canceled by neurosx and rec outpt f/u; family wanted to discuss use of acetazolamide for NPH with neurosx team. as per neurosx, acetazolamide not used for tx of NPH. Additionally not recommended for use as per neurology team   -EEG 8/3 neg for seizures   -8/3 repeat CTH with new CVA L cerebellum, started on neuro checks   -cont asa, statin   -8/4: MRI without acute cva, chronic ischemic changes noted, re-demonstration of communicating hydrocephalus; R basal ganglia non-specific enhancement; imaging reviewed by neuro, rec repeat MRI 2-4 weeks as outpt; cont asa, statin, bp control   -carotid doppler: mild plaque b/l ICA, no significant stenosis   -PT/OT/speech/swallow   -s/p pressor support and steroids for hypotension while in MICU   -resumed on pureed diet with thickened liquids, however now NPO again   -seen by speech/swallow multiple times; last seen 8/6, with recs to cont npo due to high risk of aspiration; d/w family at bedside   -8/3 ct chest with new upper opacity/basal atelectasis/poss asp  -8/3 on zosyn, ceftriaxone, changed to cefepime 8/4, then back to zosyn 8/5 due to worsening ams   -bcx 8/3 ngtd   -ID recs appreciated   -cont gentle IVF with dextrose while npo     Possible UTI vs colonization   -family had previously refused marcano removal; nowd/c'ed   -bcx 7/28 neg   -ucx 50-99k ecoli, resistant to zosyn, s/p ceftriaxone and cefepime   -cont flomax     Severe aortic stenosis   -TTE reviewed   -as per cardio, not candidate for intervention given dementia   -caution with IVF given risk of volume overload     Hypernatremia   Hyperchloremic acidosis   -likely due to poor PO intake   -gentle IVF w/dextrose     Normocytic anemia   -s/p 1u PRBC transfusion during hospital course w/adequate response   -monitor   -s/p IV venofer     Hypertensive urgency   -npo at this time   -cont current IV meds     DM2  -hgba1c 10.8  -lantus d/c'ed due to inconsistent mental status and inconsistent nutrition status   -ISS     Hypothyroid   -synthroid, dose lowered 8/4 to 112mcg , converted to IV dose 8/5      VTE ppx: lovenox     Code status : DNR/DNI     Palliative following   Son updated at bedside     prognosis guarded

## 2022-08-07 NOTE — PROGRESS NOTE ADULT - SUBJECTIVE AND OBJECTIVE BOX
SHARIFA ENGLE    [id number]    87y      Female    CC: ams    INTERVAL HPI/OVERNIGHT EVENTS: pt seen and examined. responds to verbal and tactile stimuli     REVIEW OF SYSTEMS:  unable to obtain due to mental status     Vital Signs Last 24 Hrs  T(C): 37.1 (07 Aug 2022 08:00), Max: 37.7 (07 Aug 2022 04:50)  T(F): 98.8 (07 Aug 2022 08:00), Max: 99.9 (07 Aug 2022 04:50)  HR: 87 (07 Aug 2022 14:00) (81 - 92)  BP: 135/43 (07 Aug 2022 14:00) (120/48 - 160/45)  BP(mean): 66 (07 Aug 2022 14:00) (60 - 84)  RR: 25 (07 Aug 2022 14:00) (20 - 25)  SpO2: 94% (07 Aug 2022 14:00) (93% - 99%)    Parameters below as of 07 Aug 2022 14:00  Patient On (Oxygen Delivery Method): nasal cannula  O2 Flow (L/min): 1      PHYSICAL EXAM:    GENERAL: NAD  HEENT: PERRL  NECK: soft, supple  CHEST/LUNG: b/l rhonchi; respirations unlabored on 1LNC   HEART: S1S2+, Regular rate and rhythm  ABDOMEN: Soft, Nontender, Nondistended; Bowel sounds present  SKIN: warm, dry  NEURO: AAOX0-1; drowsy     LABS:                        9.4    4.87  )-----------( 309      ( 07 Aug 2022 05:50 )             31.2     08-07    142  |  110<H>  |  <3.0<L>  ----------------------------<  209<H>  4.0   |  20.0<L>  |  0.65    Ca    7.9<L>      07 Aug 2022 05:50  Phos  2.4     08-07  Mg     2.0     08-07    TPro  5.2<L>  /  Alb  2.1<L>  /  TBili  0.3<L>  /  DBili  x   /  AST  50<H>  /  ALT  20  /  AlkPhos  73  08-07            MEDICATIONS  (STANDING):  aspirin Suppository 300 milliGRAM(s) Rectal daily  atorvastatin 80 milliGRAM(s) Oral at bedtime  dextrose 5% + lactated ringers. 1000 milliLiter(s) (75 mL/Hr) IV Continuous <Continuous>  dextrose 5%. 1000 milliLiter(s) (50 mL/Hr) IV Continuous <Continuous>  dextrose 5%. 1000 milliLiter(s) (100 mL/Hr) IV Continuous <Continuous>  dextrose 50% Injectable 25 Gram(s) IV Push once  dextrose 50% Injectable 12.5 Gram(s) IV Push once  dextrose 50% Injectable 25 Gram(s) IV Push once  enoxaparin Injectable 40 milliGRAM(s) SubCutaneous every 24 hours  ferrous    sulfate 325 milliGRAM(s) Oral daily  glucagon  Injectable 1 milliGRAM(s) IntraMuscular once  insulin lispro (ADMELOG) corrective regimen sliding scale   SubCutaneous Before meals and at bedtime  labetalol 100 milliGRAM(s) Oral two times a day  levothyroxine Injectable 50 MICROGram(s) IV Push at bedtime  lisinopril 10 milliGRAM(s) Oral daily  memantine 10 milliGRAM(s) Oral two times a day  nystatin Powder 1 Application(s) Topical two times a day  piperacillin/tazobactam IVPB.. 3.375 Gram(s) IV Intermittent every 8 hours  sodium bicarbonate  Infusion 0.062 mEq/kG/Hr (75 mL/Hr) IV Continuous <Continuous>    MEDICATIONS  (PRN):  acetaminophen  Suppository .. 650 milliGRAM(s) Rectal every 8 hours PRN Temp greater or equal to 38C (100.4F), Mild Pain (1 - 3)  aluminum hydroxide/magnesium hydroxide/simethicone Suspension 30 milliLiter(s) Oral every 6 hours PRN Dyspepsia  dextrose Oral Gel 15 Gram(s) Oral once PRN Blood Glucose LESS THAN 70 milliGRAM(s)/deciliter  hydrALAZINE Injectable 5 milliGRAM(s) IV Push every 6 hours PRN SBP> 170  ondansetron Injectable 4 milliGRAM(s) IV Push every 6 hours PRN Nausea and/or Vomiting      RADIOLOGY & ADDITIONAL TESTS:

## 2022-08-07 NOTE — PROGRESS NOTE ADULT - ASSESSMENT
87y/oF PMH HTN, hypothyroid, DM, anemia, BIB family with AMS and progressing generalized weakness. Pt aso with subjective fevers and poor PO intake. On admission, pt hypoxic and with hypertensive urgency, 7/18. CTH without acute findings, placed on empiric abx. 7/19, pt develops N/V with NBNB emesis with bradycardia and hypotension, RRT called. Pt subsequently intubated for airway protection and upgraded to MICU. Started on pressors. CT with b/l LL consolidations, poss atelectasis and/or pna, small b/l pleural effusions. Repeat CTH with NPH. Imaging reviewed by Neurology and neurosx. As per Neurosx, no significant changes from prior imaging and no surgical intervention recommended. Pt also noted to have severe AS on TTE, though no intervention planned per cardio. Pt extubated 7/21, downgraded to medicine 7/22. Hospital course further complicated by recurring fevers, resumed abx 7/29 for SIRS, possible UTI. Pt with persistent encephalopathy, likely multifactorial, initially 2/2 sepsis and dehydration in setting of underlying dementia w/suspicion for NPH. Intermittent improvement in mental status. Pt inconsistent with PO intake, noted to pocket food while eating, concern for aspiration discussed multiple times with family. Family wanted to cont PO feeds at that time. Speech re-evaluated, pt started on pureed diet. Pt noted to fall asleep while family trying to feed her. Family educated on aspiration risk. 8/3 , pt with new fever 100.9, noted to be more lethargic. Repeat CTH 8/3 with new L cerebellum density/possible acute CVA. d/w neurology, not candidate for tpa due to unclear timeline. stroke protocol initiated. asa, statin. 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 . EEG without acute seizures. Abx changed to zosyn for poss new recurrent asp pna. Son concerned about poss strep throat, pcr ordered 8/4. abx changed to cefepime for broader coverage for pna and uti as per ID 8/4, however changed back to zosyn 8/5 due to increased lethargy on cefepime.     Metabolic encephalopathy, likely multifactorial due to sepsis, NPH in setting of underlying dementia, intermittent improvement, worse again 8/3  Sepsis, POA s/p septic shock, likely 2/2 aspiration PNA   Acute hypoxic respiratory failure s/p intubation w/mechanical ventilation   -suspicion for NPH on CTH  -neurosx eval appreciated; not a candidate for any intracranial surgery such as  shunt due to multiple chronic comorbidities; therefore, as per neuro, will not pursue LP  -8/3, pt noted to be more lethargic with episode of fever; concern for recurrent infection/poss aspiration   -MRI initially canceled by neurosx and rec outpt f/u; family wanted to discuss use of acetazolamide for NPH with neurosx team. as per neurosx, acetazolamide not used for tx of NPH. Additionally not recommended for use as per neurology team   -EEG 8/3 neg for seizures   -8/3 repeat CTH with new CVA L cerebellum, started on neuro checks   -cont asa, statin   -8/4: MRI without acute cva, chronic ischemic changes noted, re-demonstration of communicating hydrocephalus; R basal ganglia non-specific enhancement; imaging reviewed by neuro, rec repeat MRI 2-4 weeks as outpt; cont asa, statin, bp control   -carotid doppler: mild plaque b/l ICA, no significant stenosis   -PT/OT/speech/swallow   -s/p pressor support and steroids for hypotension while in MICU   -resumed on pureed diet with thickened liquids, however now NPO again   -seen by speech/swallow multiple times; last seen 8/7, with recs to cont npo due to high risk of aspiration; d/w family at bedside   -8/3 ct chest with new upper opacity/basal atelectasis/poss asp  -8/3 on zosyn, ceftriaxone, changed to cefepime 8/4, then back to zosyn 8/5 due to worsening ams   -bcx 8/3 ngtd   -ID recs appreciated   -cont gentle IVF with dextrose while npo     Possible UTI vs colonization   -family had previously refused marcano removal; nowd/c'ed   -bcx 7/28 neg   -ucx 50-99k ecoli, resistant to zosyn, s/p ceftriaxone and cefepime   -cont flomax     Severe aortic stenosis   -TTE reviewed   -as per cardio, not candidate for intervention given dementia   -caution with IVF given risk of volume overload     Hypernatremia   Hyperchloremic acidosis   -likely due to poor PO intake   -gentle IVF w/dextrose     Normocytic anemia   -s/p 1u PRBC transfusion during hospital course w/adequate response   -monitor   -s/p IV venofer     Hypertensive urgency   -npo at this time   -cont current IV meds     DM2  -hgba1c 10.8  -lantus d/c'ed due to inconsistent mental status and inconsistent nutrition status   -ISS     Hypothyroid   -synthroid, dose lowered 8/4 to 112mcg , converted to IV dose 8/5      VTE ppx: lovenox     Code status : DNR/DNI     Palliative following   Son updated at bedside     prognosis poor  pt remains npo. d/w pt's , Kane and one of her sons, Tae. family asking about possible NGT. provider asked what end goals were, if they  were to pursue ngt, and they expressed they wanted to get her nutrients "so she can get strong enough to feed herself." reiterated, as with prior conversations by other providers that this is unlikely and furthermore, would not preclude future aspiration events. additionally, discussed option of transitioning to comfort care measures. family to further discuss together.    87y/oF PMH HTN, hypothyroid, DM, anemia, BIB family with AMS and progressing generalized weakness. Pt aso with subjective fevers and poor PO intake. On admission, pt hypoxic and with hypertensive urgency, 7/18. CTH without acute findings, placed on empiric abx. 7/19, pt develops N/V with NBNB emesis with bradycardia and hypotension, RRT called. Pt subsequently intubated for airway protection and upgraded to MICU. Started on pressors. CT with b/l LL consolidations, poss atelectasis and/or pna, small b/l pleural effusions. Repeat CTH with NPH. Imaging reviewed by Neurology and neurosx. As per Neurosx, no significant changes from prior imaging and no surgical intervention recommended. Pt also noted to have severe AS on TTE, though no intervention planned per cardio. Pt extubated 7/21, downgraded to medicine 7/22. Hospital course further complicated by recurring fevers, resumed abx 7/29 for SIRS, possible UTI. Pt with persistent encephalopathy, likely multifactorial, initially 2/2 sepsis and dehydration in setting of underlying dementia w/suspicion for NPH. Intermittent improvement in mental status. Pt inconsistent with PO intake, noted to pocket food while eating, concern for aspiration discussed multiple times with family. Family wanted to cont PO feeds at that time. Speech re-evaluated, pt started on pureed diet. Pt noted to fall asleep while family trying to feed her. Family educated on aspiration risk. 8/3 , pt with new fever 100.9, noted to be more lethargic. Repeat CTH 8/3 with new L cerebellum density/possible acute CVA. d/w neurology, not candidate for tpa due to unclear timeline. stroke protocol initiated. asa, statin. 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 . EEG without acute seizures. Abx changed to zosyn for poss new recurrent asp pna. Son concerned about poss strep throat, pcr ordered 8/4. abx changed to cefepime for broader coverage for pna and uti as per ID 8/4, however changed back to zosyn 8/5 due to increased lethargy on cefepime.     Metabolic encephalopathy, likely multifactorial due to sepsis, NPH in setting of underlying dementia, intermittent improvement, worse again 8/3  Sepsis, POA s/p septic shock, likely 2/2 aspiration PNA   Acute hypoxic respiratory failure s/p intubation w/mechanical ventilation   -suspicion for NPH on CTH  -neurosx eval appreciated; not a candidate for any intracranial surgery such as  shunt due to multiple chronic comorbidities; therefore, as per neuro, will not pursue LP  -8/3, pt noted to be more lethargic with episode of fever; concern for recurrent infection/poss aspiration   -MRI initially canceled by neurosx and rec outpt f/u; family wanted to discuss use of acetazolamide for NPH with neurosx team. as per neurosx, acetazolamide not used for tx of NPH. Additionally not recommended for use as per neurology team   -EEG 8/3 neg for seizures   -8/3 repeat CTH with new CVA L cerebellum, started on neuro checks   -cont asa, statin   -8/4: MRI without acute cva, chronic ischemic changes noted, re-demonstration of communicating hydrocephalus; R basal ganglia non-specific enhancement; imaging reviewed by neuro, rec repeat MRI 2-4 weeks as outpt; cont asa, statin, bp control   -carotid doppler: mild plaque b/l ICA, no significant stenosis   -PT/OT/speech/swallow   -s/p pressor support and steroids for hypotension while in MICU   -resumed on pureed diet with thickened liquids, however now NPO again   -seen by speech/swallow multiple times; last seen 8/7, with recs to cont npo due to high risk of aspiration; d/w family at bedside   -8/3 ct chest with new upper opacity/basal atelectasis/poss asp  -8/3 on zosyn, ceftriaxone, changed to cefepime 8/4, then back to zosyn 8/5 due to worsening ams   -bcx 8/3 ngtd   -ID recs appreciated   -cont gentle IVF with dextrose while npo     Possible UTI vs colonization   -family had previously refused marcano removal; nowd/c'ed   -bcx 7/28 neg   -ucx 50-99k ecoli, resistant to zosyn, s/p ceftriaxone and cefepime   -cont flomax     Severe aortic stenosis   -TTE reviewed   -as per cardio, not candidate for intervention given dementia   -caution with IVF given risk of volume overload     Hypernatremia   Hyperchloremic acidosis   -likely due to poor PO intake   -gentle IVF w/dextrose     Normocytic anemia   -s/p 1u PRBC transfusion during hospital course w/adequate response   -monitor   -s/p IV venofer     Hypertensive urgency   -npo at this time   -cont current IV meds     DM2  -hgba1c 10.8  -lantus d/c'ed due to inconsistent mental status and inconsistent nutrition status   -ISS     Hypothyroid   -synthroid, dose lowered 8/4 to 112mcg , converted to IV dose 8/5      VTE ppx: lovenox     Code status : DNR/DNI     Palliative following   Son updated at bedside     prognosis poor  pt remains npo. d/w pt's , Kane and one of her sons, Howard. family asking about possible NGT. provider asked what end goals were, if they  were to pursue ngt, and they expressed they wanted to get her nutrients "so she can get strong enough to feed herself." reiterated, as with prior conversations by other providers that this is unlikely and furthermore, would not preclude future aspiration events. additionally, discussed option of transitioning to comfort care measures. family to further discuss together.

## 2022-08-08 NOTE — PROGRESS NOTE ADULT - ASSESSMENT
87y/oF PMH HTN, hypothyroid, DM, anemia, BIB family with AMS and progressing generalized weakness. Pt aso with subjective fevers and poor PO intake. On admission, pt hypoxic and with hypertensive urgency, 7/18. CTH without acute findings, placed on empiric abx. 7/19, pt develops N/V with NBNB emesis with bradycardia and hypotension, RRT called. Pt subsequently intubated for airway protection and upgraded to MICU. Started on pressors. CT with b/l LL consolidations, poss atelectasis and/or pna, small b/l pleural effusions. Repeat CTH with NPH. Imaging reviewed by Neurology and neurosx. As per Neurosx, no significant changes from prior imaging and no surgical intervention recommended. Pt also noted to have severe AS on TTE, though no intervention planned per cardio. Pt extubated 7/21, downgraded to medicine 7/22. Hospital course further complicated by recurring fevers, resumed abx 7/29 for SIRS, possible UTI. Pt with persistent encephalopathy, likely multifactorial, initially 2/2 sepsis and dehydration in setting of underlying dementia w/suspicion for NPH. Intermittent improvement in mental status. Pt inconsistent with PO intake, noted to pocket food while eating, concern for aspiration discussed multiple times with family. Family wanted to cont PO feeds at that time. Speech re-evaluated, pt started on pureed diet. Pt noted to fall asleep while family trying to feed her. Family educated on aspiration risk. 8/3 , pt with new fever 100.9, noted to be more lethargic. Repeat CTH 8/3 with new L cerebellum density/possible acute CVA. d/w neurology, not candidate for tpa due to unclear timeline. stroke protocol initiated. asa, statin. 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 . EEG without acute seizures. Abx changed to zosyn for poss new recurrent asp pna. Son concerned about poss strep throat, pcr ordered 8/4. abx changed to cefepime for broader coverage for pna and uti as per ID 8/4, however changed back to zosyn 8/5 due to increased lethargy on cefepime.     Metabolic encephalopathy, likely multifactorial due to sepsis, NPH in setting of underlying dementia, intermittent improvement, worse again 8/3  Sepsis, POA s/p septic shock, likely 2/2 aspiration PNA   Acute hypoxic respiratory failure s/p intubation w/mechanical ventilation   -suspicion for NPH on CTH  -neurosx eval appreciated; not a candidate for any intracranial surgery such as  shunt due to multiple chronic comorbidities; therefore, as per neuro, will not pursue LP   -8/3, pt noted to be more lethargic with episode of fever; concern for recurrent infection/poss aspiration   -MRI initially canceled by neurosx and rec outpt f/u; family wanted to discuss use of acetazolamide for NPH with neurosx team. as per neurosx, acetazolamide not used for tx of NPH. Additionally not recommended for use as per neurology team   -EEG 8/3 neg for seizures   -8/3 repeat CTH with new CVA L cerebellum, started on neuro checks   -cont asa, statin   -8/4: MRI without acute cva, chronic ischemic changes noted, re-demonstration of communicating hydrocephalus; R basal ganglia non-specific enhancement; imaging reviewed by neuro, rec repeat MRI 2-4 weeks as outpt; cont asa, statin, bp control   -carotid doppler: mild plaque b/l ICA, no significant stenosis   -PT/OT/speech/swallow   -s/p pressor support and steroids for hypotension while in MICU   -resumed on pureed diet with thickened liquids, however now NPO again   -seen by speech/swallow multiple times; last seen 8/7, with recs to cont npo due to high risk of aspiration; d/w family at bedside   -8/3 ct chest with new upper opacity/basal atelectasis/poss asp pna  -8/3 on zosyn, ceftriaxone, changed to cefepime 8/4, then back to zosyn 8/5 due to worsening ams   -bcx 8/3 ngtd   -ID recs appreciated   -cont gentle IVF with dextrose while npo     Possible UTI vs colonization   -family had previously refused marcano removal; nowd/c'ed   -bcx 7/28 neg   -ucx 50-99k ecoli, resistant to zosyn, s/p ceftriaxone and cefepime   -cont flomax     Severe aortic stenosis   -TTE reviewed   -as per cardio, not candidate for intervention given dementia   -caution with IVF given risk of volume overload     Hypernatremia   Hyperchloremic acidosis   -likely due to poor PO intake   -gentle IVF w/dextrose     Normocytic anemia   -s/p 1u PRBC transfusion during hospital course w/adequate response   -monitor   -s/p IV venofer     Hypertensive urgency   -npo at this time   -cont current IV meds     DM2  -hgba1c 10.8  -lantus d/c'ed due to inconsistent mental status and inconsistent nutrition status   -ISS     Hypothyroid   -synthroid, dose lowered 8/4 to 112mcg , converted to IV dose 8/5      VTE ppx: lovenox     Code status : DNR/DNI     Palliative following     prognosis poor

## 2022-08-08 NOTE — PROGRESS NOTE ADULT - SUBJECTIVE AND OBJECTIVE BOX
INFECTIOUS DISEASES AND INTERNAL MEDICINE at [facility]  =======================================================  Julio Ariza MD  Diplomates American Board of Internal Medicine and Infectious Diseases  Telephone [phone]  Fax            [phone]  =======================================================    SHARIFA ENGLE [id number]    Follow up:    Allergies:  No Known Allergies      Medications:  acetaminophen  Suppository .. 650 milliGRAM(s) Rectal every 8 hours PRN  aluminum hydroxide/magnesium hydroxide/simethicone Suspension 30 milliLiter(s) Oral every 6 hours PRN  aspirin Suppository 300 milliGRAM(s) Rectal daily  atorvastatin 80 milliGRAM(s) Oral at bedtime  dextrose 5% + lactated ringers. 1000 milliLiter(s) IV Continuous <Continuous>  dextrose 5%. 1000 milliLiter(s) IV Continuous <Continuous>  dextrose 5%. 1000 milliLiter(s) IV Continuous <Continuous>  dextrose 50% Injectable 25 Gram(s) IV Push once  dextrose 50% Injectable 12.5 Gram(s) IV Push once  dextrose 50% Injectable 25 Gram(s) IV Push once  dextrose Oral Gel 15 Gram(s) Oral once PRN  enoxaparin Injectable 40 milliGRAM(s) SubCutaneous every 24 hours  ferrous    sulfate 325 milliGRAM(s) Oral daily  glucagon  Injectable 1 milliGRAM(s) IntraMuscular once  hydrALAZINE Injectable 5 milliGRAM(s) IV Push every 6 hours PRN  insulin lispro (ADMELOG) corrective regimen sliding scale   SubCutaneous Before meals and at bedtime  labetalol 100 milliGRAM(s) Oral two times a day  levothyroxine Injectable 50 MICROGram(s) IV Push at bedtime  lisinopril 10 milliGRAM(s) Oral daily  magnesium sulfate  IVPB 1 Gram(s) IV Intermittent once  memantine 10 milliGRAM(s) Oral two times a day  nystatin Powder 1 Application(s) Topical two times a day  ondansetron Injectable 4 milliGRAM(s) IV Push every 6 hours PRN  piperacillin/tazobactam IVPB.. 3.375 Gram(s) IV Intermittent every 8 hours  sodium bicarbonate  Infusion 0.062 mEq/kG/Hr IV Continuous <Continuous>    SOCIAL       FAMILY   FAMILY HISTORY:    REVIEW OF SYSTEMS:  UNABLE TO OBTINA           Physical Exam:  ICU Vital Signs Last 24 Hrs  T(C): 37.2 (08 Aug 2022 07:46), Max: 37.9 (08 Aug 2022 04:00)  T(F): 99 (08 Aug 2022 07:46), Max: 100.2 (08 Aug 2022 04:00)  HR: 92 (08 Aug 2022 08:00) (81 - 97)  BP: 164/68 (08 Aug 2022 08:15) (135/43 - 169/53)  BP(mean): 81 (08 Aug 2022 08:00) (66 - 88)  ABP: --  ABP(mean): --  RR: 20 (08 Aug 2022 08:00) (18 - 25)  SpO2: 94% (08 Aug 2022 08:00) (94% - 99%)    O2 Parameters below as of 08 Aug 2022 08:00  Patient On (Oxygen Delivery Method): nasal cannula  O2 Flow (L/min): 2        GEN: NAD, LETHARGIC  HEENT: normocephalic and atraumatic. EOMI. WALDEMAR.    NECK: Supple. No carotid bruits.  No lymphadenopathy or thyromegaly.  LUNGS: Clear to auscultation.  HEART: Regular rate and rhythm without murmur.  ABDOMEN: Soft, nontender, and nondistended.  Positive bowel sounds.    : No CVA tenderness  EXTREMITIES: Without any cyanosis, clubbing, rash, lesions or edema.  MSK: no joint swelling  NEUROLOGIC:  MINIMALLY RESPONSIVE       Labs:  Vitals:  ============  T(F): 99 (08 Aug 2022 07:46), Max: 100.2 (08 Aug 2022 04:00)  HR: 92 (08 Aug 2022 08:00)  BP: 164/68 (08 Aug 2022 08:15)  RR: 20 (08 Aug 2022 08:00)  SpO2: 94% (08 Aug 2022 08:00) (94% - 99%)  temp max in last 48H T(F): , Max: 100.2 (08-08-22 @ 04:00)    =======================================================  Current Antibiotics:  piperacillin/tazobactam IVPB.. 3.375 Gram(s) IV Intermittent every 8 hours    Other medications:  aspirin Suppository 300 milliGRAM(s) Rectal daily  atorvastatin 80 milliGRAM(s) Oral at bedtime  dextrose 5% + lactated ringers. 1000 milliLiter(s) IV Continuous <Continuous>  dextrose 5%. 1000 milliLiter(s) IV Continuous <Continuous>  dextrose 5%. 1000 milliLiter(s) IV Continuous <Continuous>  dextrose 50% Injectable 25 Gram(s) IV Push once  dextrose 50% Injectable 12.5 Gram(s) IV Push once  dextrose 50% Injectable 25 Gram(s) IV Push once  enoxaparin Injectable 40 milliGRAM(s) SubCutaneous every 24 hours  ferrous    sulfate 325 milliGRAM(s) Oral daily  glucagon  Injectable 1 milliGRAM(s) IntraMuscular once  insulin lispro (ADMELOG) corrective regimen sliding scale   SubCutaneous Before meals and at bedtime  labetalol 100 milliGRAM(s) Oral two times a day  levothyroxine Injectable 50 MICROGram(s) IV Push at bedtime  lisinopril 10 milliGRAM(s) Oral daily  magnesium sulfate  IVPB 1 Gram(s) IV Intermittent once  memantine 10 milliGRAM(s) Oral two times a day  nystatin Powder 1 Application(s) Topical two times a day  sodium bicarbonate  Infusion 0.062 mEq/kG/Hr IV Continuous <Continuous>      =======================================================  Labs:                        9.6    5.18  )-----------( 322      ( 08 Aug 2022 05:35 )             31.6     08-08    142  |  109<H>  |  <3.0<L>  ----------------------------<  218<H>  4.0   |  22.0  |  0.65    Ca    7.9<L>      08 Aug 2022 05:35  Phos  2.4     08-08  Mg     1.7     08-08    TPro  5.4<L>  /  Alb  2.2<L>  /  TBili  0.4  /  DBili  x   /  AST  60<H>  /  ALT  24  /  AlkPhos  104  08-08      Culture - Blood (collected 08-03-22 @ 14:35)  Source: .Blood Blood-Peripheral    Culture - Blood (collected 08-03-22 @ 14:30)  Source: .Blood Blood-Peripheral    Culture - Urine (collected 07-28-22 @ 23:30)  Source: Catheterized Catheterized  Final Report (08-01-22 @ 10:34):    50,000 - 99,000 CFU/mL Escherichia coli  Organism: Escherichia coli (08-01-22 @ 10:34)  Organism: Escherichia coli (08-01-22 @ 10:34)    Sensitivities:      -  Amikacin: S <=16      -  Amoxicillin/Clavulanic Acid: R >16/8      -  Ampicillin: R >16 These ampicillin results predict results for amoxicillin      -  Ampicillin/Sulbactam: R >16/8 Enterobacter, Klebsiella aerogenes, Citrobacter, and Serratia may develop resistance during prolonged therapy (3-4 days)      -  Aztreonam: S <=4      -  Cefazolin: R >16 (MIC_CL_COM_[id number]) For uncomplicated UTI with K. pneumoniae, E. coli, or P. mirablis: LORI <=16 is sensitive and LORI >=32 is resistant. This also predicts results for oral agents cefaclor, cefdinir, cefpodoxime, cefprozil, cefuroxime axetil, cephalexin and locarbef for uncomplicated UTI. Note that some isolates may be susceptible to these agents while testing resistant to cefazolin.      -  Cefepime: S <=2      -  Cefoxitin: S <=8      -  Ceftriaxone: S <=1 Enterobacter, Klebsiella aerogenes, Citrobacter, and Serratia may develop resistance during prolonged therapy      -  Ciprofloxacin: S <=0.25      -  Ertapenem: S <=0.5      -  Gentamicin: S <=2      -  Imipenem: S <=1      -  Levofloxacin: S <=0.5      -  Meropenem: S <=1      -  Nitrofurantoin: S <=32 Should not be used to treat pyelonephritis      -  Piperacillin/Tazobactam: R >64      -  Tigecycline: S <=2      -  Tobramycin: S <=2      -  Trimethoprim/Sulfamethoxazole: R >2/38      Method Type: LORI    Culture - Blood (collected 07-28-22 @ 22:26)  Source: .Blood Blood  Final Report (08-03-22 @ 03:00):    No Growth Final    Culture - Blood (collected 07-28-22 @ 22:16)  Source: .Blood Blood  Final Report (08-03-22 @ 03:00):    No Growth Final      Creatinine, Serum: 0.65 mg/dL (08-08-22 @ 05:35)  Creatinine, Serum: 0.65 mg/dL (08-07-22 @ 05:50)  Creatinine, Serum: 0.66 mg/dL (08-06-22 @ 06:46)  Creatinine, Serum: 0.61 mg/dL (08-05-22 @ 06:30)  Creatinine, Serum: 0.74 mg/dL (08-04-22 @ 05:39)  Creatinine, Serum: 0.83 mg/dL (08-03-22 @ 14:30)    Procalcitonin, Serum: 0.21 ng/mL (08-03-22 @ 14:30)  Procalcitonin, Serum: 2.21 ng/mL (07-28-22 @ 22:26)      Ferritin, Serum: 85 ng/mL (07-24-22 @ 06:34)      WBC Count: 5.18 K/uL (08-08-22 @ 05:35)  WBC Count: 4.87 K/uL (08-07-22 @ 05:50)  WBC Count: 4.65 K/uL (08-06-22 @ 06:46)  WBC Count: 5.74 K/uL (08-05-22 @ 06:30)  WBC Count: 5.02 K/uL (08-04-22 @ 05:39)  WBC Count: 5.94 K/uL (08-03-22 @ 14:30)    COVID-19 PCR: NotDetec (08-06-22 @ 16:25)  COVID-19 PCR: NotDetec (07-31-22 @ 06:00)  COVID-19 PCR: NotDetec (07-25-22 @ 05:00)  SARS-CoV-2: NotDetec (07-18-22 @ 17:07)      Alkaline Phosphatase, Serum: 104 U/L (08-08-22 @ 05:35)  Alkaline Phosphatase, Serum: 73 U/L (08-07-22 @ 05:50)  Alanine Aminotransferase (ALT/SGPT): 24 U/L (08-08-22 @ 05:35)  Alanine Aminotransferase (ALT/SGPT): 20 U/L (08-07-22 @ 05:50)  Aspartate Aminotransferase (AST/SGOT): 60 U/L (08-08-22 @ 05:35)  Aspartate Aminotransferase (AST/SGOT): 50 U/L (08-07-22 @ 05:50)  Bilirubin Total, Serum: 0.4 mg/dL (08-08-22 @ 05:35)  Bilirubin Total, Serum: 0.3 mg/dL (08-07-22 @ 05:50)

## 2022-08-08 NOTE — PROGRESS NOTE ADULT - ASSESSMENT
87F who presented from home when her  had noted that she had profound weakness and was unable to stand or feed herself. The patient is noted to have a history of progressive dementia and was limited in terms of providing a history. Additional information was obtained from the patient's daughter at the bedside. The patient was note to have had progressive dementia over the past few years. She lives with her  at home and there was no recent fevers, chills, or sick contacts. She is reported to be compliant with her medications at home which her daughter prepares for her. She is noted to have a history of poorly controlled blood pressures despite her cardiac medications. At baseline she was noted to be able to ambulate independently at a slow rate. There were no complaints of chest pain, palpitations, dyspnea, or cough. The patient was otherwise in her usual state of health. Upon arrival of EMS, the patient was reported to have been hypotensive and hypoxic. In the emergency department, the patient was noted to have fever and leukocytosis. There were no similar episodes in the past and no known aggravating or relieving factors. The patient's daughter did note that the patient had vision loss and pain in the right eye in the past and was planned for surgery but the pain resolved spontaneously. She was also noted to have occasional swelling and weeping wounds on the left leg for which she would be treated with antibiotics.   AS ABOVE PT ADMITTED ON 7/18 PT WITH HYPOTENSION AND WAS INTUBATED  PLACED IN ICU EVENTUAL EXTUBATION P[T FOUND TO HAVE SEVERE AORTIC STENOSIS  ALSO WITH  CT SCAN WITH PROBABLE NPH   PT WITH RECENT COURSE OF ABX FOR POSSIBLE ASP PNEUMONIA  WAS  OFF ABX    URINE CX DONE 50-99K [facility]    PT DEVELOPED SOB AND PROBABLE ASPIRATION PNEUMONIA   PLACED ON ZOSYN THEN CEFEPIME  REPORTEDLY MORE LETHARGIC ON  CEFEPIME  ALTHOUGH LESS LIKELY RELATED TO ABX    BACK on ZOSYN  STILL WITH LETHARGY  WILL D/W HOSPITALIST

## 2022-08-08 NOTE — PROGRESS NOTE ADULT - SUBJECTIVE AND OBJECTIVE BOX
SHARIFA ENGLE    [id number]    87y      Female    CC: ams    INTERVAL HPI/OVERNIGHT EVENTS: pt see and examined. no acute events reported o/n    REVIEW OF SYSTEMS:    unable to obtain 2/2 mental status     Vital Signs Last 24 Hrs  T(C): 37.2 (08 Aug 2022 07:46), Max: 37.9 (08 Aug 2022 04:00)  T(F): 99 (08 Aug 2022 07:46), Max: 100.2 (08 Aug 2022 04:00)  HR: 87 (08 Aug 2022 10:00) (81 - 97)  BP: 126/42 (08 Aug 2022 10:00) (126/42 - 169/53)  BP(mean): 63 (08 Aug 2022 10:00) (63 - 88)  RR: 20 (08 Aug 2022 10:00) (18 - 25)  SpO2: 98% (08 Aug 2022 10:00) (94% - 99%)    Parameters below as of 08 Aug 2022 10:00  Patient On (Oxygen Delivery Method): nasal cannula  O2 Flow (L/min): 2      PHYSICAL EXAM:    GENERAL: NAD  HEENT: PERRL  NECK: soft, supple  CHEST/LUNG: b/l rhonchi; respirations unlabored on 1LNC   HEART: S1S2+, Regular rate and rhythm  ABDOMEN: Soft, Nontender, Nondistended; Bowel sounds present  SKIN: warm, dry  NEURO: AAOX0; lethargic, responsive to tactile/painful stimuli    LABS:                        9.6    5.18  )-----------( 322      ( 08 Aug 2022 05:35 )             31.6     08-08    142  |  109<H>  |  <3.0<L>  ----------------------------<  218<H>  4.0   |  22.0  |  0.65    Ca    7.9<L>      08 Aug 2022 05:35  Phos  2.4     08-08  Mg     1.7     08-08    TPro  5.4<L>  /  Alb  2.2<L>  /  TBili  0.4  /  DBili  x   /  AST  60<H>  /  ALT  24  /  AlkPhos  104  08-08            MEDICATIONS  (STANDING):  aspirin Suppository 300 milliGRAM(s) Rectal daily  atorvastatin 80 milliGRAM(s) Oral at bedtime  dextrose 5% + lactated ringers. 1000 milliLiter(s) (75 mL/Hr) IV Continuous <Continuous>  dextrose 5%. 1000 milliLiter(s) (50 mL/Hr) IV Continuous <Continuous>  dextrose 5%. 1000 milliLiter(s) (100 mL/Hr) IV Continuous <Continuous>  dextrose 50% Injectable 25 Gram(s) IV Push once  dextrose 50% Injectable 12.5 Gram(s) IV Push once  dextrose 50% Injectable 25 Gram(s) IV Push once  enoxaparin Injectable 40 milliGRAM(s) SubCutaneous every 24 hours  ferrous    sulfate 325 milliGRAM(s) Oral daily  glucagon  Injectable 1 milliGRAM(s) IntraMuscular once  insulin lispro (ADMELOG) corrective regimen sliding scale   SubCutaneous Before meals and at bedtime  labetalol 100 milliGRAM(s) Oral two times a day  levothyroxine Injectable 50 MICROGram(s) IV Push at bedtime  lisinopril 10 milliGRAM(s) Oral daily  memantine 10 milliGRAM(s) Oral two times a day  nystatin Powder 1 Application(s) Topical two times a day  piperacillin/tazobactam IVPB.. 3.375 Gram(s) IV Intermittent every 8 hours  sodium bicarbonate  Infusion 0.062 mEq/kG/Hr (75 mL/Hr) IV Continuous <Continuous>    MEDICATIONS  (PRN):  acetaminophen  Suppository .. 650 milliGRAM(s) Rectal every 8 hours PRN Temp greater or equal to 38C (100.4F), Mild Pain (1 - 3)  aluminum hydroxide/magnesium hydroxide/simethicone Suspension 30 milliLiter(s) Oral every 6 hours PRN Dyspepsia  dextrose Oral Gel 15 Gram(s) Oral once PRN Blood Glucose LESS THAN 70 milliGRAM(s)/deciliter  hydrALAZINE Injectable 5 milliGRAM(s) IV Push every 6 hours PRN SBP> 170  ondansetron Injectable 4 milliGRAM(s) IV Push every 6 hours PRN Nausea and/or Vomiting      RADIOLOGY & ADDITIONAL TESTS:

## 2022-08-09 NOTE — PROGRESS NOTE ADULT - ASSESSMENT
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      #1 Dementia  - seems to have been progressing over the last 3 years - per  has been sleeping more, eating less, etc   - poor overall prognosis  - supportive measures, frequent reorientation  - end stage     #2 Encephalopathy  - MRI no acute stroke, chronic microvascular changes, communicating hydrocephlus  - neurology following   - likely waxing and waning mental status related to underlying dementia     #3 Dysphagia  - too lethargic to swallow  - will need formal swallow evaluation  - would not recommend feeding tube due to advanced dementia, unlikely to improve condition or outcome     #4 NPH  - not a candidate for shunting  - appreciate neurosurgery input  - given not a candidate for shunting, would not make sense to subject her to an LP  - had evidence of swelling in old imaging from 2019     #5 Severe AS    - poor prognosis  - not a candidate for valve surgery   - hospice appropriate    #6 Debility  - needs assist with ADLs    #7 Encounter for palliative care  - patient is at end of life  - see goals of care

## 2022-08-09 NOTE — CHART NOTE - NSCHARTNOTEFT_GEN_A_CORE
Palliative care social work note.    SW met with patients family to provide support and address family questions and concerns. Despite prior discussions with physicians,  family continued to inquire and  ask same questions regarding Hospice, feeding source and remaining in hospital as palliative care patient. Ongoing education provided in clarifying inpatient Hospice, home hospice and comfort care at SNF. Family inquiring regarding ng tube since they were told SNF needs feeding source. Temporary usage reviewed and SW addressed  utilizing established goals of care for patient to guide planning. Family verbalized thoughts that patient could remain in hospital. SW attempted to engage family in addressing their discussions with physicians prior, however family dismissed and stated doctors are ignoring them. SW reviewed involvement of palliative care physician DR Arguello and hospitalist in coordination of care and prior education regarding options of care with transitions for patient. SW contacted Dr Arguello palliative acre physician to advise of SW discussions with family and DR Arguello will contact hospitalist and meet with family further.

## 2022-08-09 NOTE — PROGRESS NOTE ADULT - CONVERSATION DETAILS
Follow up conversation held with family at bedside regarding overall condition, new stroke, prognosis and plan of care. They understand overall remaining guarded condition especially now with new stroke. Confirmed wishes in terms of DNR and DNI, but they still want to continue all measures and give her time to see if she can get rehab and get stronger. I once again explained my recommendation for home hospice care but family relays concerns about having enough support at home and help at home. A lot of support provided.
discussion with Dr. Murillo and Dr. Lopez at bedside with patient,  Kane, son John, and sister Sara. Once again explained poor prognosis, MRI findings. I explained to family that patient's waxing and waning mental state and overall poor condition is related to underlying dementia and she is unlikely to improve. I once again explained that I did not feel her poor mental state is related to acute infections, or anything that is reversible or treatable but rather related to dementia. I once again explained options of home hospice or if she becomes more symptomatic and is not eating or drinking then she would qualify for inpatient hospice. Sister Sara was asking about if [facility] offers hospice and after checking with social work I told them that [facility]. We confirmed DNR and DNI status.
Discussed diagnosis and prognosis w/ family.  Discussed limited treatment options at this point.  Offered home hospice to family if they wanted to take her home.  Family seems to be leaning towards [facility] for PT.  Family did not appear receptive to the diagnosis and limited treatment options.  Palliative medicine consulted and assisted in conversation.  All questions answered in detail and at length.
-Spoke to family at bedside to review clinical course, plan of care, options for care    -Hospitalization course, inc need for intubation discussed. Son, John, inquires re: use of IV fluids for comfort.  Noted that patient reports that she is comfortable.  APAP remains available PRN.  -Discussed delicate balance of fluid in the setting of recent need for intubation, and AS.  Family offers early in a conversation that desire is to get patient home, and explained that re-intubation, if needed, would further delay/prevent realization of this goal.  -John reports that pt has been able to eat, without aspirating.  Reviewed natural sequelae of dementia, inc aspiration.  Discussed that coughing post PO intake can be a sign of aspiration.  Discussed that as disease progresses, patient can aspirate on their own secretions.  -John inquires about access to prior CT brain scans, so as to be able to compare with current findings.  Shared this information w/primary team following meeting.  Noted that independent of findings on CT, dementia can continue to progress, and can be appreciated by a clinical exam.  -John offers that home care vs hospice is under consideration, but improvement prior to d/c sought.  Would like to speak with staff re: specifics.  Spoke with nurse manager following meeting, who will communicate this to CM.  Rehab also raised by son.  -Explained that broadly speaking, cognizant of advanced illness that will not ultimately improve and be life limiting, hospice care is oriented towards comfort, minimization of medical interventions.  With home care and rehab, care is time-limited, and oriented towards improvement.  Explained that changes in clinical status could prompt hospitalization/evaluation at ED.  Also explained that rehab services (at home and in [facility]) are predicated on patient's ability to participate.  -Discussed that if [facility] or home care is pursued, there are opportunity costs of time lost at home, as a family, related to hospitalization, ongoing medical care.  -Explained that should family opt to pursue [facility] or home care, then pt could be transitioned to hospice care on completion, or should efforts at rehab prove unsuccessful.  -All questions answered.  Appreciation expressed by spouse for conversation.  Emotional support provided.  -Family notes that additional information, as noted above, needed to facilitate care planning.
discussion held with son John and sister Sara at bedside regarding overall condition, prognosis, and plan of care. Once again explained poor prognosis given advanced valvular heart disease, dementia, deconditioning and debility. Once again explained options and that my best recommendation is to get her out of the hospital sooner rather than later because she may do better in her normal environment. John expresses concerns about taking her home to " starve to death." I tried to explain progression of dementia and illnesses underlying, we also discussed she may do better at home in terms of food and appetite once again because in her normal settings. I explained that prognosis is likely month at this time and she is appropriate for home hospice. They are still in discussions about next best plan but John feels she needs to be optimized and stronger before she leaves. I tried to express my concerns that the longer she stays here, the more debilitated she will become and gave my recommendation to get her out of here as soon as medically safe. Broached intubation and directives again but nothing definitive was stated to me.
as per  patient would not want intubation or cpr.   wishes patient to be DNR/ DNI.   Son John confirmed earlier that family wishes patient to be dnr/ dni.   jonathan discussed and signed by  nati. patient's sister radha at bedside who signed as witness
discussion held with  Kane, son Howard, daughter from [place], sister Sara with Dr. Rhodes. Once again explained that patient is at end of life related to her progressive dementia and we have not elicited any acute reversible processes that we can treat to get her better. Expressed to them that we have two options - end of life care/pleasure feeds/comfort feeds only if she is awake and asking for food versus NG tube and assess whether she is a candidate for long term PEG tube. I explained risks of NG tube and PEG tube and that it has not been shown to improve quality of life in patients with end stage dementia. I explained the risks of her pulling it out, it falling out, and potentially increasing aspiration. I explained that with or without the feeding tube she is at end of life and prognosis is likely weeks to months but may be shorter if she develops another infection or aspiration related event. I explained she is appropriate for hospice services. They had questions regarding amantadine for wakefulness and if that would help her, I expressed that we cannot give her another orally but could likely give via NG tube but that it is unlikely going to help her swallowing. They also were asking if temporary feeding via NG tube will get her better enough to be able to swallow on her own, and I explained that it is unlikely particularly in consideration of the fact that when eating she was pocketing her food.  Kane and sister Sara do not think she would want an NG tube or PEG tube but they need time to discuss and let us know final decision.     Emotional support provided to them.
son rogelio wishes patient to be full code for now , also states that will discuss with father ( patient 's  ) regarding code status
patient remains dnr/ dni for now

## 2022-08-09 NOTE — PROGRESS NOTE ADULT - SUBJECTIVE AND OBJECTIVE BOX
SHARIFA ENGLE    [id number]    87y      Female    CC: ams     INTERVAL HPI/OVERNIGHT EVENTS: pt seen and examined. no acute events reported o/n     REVIEW OF SYSTEMS:  unable to obtain due to mental status     Vital Signs Last 24 Hrs  T(C): 37.8 (09 Aug 2022 14:30), Max: 38.4 (09 Aug 2022 11:28)  T(F): 100.1 (09 Aug 2022 14:30), Max: 101.2 (09 Aug 2022 11:28)  HR: 90 (09 Aug 2022 14:00) (82 - 96)  BP: 142/58 (09 Aug 2022 14:00) (129/37 - 184/62)  BP(mean): 62 (09 Aug 2022 12:00) (62 - 82)  RR: 15 (09 Aug 2022 14:00) (15 - 21)  SpO2: 100% (09 Aug 2022 14:00) (96% - 100%)    Parameters below as of 09 Aug 2022 14:00  Patient On (Oxygen Delivery Method): nasal cannula  O2 Flow (L/min): 2      PHYSICAL EXAM:    GENERAL: NAD  HEENT: normocephalic, atraumatic   NECK: soft, supple  CHEST/LUNG: Clear to auscultation bilaterally; respirations unlabored   HEART: S1S2+, Regular rate and rhythm  ABDOMEN: Soft, Nontender, Nondistended; Bowel sounds present  SKIN: warm, dry  NEURO: lethargic, responsive to some verbal, tactile stimuli; not following commands     LABS:                        9.3    4.96  )-----------( 322      ( 09 Aug 2022 04:49 )             30.5     08-09    140  |  108<H>  |  <3.0<L>  ----------------------------<  210<H>  4.1   |  24.0  |  0.64    Ca    8.0<L>      09 Aug 2022 04:49  Phos  2.4     08-08  Mg     1.6     08-09    TPro  5.4<L>  /  Alb  2.2<L>  /  TBili  0.4  /  DBili  x   /  AST  60<H>  /  ALT  24  /  AlkPhos  104  08-08            MEDICATIONS  (STANDING):  aspirin Suppository 300 milliGRAM(s) Rectal daily  atorvastatin 80 milliGRAM(s) Oral at bedtime  dextrose 5% + lactated ringers. 1000 milliLiter(s) (50 mL/Hr) IV Continuous <Continuous>  dextrose 5%. 1000 milliLiter(s) (50 mL/Hr) IV Continuous <Continuous>  dextrose 5%. 1000 milliLiter(s) (100 mL/Hr) IV Continuous <Continuous>  dextrose 50% Injectable 25 Gram(s) IV Push once  dextrose 50% Injectable 12.5 Gram(s) IV Push once  dextrose 50% Injectable 25 Gram(s) IV Push once  enoxaparin Injectable 40 milliGRAM(s) SubCutaneous every 24 hours  ferrous    sulfate 325 milliGRAM(s) Oral daily  glucagon  Injectable 1 milliGRAM(s) IntraMuscular once  insulin lispro (ADMELOG) corrective regimen sliding scale   SubCutaneous Before meals and at bedtime  labetalol 100 milliGRAM(s) Oral two times a day  levothyroxine Injectable 50 MICROGram(s) IV Push at bedtime  lisinopril 10 milliGRAM(s) Oral daily  memantine 10 milliGRAM(s) Oral two times a day  nystatin Powder 1 Application(s) Topical two times a day  piperacillin/tazobactam IVPB.. 3.375 Gram(s) IV Intermittent every 8 hours  sodium bicarbonate  Infusion 0.062 mEq/kG/Hr (75 mL/Hr) IV Continuous <Continuous>    MEDICATIONS  (PRN):  acetaminophen  Suppository .. 650 milliGRAM(s) Rectal every 8 hours PRN Temp greater or equal to 38C (100.4F), Mild Pain (1 - 3)  aluminum hydroxide/magnesium hydroxide/simethicone Suspension 30 milliLiter(s) Oral every 6 hours PRN Dyspepsia  dextrose Oral Gel 15 Gram(s) Oral once PRN Blood Glucose LESS THAN 70 milliGRAM(s)/deciliter  hydrALAZINE Injectable 5 milliGRAM(s) IV Push every 6 hours PRN SBP> 170  ondansetron Injectable 4 milliGRAM(s) IV Push every 6 hours PRN Nausea and/or Vomiting      RADIOLOGY & ADDITIONAL TESTS:

## 2022-08-09 NOTE — PROGRESS NOTE ADULT - SUBJECTIVE AND OBJECTIVE BOX
OVERNIGHT EVENTS: doing poorly, remains lethargic     Present Symptoms:     Dyspnea: none   Nausea/Vomiting: No  Anxiety:  No  Depression: unable   Fatigue: unable   Loss of appetite: unable   Constipation: none     Pain: none             Character-            Duration-            Effect-            Factors-            Frequency-            Location-            Severity-    Pain AD Score:  [email or web address] (press ctrl + left click to view)    Review of Systems: Reviewed                     Unable to obtain due to poor mentation   All others negative    MEDICATIONS  (STANDING):  aspirin Suppository 300 milliGRAM(s) Rectal daily  atorvastatin 80 milliGRAM(s) Oral at bedtime  dextrose 5% + lactated ringers. 1000 milliLiter(s) (50 mL/Hr) IV Continuous <Continuous>  dextrose 5%. 1000 milliLiter(s) (50 mL/Hr) IV Continuous <Continuous>  dextrose 5%. 1000 milliLiter(s) (100 mL/Hr) IV Continuous <Continuous>  dextrose 50% Injectable 25 Gram(s) IV Push once  dextrose 50% Injectable 12.5 Gram(s) IV Push once  dextrose 50% Injectable 25 Gram(s) IV Push once  enoxaparin Injectable 40 milliGRAM(s) SubCutaneous every 24 hours  ferrous    sulfate 325 milliGRAM(s) Oral daily  glucagon  Injectable 1 milliGRAM(s) IntraMuscular once  insulin lispro (ADMELOG) corrective regimen sliding scale   SubCutaneous Before meals and at bedtime  labetalol 100 milliGRAM(s) Oral two times a day  levothyroxine Injectable 50 MICROGram(s) IV Push at bedtime  lisinopril 10 milliGRAM(s) Oral daily  memantine 10 milliGRAM(s) Oral two times a day  nystatin Powder 1 Application(s) Topical two times a day  piperacillin/tazobactam IVPB.. 3.375 Gram(s) IV Intermittent every 8 hours  sodium bicarbonate  Infusion 0.062 mEq/kG/Hr (75 mL/Hr) IV Continuous <Continuous>    MEDICATIONS  (PRN):  acetaminophen  Suppository .. 650 milliGRAM(s) Rectal every 8 hours PRN Temp greater or equal to 38C (100.4F), Mild Pain (1 - 3)  aluminum hydroxide/magnesium hydroxide/simethicone Suspension 30 milliLiter(s) Oral every 6 hours PRN Dyspepsia  dextrose Oral Gel 15 Gram(s) Oral once PRN Blood Glucose LESS THAN 70 milliGRAM(s)/deciliter  hydrALAZINE Injectable 5 milliGRAM(s) IV Push every 6 hours PRN SBP> 170  ondansetron Injectable 4 milliGRAM(s) IV Push every 6 hours PRN Nausea and/or Vomiting      PHYSICAL EXAM:    Vital Signs Last 24 Hrs  T(C): 38.1 (09 Aug 2022 13:15), Max: 38.4 (09 Aug 2022 11:28)  T(F): 100.6 (09 Aug 2022 13:15), Max: 101.2 (09 Aug 2022 11:28)  HR: 96 (09 Aug 2022 12:00) (82 - 96)  BP: 129/37 (09 Aug 2022 12:00) (129/37 - 184/62)  BP(mean): 62 (09 Aug 2022 12:00) (62 - 88)  RR: 21 (09 Aug 2022 12:00) (19 - 21)  SpO2: 99% (09 Aug 2022 12:00) (96% - 100%)    Parameters below as of 09 Aug 2022 12:00  Patient On (Oxygen Delivery Method): nasal cannula  O2 Flow (L/min): 2    General: lethargic     Karnofsky:  20 %    HEENT: normal      Lungs: comfortable     CV: normal      GI: normal     : normal      MSK: weakness      Skin: no rash    LABS:                      9.3    4.96  )-----------( 322      ( 09 Aug 2022 04:49 )             30.5     08-09    140  |  108<H>  |  <3.0<L>  ----------------------------<  210<H>  4.1   |  24.0  |  0.64    Ca    8.0<L>      09 Aug 2022 04:49  Phos  2.4     08-08  Mg     1.6     08-09    TPro  5.4<L>  /  Alb  2.2<L>  /  TBili  0.4  /  DBili  x   /  AST  60<H>  /  ALT  24  /  AlkPhos  104  08-08    I&O's Summary    08 Aug 2022 07:01  -  09 Aug 2022 07:00  --------------------------------------------------------  IN: 1275 mL / OUT: 300 mL / NET: 975 mL    RADIOLOGY & ADDITIONAL STUDIES:    ADVANCE DIRECTIVES/TREATMENT PREFERENCES:  DNR/DNI

## 2022-08-09 NOTE — PROGRESS NOTE ADULT - ASSESSMENT
87y/oF PMH HTN, hypothyroid, DM, anemia, BIB family with AMS and progressing generalized weakness. Pt aso with subjective fevers and poor PO intake. On admission, pt hypoxic and with hypertensive urgency, 7/18. CTH without acute findings, placed on empiric abx. 7/19, pt develops N/V with NBNB emesis with bradycardia and hypotension, RRT called. Pt subsequently intubated for airway protection and upgraded to MICU. Started on pressors. CT with b/l LL consolidations, poss atelectasis and/or pna, small b/l pleural effusions. Repeat CTH with NPH. Imaging reviewed by Neurology and neurosx. As per Neurosx, no significant changes from prior imaging and no surgical intervention recommended. Pt also noted to have severe AS on TTE, though no intervention planned per cardio. Pt extubated 7/21, downgraded to medicine 7/22. Hospital course further complicated by recurring fevers, resumed abx 7/29 for SIRS, possible UTI. Pt with persistent encephalopathy, likely multifactorial, initially 2/2 sepsis and dehydration in setting of underlying dementia w/suspicion for NPH. Intermittent improvement in mental status. Pt inconsistent with PO intake, noted to pocket food while eating, concern for aspiration discussed multiple times with family. Family wanted to cont PO feeds at that time. Speech re-evaluated, pt started on pureed diet. Pt noted to fall asleep while family trying to feed her. Family educated on aspiration risk. 8/3 , pt with new fever 100.9, noted to be more lethargic. Repeat CTH 8/3 with new L cerebellum density/possible acute CVA. d/w neurology, not candidate for tpa due to unclear timeline. stroke protocol initiated. asa, statin. 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 . EEG without acute seizures. Abx changed to zosyn for poss new recurrent asp pna. Son concerned about poss strep throat, pcr ordered 8/4. abx changed to cefepime for broader coverage for pna and uti as per ID 8/4, however changed back to zosyn 8/5 due to increased lethargy on cefepime.     Metabolic encephalopathy, likely multifactorial due to sepsis, NPH in setting of underlying dementia, intermittent improvement, worse again 8/3  Sepsis, POA s/p septic shock, likely 2/2 aspiration PNA   Acute hypoxic respiratory failure s/p intubation w/mechanical ventilation   -suspicion for NPH on CTH  -neurosx eval appreciated; not a candidate for any intracranial surgery such as  shunt due to multiple chronic comorbidities; therefore, as per neuro, will not pursue LP   -8/3, pt noted to be more lethargic with episode of fever; concern for recurrent infection/poss aspiration   -MRI initially canceled by neurosx and rec outpt f/u; family wanted to discuss use of acetazolamide for NPH with neurosx team. as per neurosx, acetazolamide not used for tx of NPH. Additionally not recommended for use as per neurology team   -EEG 8/3 neg for seizures   -8/3 repeat CTH with new CVA L cerebellum, started on neuro checks   -cont asa, statin   -8/4: MRI without acute cva, chronic ischemic changes noted, re-demonstration of communicating hydrocephalus; R basal ganglia non-specific enhancement; imaging reviewed by neuro, rec repeat MRI 2-4 weeks as outpt; cont asa, statin, bp control   -carotid doppler: mild plaque b/l ICA, no significant stenosis   -PT/OT/speech/swallow   -s/p pressor support and steroids for hypotension while in MICU   -resumed on pureed diet with thickened liquids, however now NPO again   -seen by speech/swallow multiple times; last seen 8/7, with recs to cont npo due to high risk of aspiration; d/w family at bedside   -8/3 ct chest with new upper opacity/basal atelectasis/poss asp pna  -8/3 on zosyn, ceftriaxone, changed to cefepime 8/4, then back to zosyn 8/5 due to worsening ams   -bcx 8/3 ngtd   -ID recs appreciated   -cont gentle IVF with dextrose while npo     Possible UTI vs colonization   -family had previously refused marcano removal; nowd/c'ed   -bcx 7/28 neg   -ucx 50-99k ecoli, resistant to zosyn, s/p ceftriaxone and cefepime   -cont flomax     Severe aortic stenosis   -TTE reviewed   -as per cardio, not candidate for intervention given dementia   -caution with IVF given risk of volume overload     Hypernatremia   Hyperchloremic acidosis   -likely due to poor PO intake   -gentle IVF w/dextrose     Normocytic anemia   -s/p 1u PRBC transfusion during hospital course w/adequate response   -monitor   -s/p IV venofer     Hypertensive urgency   -npo at this time   -cont current IV meds     DM2  -hgba1c 10.8  -lantus d/c'ed due to inconsistent mental status and inconsistent nutrition status   -ISS     Hypothyroid   -synthroid, dose lowered 8/4 to 112mcg , converted to IV dose 8/5      VTE ppx: lovenox     Code status : DNR/DNI     Palliative following     prognosis poor    d/w pt's family including pt's  Kane, son Howard, daughter from [place], sister Sara and Palliative Dr. Arguello, reiterating current treatment options including comfort care with comfort/pleasure feeds vs temporary NGT and possible pursuance of PEG as pt remains NPO. reiterated that ngt/peg feeding would not prevent, and can possibly increase risk of aspiration.  and sister (Sara) expressing they don't think pt would want NGT or PEG, however, son and daughter wanting to continue discussion with the rest of the family. see full Palliative GOC note

## 2022-08-10 NOTE — PROGRESS NOTE ADULT - SUBJECTIVE AND OBJECTIVE BOX
OVERNIGHT EVENTS: had fever yesterday afternoon     Present Symptoms:     Dyspnea: Mild Moderate Severe  Nausea/Vomiting: Yes No  Anxiety:  Yes No  Depression: Yes No  Fatigue: Yes No  Loss of appetite: Yes No  Constipation:     Pain:             Character-            Duration-            Effect-            Factors-            Frequency-            Location-            Severity-    Pain AD Score:  [email or web address] (press ctrl + left click to view)    Review of Systems: Reviewed                     Negative:                     Positive:  Unable to obtain due to poor mentation   All others negative    MEDICATIONS  (STANDING):  aspirin Suppository 300 milliGRAM(s) Rectal daily  atorvastatin 80 milliGRAM(s) Oral at bedtime  dextrose 5% + lactated ringers. 1000 milliLiter(s) (50 mL/Hr) IV Continuous <Continuous>  dextrose 5%. 1000 milliLiter(s) (50 mL/Hr) IV Continuous <Continuous>  dextrose 5%. 1000 milliLiter(s) (100 mL/Hr) IV Continuous <Continuous>  dextrose 50% Injectable 25 Gram(s) IV Push once  dextrose 50% Injectable 12.5 Gram(s) IV Push once  dextrose 50% Injectable 25 Gram(s) IV Push once  enoxaparin Injectable 40 milliGRAM(s) SubCutaneous every 24 hours  ferrous    sulfate 325 milliGRAM(s) Oral daily  glucagon  Injectable 1 milliGRAM(s) IntraMuscular once  insulin lispro (ADMELOG) corrective regimen sliding scale   SubCutaneous Before meals and at bedtime  labetalol 100 milliGRAM(s) Oral two times a day  levothyroxine Injectable 50 MICROGram(s) IV Push at bedtime  lisinopril 10 milliGRAM(s) Oral daily  memantine 10 milliGRAM(s) Oral two times a day  nystatin Powder 1 Application(s) Topical two times a day  piperacillin/tazobactam IVPB.. 3.375 Gram(s) IV Intermittent every 8 hours  sodium bicarbonate  Infusion 0.062 mEq/kG/Hr (75 mL/Hr) IV Continuous <Continuous>    MEDICATIONS  (PRN):  acetaminophen  Suppository .. 650 milliGRAM(s) Rectal every 8 hours PRN Temp greater or equal to 38C (100.4F), Mild Pain (1 - 3)  aluminum hydroxide/magnesium hydroxide/simethicone Suspension 30 milliLiter(s) Oral every 6 hours PRN Dyspepsia  dextrose Oral Gel 15 Gram(s) Oral once PRN Blood Glucose LESS THAN 70 milliGRAM(s)/deciliter  hydrALAZINE Injectable 5 milliGRAM(s) IV Push every 6 hours PRN SBP> 170  ondansetron Injectable 4 milliGRAM(s) IV Push every 6 hours PRN Nausea and/or Vomiting      PHYSICAL EXAM:    Vital Signs Last 24 Hrs  T(C): 37.1 (10 Aug 2022 08:00), Max: 38.4 (09 Aug 2022 11:28)  T(F): 98.8 (10 Aug 2022 08:00), Max: 101.2 (09 Aug 2022 11:28)  HR: 93 (10 Aug 2022 06:00) (82 - 96)  BP: 164/52 (10 Aug 2022 06:00) (98/45 - 164/52)  BP(mean): 81 (10 Aug 2022 06:00) (57 - 81)  RR: 22 (10 Aug 2022 06:00) (15 - 25)  SpO2: 99% (10 Aug 2022 06:00) (96% - 100%)    Parameters below as of 10 Aug 2022 06:00  Patient On (Oxygen Delivery Method): nasal cannula  O2 Flow (L/min): 2      General: alert  oriented x ____ lethargic agitated                  cachexia  nonverbal  coma    Karnofsky:  %    HEENT: normal  dry mouth  ET tube/trach    Lungs: comfortable tachypnea/labored breathing  excessive secretions    CV: normal  tachycardia    GI: normal  distended  tender  no BS               PEG/NG/OG tube  constipation  last BM:     : normal  incontinent  oliguria/anuria  marcano    MSK: normal  weakness  edema             ambulatory  bedbound/wheelchair bound    Skin: normal  pressure ulcers- Stage_____  no rash    LABS:                      9.5    5.63  )-----------( 331      ( 10 Aug 2022 05:44 )             31.4     08-10    141  |  107  |  <3.0<L>  ----------------------------<  164<H>  3.9   |  23.0  |  0.74    Ca    8.1<L>      10 Aug 2022 05:44  Mg     1.7     08-10    I&O's Summary    09 Aug 2022 07:01  -  10 Aug 2022 07:00  --------------------------------------------------------  IN: 750 mL / OUT: 200 mL / NET: 550 mL    RADIOLOGY & ADDITIONAL STUDIES:    ADVANCE DIRECTIVES/TREATMENT PREFERENCES:  DNR YES NO  Completed on:                     MOL  YES NO   Completed on:  Living Will  YES NO   Completed on: OVERNIGHT EVENTS: had fever yesterday afternoon     Present Symptoms:     Dyspnea: none   Nausea/Vomiting: No  Anxiety:  No  Depression: unable   Fatigue: unable   Loss of appetite: unable   Constipation: none     Pain: none             Character-            Duration-            Effect-            Factors-            Frequency-            Location-            Severity-    Pain AD Score:  [email or web address] (press ctrl + left click to view)    Review of Systems: Reviewed                 Unable to obtain due to poor mentation   All others negative    MEDICATIONS  (STANDING):  aspirin Suppository 300 milliGRAM(s) Rectal daily  atorvastatin 80 milliGRAM(s) Oral at bedtime  dextrose 5% + lactated ringers. 1000 milliLiter(s) (50 mL/Hr) IV Continuous <Continuous>  dextrose 5%. 1000 milliLiter(s) (50 mL/Hr) IV Continuous <Continuous>  dextrose 5%. 1000 milliLiter(s) (100 mL/Hr) IV Continuous <Continuous>  dextrose 50% Injectable 25 Gram(s) IV Push once  dextrose 50% Injectable 12.5 Gram(s) IV Push once  dextrose 50% Injectable 25 Gram(s) IV Push once  enoxaparin Injectable 40 milliGRAM(s) SubCutaneous every 24 hours  ferrous    sulfate 325 milliGRAM(s) Oral daily  glucagon  Injectable 1 milliGRAM(s) IntraMuscular once  insulin lispro (ADMELOG) corrective regimen sliding scale   SubCutaneous Before meals and at bedtime  labetalol 100 milliGRAM(s) Oral two times a day  levothyroxine Injectable 50 MICROGram(s) IV Push at bedtime  lisinopril 10 milliGRAM(s) Oral daily  memantine 10 milliGRAM(s) Oral two times a day  nystatin Powder 1 Application(s) Topical two times a day  piperacillin/tazobactam IVPB.. 3.375 Gram(s) IV Intermittent every 8 hours  sodium bicarbonate  Infusion 0.062 mEq/kG/Hr (75 mL/Hr) IV Continuous <Continuous>    MEDICATIONS  (PRN):  acetaminophen  Suppository .. 650 milliGRAM(s) Rectal every 8 hours PRN Temp greater or equal to 38C (100.4F), Mild Pain (1 - 3)  aluminum hydroxide/magnesium hydroxide/simethicone Suspension 30 milliLiter(s) Oral every 6 hours PRN Dyspepsia  dextrose Oral Gel 15 Gram(s) Oral once PRN Blood Glucose LESS THAN 70 milliGRAM(s)/deciliter  hydrALAZINE Injectable 5 milliGRAM(s) IV Push every 6 hours PRN SBP> 170  ondansetron Injectable 4 milliGRAM(s) IV Push every 6 hours PRN Nausea and/or Vomiting      PHYSICAL EXAM:    Vital Signs Last 24 Hrs  T(C): 37.1 (10 Aug 2022 08:00), Max: 38.4 (09 Aug 2022 11:28)  T(F): 98.8 (10 Aug 2022 08:00), Max: 101.2 (09 Aug 2022 11:28)  HR: 93 (10 Aug 2022 06:00) (82 - 96)  BP: 164/52 (10 Aug 2022 06:00) (98/45 - 164/52)  BP(mean): 81 (10 Aug 2022 06:00) (57 - 81)  RR: 22 (10 Aug 2022 06:00) (15 - 25)  SpO2: 99% (10 Aug 2022 06:00) (96% - 100%)    Parameters below as of 10 Aug 2022 06:00  Patient On (Oxygen Delivery Method): nasal cannula  O2 Flow (L/min): 2    General: lethargic     Karnofsky: 20 %    HEENT: normal      Lungs: mild tachypnea, mild secretions     CV: normal      GI: normal      : normal     MSK: bedbound/wheelchair bound    Skin: no rash    LABS:                      9.5    5.63  )-----------( 331      ( 10 Aug 2022 05:44 )             31.4     08-10    141  |  107  |  <3.0<L>  ----------------------------<  164<H>  3.9   |  23.0  |  0.74    Ca    8.1<L>      10 Aug 2022 05:44  Mg     1.7     08-10    I&O's Summary    09 Aug 2022 07:01  -  10 Aug 2022 07:00  --------------------------------------------------------  IN: 750 mL / OUT: 200 mL / NET: 550 mL    RADIOLOGY & ADDITIONAL STUDIES:    ADVANCE DIRECTIVES/TREATMENT PREFERENCES:  DNR/DNI

## 2022-08-10 NOTE — CONSULT NOTE ADULT - PROVIDER SPECIALTY LIST ADULT
Neurology
Neurology
Infectious Disease
Rehab Medicine
Cardiology
Neurosurgery
Palliative Care
Critical Care
Gastroenterology

## 2022-08-10 NOTE — PROGRESS NOTE ADULT - ASSESSMENT
87y/oF PMH HTN, hypothyroid, DM, anemia, BIB family with AMS and progressing generalized weakness. Pt aso with subjective fevers and poor PO intake. On admission, pt hypoxic and with hypertensive urgency, 7/18. CTH without acute findings, placed on empiric abx. 7/19, pt develops N/V with NBNB emesis with bradycardia and hypotension, RRT called. Pt subsequently intubated for airway protection and upgraded to MICU. Started on pressors. CT with b/l LL consolidations, poss atelectasis and/or pna, small b/l pleural effusions. Repeat CTH with NPH. Imaging reviewed by Neurology and neurosx. As per Neurosx, no significant changes from prior imaging and no surgical intervention recommended. Pt also noted to have severe AS on TTE, though no intervention planned per cardio. Pt extubated 7/21, downgraded to medicine 7/22. Hospital course further complicated by recurring fevers, resumed abx 7/29 for SIRS, possible UTI. Pt with persistent encephalopathy, likely multifactorial, initially 2/2 sepsis and dehydration in setting of underlying dementia w/suspicion for NPH. Intermittent improvement in mental status. Pt inconsistent with PO intake, noted to pocket food while eating, concern for aspiration discussed multiple times with family. Family wanted to cont PO feeds at that time. Speech re-evaluated, pt started on pureed diet. Pt noted to fall asleep while family trying to feed her. Family educated on aspiration risk. 8/3 , pt with new fever 100.9, noted to be more lethargic. Repeat CTH 8/3 with new L cerebellum density/possible acute CVA. d/w neurology, not candidate for tpa due to unclear timeline. stroke protocol initiated. asa, statin. 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 . EEG without acute seizures. Abx changed to zosyn for poss new recurrent asp pna. Son concerned about poss strep throat, pcr ordered 8/4. abx changed to cefepime for broader coverage for pna and uti as per ID 8/4, however changed back to zosyn 8/5 due to increased lethargy on cefepime.     Metabolic encephalopathy, likely multifactorial due to sepsis, NPH in setting of underlying dementia, intermittent improvement, worse again 8/3  Sepsis, POA s/p septic shock, likely 2/2 aspiration PNA   Acute hypoxic respiratory failure s/p intubation w/mechanical ventilation  Dysphagia    -suspicion for NPH on CTH  -neurosx eval appreciated; not a candidate for any intracranial surgery such as  shunt due to multiple chronic comorbidities; therefore, as per neuro, will not pursue LP   -8/3, pt noted to be more lethargic with episode of fever; concern for recurrent infection/poss aspiration   -MRI initially canceled by neurosx and rec outpt f/u; family wanted to discuss use of acetazolamide for NPH with neurosx team. as per neurosx, acetazolamide not used for tx of NPH. Additionally not recommended for use as per neurology team   -EEG 8/3 neg for seizures   -8/3 repeat CTH with new CVA L cerebellum, started on neuro checks   -cont asa, statin   -8/4: MRI without acute cva, chronic ischemic changes noted, re-demonstration of communicating hydrocephalus; R basal ganglia non-specific enhancement; imaging reviewed by neuro, rec repeat MRI 2-4 weeks as outpt; cont asa, statin, bp control   -carotid doppler: mild plaque b/l ICA, no significant stenosis   -PT/OT/speech/swallow   -s/p pressor support and steroids for hypotension while in MICU   -resumed on pureed diet with thickened liquids, however now NPO again   -seen by speech/swallow multiple times; last seen 8/7, with recs to cont npo due to high risk of aspiration; d/w family at bedside   -8/3 ct chest with new upper opacity/basal atelectasis/poss asp pna  -8/3 on zosyn, ceftriaxone, changed to cefepime 8/4, then back to zosyn 8/5 due to worsening ams   -bcx 8/3 ngtd   -ID recs appreciated   -cont gentle IVF with dextrose while npo   -GI consulted to evaluate if pt is even candidate for possible peg, should family opt for this     Possible UTI vs colonization   -family had previously refused marcano removal; nowd/c'ed   -bcx 7/28 neg   -ucx 50-99k ecoli, resistant to zosyn, s/p ceftriaxone and cefepime   -cont flomax     Severe aortic stenosis   -TTE reviewed   -as per cardio, not candidate for intervention given dementia   -caution with IVF given risk of volume overload     Hypernatremia   Hyperchloremic acidosis   -likely due to poor PO intake   -gentle IVF w/dextrose     Normocytic anemia   -s/p 1u PRBC transfusion during hospital course w/adequate response   -monitor   -s/p IV venofer     Hypertensive urgency   -npo at this time   -cont current IV meds     DM2  -hgba1c 10.8  -lantus d/c'ed due to inconsistent mental status and inconsistent nutrition status   -ISS     Hypothyroid   -synthroid, dose lowered 8/4 to 112mcg , converted to IV dose 8/5      VTE ppx: lovenox     Code status : DNR/DNI     Palliative following     prognosis poor    will f/u w/family regarding goc decision

## 2022-08-10 NOTE — CONSULT NOTE ADULT - ASSESSMENT
87F with dementia,  DM, anemia, hypothyroidism, admitted for weakness. CTH with NPH (normal pressure hydrocephalus). Repeat CTH 8/3 with new L cerebellum density/possible acute CVA. On 8/4: MRI without acute cva, chronic ischemic changes noted, re-demonstration of communicating hydrocephalus; R basal ganglia non-specific enhancement. Patient failed speech and swallow evaluation and GI is consulted for possible PEG tube placement.    Plan:  Will plan for PEG tube placement once patient remain afebrile x48 hours and when once final decision made by the family.   Continue tube feeds via NGT for the meantime.   Plan discussed with patient's son.

## 2022-08-10 NOTE — PROGRESS NOTE ADULT - ASSESSMENT
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      #1 Dementia  - seems to have been progressing over the last 3 years - per  has been sleeping more, eating less, etc   - poor overall prognosis  - supportive measures, frequent reorientation  - end stage     #2 Encephalopathy  - MRI no acute stroke, chronic microvascular changes, communicating hydrocephlus  - neurology following   - likely waxing and waning mental status related to underlying dementia     #3 Dysphagia  - too lethargic to swallow  - will need formal swallow evaluation  - would not recommend feeding tube due to advanced dementia, unlikely to improve condition or outcome     #4 NPH  - not a candidate for shunting  - appreciate neurosurgery input  - given not a candidate for shunting, would not make sense to subject her to an LP  - had evidence of swelling in old imaging from 2019     #5 Severe AS    - poor prognosis  - not a candidate for valve surgery   - hospice appropriate    #6 Debility  - needs assist with ADLs    #7 Encounter for palliative care  - will touch base with family regarding decision today

## 2022-08-10 NOTE — PROGRESS NOTE ADULT - SUBJECTIVE AND OBJECTIVE BOX
SUBJECTIVE/OBJECTIVE: Patient seen at bedside with son and grandson present. Patient lethargic, occasionally responding yes to questions, minimally participating with examination.     REVIEW OF SYSTEMS  + fever     FUNCTIONAL PROGRESS  8/1 PT  Therapeutic Interventions    Bed Mobility  Bed Mobility Training Rehab Potential: fair, will monitor progress closely  Bed Mobility Training Sit-to-Supine: maximum assist (25% patient effort);  1 person assist  Bed Mobility Training Supine-to-Sit: maximum assist (25% patient effort);  1 person assist  Bed Mobility Training Limitations: decreased ability to use arms for pushing/pulling;  decreased ability to use legs for bridging/pushing;  impaired balance;  decreased strength;  impaired postural control    Sit-Stand Transfer Training  Sit-to-Stand Transfer Training Rehab Potential: fair, will monitor progress closely  Transfer Training Sit-to-Stand Transfer: maximum assist (25% patient effort);  1 person assist;  PT assist  Transfer Training Stand-to-Sit Transfer: maximum assist (25% patient effort);  1 person assist;  PT assist  Sit-to-Stand Transfer Training Transfer Safety Analysis: decreased weight-shifting ability;  decreased strength;  impaired balance    Gait Training  Gait Training Rehab Potential: fair, will monitor progress closely  Gait Training: maximum assist (25% patient effort);  1 person assist;  PT assist;  3 lateral steps to the left at bedside  Gait Analysis: decreased step length;  decreased stride length;  decreased weight-shifting ability;  decreased strength;  impaired balance;  impaired postural control    Therapeutic Exercise  Therapeutic Exercise Rehab Effort: fair  Therapeutic Exercise Detail: Pt performs Ok UE/LE AROM therex in functional plane    VITALS  T(C): 37.1 (08-10-22 @ 08:00), Max: 38.1 (08-09-22 @ 13:15)  HR: 92 (08-10-22 @ 10:00) (82 - 96)  BP: 114/71 (08-10-22 @ 10:00) (98/45 - 164/52)  RR: 21 (08-10-22 @ 10:00) (15 - 25)  SpO2: 94% (08-10-22 @ 10:00) (94% - 100%)    MEDICATIONS  (STANDING):  aspirin Suppository 300 milliGRAM(s) Rectal daily  atorvastatin 80 milliGRAM(s) Oral at bedtime  dextrose 5% + lactated ringers. 1000 milliLiter(s) (50 mL/Hr) IV Continuous <Continuous>  dextrose 5%. 1000 milliLiter(s) (50 mL/Hr) IV Continuous <Continuous>  dextrose 5%. 1000 milliLiter(s) (100 mL/Hr) IV Continuous <Continuous>  dextrose 50% Injectable 25 Gram(s) IV Push once  dextrose 50% Injectable 12.5 Gram(s) IV Push once  dextrose 50% Injectable 25 Gram(s) IV Push once  enoxaparin Injectable 40 milliGRAM(s) SubCutaneous every 24 hours  ferrous    sulfate 325 milliGRAM(s) Oral daily  glucagon  Injectable 1 milliGRAM(s) IntraMuscular once  insulin lispro (ADMELOG) corrective regimen sliding scale   SubCutaneous Before meals and at bedtime  labetalol 100 milliGRAM(s) Oral two times a day  levothyroxine Injectable 50 MICROGram(s) IV Push at bedtime  lisinopril 10 milliGRAM(s) Oral daily  memantine 10 milliGRAM(s) Oral two times a day  nystatin Powder 1 Application(s) Topical two times a day  piperacillin/tazobactam IVPB.. 3.375 Gram(s) IV Intermittent every 8 hours  sodium bicarbonate  Infusion 0.062 mEq/kG/Hr (75 mL/Hr) IV Continuous <Continuous>    MEDICATIONS  (PRN):  acetaminophen  Suppository .. 650 milliGRAM(s) Rectal every 8 hours PRN Temp greater or equal to 38C (100.4F), Mild Pain (1 - 3)  aluminum hydroxide/magnesium hydroxide/simethicone Suspension 30 milliLiter(s) Oral every 6 hours PRN Dyspepsia  dextrose Oral Gel 15 Gram(s) Oral once PRN Blood Glucose LESS THAN 70 milliGRAM(s)/deciliter  hydrALAZINE Injectable 5 milliGRAM(s) IV Push every 6 hours PRN SBP> 170  ondansetron Injectable 4 milliGRAM(s) IV Push every 6 hours PRN Nausea and/or Vomiting      RECENT LABS/IMAGING                          9.5    5.63  )-----------( 331      ( 10 Aug 2022 05:44 )             31.4     08-10    141  |  107  |  <3.0<L>  ----------------------------<  164<H>  3.9   |  23.0  |  0.74    Ca    8.1<L>      10 Aug 2022 05:44  Mg     1.7     08-10      Chest X ray 8/6: Probable small bilateral pleural effusions and bibasilar atelectasis.    MRI Brain 8/4: Communicating hydrocephalus. Chronic microvascular changes.   No acute infarction. 1 cm area of enhancement in left basal ganglia.    CT head 8/3: Mild periventricular white matter ischemia. Hypodensity in   the LEFT cerebellum which may reflect an infarction. 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    Prominent lateral ventricles due to   central atrophy.    CT chest 8/3: Small bilateral pleural effusion with mild increase in size of right   pleural effusion. New nonspecific groundglass opacities in the upper lobes.  Decreased bibasilar densities suggesting residual mild linear atelectasis  Stable mild mediastinal lymphadenopathy. No acute pathology in the abdomen or pelvis  ----------------------------------------------------------------------------------------  PHYSICAL EXAM  Constitutional - lethargic, does not open eyes, minimally follows commands   Neck - No JVD  Chest - No respiratory distress  Cardiovascular - No cyanosis   Abdomen - Soft   Extremities - No calf tenderness   Neurologic Exam -                    Cognitive -AAOx0, responds yes to questions     Motor - Limited exam due to cognition. LUE  2/5         Reflexes - No clonus   Psychiatric - Resting comfortably    ----------------------------------------------------------------------------------------  ASSESSMENT/PLAN  87y Female with functional deficits after prolonged hospitalization complicated by sepsis, encephalopathy and communicating hydrocephalus.   Sepsis - Zosyn  Hypothyroidism- synthroid   Wakefulness- memantine, consider addition of Provigil in the AM   Dysphagia -  NPO, pending discussion with family for NGT, PEG   DVT PPX - Lovenox  Rehab- PT and OT to maintain passive ROM and prevent contracture.  Discussed with son at bedside, rehabilitation goal to maintain strength as much as possible while hospitalized. Will continue to follow. Functional progress will determine ongoing rehab disposition  recommendations, which may change.             SUBJECTIVE/OBJECTIVE: Patient seen at bedside with son and grandson present. Patient lethargic, occasionally responding yes to questions, minimally participating with examination.     REVIEW OF SYSTEMS  + fever     FUNCTIONAL PROGRESS  8/1 PT  Therapeutic Interventions    Bed Mobility  Bed Mobility Training Rehab Potential: fair, will monitor progress closely  Bed Mobility Training Sit-to-Supine: maximum assist (25% patient effort);  1 person assist  Bed Mobility Training Supine-to-Sit: maximum assist (25% patient effort);  1 person assist  Bed Mobility Training Limitations: decreased ability to use arms for pushing/pulling;  decreased ability to use legs for bridging/pushing;  impaired balance;  decreased strength;  impaired postural control    Sit-Stand Transfer Training  Sit-to-Stand Transfer Training Rehab Potential: fair, will monitor progress closely  Transfer Training Sit-to-Stand Transfer: maximum assist (25% patient effort);  1 person assist;  PT assist  Transfer Training Stand-to-Sit Transfer: maximum assist (25% patient effort);  1 person assist;  PT assist  Sit-to-Stand Transfer Training Transfer Safety Analysis: decreased weight-shifting ability;  decreased strength;  impaired balance    Gait Training  Gait Training Rehab Potential: fair, will monitor progress closely  Gait Training: maximum assist (25% patient effort);  1 person assist;  PT assist;  3 lateral steps to the left at bedside  Gait Analysis: decreased step length;  decreased stride length;  decreased weight-shifting ability;  decreased strength;  impaired balance;  impaired postural control    Therapeutic Exercise  Therapeutic Exercise Rehab Effort: fair  Therapeutic Exercise Detail: Pt performs Ok UE/LE AROM therex in functional plane    VITALS  T(C): 37.1 (08-10-22 @ 08:00), Max: 38.1 (08-09-22 @ 13:15)  HR: 92 (08-10-22 @ 10:00) (82 - 96)  BP: 114/71 (08-10-22 @ 10:00) (98/45 - 164/52)  RR: 21 (08-10-22 @ 10:00) (15 - 25)  SpO2: 94% (08-10-22 @ 10:00) (94% - 100%)    MEDICATIONS  (STANDING):  aspirin Suppository 300 milliGRAM(s) Rectal daily  atorvastatin 80 milliGRAM(s) Oral at bedtime  dextrose 5% + lactated ringers. 1000 milliLiter(s) (50 mL/Hr) IV Continuous <Continuous>  dextrose 5%. 1000 milliLiter(s) (50 mL/Hr) IV Continuous <Continuous>  dextrose 5%. 1000 milliLiter(s) (100 mL/Hr) IV Continuous <Continuous>  dextrose 50% Injectable 25 Gram(s) IV Push once  dextrose 50% Injectable 12.5 Gram(s) IV Push once  dextrose 50% Injectable 25 Gram(s) IV Push once  enoxaparin Injectable 40 milliGRAM(s) SubCutaneous every 24 hours  ferrous    sulfate 325 milliGRAM(s) Oral daily  glucagon  Injectable 1 milliGRAM(s) IntraMuscular once  insulin lispro (ADMELOG) corrective regimen sliding scale   SubCutaneous Before meals and at bedtime  labetalol 100 milliGRAM(s) Oral two times a day  levothyroxine Injectable 50 MICROGram(s) IV Push at bedtime  lisinopril 10 milliGRAM(s) Oral daily  memantine 10 milliGRAM(s) Oral two times a day  nystatin Powder 1 Application(s) Topical two times a day  piperacillin/tazobactam IVPB.. 3.375 Gram(s) IV Intermittent every 8 hours  sodium bicarbonate  Infusion 0.062 mEq/kG/Hr (75 mL/Hr) IV Continuous <Continuous>    MEDICATIONS  (PRN):  acetaminophen  Suppository .. 650 milliGRAM(s) Rectal every 8 hours PRN Temp greater or equal to 38C (100.4F), Mild Pain (1 - 3)  aluminum hydroxide/magnesium hydroxide/simethicone Suspension 30 milliLiter(s) Oral every 6 hours PRN Dyspepsia  dextrose Oral Gel 15 Gram(s) Oral once PRN Blood Glucose LESS THAN 70 milliGRAM(s)/deciliter  hydrALAZINE Injectable 5 milliGRAM(s) IV Push every 6 hours PRN SBP> 170  ondansetron Injectable 4 milliGRAM(s) IV Push every 6 hours PRN Nausea and/or Vomiting      RECENT LABS/IMAGING                          9.5    5.63  )-----------( 331      ( 10 Aug 2022 05:44 )             31.4     08-10    141  |  107  |  <3.0<L>  ----------------------------<  164<H>  3.9   |  23.0  |  0.74    Ca    8.1<L>      10 Aug 2022 05:44  Mg     1.7     08-10      Chest X ray 8/6: Probable small bilateral pleural effusions and bibasilar atelectasis.    MRI Brain 8/4: Communicating hydrocephalus. Chronic microvascular changes.   No acute infarction. 1 cm area of enhancement in left basal ganglia.    CT head 8/3: Mild periventricular white matter ischemia. Hypodensity in   the LEFT cerebellum which may reflect an infarction. 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    Prominent lateral ventricles due to   central atrophy.    CT chest 8/3: Small bilateral pleural effusion with mild increase in size of right   pleural effusion. New nonspecific groundglass opacities in the upper lobes.  Decreased bibasilar densities suggesting residual mild linear atelectasis  Stable mild mediastinal lymphadenopathy. No acute pathology in the abdomen or pelvis  ----------------------------------------------------------------------------------------  PHYSICAL EXAM  Constitutional - lethargic, does not open eyes, minimally follows commands   Neck - No JVD  Chest - No respiratory distress  Cardiovascular - No cyanosis   Abdomen - Soft   Extremities - No calf tenderness   Neurologic Exam -                    Cognitive -AAOx0, responds yes to questions     Motor - Limited exam due to cognition. LUE  2/5         Reflexes - No clonus   Psychiatric - Resting comfortably    ----------------------------------------------------------------------------------------  ASSESSMENT/PLAN  87y Female with functional deficits after prolonged hospitalization complicated by sepsis, encephalopathy and communicating hydrocephalus.   Sepsis - Zosyn  Hypothyroidism- synthroid   Wakefulness- memantine, consider addition of Provigil in the AM   Dysphagia -  NPO, pending discussion with family for NGT, PEG   DVT PPX - Lovenox  Rehab- PT and OT to maintain passive ROM and prevent contracture. Will continue to follow. Functional progress will determine ongoing rehab disposition recommendations, which may change.

## 2022-08-10 NOTE — CONSULT NOTE ADULT - CONSULT REASON
CVA
FEVER
AS
NPH
R/O Stroke
NPH
PEG tube placement
Resp arrest, urgently intubated
advanced dementia

## 2022-08-10 NOTE — CONSULT NOTE ADULT - NS ATTEND AMEND GEN_ALL_CORE FT
VALENTIN Attg:    see above    patient seen 7/26 am    agree with exam as above    CT reviewed -- ventricles stable compared to imaging from 2019    I discussed the clinical course with the patient's sister and son.  The patient is not a candidate for consideration of  shunting at this time.  Overall, I anticipate that the risk would likely outweigh the benefit of  shunting given the patient's age, medical co-morbidities (AS), and recent infection without clear source at this time    recall prn
If the patient's mental status continues as she appears today I do not think she will be a candidate for oral feeds.  For the present would recommend a Dobbhoff to be placed and she be given diabetic feedings.  In the meantime she was febrile to 101.9 yesterday and she needs to be afebrile for 48 hours prior to PEG placement.  I discussed her situation with her son at the bedside who would like to see how she does with Dobbhoff tube feeds prior to making a final decision regarding percutaneous endoscopic gastrostomy tube placement.  In addition she has severe aortic stenosis for which cardiology does not recommend any intervention and she would be at significant risk for conscious sedation and percutaneous endoscopic gastrostomy tube placement.  The son understands the above.  Please reconsult when patient has been afebrile and a definitive decision has been made.

## 2022-08-10 NOTE — CONSULT NOTE ADULT - SUBJECTIVE AND OBJECTIVE BOX
HPI: 87y/oF PMH HTN, hypothyroid, DM, anemia, BIB family with AMS and progressing generalized weakness. Patient is nonverbal and therefore HPI obtained from EMR. On admission, patient was hypoxic and with hypertensive urgency, Pt aso with subjective fevers and poor PO intake. On admission, pt hypoxic and with hypertensive urgency, 7/18. CTH without acute findings, placed on empiric abx. 7/19, pt develops N/V with NBNB emesis with bradycardia and hypotension, RRT called. Pt subsequently intubated for airway protection and upgraded to MICU. Started on pressors. CT with b/l LL consolidations, poss atelectasis and/or pna, small b/l pleural effusions. 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 Imaging reviewed by Neurology and neurosx. As per Neurosx, no significant changes from prior imaging and no surgical intervention recommended. Pt also noted to have severe AS on TTE, though no intervention planned per cardio. Pt extubated 7/21, downgraded to medicine 7/22. Hospital course further complicated by recurring fevers, resumed abx 7/29 for SIRS, possible UTI. Pt with persistent encephalopathy, likely multifactorial, initially 2/2 sepsis and dehydration in setting of underlying dementia w/suspicion for NPH. Intermittent improvement in mental status. Pt inconsistent with PO intake, noted to pocket food while eating, concern for aspiration discussed multiple times with family. Family wanted to cont PO feeds at that time. Speech re-evaluated, pt started on pureed diet. Pt noted to fall asleep while family trying to feed her. Family educated on aspiration risk. 8/3 , pt with new fever 100.9, noted to be more lethargic. Repeat CTH 8/3 with new L cerebellum density/possible acute CVA. d/w neurology, not candidate for tpa due to unclear timeline. stroke protocol initiated. asa, statin. 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 . EEG without acute seizures. Abx changed to zosyn for poss new recurrent asp pna. Son concerned about poss strep throat, pcr ordered 8/4. abx changed to cefepime for broader coverage for pna and uti as per ID 8/4, however changed back to zosyn 8/5 due to increased lethargy on cefepime.   Metabolic encephalopathy, likely multifactorial due to sepsis, NPH in setting of underlying dementia, intermittent improvement, worse again 8/3  Sepsis, POA s/p septic shock, likely 2/2 aspiration PNA . Patient failed speech and swallow evaluation and GI is consulted for possible PEG tube placement. At the time of my evaluation, patient is nonverbal, son at the bedside requesting to start tube feeds via nasogastric tube at this time and to reevaluate the need for PEG tube later this week.     Review of Systems:  Unable to obtain ROS due to mental status.       PAST MEDICAL & SURGICAL HISTORY:  Essential hypertension      Hypothyroidism, unspecified type      Diabetes mellitus of other type without complication      Anemia      S/P cataract surgery  both eyes      S/P appendectomy          REVIEW OF SYSTEMS      General:No Weight change/ Fatigue/ HA/Dizzy	    Skin/Breast: No Rashes/ Lesions/ Masses  	  Ophthalmologic: No Blurry vision/ Glaucoma/ Blindness  	  ENMT: No Hearing loss/ Drainage/ Lesions	    Respiratory and Thorax: No Cough/ Wheezing/ SOB/ Hemoptysis/ Sputum production  	  Cardiovascular: No Chest pain/ Palpitations/ Diaphoresis	    Gastrointestinal: No Nausea/ Vomiting/ Constipation/ Appetite Change	    Genitourinary: No Heamturia/ Dysuria/ Frequency change/ Impotence	    Musculoskeletal: No Pain/ Weakness/ Claudication	    Neurological: No Seizures/ TIA/CVA/ Parastesias	    Psychiatric: No Dementia/ Depression/ SI/HI	    Hematology/Lymphatics: No hx of bleeding/ Edema	    Endocrine:	No Hyperglycemia/ Hypoglycemia    Allergic/Immunologic:	 No Anaphylaxis/ Intolerance/ Recent illnesses    MEDICATIONS  (STANDING):  aspirin Suppository 300 milliGRAM(s) Rectal daily  atorvastatin 80 milliGRAM(s) Oral at bedtime  dextrose 5% + lactated ringers. 1000 milliLiter(s) (50 mL/Hr) IV Continuous <Continuous>  dextrose 5%. 1000 milliLiter(s) (50 mL/Hr) IV Continuous <Continuous>  dextrose 5%. 1000 milliLiter(s) (100 mL/Hr) IV Continuous <Continuous>  dextrose 50% Injectable 25 Gram(s) IV Push once  dextrose 50% Injectable 12.5 Gram(s) IV Push once  dextrose 50% Injectable 25 Gram(s) IV Push once  enoxaparin Injectable 40 milliGRAM(s) SubCutaneous every 24 hours  ferrous    sulfate 325 milliGRAM(s) Oral daily  glucagon  Injectable 1 milliGRAM(s) IntraMuscular once  insulin lispro (ADMELOG) corrective regimen sliding scale   SubCutaneous Before meals and at bedtime  labetalol 100 milliGRAM(s) Oral two times a day  levothyroxine Injectable 50 MICROGram(s) IV Push at bedtime  lisinopril 10 milliGRAM(s) Oral daily  memantine 10 milliGRAM(s) Oral two times a day  nystatin Powder 1 Application(s) Topical two times a day  piperacillin/tazobactam IVPB.. 3.375 Gram(s) IV Intermittent every 8 hours  sodium bicarbonate  Infusion 0.062 mEq/kG/Hr (75 mL/Hr) IV Continuous <Continuous>    MEDICATIONS  (PRN):  acetaminophen  Suppository .. 650 milliGRAM(s) Rectal every 8 hours PRN Temp greater or equal to 38C (100.4F), Mild Pain (1 - 3)  aluminum hydroxide/magnesium hydroxide/simethicone Suspension 30 milliLiter(s) Oral every 6 hours PRN Dyspepsia  dextrose Oral Gel 15 Gram(s) Oral once PRN Blood Glucose LESS THAN 70 milliGRAM(s)/deciliter  hydrALAZINE Injectable 5 milliGRAM(s) IV Push every 6 hours PRN SBP> 170  ondansetron Injectable 4 milliGRAM(s) IV Push every 6 hours PRN Nausea and/or Vomiting      Allergies    No Known Allergies    Intolerances        Vital Signs Last 24 Hrs  T(C): 37.1 (10 Aug 2022 12:00), Max: 37.8 (09 Aug 2022 14:30)  T(F): 98.7 (10 Aug 2022 12:00), Max: 100.1 (09 Aug 2022 14:30)  HR: 92 (10 Aug 2022 10:00) (82 - 93)  BP: 114/71 (10 Aug 2022 10:00) (98/45 - 164/52)  BP(mean): 82 (10 Aug 2022 10:00) (57 - 82)  RR: 21 (10 Aug 2022 10:00) (15 - 25)  SpO2: 94% (10 Aug 2022 10:00) (94% - 100%)    Parameters below as of 10 Aug 2022 10:00  Patient On (Oxygen Delivery Method): nasal cannula  O2 Flow (L/min): 2    PHYSICAL EXAM:  Constitutional: Nonverbal, in no acute distress  Neuro: Non verbal, open eyes spontaneously, does not follow commands.    HEENT: anicteric sclerae  Neck: supple, no JVD  CV: regular rate, regular rhythm, +S1S2, +systolic murmur  Pulm/chest: lung sounds CTA and equal bilaterally, no accessory muscle use noted  Abd: soft, ND, +BS. No previous surgical incisions noted   Ext: no Cyanosis, clubbing or edema  Skin: warm, no jaundice         LABS:                        9.5    5.63  )-----------( 331      ( 10 Aug 2022 05:44 )             31.4     08-10    141  |  107  |  <3.0<L>  ----------------------------<  164<H>  3.9   |  23.0  |  0.74    Ca    8.1<L>      10 Aug 2022 05:44  Mg     1.7     08-10           HPI: 87y/oF PMH HTN, hypothyroid, DM, anemia, BIB family with AMS and progressing generalized weakness. Patient is nonverbal and therefore HPI obtained from EMR.  Pt aso with subjective fevers and poor PO intake. On admission, pt hypoxic and with hypertensive urgency, 7/18. CTH without acute findings, placed on empiric abx. 7/19, pt develops N/V with NBNB emesis with bradycardia and hypotension, RRT called. Pt subsequently intubated for airway protection and upgraded to MICU. Started on pressors. CT with b/l LL consolidations, poss atelectasis and/or pna, small b/l pleural effusions. 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 Imaging reviewed by Neurology and neurosx. As per Neurosx, no significant changes from prior imaging and no surgical intervention recommended. Pt also noted to have severe AS on TTE, though no intervention planned per cardio. Pt extubated 7/21, downgraded to medicine 7/22. Hospital course further complicated by recurring fevers, resumed abx 7/29 for SIRS, possible UTI. Pt with persistent encephalopathy, likely multifactorial, initially 2/2 sepsis and dehydration in setting of underlying dementia w/suspicion for NPH. Intermittent improvement in mental status. Pt inconsistent with PO intake, noted to pocket food while eating, concern for aspiration discussed multiple times with family. Family wanted to cont PO feeds at that time. Speech re-evaluated, pt started on pureed diet. Pt noted to fall asleep while family trying to feed her. Family educated on aspiration risk. 8/3 , pt with new fever 100.9, noted to be more lethargic. Repeat CTH 8/3 with new L cerebellum density/possible acute CVA. d/w neurology, not candidate for tpa due to unclear timeline. stroke protocol initiated. asa, statin. 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 . EEG without acute seizures. Abx changed to zosyn for poss new recurrent asp pna. Son concerned about poss strep throat, pcr ordered 8/4. abx changed to cefepime for broader coverage for pna and uti as per ID 8/4, however changed back to zosyn 8/5 due to increased lethargy on cefepime.  GI is consulted for possible PEG tube placement. At the time of my evaluation, patient is nonverbal, son at the bedside requesting to start tube feeds via nasogastric tube at this time and to reevaluate the need for PEG tube later this week.     Review of Systems:  Unable to obtain ROS due to mental status.       PAST MEDICAL & SURGICAL HISTORY:  Essential hypertension      Hypothyroidism, unspecified type      Diabetes mellitus of other type without complication      Anemia      S/P cataract surgery  both eyes      S/P appendectomy          REVIEW OF SYSTEMS      General:No Weight change/ Fatigue/ HA/Dizzy	    Skin/Breast: No Rashes/ Lesions/ Masses  	  Ophthalmologic: No Blurry vision/ Glaucoma/ Blindness  	  ENMT: No Hearing loss/ Drainage/ Lesions	    Respiratory and Thorax: No Cough/ Wheezing/ SOB/ Hemoptysis/ Sputum production  	  Cardiovascular: No Chest pain/ Palpitations/ Diaphoresis	    Gastrointestinal: No Nausea/ Vomiting/ Constipation/ Appetite Change	    Genitourinary: No Heamturia/ Dysuria/ Frequency change/ Impotence	    Musculoskeletal: No Pain/ Weakness/ Claudication	    Neurological: No Seizures/ TIA/CVA/ Parastesias	    Psychiatric: No Dementia/ Depression/ SI/HI	    Hematology/Lymphatics: No hx of bleeding/ Edema	    Endocrine:	No Hyperglycemia/ Hypoglycemia    Allergic/Immunologic:	 No Anaphylaxis/ Intolerance/ Recent illnesses    MEDICATIONS  (STANDING):  aspirin Suppository 300 milliGRAM(s) Rectal daily  atorvastatin 80 milliGRAM(s) Oral at bedtime  dextrose 5% + lactated ringers. 1000 milliLiter(s) (50 mL/Hr) IV Continuous <Continuous>  dextrose 5%. 1000 milliLiter(s) (50 mL/Hr) IV Continuous <Continuous>  dextrose 5%. 1000 milliLiter(s) (100 mL/Hr) IV Continuous <Continuous>  dextrose 50% Injectable 25 Gram(s) IV Push once  dextrose 50% Injectable 12.5 Gram(s) IV Push once  dextrose 50% Injectable 25 Gram(s) IV Push once  enoxaparin Injectable 40 milliGRAM(s) SubCutaneous every 24 hours  ferrous    sulfate 325 milliGRAM(s) Oral daily  glucagon  Injectable 1 milliGRAM(s) IntraMuscular once  insulin lispro (ADMELOG) corrective regimen sliding scale   SubCutaneous Before meals and at bedtime  labetalol 100 milliGRAM(s) Oral two times a day  levothyroxine Injectable 50 MICROGram(s) IV Push at bedtime  lisinopril 10 milliGRAM(s) Oral daily  memantine 10 milliGRAM(s) Oral two times a day  nystatin Powder 1 Application(s) Topical two times a day  piperacillin/tazobactam IVPB.. 3.375 Gram(s) IV Intermittent every 8 hours  sodium bicarbonate  Infusion 0.062 mEq/kG/Hr (75 mL/Hr) IV Continuous <Continuous>    MEDICATIONS  (PRN):  acetaminophen  Suppository .. 650 milliGRAM(s) Rectal every 8 hours PRN Temp greater or equal to 38C (100.4F), Mild Pain (1 - 3)  aluminum hydroxide/magnesium hydroxide/simethicone Suspension 30 milliLiter(s) Oral every 6 hours PRN Dyspepsia  dextrose Oral Gel 15 Gram(s) Oral once PRN Blood Glucose LESS THAN 70 milliGRAM(s)/deciliter  hydrALAZINE Injectable 5 milliGRAM(s) IV Push every 6 hours PRN SBP> 170  ondansetron Injectable 4 milliGRAM(s) IV Push every 6 hours PRN Nausea and/or Vomiting      Allergies    No Known Allergies    Intolerances        Vital Signs Last 24 Hrs  T(C): 37.1 (10 Aug 2022 12:00), Max: 37.8 (09 Aug 2022 14:30)  T(F): 98.7 (10 Aug 2022 12:00), Max: 100.1 (09 Aug 2022 14:30)  HR: 92 (10 Aug 2022 10:00) (82 - 93)  BP: 114/71 (10 Aug 2022 10:00) (98/45 - 164/52)  BP(mean): 82 (10 Aug 2022 10:00) (57 - 82)  RR: 21 (10 Aug 2022 10:00) (15 - 25)  SpO2: 94% (10 Aug 2022 10:00) (94% - 100%)    Parameters below as of 10 Aug 2022 10:00  Patient On (Oxygen Delivery Method): nasal cannula  O2 Flow (L/min): 2    PHYSICAL EXAM:  Constitutional: lethargic female, Nonverbal, in no acute distress  Neuro: Non verbal, open eyes spontaneously, does not follow commands.    HEENT: anicteric sclerae  Neck: supple, no JVD  CV: regular rate, regular rhythm, +S1S2, +systolic murmur  Pulm/chest: lung sounds CTA and equal bilaterally, no accessory muscle use noted  Abd: soft, ND, +BS. No previous surgical incisions noted   Ext: no Cyanosis, clubbing or edema  Skin: warm, no jaundice         LABS:                        9.5    5.63  )-----------( 331      ( 10 Aug 2022 05:44 )             31.4     08-10    141  |  107  |  <3.0<L>  ----------------------------<  164<H>  3.9   |  23.0  |  0.74    Ca    8.1<L>      10 Aug 2022 05:44  Mg     1.7     08-10

## 2022-08-10 NOTE — CONSULT NOTE ADULT - CONSULT REQUESTED DATE/TIME
04-Aug-2022 08:07
26-Jul-2022
04-Aug-2022 15:11
10-Aug-2022 13:50
21-Jul-2022 11:14
21-Jul-2022 14:11
25-Jul-2022 16:30
19-Jul-2022 12:00
27-Jul-2022 11:10

## 2022-08-10 NOTE — PROGRESS NOTE ADULT - SUBJECTIVE AND OBJECTIVE BOX
SHARIFA ENGLE    [id number]    87y      Female    CC: ams    INTERVAL HPI/OVERNIGHT EVENTS: pt seen and examined.     REVIEW OF SYSTEMS:  unable to obtain     Vital Signs Last 24 Hrs  T(C): 37.1 (10 Aug 2022 12:00), Max: 38.1 (09 Aug 2022 13:15)  T(F): 98.7 (10 Aug 2022 12:00), Max: 100.6 (09 Aug 2022 13:15)  HR: 92 (10 Aug 2022 10:00) (82 - 93)  BP: 114/71 (10 Aug 2022 10:00) (98/45 - 164/52)  BP(mean): 82 (10 Aug 2022 10:00) (57 - 82)  RR: 21 (10 Aug 2022 10:00) (15 - 25)  SpO2: 94% (10 Aug 2022 10:00) (94% - 100%)    Parameters below as of 10 Aug 2022 10:00  Patient On (Oxygen Delivery Method): nasal cannula  O2 Flow (L/min): 2      PHYSICAL EXAM:    GENERAL: NAD  HEENT: normocephalic, atraumatic   NECK: soft, supple  CHEST/LUNG: Clear to auscultation bilaterally; respirations unlabored on NC   HEART: S1S2+, Regular rate and rhythm  ABDOMEN: Soft, Nontender, Nondistended; Bowel sounds present  SKIN: warm, dry  NEURO: lethargic, responsive to some verbal, tactile stimuli; not following commands     LABS:                        9.5    5.63  )-----------( 331      ( 10 Aug 2022 05:44 )             31.4     08-10    141  |  107  |  <3.0<L>  ----------------------------<  164<H>  3.9   |  23.0  |  0.74    Ca    8.1<L>      10 Aug 2022 05:44  Mg     1.7     08-10              MEDICATIONS  (STANDING):  aspirin Suppository 300 milliGRAM(s) Rectal daily  atorvastatin 80 milliGRAM(s) Oral at bedtime  dextrose 5% + lactated ringers. 1000 milliLiter(s) (50 mL/Hr) IV Continuous <Continuous>  dextrose 5%. 1000 milliLiter(s) (50 mL/Hr) IV Continuous <Continuous>  dextrose 5%. 1000 milliLiter(s) (100 mL/Hr) IV Continuous <Continuous>  dextrose 50% Injectable 25 Gram(s) IV Push once  dextrose 50% Injectable 12.5 Gram(s) IV Push once  dextrose 50% Injectable 25 Gram(s) IV Push once  enoxaparin Injectable 40 milliGRAM(s) SubCutaneous every 24 hours  ferrous    sulfate 325 milliGRAM(s) Oral daily  glucagon  Injectable 1 milliGRAM(s) IntraMuscular once  insulin lispro (ADMELOG) corrective regimen sliding scale   SubCutaneous Before meals and at bedtime  labetalol 100 milliGRAM(s) Oral two times a day  levothyroxine Injectable 50 MICROGram(s) IV Push at bedtime  lisinopril 10 milliGRAM(s) Oral daily  memantine 10 milliGRAM(s) Oral two times a day  nystatin Powder 1 Application(s) Topical two times a day  piperacillin/tazobactam IVPB.. 3.375 Gram(s) IV Intermittent every 8 hours  sodium bicarbonate  Infusion 0.062 mEq/kG/Hr (75 mL/Hr) IV Continuous <Continuous>    MEDICATIONS  (PRN):  acetaminophen  Suppository .. 650 milliGRAM(s) Rectal every 8 hours PRN Temp greater or equal to 38C (100.4F), Mild Pain (1 - 3)  aluminum hydroxide/magnesium hydroxide/simethicone Suspension 30 milliLiter(s) Oral every 6 hours PRN Dyspepsia  dextrose Oral Gel 15 Gram(s) Oral once PRN Blood Glucose LESS THAN 70 milliGRAM(s)/deciliter  hydrALAZINE Injectable 5 milliGRAM(s) IV Push every 6 hours PRN SBP> 170  ondansetron Injectable 4 milliGRAM(s) IV Push every 6 hours PRN Nausea and/or Vomiting      RADIOLOGY & ADDITIONAL TESTS:

## 2022-08-11 NOTE — PROGRESS NOTE ADULT - SUBJECTIVE AND OBJECTIVE BOX
Subjective/Objective  Patient evaluated at bedside with son present. Patient appears lethargic, not participating with exam and is non-verbal.     REVIEW OF SYSTEMS  + fever, +fatigue       FUNCTIONAL PROGRESS  8/1 PT  Therapeutic Interventions    Bed Mobility  Bed Mobility Training Rehab Potential: fair, will monitor progress closely  Bed Mobility Training Sit-to-Supine: maximum assist (25% patient effort);  1 person assist  Bed Mobility Training Supine-to-Sit: maximum assist (25% patient effort);  1 person assist  Bed Mobility Training Limitations: decreased ability to use arms for pushing/pulling;  decreased ability to use legs for bridging/pushing;  impaired balance;  decreased strength;  impaired postural control    Sit-Stand Transfer Training  Sit-to-Stand Transfer Training Rehab Potential: fair, will monitor progress closely  Transfer Training Sit-to-Stand Transfer: maximum assist (25% patient effort);  1 person assist;  PT assist  Transfer Training Stand-to-Sit Transfer: maximum assist (25% patient effort);  1 person assist;  PT assist  Sit-to-Stand Transfer Training Transfer Safety Analysis: decreased weight-shifting ability;  decreased strength;  impaired balance    Gait Training  Gait Training Rehab Potential: fair, will monitor progress closely  Gait Training: maximum assist (25% patient effort);  1 person assist;  PT assist;  3 lateral steps to the left at bedside  Gait Analysis: decreased step length;  decreased stride length;  decreased weight-shifting ability;  decreased strength;  impaired balance;  impaired postural control    Therapeutic Exercise  Therapeutic Exercise Rehab Effort: fair  Therapeutic Exercise Detail: Pt performs Ok UE/LE AROM therex in functional plane    VITALS  T(C): 37.8 (08-11-22 @ 08:21), Max: 38.7 (08-11-22 @ 00:56)  HR: 86 (08-11-22 @ 08:00) (86 - 98)  BP: 133/40 (08-11-22 @ 08:00) (125/52 - 146/57)  RR: 19 (08-11-22 @ 08:00) (16 - 20)  SpO2: 95% (08-11-22 @ 08:00) (94% - 97%)  Wt(kg): --    MEDICATIONS   acetaminophen  Suppository .. 650 milliGRAM(s) every 8 hours PRN  aluminum hydroxide/magnesium hydroxide/simethicone Suspension 30 milliLiter(s) every 6 hours PRN  aspirin Suppository 300 milliGRAM(s) daily  atorvastatin 80 milliGRAM(s) at bedtime  dextrose 5% + lactated ringers. 1000 milliLiter(s) <Continuous>  dextrose 5%. 1000 milliLiter(s) <Continuous>  dextrose 5%. 1000 milliLiter(s) <Continuous>  dextrose 50% Injectable 25 Gram(s) once  dextrose 50% Injectable 12.5 Gram(s) once  dextrose 50% Injectable 25 Gram(s) once  dextrose Oral Gel 15 Gram(s) once PRN  enoxaparin Injectable 40 milliGRAM(s) every 24 hours  ferrous    sulfate 325 milliGRAM(s) daily  glucagon  Injectable 1 milliGRAM(s) once  hydrALAZINE Injectable 5 milliGRAM(s) every 6 hours PRN  insulin lispro (ADMELOG) corrective regimen sliding scale   Before meals and at bedtime  labetalol 100 milliGRAM(s) two times a day  levothyroxine Injectable 50 MICROGram(s) at bedtime  lisinopril 10 milliGRAM(s) daily  memantine 10 milliGRAM(s) two times a day  nystatin Powder 1 Application(s) two times a day  ondansetron Injectable 4 milliGRAM(s) every 6 hours PRN  piperacillin/tazobactam IVPB.. 3.375 Gram(s) every 8 hours  sodium bicarbonate  Infusion 0.062 mEq/kG/Hr <Continuous>      RECENT LABS/IMAGING                          9.7    5.96  )-----------( 267      ( 11 Aug 2022 05:53 )             31.9     08-11    137  |  106  |  3.2<L>  ----------------------------<  211<H>  3.8   |  20.0<L>  |  0.67    Ca    7.7<L>      11 Aug 2022 05:53  Phos  3.1     08-11  Mg     1.5     08-11      Chest X ray 8/6: Probable small bilateral pleural effusions and bibasilar atelectasis.    MRI Brain 8/4: Communicating hydrocephalus. Chronic microvascular changes.   No acute infarction. 1 cm area of enhancement in left basal ganglia.    CT head 8/3: Mild periventricular white matter ischemia. Hypodensity in   the LEFT cerebellum which may reflect an infarction. 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    Prominent lateral ventricles due to   central atrophy.    CT chest 8/3: Small bilateral pleural effusion with mild increase in size of right   pleural effusion. New nonspecific groundglass opacities in the upper lobes.  Decreased bibasilar densities suggesting residual mild linear atelectasis  Stable mild mediastinal lymphadenopathy. No acute pathology in the abdomen or pelvis    ---------------------------------------------------------------------------------------  PHYSICAL EXAM  Constitutional - lethargic, does not open eyes, minimally follows commands   Neck - No JVD  Chest - No respiratory distress  Cardiovascular - No cyanosis   Abdomen - Soft   Extremities - No calf tenderness   Neurologic Exam -                    Cognitive -AAOx0, not responding to questions      Motor - Limited exam due to cognition. LUE  2/5         Reflexes - No clonus   Psychiatric - Resting comfortably    ----------------------------------------------------------------------------------------  ASSESSMENT/PLAN  87y Female with functional deficits after prolonged hospitalization complicated by sepsis, encephalopathy and communicating hydrocephalus.   Sepsis - Zosyn  Hypothyroidism- synthroid   Wakefulness- memantine, consider addition of Provigil in the AM   Dysphagia -  NPO, pending discussion with family for NGT, PEG  DVT PPX - Lovenox  Rehab- PT and OT to maintain passive ROM and prevent contracture. Palliative care following, goals of care discussion with family, possible hospice referral. Functional progress will determine ongoing rehab disposition recommendations, which may change.            Subjective/Objective  Patient evaluated at bedside with son present. Patient appears lethargic, not participating with exam and is non-verbal.     REVIEW OF SYSTEMS  + fever, +fatigue       FUNCTIONAL PROGRESS  8/1 PT  Therapeutic Interventions    Bed Mobility  Bed Mobility Training Rehab Potential: fair, will monitor progress closely  Bed Mobility Training Sit-to-Supine: maximum assist (25% patient effort);  1 person assist  Bed Mobility Training Supine-to-Sit: maximum assist (25% patient effort);  1 person assist  Bed Mobility Training Limitations: decreased ability to use arms for pushing/pulling;  decreased ability to use legs for bridging/pushing;  impaired balance;  decreased strength;  impaired postural control    Sit-Stand Transfer Training  Sit-to-Stand Transfer Training Rehab Potential: fair, will monitor progress closely  Transfer Training Sit-to-Stand Transfer: maximum assist (25% patient effort);  1 person assist;  PT assist  Transfer Training Stand-to-Sit Transfer: maximum assist (25% patient effort);  1 person assist;  PT assist  Sit-to-Stand Transfer Training Transfer Safety Analysis: decreased weight-shifting ability;  decreased strength;  impaired balance    Gait Training  Gait Training Rehab Potential: fair, will monitor progress closely  Gait Training: maximum assist (25% patient effort);  1 person assist;  PT assist;  3 lateral steps to the left at bedside  Gait Analysis: decreased step length;  decreased stride length;  decreased weight-shifting ability;  decreased strength;  impaired balance;  impaired postural control    Therapeutic Exercise  Therapeutic Exercise Rehab Effort: fair  Therapeutic Exercise Detail: Pt performs Ok UE/LE AROM therex in functional plane    VITALS  T(C): 37.8 (08-11-22 @ 08:21), Max: 38.7 (08-11-22 @ 00:56)  HR: 86 (08-11-22 @ 08:00) (86 - 98)  BP: 133/40 (08-11-22 @ 08:00) (125/52 - 146/57)  RR: 19 (08-11-22 @ 08:00) (16 - 20)  SpO2: 95% (08-11-22 @ 08:00) (94% - 97%)  Wt(kg): --    MEDICATIONS   acetaminophen  Suppository .. 650 milliGRAM(s) every 8 hours PRN  aluminum hydroxide/magnesium hydroxide/simethicone Suspension 30 milliLiter(s) every 6 hours PRN  aspirin Suppository 300 milliGRAM(s) daily  atorvastatin 80 milliGRAM(s) at bedtime  dextrose 5% + lactated ringers. 1000 milliLiter(s) <Continuous>  dextrose 5%. 1000 milliLiter(s) <Continuous>  dextrose 5%. 1000 milliLiter(s) <Continuous>  dextrose 50% Injectable 25 Gram(s) once  dextrose 50% Injectable 12.5 Gram(s) once  dextrose 50% Injectable 25 Gram(s) once  dextrose Oral Gel 15 Gram(s) once PRN  enoxaparin Injectable 40 milliGRAM(s) every 24 hours  ferrous    sulfate 325 milliGRAM(s) daily  glucagon  Injectable 1 milliGRAM(s) once  hydrALAZINE Injectable 5 milliGRAM(s) every 6 hours PRN  insulin lispro (ADMELOG) corrective regimen sliding scale   Before meals and at bedtime  labetalol 100 milliGRAM(s) two times a day  levothyroxine Injectable 50 MICROGram(s) at bedtime  lisinopril 10 milliGRAM(s) daily  memantine 10 milliGRAM(s) two times a day  nystatin Powder 1 Application(s) two times a day  ondansetron Injectable 4 milliGRAM(s) every 6 hours PRN  piperacillin/tazobactam IVPB.. 3.375 Gram(s) every 8 hours  sodium bicarbonate  Infusion 0.062 mEq/kG/Hr <Continuous>      RECENT LABS/IMAGING                          9.7    5.96  )-----------( 267      ( 11 Aug 2022 05:53 )             31.9     08-11    137  |  106  |  3.2<L>  ----------------------------<  211<H>  3.8   |  20.0<L>  |  0.67    Ca    7.7<L>      11 Aug 2022 05:53  Phos  3.1     08-11  Mg     1.5     08-11      Chest X ray 8/6: Probable small bilateral pleural effusions and bibasilar atelectasis.    MRI Brain 8/4: Communicating hydrocephalus. Chronic microvascular changes.   No acute infarction. 1 cm area of enhancement in left basal ganglia.    CT head 8/3: Mild periventricular white matter ischemia. Hypodensity in   the LEFT cerebellum which may reflect an infarction. 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    Prominent lateral ventricles due to   central atrophy.    CT chest 8/3: Small bilateral pleural effusion with mild increase in size of right   pleural effusion. New nonspecific groundglass opacities in the upper lobes.  Decreased bibasilar densities suggesting residual mild linear atelectasis  Stable mild mediastinal lymphadenopathy. No acute pathology in the abdomen or pelvis    ---------------------------------------------------------------------------------------  PHYSICAL EXAM  Constitutional - lethargic, does not open eyes, minimally follows commands   Neck - No JVD  Chest - No respiratory distress  Cardiovascular - No cyanosis   Abdomen - Soft   Extremities - No calf tenderness   Neurologic Exam -                    Cognitive -AAOx0, not responding to questions      Motor - Limited exam due to cognition. LUE  2/5         Reflexes - No clonus   Psychiatric - Resting comfortably    ----------------------------------------------------------------------------------------  ASSESSMENT/PLAN  87y Female with functional deficits after prolonged hospitalization complicated by sepsis, encephalopathy and communicating hydrocephalus.   Sepsis - Zosyn  Hypothyroidism- synthroid   Wakefulness- memantine, consider addition of Provigil in the AM   Dysphagia -  NPO, pending discussion with family for NGT, PEG  DVT PPX - Lovenox  Rehab- Patient lethargic, unable to participate with examination. Palliative care following, goals of care discussion with family, possible hospice referral. Patient not candidate for acute or MARITZA rehabilitation. Will sign off, please reconsult if needed for rehab dispo recommendations.

## 2022-08-11 NOTE — PROGRESS NOTE ADULT - SUBJECTIVE AND OBJECTIVE BOX
OVERNIGHT EVENTS: appears lethargic and some shortness of breath/tachypnea     Present Symptoms:     Dyspnea: none   Nausea/Vomiting: No  Anxiety:  No  Depression: unable   Fatigue: unable   Loss of appetite: No  Constipation: none     Pain: none             Character-            Duration-            Effect-            Factors-            Frequency-            Location-            Severity-    Pain AD Score:  [email or web address] (press ctrl + left click to view)    Review of Systems: Reviewed                 Unable to obtain due to poor mentation   All others negative    MEDICATIONS  (STANDING):  aspirin Suppository 300 milliGRAM(s) Rectal daily  atorvastatin 80 milliGRAM(s) Oral at bedtime  dextrose 5% + lactated ringers. 1000 milliLiter(s) (50 mL/Hr) IV Continuous <Continuous>  dextrose 5%. 1000 milliLiter(s) (50 mL/Hr) IV Continuous <Continuous>  dextrose 5%. 1000 milliLiter(s) (100 mL/Hr) IV Continuous <Continuous>  dextrose 50% Injectable 25 Gram(s) IV Push once  dextrose 50% Injectable 12.5 Gram(s) IV Push once  dextrose 50% Injectable 25 Gram(s) IV Push once  enoxaparin Injectable 40 milliGRAM(s) SubCutaneous every 24 hours  ferrous    sulfate 325 milliGRAM(s) Oral daily  glucagon  Injectable 1 milliGRAM(s) IntraMuscular once  insulin lispro (ADMELOG) corrective regimen sliding scale   SubCutaneous Before meals and at bedtime  labetalol 100 milliGRAM(s) Oral two times a day  levothyroxine Injectable 50 MICROGram(s) IV Push at bedtime  lisinopril 10 milliGRAM(s) Oral daily  magnesium sulfate  IVPB 2 Gram(s) IV Intermittent once  memantine 10 milliGRAM(s) Oral two times a day  nystatin Powder 1 Application(s) Topical two times a day  piperacillin/tazobactam IVPB.. 3.375 Gram(s) IV Intermittent every 8 hours  sodium bicarbonate  Infusion 0.062 mEq/kG/Hr (75 mL/Hr) IV Continuous <Continuous>    MEDICATIONS  (PRN):  acetaminophen  Suppository .. 650 milliGRAM(s) Rectal every 8 hours PRN Temp greater or equal to 38C (100.4F), Mild Pain (1 - 3)  aluminum hydroxide/magnesium hydroxide/simethicone Suspension 30 milliLiter(s) Oral every 6 hours PRN Dyspepsia  dextrose Oral Gel 15 Gram(s) Oral once PRN Blood Glucose LESS THAN 70 milliGRAM(s)/deciliter  hydrALAZINE Injectable 5 milliGRAM(s) IV Push every 6 hours PRN SBP> 170  ondansetron Injectable 4 milliGRAM(s) IV Push every 6 hours PRN Nausea and/or Vomiting    PHYSICAL EXAM:    Vital Signs Last 24 Hrs  T(C): 37.8 (11 Aug 2022 08:21), Max: 38.7 (11 Aug 2022 00:56)  T(F): 100 (11 Aug 2022 08:21), Max: 101.7 (11 Aug 2022 00:56)  HR: 86 (11 Aug 2022 08:00) (86 - 98)  BP: 133/40 (11 Aug 2022 08:00) (114/71 - 146/57)  BP(mean): 64 (11 Aug 2022 08:00) (64 - 82)  RR: 19 (11 Aug 2022 08:00) (16 - 21)  SpO2: 95% (11 Aug 2022 08:00) (94% - 97%)    Parameters below as of 11 Aug 2022 08:00  Patient On (Oxygen Delivery Method): room air    General: lethargic     Karnofsky:  20 %    HEENT: normal      Lungs: comfortable     CV: normal      GI: normal     : normal     MSK: weakness     Skin: no rash    LABS:                     9.7    5.96  )-----------( 267      ( 11 Aug 2022 05:53 )             31.9     08-11    137  |  106  |  3.2<L>  ----------------------------<  211<H>  3.8   |  20.0<L>  |  0.67    Ca    7.7<L>      11 Aug 2022 05:53  Phos  3.1     08-11  Mg     1.5     08-11    I&O's Summary    10 Aug 2022 07:01  -  11 Aug 2022 07:00  --------------------------------------------------------  IN: 300 mL / OUT: 400 mL / NET: -100 mL    11 Aug 2022 07:01  -  11 Aug 2022 08:25  --------------------------------------------------------  IN: 100 mL / OUT: 0 mL / NET: 100 mL    RADIOLOGY & ADDITIONAL STUDIES:    ADVANCE DIRECTIVES/TREATMENT PREFERENCES:  DNR/DNI

## 2022-08-11 NOTE — PROGRESS NOTE ADULT - SUBJECTIVE AND OBJECTIVE BOX
SHARIFA ENGLE    [id number]    87y      Female    CC: AMS     INTERVAL HPI/OVERNIGHT EVENTS: Pt seen and examined.     REVIEW OF SYSTEMS:  unable to assess due to mental status     Vital Signs Last 24 Hrs  T(C): 37.8 (11 Aug 2022 08:21), Max: 38.7 (11 Aug 2022 00:56)  T(F): 100 (11 Aug 2022 08:21), Max: 101.7 (11 Aug 2022 00:56)  HR: 86 (11 Aug 2022 08:00) (86 - 98)  BP: 133/40 (11 Aug 2022 08:00) (125/52 - 146/57)  BP(mean): 64 (11 Aug 2022 08:00) (64 - 82)  RR: 19 (11 Aug 2022 08:00) (16 - 20)  SpO2: 95% (11 Aug 2022 08:00) (94% - 97%)    Parameters below as of 11 Aug 2022 08:00  Patient On (Oxygen Delivery Method): room air        PHYSICAL EXAM:    GENERAL: NAD  HEENT: normocephalic, atraumatic   NECK: soft, supple  CHEST/LUNG: Clear to auscultation bilaterally; respirations unlabored on RA  HEART: S1S2+, Regular rate and rhythm  ABDOMEN: Soft, Nontender, Nondistended; Bowel sounds present  SKIN: warm, dry  NEURO: minimally responsive      LABS:                        9.7    5.96  )-----------( 267      ( 11 Aug 2022 05:53 )             31.9     08-11    137  |  106  |  3.2<L>  ----------------------------<  211<H>  3.8   |  20.0<L>  |  0.67    Ca    7.7<L>      11 Aug 2022 05:53  Phos  3.1     08-11  Mg     1.5     08-11              MEDICATIONS  (STANDING):  aspirin Suppository 300 milliGRAM(s) Rectal daily  atorvastatin 80 milliGRAM(s) Oral at bedtime  dextrose 5% + lactated ringers. 1000 milliLiter(s) (50 mL/Hr) IV Continuous <Continuous>  dextrose 5%. 1000 milliLiter(s) (50 mL/Hr) IV Continuous <Continuous>  dextrose 5%. 1000 milliLiter(s) (100 mL/Hr) IV Continuous <Continuous>  dextrose 50% Injectable 25 Gram(s) IV Push once  dextrose 50% Injectable 12.5 Gram(s) IV Push once  dextrose 50% Injectable 25 Gram(s) IV Push once  enoxaparin Injectable 40 milliGRAM(s) SubCutaneous every 24 hours  ferrous    sulfate 325 milliGRAM(s) Oral daily  glucagon  Injectable 1 milliGRAM(s) IntraMuscular once  insulin lispro (ADMELOG) corrective regimen sliding scale   SubCutaneous Before meals and at bedtime  labetalol 100 milliGRAM(s) Oral two times a day  levothyroxine Injectable 50 MICROGram(s) IV Push at bedtime  lisinopril 10 milliGRAM(s) Oral daily  memantine 10 milliGRAM(s) Oral two times a day  nystatin Powder 1 Application(s) Topical two times a day  piperacillin/tazobactam IVPB.. 3.375 Gram(s) IV Intermittent every 8 hours  sodium bicarbonate  Infusion 0.062 mEq/kG/Hr (75 mL/Hr) IV Continuous <Continuous>    MEDICATIONS  (PRN):  acetaminophen  Suppository .. 650 milliGRAM(s) Rectal every 8 hours PRN Temp greater or equal to 38C (100.4F), Mild Pain (1 - 3)  aluminum hydroxide/magnesium hydroxide/simethicone Suspension 30 milliLiter(s) Oral every 6 hours PRN Dyspepsia  dextrose Oral Gel 15 Gram(s) Oral once PRN Blood Glucose LESS THAN 70 milliGRAM(s)/deciliter  hydrALAZINE Injectable 5 milliGRAM(s) IV Push every 6 hours PRN SBP> 170  ondansetron Injectable 4 milliGRAM(s) IV Push every 6 hours PRN Nausea and/or Vomiting      RADIOLOGY & ADDITIONAL TESTS:   SHARIFA ENGLE    [id number]    87y      Female    CC: AMS     INTERVAL HPI/OVERNIGHT EVENTS: Pt seen and examined. febrile o/n tmax 101.7    REVIEW OF SYSTEMS:  unable to assess due to mental status     Vital Signs Last 24 Hrs  T(C): 37.8 (11 Aug 2022 08:21), Max: 38.7 (11 Aug 2022 00:56)  T(F): 100 (11 Aug 2022 08:21), Max: 101.7 (11 Aug 2022 00:56)  HR: 86 (11 Aug 2022 08:00) (86 - 98)  BP: 133/40 (11 Aug 2022 08:00) (125/52 - 146/57)  BP(mean): 64 (11 Aug 2022 08:00) (64 - 82)  RR: 19 (11 Aug 2022 08:00) (16 - 20)  SpO2: 95% (11 Aug 2022 08:00) (94% - 97%)    Parameters below as of 11 Aug 2022 08:00  Patient On (Oxygen Delivery Method): room air        PHYSICAL EXAM:    GENERAL: NAD  HEENT: normocephalic, atraumatic   NECK: soft, supple  CHEST/LUNG: Clear to auscultation bilaterally; respirations unlabored on RA  HEART: S1S2+, Regular rate and rhythm  ABDOMEN: Soft, Nontender, Nondistended; Bowel sounds present  SKIN: warm, dry  NEURO: minimally responsive      LABS:                        9.7    5.96  )-----------( 267      ( 11 Aug 2022 05:53 )             31.9     08-11    137  |  106  |  3.2<L>  ----------------------------<  211<H>  3.8   |  20.0<L>  |  0.67    Ca    7.7<L>      11 Aug 2022 05:53  Phos  3.1     08-11  Mg     1.5     08-11              MEDICATIONS  (STANDING):  aspirin Suppository 300 milliGRAM(s) Rectal daily  atorvastatin 80 milliGRAM(s) Oral at bedtime  dextrose 5% + lactated ringers. 1000 milliLiter(s) (50 mL/Hr) IV Continuous <Continuous>  dextrose 5%. 1000 milliLiter(s) (50 mL/Hr) IV Continuous <Continuous>  dextrose 5%. 1000 milliLiter(s) (100 mL/Hr) IV Continuous <Continuous>  dextrose 50% Injectable 25 Gram(s) IV Push once  dextrose 50% Injectable 12.5 Gram(s) IV Push once  dextrose 50% Injectable 25 Gram(s) IV Push once  enoxaparin Injectable 40 milliGRAM(s) SubCutaneous every 24 hours  ferrous    sulfate 325 milliGRAM(s) Oral daily  glucagon  Injectable 1 milliGRAM(s) IntraMuscular once  insulin lispro (ADMELOG) corrective regimen sliding scale   SubCutaneous Before meals and at bedtime  labetalol 100 milliGRAM(s) Oral two times a day  levothyroxine Injectable 50 MICROGram(s) IV Push at bedtime  lisinopril 10 milliGRAM(s) Oral daily  memantine 10 milliGRAM(s) Oral two times a day  nystatin Powder 1 Application(s) Topical two times a day  piperacillin/tazobactam IVPB.. 3.375 Gram(s) IV Intermittent every 8 hours  sodium bicarbonate  Infusion 0.062 mEq/kG/Hr (75 mL/Hr) IV Continuous <Continuous>    MEDICATIONS  (PRN):  acetaminophen  Suppository .. 650 milliGRAM(s) Rectal every 8 hours PRN Temp greater or equal to 38C (100.4F), Mild Pain (1 - 3)  aluminum hydroxide/magnesium hydroxide/simethicone Suspension 30 milliLiter(s) Oral every 6 hours PRN Dyspepsia  dextrose Oral Gel 15 Gram(s) Oral once PRN Blood Glucose LESS THAN 70 milliGRAM(s)/deciliter  hydrALAZINE Injectable 5 milliGRAM(s) IV Push every 6 hours PRN SBP> 170  ondansetron Injectable 4 milliGRAM(s) IV Push every 6 hours PRN Nausea and/or Vomiting      RADIOLOGY & ADDITIONAL TESTS:

## 2022-08-11 NOTE — CHART NOTE - NSCHARTNOTEFT_GEN_A_CORE
Per MD request, attempted to place NGT for feeding. Pt nonverbal, unresponsive. Multiple attempts unsuccessful. Pt not able to cooperate with procedure/ follow instructions or swallow.  Pt began coughing and fighting tube placement. Tube appeared to be in lung.  No gastric bubble could be auscultated. Tube removed. HR increased and O2 sat decreased during procedure. Multiple attempts with same outcome.  Family members at bedside not in agreement about NGT tube placement. Dr. Rhodes aware.

## 2022-08-11 NOTE — PROGRESS NOTE ADULT - ASSESSMENT
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      #1 Dementia  - seems to have been progressing over the last 3 years - per  has been sleeping more, eating less, etc   - poor overall prognosis  - supportive measures, frequent reorientation  - end stage     #2 Encephalopathy  - MRI no acute stroke, chronic microvascular changes, communicating hydrocephlus  - neurology following   - likely waxing and waning mental status related to underlying dementia     #3 Dysphagia  - too lethargic to swallow  - would not recommend feeding tube due to advanced dementia, unlikely to improve condition or outcome   - family in discordance regarding NG tube trial versus comfort feeds     #4 NPH  - not a candidate for shunting  - appreciate neurosurgery input  - given not a candidate for shunting, would not make sense to subject her to an LP  - had evidence of swelling in old imaging from 2019     #5 Severe AS    - poor prognosis  - not a candidate for valve surgery   - hospice appropriate    #6 Debility  - needs assist with ADLs    #7 Encounter for palliative care  - patient is at end of life  - awaiting family decision regarding pleasure feeds versus trail of NG tube

## 2022-08-11 NOTE — PROGRESS NOTE ADULT - ASSESSMENT
87y/oF PMH HTN, hypothyroid, DM, anemia, BIB family with AMS and progressing generalized weakness. Pt aso with subjective fevers and poor PO intake. On admission, pt hypoxic and with hypertensive urgency, 7/18. CTH without acute findings, placed on empiric abx. 7/19, pt develops N/V with NBNB emesis with bradycardia and hypotension, RRT called. Pt subsequently intubated for airway protection and upgraded to MICU. Started on pressors. CT with b/l LL consolidations, poss atelectasis and/or pna, small b/l pleural effusions. Repeat CTH with NPH. Imaging reviewed by Neurology and neurosx. As per Neurosx, no significant changes from prior imaging and no surgical intervention recommended. Pt also noted to have severe AS on TTE, though no intervention planned per cardio. Pt extubated 7/21, downgraded to medicine 7/22. Hospital course further complicated by recurring fevers, resumed abx 7/29 for SIRS, possible UTI. Pt with persistent encephalopathy, likely multifactorial, initially 2/2 sepsis and dehydration in setting of underlying dementia w/suspicion for NPH. Intermittent improvement in mental status. Pt inconsistent with PO intake, noted to pocket food while eating, concern for aspiration discussed multiple times with family. Family wanted to cont PO feeds at that time. Speech re-evaluated, pt started on pureed diet. Pt noted to fall asleep while family trying to feed her. Family educated on aspiration risk. 8/3 , pt with new fever 100.9, noted to be more lethargic. Repeat CTH 8/3 with new L cerebellum density/possible acute CVA. d/w neurology, not candidate for tpa due to unclear timeline. stroke protocol initiated. asa, statin. 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 . EEG without acute seizures. Abx changed to zosyn for poss new recurrent asp pna. Son concerned about poss strep throat, pcr ordered 8/4. abx changed to cefepime for broader coverage for pna and uti as per ID 8/4, however changed back to zosyn 8/5 due to increased lethargy on cefepime. Family wanting to continue zosyn. Pt remains NPO.    Metabolic encephalopathy, likely multifactorial due to sepsis, NPH in setting of underlying dementia, intermittent improvement, worse again 8/3  Sepsis, POA s/p septic shock, likely 2/2 aspiration PNA   Acute hypoxic respiratory failure s/p intubation w/mechanical ventilation  Dysphagia    -suspicion for NPH on CTH  -neurosx eval appreciated; not a candidate for any intracranial surgery such as  shunt due to multiple chronic comorbidities; therefore, as per neuro, will not pursue LP   -8/3, pt noted to be more lethargic with episode of fever; concern for recurrent infection/poss aspiration   -MRI initially canceled by neurosx and rec outpt f/u; family wanted to discuss use of acetazolamide for NPH with neurosx team. as per neurosx, acetazolamide not used for tx of NPH. Additionally not recommended for use as per neurology team   -EEG 8/3 neg for seizures   -8/3 repeat CTH with new CVA L cerebellum, started on neuro checks   -cont asa, statin   -8/4: MRI without acute cva, chronic ischemic changes noted, re-demonstration of communicating hydrocephalus; R basal ganglia non-specific enhancement; imaging reviewed by neuro, rec repeat MRI 2-4 weeks as outpt; cont asa, statin, bp control   -carotid doppler: mild plaque b/l ICA, no significant stenosis   -PT/OT/speech/swallow   -s/p pressor support and steroids for hypotension while in MICU   -resumed on pureed diet with thickened liquids, however now NPO again   -seen by speech/swallow multiple times; last seen 8/7, with recs to cont npo due to high risk of aspiration; d/w family at bedside   -8/3 ct chest with new upper opacity/basal atelectasis/poss asp pna  -8/3 on zosyn, ceftriaxone, changed to cefepime 8/4, then back to zosyn 8/5 due to worsening ams   -bcx 8/3 ngtd   -ID recs appreciated   -cont gentle IVF with dextrose while npo   -GI consulted to evaluate if pt is even candidate for possible peg, should family opt for this     Possible UTI vs colonization   -family had previously refused marcano removal; nowd/c'ed   -bcx 7/28 neg   -ucx 50-99k ecoli, resistant to zosyn, s/p ceftriaxone and cefepime   -cont flomax     Severe aortic stenosis   -TTE reviewed   -as per cardio, not candidate for intervention given dementia   -caution with IVF given risk of volume overload     Hypernatremia   Hyperchloremic acidosis   -likely due to poor PO intake   -gentle IVF w/dextrose     Normocytic anemia   -s/p 1u PRBC transfusion during hospital course w/adequate response   -monitor   -s/p IV venofer     Hypertensive urgency   -npo at this time   -cont current IV meds     DM2  -hgba1c 10.8  -lantus d/c'ed due to inconsistent mental status and inconsistent nutrition status   -ISS     Hypothyroid   -synthroid, dose lowered 8/4 to 112mcg , converted to IV dose 8/5      VTE ppx: lovenox     Code status : DNR/DNI     Palliative following     prognosis poor    will f/u w/family regarding goc decision 87y/oF PMH HTN, hypothyroid, DM, anemia, BIB family with AMS and progressing generalized weakness. Pt aso with subjective fevers and poor PO intake. On admission, pt hypoxic and with hypertensive urgency, 7/18. CTH without acute findings, placed on empiric abx. 7/19, pt develops N/V with NBNB emesis with bradycardia and hypotension, RRT called. Pt subsequently intubated for airway protection and upgraded to MICU. Started on pressors. CT with b/l LL consolidations, poss atelectasis and/or pna, small b/l pleural effusions. Repeat CTH with NPH. Imaging reviewed by Neurology and neurosx. As per Neurosx, no significant changes from prior imaging and no surgical intervention recommended. Pt also noted to have severe AS on TTE, though no intervention planned per cardio. Pt extubated 7/21, downgraded to medicine 7/22. Hospital course further complicated by recurring fevers, resumed abx 7/29 for SIRS, possible UTI. Pt with persistent encephalopathy, likely multifactorial, initially 2/2 sepsis and dehydration in setting of underlying dementia w/suspicion for NPH. Intermittent improvement in mental status. Pt inconsistent with PO intake, noted to pocket food while eating, concern for aspiration discussed multiple times with family. Family wanted to cont PO feeds at that time. Speech re-evaluated, pt started on pureed diet. Pt noted to fall asleep while family trying to feed her. Family educated on aspiration risk. 8/3 , pt with new fever 100.9, noted to be more lethargic. Repeat CTH 8/3 with new L cerebellum density/possible acute CVA. d/w neurology, not candidate for tpa due to unclear timeline. stroke protocol initiated. asa, statin. 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 . EEG without acute seizures. Abx changed to zosyn for poss new recurrent asp pna. Son concerned about poss strep throat, pcr ordered 8/4. abx changed to cefepime for broader coverage for pna and uti as per ID 8/4, however changed back to zosyn 8/5 due to increased lethargy on cefepime. Family wanting to continue zosyn. Pt remains NPO.    Metabolic encephalopathy, likely multifactorial due to sepsis, NPH in setting of underlying dementia, intermittent improvement, worse again 8/3  Sepsis, POA s/p septic shock, likely 2/2 aspiration PNA   Acute hypoxic respiratory failure s/p intubation w/mechanical ventilation  Dysphagia    -suspicion for NPH on CTH  -neurosx eval appreciated; not a candidate for any intracranial surgery such as  shunt due to multiple chronic comorbidities; therefore, as per neuro, will not pursue LP   -8/3, pt noted to be more lethargic with episode of fever; concern for recurrent infection/poss aspiration   -MRI initially canceled by neurosx and rec outpt f/u; family wanted to discuss use of acetazolamide for NPH with neurosx team. as per neurosx, acetazolamide not used for tx of NPH. Additionally not recommended for use as per neurology team   -EEG 8/3 neg for seizures   -8/3 repeat CTH with new CVA L cerebellum, started on neuro checks   -cont asa, statin   -8/4: MRI without acute cva, chronic ischemic changes noted, re-demonstration of communicating hydrocephalus; R basal ganglia non-specific enhancement; imaging reviewed by neuro, rec repeat MRI 2-4 weeks as outpt; cont asa, statin, bp control   -carotid doppler: mild plaque b/l ICA, no significant stenosis   -PT/OT/speech/swallow   -s/p pressor support and steroids for hypotension while in MICU   -resumed on pureed diet with thickened liquids, however now NPO again   -seen by speech/swallow multiple times; last seen 8/7, with recs to cont npo due to high risk of aspiration; d/w family at bedside   -8/3 ct chest with new upper opacity/basal atelectasis/poss asp pna  -8/3 on zosyn, ceftriaxone, changed to cefepime 8/4, then back to zosyn 8/5 due to worsening ams   -bcx 8/3 ngtd   -ID recs appreciated   -cont gentle IVF with dextrose while npo   -GI recs appreciated   -would need to be afebrile x48hrs; and as per GI would be significant risk for conscious sedation given comorbidities     Possible UTI vs colonization   -family had previously refused marcano removal; nowd/c'ed   -bcx 7/28 neg   -ucx 50-99k ecoli, resistant to zosyn, s/p ceftriaxone and cefepime   -cont flomax     Severe aortic stenosis   -TTE reviewed   -as per cardio, not candidate for intervention given dementia   -caution with IVF given risk of volume overload     Hypernatremia   Hyperchloremic acidosis   -likely due to poor PO intake   -gentle IVF w/dextrose     Normocytic anemia   -s/p 1u PRBC transfusion during hospital course w/adequate response   -monitor   -s/p IV venofer     Hypertensive urgency   -npo at this time   -cont current IV meds     DM2  -hgba1c 10.8  -lantus d/c'ed due to inconsistent mental status and inconsistent nutrition status   -ISS     Hypothyroid   -synthroid, dose lowered 8/4 to 112mcg , converted to IV dose 8/5      VTE ppx: lovenox     Code status : DNR/DNI     Palliative following     prognosis poor    d/w pt's , Kane and one of pt's sons, Tae, decision to proceed with NGT and start tube feeds. Tae asking to "hold off" on starting amantadine that family is requesting for wakefulness.  87y/oF PMH HTN, hypothyroid, DM, anemia, BIB family with AMS and progressing generalized weakness. Pt aso with subjective fevers and poor PO intake. On admission, pt hypoxic and with hypertensive urgency, 7/18. CTH without acute findings, placed on empiric abx. 7/19, pt develops N/V with NBNB emesis with bradycardia and hypotension, RRT called. Pt subsequently intubated for airway protection and upgraded to MICU. Started on pressors. CT with b/l LL consolidations, poss atelectasis and/or pna, small b/l pleural effusions. Repeat CTH with NPH. Imaging reviewed by Neurology and neurosx. As per Neurosx, no significant changes from prior imaging and no surgical intervention recommended. Pt also noted to have severe AS on TTE, though no intervention planned per cardio. Pt extubated 7/21, downgraded to medicine 7/22. Hospital course further complicated by recurring fevers, resumed abx 7/29 for SIRS, possible UTI. Pt with persistent encephalopathy, likely multifactorial, initially 2/2 sepsis and dehydration in setting of underlying dementia w/suspicion for NPH. Intermittent improvement in mental status. Pt inconsistent with PO intake, noted to pocket food while eating, concern for aspiration discussed multiple times with family. Family wanted to cont PO feeds at that time. Speech re-evaluated, pt started on pureed diet. Pt noted to fall asleep while family trying to feed her. Family educated on aspiration risk. 8/3 , pt with new fever 100.9, noted to be more lethargic. Repeat CTH 8/3 with new L cerebellum density/possible acute CVA. d/w neurology, not candidate for tpa due to unclear timeline. stroke protocol initiated. asa, statin. 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 . EEG without acute seizures. Abx changed to zosyn for poss new recurrent asp pna. Son concerned about poss strep throat, pcr ordered 8/4. abx changed to cefepime for broader coverage for pna and uti as per ID 8/4, however changed back to zosyn 8/5 due to increased lethargy on cefepime. Family wanting to continue zosyn. Pt remains NPO.    Metabolic encephalopathy, likely multifactorial due to sepsis, NPH in setting of underlying dementia, intermittent improvement, worse again 8/3  Sepsis, POA s/p septic shock, likely 2/2 aspiration PNA   Acute hypoxic respiratory failure s/p intubation w/mechanical ventilation  Dysphagia    -suspicion for NPH on CTH  -neurosx eval appreciated; not a candidate for any intracranial surgery such as  shunt due to multiple chronic comorbidities; therefore, as per neuro, will not pursue LP   -8/3, pt noted to be more lethargic with episode of fever; concern for recurrent infection/poss aspiration   -MRI initially canceled by neurosx and rec outpt f/u; family wanted to discuss use of acetazolamide for NPH with neurosx team. as per neurosx, acetazolamide not used for tx of NPH. Additionally not recommended for use as per neurology team   -EEG 8/3 neg for seizures   -8/3 repeat CTH with new CVA L cerebellum, started on neuro checks   -cont asa, statin   -8/4: MRI without acute cva, chronic ischemic changes noted, re-demonstration of communicating hydrocephalus; R basal ganglia non-specific enhancement; imaging reviewed by neuro, rec repeat MRI 2-4 weeks as outpt; cont asa, statin, bp control   -carotid doppler: mild plaque b/l ICA, no significant stenosis   -PT/OT/speech/swallow   -s/p pressor support and steroids for hypotension while in MICU   -resumed on pureed diet with thickened liquids, however now NPO again   -seen by speech/swallow multiple times; last seen 8/7, with recs to cont npo due to high risk of aspiration; d/w family at bedside   -8/3 ct chest with new upper opacity/basal atelectasis/poss asp pna  -8/3 on zosyn, ceftriaxone, changed to cefepime 8/4, then back to zosyn 8/5 due to worsening ams   -bcx 8/3 ngtd   -ID recs appreciated   -cont gentle IVF with dextrose while npo   -GI recs appreciated   -would need to be afebrile x48hrs; and as per GI would be significant risk for conscious sedation given comorbidities     Possible UTI vs colonization   -family had previously refused marcano removal; nowd/c'ed   -bcx 7/28 neg   -ucx 50-99k ecoli, resistant to zosyn, s/p ceftriaxone and cefepime   -cont flomax     Severe aortic stenosis   -TTE reviewed   -as per cardio, not candidate for intervention given dementia   -caution with IVF given risk of volume overload     Hypernatremia   Hyperchloremic acidosis   -likely due to poor PO intake   -gentle IVF w/dextrose     Normocytic anemia   -s/p 1u PRBC transfusion during hospital course w/adequate response   -monitor   -s/p IV venofer     Hypertensive urgency   -npo at this time   -cont current IV meds     DM2  -hgba1c 10.8  -lantus d/c'ed due to inconsistent mental status and inconsistent nutrition status   -ISS     Hypothyroid   -synthroid, dose lowered 8/4 to 112mcg , converted to IV dose 8/5      VTE ppx: lovenox     Code status : DNR/DNI     Palliative following     prognosis poor    d/w pt's , Kane and one of pt's sons, Tae, decision to proceed with NGT and start tube feeds. Tae asking to "hold off a few days" on starting amantadine that family is requesting for wakefulness. discussed that, we can defer medication today, however, given temporary nature of NGT and trial of TF to assess for pt's response, would not delay longer, if they want to continue to pursue this medication. Kane reports family in agreement. Plan for NGT placement today, once placement confirmed, will start tube feeds and PO medications.  87y/oF PMH HTN, hypothyroid, DM, anemia, BIB family with AMS and progressing generalized weakness. Pt aso with subjective fevers and poor PO intake. On admission, pt hypoxic and with hypertensive urgency, 7/18. CTH without acute findings, placed on empiric abx. 7/19, pt develops N/V with NBNB emesis with bradycardia and hypotension, RRT called. Pt subsequently intubated for airway protection and upgraded to MICU. Started on pressors. CT with b/l LL consolidations, poss atelectasis and/or pna, small b/l pleural effusions. Repeat CTH with NPH. Imaging reviewed by Neurology and neurosx. As per Neurosx, no significant changes from prior imaging and no surgical intervention recommended. Pt also noted to have severe AS on TTE, though no intervention planned per cardio. Pt extubated 7/21, downgraded to medicine 7/22. Hospital course further complicated by recurring fevers, resumed abx 7/29 for SIRS, possible UTI. Pt with persistent encephalopathy, likely multifactorial, initially 2/2 sepsis and dehydration in setting of underlying dementia w/suspicion for NPH. Intermittent improvement in mental status. Pt inconsistent with PO intake, noted to pocket food while eating, concern for aspiration discussed multiple times with family. Family wanted to cont PO feeds at that time. Speech re-evaluated, pt started on pureed diet. Pt noted to fall asleep while family trying to feed her. Family educated on aspiration risk. 8/3 , pt with new fever 100.9, noted to be more lethargic. Repeat CTH 8/3 with new L cerebellum density/possible acute CVA. d/w neurology, not candidate for tpa due to unclear timeline. stroke protocol initiated. asa, statin. 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 . EEG without acute seizures. Abx changed to zosyn for poss new recurrent asp pna. Son concerned about poss strep throat, pcr ordered 8/4. abx changed to cefepime for broader coverage for pna and uti as per ID 8/4, however changed back to zosyn 8/5 due to increased lethargy on cefepime. Family wanting to continue zosyn. Pt remains NPO.    Metabolic encephalopathy, likely multifactorial due to sepsis, NPH in setting of underlying dementia, intermittent improvement, worse again 8/3  Sepsis, POA s/p septic shock, likely 2/2 aspiration PNA   Acute hypoxic respiratory failure s/p intubation w/mechanical ventilation  Dysphagia    -suspicion for NPH on CTH  -neurosx eval appreciated; not a candidate for any intracranial surgery such as  shunt due to multiple chronic comorbidities; therefore, as per neuro, will not pursue LP   -8/3, pt noted to be more lethargic with episode of fever; concern for recurrent infection/poss aspiration   -MRI initially canceled by neurosx and rec outpt f/u; family wanted to discuss use of acetazolamide for NPH with neurosx team. as per neurosx, acetazolamide not used for tx of NPH. Additionally not recommended for use as per neurology team   -EEG 8/3 neg for seizures   -8/3 repeat CTH with new CVA L cerebellum, started on neuro checks   -cont asa, statin   -8/4: MRI without acute cva, chronic ischemic changes noted, re-demonstration of communicating hydrocephalus; R basal ganglia non-specific enhancement; imaging reviewed by neuro, rec repeat MRI 2-4 weeks as outpt; cont asa, statin, bp control   -carotid doppler: mild plaque b/l ICA, no significant stenosis   -PT/OT/speech/swallow   -s/p pressor support and steroids for hypotension while in MICU   -resumed on pureed diet with thickened liquids, however now NPO again   -seen by speech/swallow multiple times; last seen 8/7, with recs to cont npo due to high risk of aspiration; d/w family at bedside   -8/3 ct chest with new upper opacity/basal atelectasis/poss asp pna  -8/3 on zosyn, ceftriaxone, changed to cefepime 8/4, then back to zosyn 8/5 due to worsening ams   -bcx 8/3 ngtd   -ID recs appreciated   -cont gentle IVF with dextrose while npo   -GI recs appreciated   -would need to be afebrile x48hrs; and as per GI would be significant risk for conscious sedation given comorbidities     Possible UTI vs colonization   -family had previously refused marcano removal; nowd/c'ed   -bcx 7/28 neg   -ucx 50-99k ecoli, resistant to zosyn, s/p ceftriaxone and cefepime   -cont flomax     Severe aortic stenosis   -TTE reviewed   -as per cardio, not candidate for intervention given dementia   -caution with IVF given risk of volume overload     Hypernatremia   Hyperchloremic acidosis   -likely due to poor PO intake   -gentle IVF w/dextrose     Normocytic anemia   -s/p 1u PRBC transfusion during hospital course w/adequate response   -monitor   -s/p IV venofer     Hypertensive urgency   -npo at this time   -cont current IV meds     DM2  -hgba1c 10.8  -lantus d/c'ed due to inconsistent mental status and inconsistent nutrition status   -ISS     Hypothyroid   -synthroid, dose lowered 8/4 to 112mcg , converted to IV dose 8/5      VTE ppx: lovenox     Code status : DNR/DNI     Palliative following     prognosis poor    d/w pt's , Kane and one of pt's sons, Tae, decision to proceed with NGT and start tube feeds. Tae asking to "hold off a few days" on starting memantine that family is requesting for wakefulness. discussed that, we can defer medication today, however, given temporary nature of NGT and trial of TF to assess for pt's response, would not delay longer, if they want to continue to pursue this medication. Kane reports family in agreement. Plan for NGT placement today, once placement confirmed, will start tube feeds and PO medications.

## 2022-08-12 NOTE — PROGRESS NOTE ADULT - ASSESSMENT
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      #1 Dementia  - seems to have been progressing over the last 3 years - per  has been sleeping more, eating less, etc   - poor overall prognosis  - supportive measures, frequent reorientation  - end stage     #2 Encephalopathy  - MRI no acute stroke, chronic microvascular changes, communicating hydrocephalus  - neurology following   - likely waxing and waning mental status related to underlying dementia     #3 Dysphagia  - too lethargic to swallow  - would not recommend feeding tube due to advanced dementia, unlikely to improve condition or outcome   - did not tolerate NG tube trial, no PEG tube, pleasure feeds     #4 NPH  - not a candidate for shunting  - appreciate neurosurgery input  - given not a candidate for shunting, would not make sense to subject her to an LP  - had evidence of swelling in old imaging from 2019     #5 Severe AS    - poor prognosis  - not a candidate for valve surgery   - hospice appropriate  - sublingual morphine for shortness of breath     #6 Debility  - needs assist with ADLs    #7 Encounter for palliative care  - patient is at end of life  - awaiting bed [facility] for comfort care

## 2022-08-12 NOTE — GOALS OF CARE CONVERSATION - ADVANCED CARE PLANNING - CONVERSATION DETAILS
discussion held with family including  Kane discussion held with family including  Kane, sons John, son Tae, sister Sara reagrding opoor prognosis, worsening condition, high fevers, low blood pressure and that patient ahs entered active dying stages. Confirmed DNR and DNI status, and recommending shifting to full comfort measures only, no further temperature checks, blood draws. I explained symptoms shallow breathing, dyspnea, noisy breathing that may arise, and that I will be pre-emptive about managing those symptoms.   A lot of emotional support provided.

## 2022-08-12 NOTE — PROGRESS NOTE ADULT - SUBJECTIVE AND OBJECTIVE BOX
SHARIFA ENGLE    [id number]    87y      Female    CC: ams     INTERVAL HPI/OVERNIGHT EVENTS: pt seen and examined. febrile this am     REVIEW OF SYSTEMS:  unable to obtain     Vital Signs Last 24 Hrs  T(C): 39.2 (12 Aug 2022 10:00), Max: 39.6 (12 Aug 2022 09:20)  T(F): 102.6 (12 Aug 2022 10:00), Max: 103.3 (12 Aug 2022 09:20)  HR: 101 (12 Aug 2022 10:00) (64 - 112)  BP: 85/33 (12 Aug 2022 10:00) (85/33 - 139/41)  BP(mean): 46 (12 Aug 2022 10:00) (46 - 63)  RR: 22 (12 Aug 2022 10:00) (16 - 23)  SpO2: 95% (12 Aug 2022 10:00) (95% - 98%)    Parameters below as of 12 Aug 2022 10:00  Patient On (Oxygen Delivery Method): room air        PHYSICAL EXAM:    GENERAL: NAD  HEENT: atraumatic   NECK: soft, supple  CHEST/LUNG: mildly tachypneic   HEART: S1S2+, Regular rate and rhythm  ABDOMEN: Soft, Nontender, Nondistended; Bowel sounds present  SKIN: anasarca   NEURO: lethargic; minimally responsive     LABS:                        10.4   8.20  )-----------( 321      ( 12 Aug 2022 06:17 )             35.2     08-12    134<L>  |  102  |  4.0<L>  ----------------------------<  201<H>  3.8   |  20.0<L>  |  0.66    Ca    7.6<L>      12 Aug 2022 06:17  Phos  3.1     08-11  Mg     1.8     08-12    TPro  5.2<L>  /  Alb  1.9<L>  /  TBili  0.4  /  DBili  x   /  AST  74<H>  /  ALT  25  /  AlkPhos  147<H>  08-12            MEDICATIONS  (STANDING):  dextrose 5%. 1000 milliLiter(s) (50 mL/Hr) IV Continuous <Continuous>  dextrose 5%. 1000 milliLiter(s) (100 mL/Hr) IV Continuous <Continuous>  dextrose 50% Injectable 25 Gram(s) IV Push once  dextrose 50% Injectable 12.5 Gram(s) IV Push once  dextrose 50% Injectable 25 Gram(s) IV Push once  diazepam  Injectable 10 milliGRAM(s) IV Push every 8 hours  glucagon  Injectable 1 milliGRAM(s) IntraMuscular once  morphine  - Injectable 2 milliGRAM(s) IV Push every 6 hours    MEDICATIONS  (PRN):  acetaminophen  Suppository .. 650 milliGRAM(s) Rectal every 8 hours PRN Temp greater or equal to 38C (100.4F), Mild Pain (1 - 3)  dextrose Oral Gel 15 Gram(s) Oral once PRN Blood Glucose LESS THAN 70 milliGRAM(s)/deciliter  diazepam  Injectable 10 milliGRAM(s) IV Push every 4 hours PRN agtiation  glycopyrrolate Injectable 0.2 milliGRAM(s) IV Push every 6 hours PRN secretions  morphine  - Injectable 2 milliGRAM(s) IV Push every 2 hours PRN dyspnea  ondansetron Injectable 4 milliGRAM(s) IV Push every 6 hours PRN Nausea and/or Vomiting      RADIOLOGY & ADDITIONAL TESTS:

## 2022-08-12 NOTE — CHART NOTE - NSCHARTNOTEFT_GEN_A_CORE
Source: Patient [ ]  Family [ x ]   other [ x ] chart    Current Diet: Diet, NPO (08-03-22 @ 21:57) [Active]    PO intake:  NPO    Source for PO intake [ ] Patient [ ] family [ ] chart [ ] staff [ ] other    Current Weight:  138.2lbs (8/11)   (8/10) 140.6lbs  (8/7) 134.4lbs  (7/21) 150.7lbs      Pertinent Medications: MEDICATIONS  (STANDING):  dextrose 5%. 1000 milliLiter(s) (50 mL/Hr) IV Continuous <Continuous>  dextrose 5%. 1000 milliLiter(s) (100 mL/Hr) IV Continuous <Continuous>  dextrose 50% Injectable 25 Gram(s) IV Push once  dextrose 50% Injectable 12.5 Gram(s) IV Push once  dextrose 50% Injectable 25 Gram(s) IV Push once  diazepam  Injectable 10 milliGRAM(s) IV Push every 8 hours  glucagon  Injectable 1 milliGRAM(s) IntraMuscular once  levothyroxine Injectable 50 MICROGram(s) IV Push at bedtime  morphine  - Injectable 2 milliGRAM(s) IV Push every 6 hours    MEDICATIONS  (PRN):  acetaminophen  Suppository .. 650 milliGRAM(s) Rectal every 8 hours PRN Temp greater or equal to 38C (100.4F), Mild Pain (1 - 3)  dextrose Oral Gel 15 Gram(s) Oral once PRN Blood Glucose LESS THAN 70 milliGRAM(s)/deciliter  diazepam  Injectable 10 milliGRAM(s) IV Push every 4 hours PRN agtiation  glycopyrrolate Injectable 0.2 milliGRAM(s) IV Push every 6 hours PRN secretions  hydrALAZINE Injectable 5 milliGRAM(s) IV Push every 6 hours PRN SBP> 170  morphine  - Injectable 2 milliGRAM(s) IV Push every 2 hours PRN dyspnea  ondansetron Injectable 4 milliGRAM(s) IV Push every 6 hours PRN Nausea and/or Vomiting    Pertinent Labs: CBC Full  -  ( 12 Aug 2022 06:17 )  WBC Count : 8.20 K/uL  RBC Count : 4.01 M/uL  Hemoglobin : 10.4 g/dL  Hematocrit : 35.2 %  Platelet Count - Automated : 321 K/uL  Mean Cell Volume : 87.8 fl  Mean Cell Hemoglobin : 25.9 pg  Mean Cell Hemoglobin Concentration : 29.5 gm/dL    Nutrition Related labs: 08-12 Na134 mmol/L<L> Glu 201 mg/dL<H> K+ 3.8 mmol/L Cr  0.66 mg/dL BUN 4.0 mg/dL<L> Phos n/a   Alb 1.9 g/dL<L> PAB n/a      Skin: Moisture associated dermatitis and IAD per documentation     Nutrition focused physical exam previously conducted - found signs of malnutrition [ ]absent [x ]present    Subcutaneous fat loss: [x ] Orbital fat pads region, [ ]Buccal fat region, [ ]Triceps region,  [ ]Ribs region    Muscle wasting: [ x]Temples region, [ x]Clavicle region, [x ]Shoulder region, [ ]Scapula region, [ ]Interosseous region,  [ ]thigh region, [ ]Calf region    Estimated Needs:   [x ] no change since previous assessment  [ ] recalculated:     Current Nutrition Diagnosis: Pt remains at high nutrition risk secondary to malnutrition (severe, acute) related to inability to meet sufficient protein-energy in setting of  advanced age, dementia, declining functional status, failure to thrive as evidenced by meeting <50% nutrient needs >5 days and NFPE findings. Patient remains NPO per current clinical status and inability to tolerate PO nutrition. Per documentation, patient awaiting comfort care. Palliative care following. RD to remain available as needed.     Recommendations:   1) Honor family/patient wishes; comfort care     Monitoring and Evaluation:   [ ] PO intake [ ] Tolerance to diet prescription [X] Weights  [X] Follow up per protocol [X] Labs:

## 2022-08-12 NOTE — CHART NOTE - NSCHARTNOTEFT_GEN_A_CORE
Palliative care social work note.    SW met with patients spouse and son at bedside to provide support in coping with projected loss and dealing with end of life issues. Spouse engaged in discussing their 59 years of marriage and time in [place] as well as life fulfilled with many things wife accomplished. Preparing for grief addressed and support services available reviewed.

## 2022-08-12 NOTE — PROGRESS NOTE ADULT - ASSESSMENT
87y/oF PMH HTN, hypothyroid, DM, anemia, BIB family with AMS and progressing generalized weakness. Pt aso with subjective fevers and poor PO intake. On admission, pt hypoxic and with hypertensive urgency, 7/18. CTH without acute findings, placed on empiric abx. 7/19, pt develops N/V with NBNB emesis with bradycardia and hypotension, RRT called. Pt subsequently intubated for airway protection and upgraded to MICU. Started on pressors. CT with b/l LL consolidations, poss atelectasis and/or pna, small b/l pleural effusions. Repeat CTH with NPH. Imaging reviewed by Neurology and neurosx. As per Neurosx, no significant changes from prior imaging and no surgical intervention recommended. Pt also noted to have severe AS on TTE, though no intervention planned per cardio. Pt extubated 7/21, downgraded to medicine 7/22. Hospital course further complicated by recurring fevers, resumed abx 7/29 for SIRS, possible UTI. Pt with persistent encephalopathy, likely multifactorial, initially 2/2 sepsis and dehydration in setting of underlying dementia w/suspicion for NPH. Intermittent improvement in mental status. Pt inconsistent with PO intake, noted to pocket food while eating, concern for aspiration discussed multiple times with family. Family wanted to cont PO feeds at that time. Speech re-evaluated, pt started on pureed diet. Pt noted to fall asleep while family trying to feed her. Family educated on aspiration risk. 8/3 , pt with new fever 100.9, noted to be more lethargic. Repeat CTH 8/3 with new L cerebellum density/possible acute CVA. d/w neurology, not candidate for tpa due to unclear timeline. stroke protocol initiated. asa, statin. 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 . EEG without acute seizures. Abx changed to zosyn for poss new recurrent asp pna. Son concerned about poss strep throat, pcr ordered 8/4. abx changed to cefepime for broader coverage for pna and uti as per ID 8/4, however changed back to zosyn 8/5 due to increased lethargy on cefepime. Family wanting to continue zosyn. Pt remains NPO. Pt now transitioned to comfort care measures only.     Metabolic encephalopathy, likely multifactorial due to sepsis, NPH in setting of underlying dementia, intermittent improvement, worse again 8/3  Sepsis, POA s/p septic shock, likely 2/2 aspiration PNA   Acute hypoxic respiratory failure s/p intubation w/mechanical ventilation  Dysphagia    -suspicion for NPH on CTH  -neurosx eval appreciated; not a candidate for any intracranial surgery such as  shunt due to multiple chronic comorbidities; therefore, as per neuro, will not pursue LP   -8/3, pt noted to be more lethargic with episode of fever; concern for recurrent infection/poss aspiration   -MRI initially canceled by neurosx and rec outpt f/u; family wanted to discuss use of acetazolamide for NPH with neurosx team. as per neurosx, acetazolamide not used for tx of NPH. Additionally not recommended for use as per neurology team   -EEG 8/3 neg for seizures   -8/3 repeat CTH with new CVA L cerebellum, started on neuro checks   -cont asa, statin   -8/4: MRI without acute cva, chronic ischemic changes noted, re-demonstration of communicating hydrocephalus; R basal ganglia non-specific enhancement; imaging reviewed by neuro, rec repeat MRI 2-4 weeks as outpt; cont asa, statin, bp control   -carotid doppler: mild plaque b/l ICA, no significant stenosis   -PT/OT/speech/swallow   -s/p pressor support and steroids for hypotension while in MICU   -resumed on pureed diet with thickened liquids, however now NPO again   -seen by speech/swallow multiple times; last seen 8/7, with recs to cont npo due to high risk of aspiration; d/w family at bedside   -8/3 ct chest with new upper opacity/basal atelectasis/poss asp pna  -8/3 on zosyn, ceftriaxone, changed to cefepime 8/4, then back to zosyn 8/5 due to worsening ams   -bcx 8/3 ngtd   -ID recs appreciated   -cont gentle IVF with dextrose while npo   -GI recs appreciated   -would need to be afebrile x48hrs; and as per GI would be significant risk for conscious sedation given comorbidities     Possible UTI vs colonization   -family had previously refused marcano removal; nowd/c'ed   -bcx 7/28 neg   -ucx 50-99k ecoli, resistant to zosyn, s/p ceftriaxone and cefepime   -stop flomax     Severe aortic stenosis   -TTE reviewed   -as per cardio, not candidate for intervention given dementia   -caution with IVF given risk of volume overload     Hypernatremia   Hyperchloremic acidosis   -likely due to poor PO intake   -gentle IVF w/dextrose , will stop ivf now     Normocytic anemia   -s/p 1u PRBC transfusion during hospital course w/adequate response   -monitor   -s/p IV venofer     Hypertensive urgency   -npo at this time   -stop vitals     DM2  -hgba1c 10.8  -lantus d/c'ed due to inconsistent mental status and inconsistent nutrition status   -ISS     Hypothyroid   -synthroid, dose lowered 8/4 to 112mcg , converted to IV dose 8/5; will stop in setting of comfort care measures        Code status : DNR/DNI   Palliative following     pt now full comfort care measures. no vitals, labs or escalation of care

## 2022-08-12 NOTE — PROGRESS NOTE ADULT - SUBJECTIVE AND OBJECTIVE BOX
OVERNIGHT EVENTS: shallow breathing     Present Symptoms:     Dyspnea: none   Nausea/Vomiting: No  Anxiety:  No  Depression: unable   Fatigue: unable   Loss of appetite: unable   Constipation: none     Pain: none currently             Character-            Duration-            Effect-            Factors-            Frequency-            Location-            Severity-    Pain AD Score:  http://geriatrictoolkit.[email or web address]/cog/painad.pdf (press ctrl + left click to view)    Review of Systems: Reviewed                   Unable to obtain due to poor mentation   All others negative    MEDICATIONS  (STANDING):  atorvastatin 80 milliGRAM(s) Oral at bedtime  dextrose 5% + lactated ringers. 1000 milliLiter(s) (50 mL/Hr) IV Continuous <Continuous>  dextrose 5%. 1000 milliLiter(s) (50 mL/Hr) IV Continuous <Continuous>  dextrose 5%. 1000 milliLiter(s) (100 mL/Hr) IV Continuous <Continuous>  dextrose 50% Injectable 25 Gram(s) IV Push once  dextrose 50% Injectable 12.5 Gram(s) IV Push once  dextrose 50% Injectable 25 Gram(s) IV Push once  ferrous    sulfate 325 milliGRAM(s) Oral daily  glucagon  Injectable 1 milliGRAM(s) IntraMuscular once  insulin lispro (ADMELOG) corrective regimen sliding scale   SubCutaneous Before meals and at bedtime  labetalol 100 milliGRAM(s) Oral two times a day  levothyroxine Injectable 50 MICROGram(s) IV Push at bedtime  lisinopril 10 milliGRAM(s) Oral daily  memantine 10 milliGRAM(s) Oral two times a day  nystatin Powder 1 Application(s) Topical two times a day  piperacillin/tazobactam IVPB.. 3.375 Gram(s) IV Intermittent every 8 hours    MEDICATIONS  (PRN):  acetaminophen  Suppository .. 650 milliGRAM(s) Rectal every 8 hours PRN Temp greater or equal to 38C (100.4F), Mild Pain (1 - 3)  aluminum hydroxide/magnesium hydroxide/simethicone Suspension 30 milliLiter(s) Oral every 6 hours PRN Dyspepsia  dextrose Oral Gel 15 Gram(s) Oral once PRN Blood Glucose LESS THAN 70 milliGRAM(s)/deciliter  hydrALAZINE Injectable 5 milliGRAM(s) IV Push every 6 hours PRN SBP> 170  LORazepam    Concentrate 1 milliGRAM(s) SubLingual every 4 hours PRN agtiation or restlessness  morphine Concentrate 5 milliGRAM(s) SubLingual every 4 hours PRN dyspnea  ondansetron Injectable 4 milliGRAM(s) IV Push every 6 hours PRN Nausea and/or Vomiting    PHYSICAL EXAM:    Vital Signs Last 24 Hrs  T(C): 37.2 (12 Aug 2022 04:00), Max: 37.8 (11 Aug 2022 08:21)  T(F): 99 (12 Aug 2022 04:00), Max: 100 (11 Aug 2022 08:21)  HR: 100 (12 Aug 2022 04:01) (64 - 100)  BP: 128/44 (12 Aug 2022 04:01) (122/44 - 139/41)  BP(mean): 63 (11 Aug 2022 16:00) (63 - 64)  RR: 17 (12 Aug 2022 04:01) (17 - 23)  SpO2: 98% (12 Aug 2022 04:01) (95% - 98%)    Parameters below as of 12 Aug 2022 04:01  Patient On (Oxygen Delivery Method): room air    General: lethargic     Karnofsky: 10 - 20 %    HEENT: normal      Lungs: mild tachypnea     CV: normal      GI: normal      MSK: bedbound/wheelchair bound    Skin: no rash    LABS:                      10.4   8.20  )-----------( 321      ( 12 Aug 2022 06:17 )             35.2     08-12    134<L>  |  102  |  4.0<L>  ----------------------------<  201<H>  3.8   |  20.0<L>  |  0.66    Ca    7.6<L>      12 Aug 2022 06:17  Phos  3.1     08-11  Mg     1.8     08-12    TPro  5.2<L>  /  Alb  1.9<L>  /  TBili  0.4  /  DBili  x   /  AST  74<H>  /  ALT  25  /  AlkPhos  147<H>  08-12        I&O's Summary    11 Aug 2022 07:01  -  12 Aug 2022 07:00  --------------------------------------------------------  IN: 500 mL / OUT: 0 mL / NET: 500 mL        RADIOLOGY & ADDITIONAL STUDIES:    ADVANCE DIRECTIVES/TREATMENT PREFERENCES:  DNR YES NO  Completed on:                     GENEVA  YES NO   Completed on:  Living Will  YES NO   Completed on:

## 2022-08-13 NOTE — PROGRESS NOTE ADULT - SUBJECTIVE AND OBJECTIVE BOX
[facility] Division of Hospital Medicine    SUBJECTIVE / OVERNIGHT EVENTS:    Patient denies chest pain, SOB, abd pain, N/V, fever, chills, dysuria or any other complaints. All remainder ROS negative.     MEDICATIONS  (STANDING):  dextrose 5%. 1000 milliLiter(s) (50 mL/Hr) IV Continuous <Continuous>  dextrose 5%. 1000 milliLiter(s) (100 mL/Hr) IV Continuous <Continuous>  dextrose 50% Injectable 25 Gram(s) IV Push once  dextrose 50% Injectable 12.5 Gram(s) IV Push once  dextrose 50% Injectable 25 Gram(s) IV Push once  diazepam  Injectable 10 milliGRAM(s) IV Push every 8 hours  glucagon  Injectable 1 milliGRAM(s) IntraMuscular once  morphine  - Injectable 2 milliGRAM(s) IV Push every 6 hours    MEDICATIONS  (PRN):  acetaminophen  Suppository .. 650 milliGRAM(s) Rectal every 8 hours PRN Temp greater or equal to 38C (100.4F), Mild Pain (1 - 3)  dextrose Oral Gel 15 Gram(s) Oral once PRN Blood Glucose LESS THAN 70 milliGRAM(s)/deciliter  diazepam  Injectable 10 milliGRAM(s) IV Push every 4 hours PRN agtiation  glycopyrrolate Injectable 0.2 milliGRAM(s) IV Push every 6 hours PRN secretions  morphine  - Injectable 2 milliGRAM(s) IV Push every 2 hours PRN dyspnea  ondansetron Injectable 4 milliGRAM(s) IV Push every 6 hours PRN Nausea and/or Vomiting        I&O's Summary      PHYSICAL EXAM:  Vital Signs Last 24 Hrs  T(C): --  T(F): --  HR: --  BP: --  BP(mean): --  RR: --  SpO2: --            CONSTITUTIONAL: NAD, elderly  ENMT: Moist oral mucosa, no pharyngeal injection or exudates; normal dentition  RESPIRATORY: Normal respiratory effort; lungs are clear to auscultation bilaterally  CARDIOVASCULAR: Regular rate and rhythm, normal S1 and S2, no murmur/rub/gallop; Peripheral pulses are 2+ bilaterally  ABDOMEN: Nontender to palpation, normoactive bowel sounds, no rebound/guarding;   MUSCLOSKELETAL:  No clubbing or cyanosis of digits; no joint swelling or tenderness to palpation  PSYCH: Alert to physical stimulus affect appropriate  NEUROLOGY: CN 2-12 are intact and symmetric; no gross sensory deficits;   SKIN: No rashes; no palpable lesions    LABS:                        10.4   8.20  )-----------( 321      ( 12 Aug 2022 06:17 )             35.2     08-12    134<L>  |  102  |  4.0<L>  ----------------------------<  201<H>  3.8   |  20.0<L>  |  0.66    Ca    7.6<L>      12 Aug 2022 06:17  Mg     1.8     08-12    TPro  5.2<L>  /  Alb  1.9<L>  /  TBili  0.4  /  DBili  x   /  AST  74<H>  /  ALT  25  /  AlkPhos  147<H>  08-12              CAPILLARY BLOOD GLUCOSE            RADIOLOGY & ADDITIONAL TESTS:  Results Reviewed:   Imaging Personally Reviewed:  Electrocardiogram Personally Reviewed:                                           [facility] Division of Hospital Medicine    SUBJECTIVE / OVERNIGHT EVENTS:  No overnight events  Unable to assess ROS sec to mental status    MEDICATIONS  (STANDING):  dextrose 5%. 1000 milliLiter(s) (50 mL/Hr) IV Continuous <Continuous>  dextrose 5%. 1000 milliLiter(s) (100 mL/Hr) IV Continuous <Continuous>  dextrose 50% Injectable 25 Gram(s) IV Push once  dextrose 50% Injectable 12.5 Gram(s) IV Push once  dextrose 50% Injectable 25 Gram(s) IV Push once  diazepam  Injectable 10 milliGRAM(s) IV Push every 8 hours  glucagon  Injectable 1 milliGRAM(s) IntraMuscular once  morphine  - Injectable 2 milliGRAM(s) IV Push every 6 hours    MEDICATIONS  (PRN):  acetaminophen  Suppository .. 650 milliGRAM(s) Rectal every 8 hours PRN Temp greater or equal to 38C (100.4F), Mild Pain (1 - 3)  dextrose Oral Gel 15 Gram(s) Oral once PRN Blood Glucose LESS THAN 70 milliGRAM(s)/deciliter  diazepam  Injectable 10 milliGRAM(s) IV Push every 4 hours PRN agtiation  glycopyrrolate Injectable 0.2 milliGRAM(s) IV Push every 6 hours PRN secretions  morphine  - Injectable 2 milliGRAM(s) IV Push every 2 hours PRN dyspnea  ondansetron Injectable 4 milliGRAM(s) IV Push every 6 hours PRN Nausea and/or Vomiting        I&O's Summary      PHYSICAL EXAM:  Vital Signs Last 24 Hrs  T(C): --  T(F): --  HR: --  BP: --  BP(mean): --  RR: --  SpO2: --            CONSTITUTIONAL: NAD, elderly  ENMT: Moist oral mucosa, no pharyngeal injection or exudates; normal dentition  RESPIRATORY: Normal respiratory effort; lungs are clear to auscultation bilaterally  CARDIOVASCULAR: Regular rate and rhythm, normal S1 and S2, no murmur/rub/gallop; Peripheral pulses are 2+ bilaterally  ABDOMEN: Nontender to palpation, normoactive bowel sounds, no rebound/guarding;   MUSCLOSKELETAL:  No clubbing or cyanosis of digits; no joint swelling or tenderness to palpation  PSYCH: Not alert or oriented, opens eyes intermittently  NEUROLOGY: CN 2-12 are intact and symmetric; no gross sensory deficits;   SKIN: No rashes; no palpable lesions    LABS:                        10.4   8.20  )-----------( 321      ( 12 Aug 2022 06:17 )             35.2     08-12    134<L>  |  102  |  4.0<L>  ----------------------------<  201<H>  3.8   |  20.0<L>  |  0.66    Ca    7.6<L>      12 Aug 2022 06:17  Mg     1.8     08-12    TPro  5.2<L>  /  Alb  1.9<L>  /  TBili  0.4  /  DBili  x   /  AST  74<H>  /  ALT  25  /  AlkPhos  147<H>  08-12              CAPILLARY BLOOD GLUCOSE            RADIOLOGY & ADDITIONAL TESTS:  Results Reviewed:   Imaging Personally Reviewed:  Electrocardiogram Personally Reviewed:

## 2022-08-13 NOTE — PROGRESS NOTE ADULT - ASSESSMENT
87y/oF PMH HTN, hypothyroid, DM, anemia, BIB family with AMS and progressing generalized weakness. Pt aso with subjective fevers and poor PO intake. On admission, pt hypoxic and with hypertensive urgency, 7/18. CTH without acute findings, placed on empiric abx. 7/19, pt develops N/V with NBNB emesis with bradycardia and hypotension, RRT called. Pt subsequently intubated for airway protection and upgraded to MICU. Started on pressors. CT with b/l LL consolidations, poss atelectasis and/or pna, small b/l pleural effusions. Repeat CTH with NPH. Imaging reviewed by Neurology and neurosx. As per Neurosx, no significant changes from prior imaging and no surgical intervention recommended. Pt also noted to have severe AS on TTE, though no intervention planned per cardio. Pt extubated 7/21, downgraded to medicine 7/22. Hospital course further complicated by recurring fevers, resumed abx 7/29 for SIRS, possible UTI. Pt with persistent encephalopathy, likely multifactorial, initially 2/2 sepsis and dehydration in setting of underlying dementia w/suspicion for NPH. Intermittent improvement in mental status. Pt inconsistent with PO intake, noted to pocket food while eating, concern for aspiration discussed multiple times with family. Family wanted to cont PO feeds at that time. Speech re-evaluated, pt started on pureed diet. Pt noted to fall asleep while family trying to feed her. Family educated on aspiration risk. 8/3 , pt with new fever 100.9, noted to be more lethargic. Repeat CTH 8/3 with new L cerebellum density/possible acute CVA. d/w neurology, not candidate for tpa due to unclear timeline. stroke protocol initiated. asa, statin. 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 . EEG without acute seizures. Abx changed to zosyn for poss new recurrent asp pna. Son concerned about poss strep throat, pcr ordered 8/4. abx changed to cefepime for broader coverage for pna and uti as per ID 8/4, however changed back to zosyn 8/5 due to increased lethargy on cefepime. Family wanting to continue zosyn. Pt remains NPO. Pt now transitioned to comfort care measures only.     Metabolic encephalopathy, likely multifactorial due to sepsis, NPH in setting of underlying dementia, intermittent improvement, worse again 8/3  Sepsis, POA s/p septic shock, likely 2/2 aspiration PNA   Acute hypoxic respiratory failure s/p intubation w/mechanical ventilation  Dysphagia    -suspicion for NPH on CTH  -neurosx eval appreciated; not a candidate for any intracranial surgery such as  shunt due to multiple chronic comorbidities; therefore, as per neuro, will not pursue LP   -8/3, pt noted to be more lethargic with episode of fever; concern for recurrent infection/poss aspiration   -MRI initially canceled by neurosx and rec outpt f/u; family wanted to discuss use of acetazolamide for NPH with neurosx team. as per neurosx, acetazolamide not used for tx of NPH. Additionally not recommended for use as per neurology team   -EEG 8/3 neg for seizures   -8/3 repeat CTH with new CVA L cerebellum, started on neuro checks   -cont asa, statin   -8/4: MRI without acute cva, chronic ischemic changes noted, re-demonstration of communicating hydrocephalus; R basal ganglia non-specific enhancement; imaging reviewed by neuro, rec repeat MRI 2-4 weeks as outpt; cont asa, statin, bp control   -carotid doppler: mild plaque b/l ICA, no significant stenosis   -PT/OT/speech/swallow   -s/p pressor support and steroids for hypotension while in MICU   -resumed on pureed diet with thickened liquids, however now NPO again   -seen by speech/swallow multiple times; last seen 8/7, with recs to cont npo due to high risk of aspiration; d/w family at bedside   -8/3 ct chest with new upper opacity/basal atelectasis/poss asp pna  -8/3 on zosyn, ceftriaxone, changed to cefepime 8/4, then back to zosyn 8/5 due to worsening ams   -bcx 8/3 ngtd   -ID recs appreciated   -cont gentle IVF with dextrose while npo   -GI recs appreciated   -would need to be afebrile x48hrs; and as per GI would be significant risk for conscious sedation given comorbidities     Possible UTI vs colonization   -family had previously refused marcano removal; nowd/c'ed   -bcx 7/28 neg   -ucx 50-99k ecoli, resistant to zosyn, s/p ceftriaxone and cefepime   -stop flomax     Severe aortic stenosis   -TTE reviewed   -as per cardio, not candidate for intervention given dementia     Normocytic anemia   -s/p 1u PRBC transfusion during hospital course w/adequate response   -monitor   -s/p IV venofer     Hypertensive urgency   -npo at this time   -stop vitals     DM2  -hgba1c 10.8  -lantus d/c'ed due to inconsistent mental status and inconsistent nutrition status   -ISS     Hypothyroid   -synthroid, dose lowered 8/4 to 112mcg , converted to IV dose 8/5; will stop in setting of comfort care measures        Code status : DNR/DNI   Palliative following     pt now full comfort care measures. no vitals, labs or escalation of care   Awaiting Hospice

## 2022-08-14 NOTE — PROGRESS NOTE ADULT - ASSESSMENT
87y/oF PMH HTN, hypothyroid, DM, anemia, BIB family with AMS and progressing generalized weakness. Pt aso with subjective fevers and poor PO intake. On admission, pt hypoxic and with hypertensive urgency, 7/18. CTH without acute findings, placed on empiric abx. 7/19, pt develops N/V with NBNB emesis with bradycardia and hypotension, RRT called. Pt subsequently intubated for airway protection and upgraded to MICU. Started on pressors. CT with b/l LL consolidations, poss atelectasis and/or pna, small b/l pleural effusions. Repeat CTH with NPH. Imaging reviewed by Neurology and neurosx. As per Neurosx, no significant changes from prior imaging and no surgical intervention recommended. Pt also noted to have severe AS on TTE, though no intervention planned per cardio. Pt extubated 7/21, downgraded to medicine 7/22. Hospital course further complicated by recurring fevers, resumed abx 7/29 for SIRS, possible UTI. Pt with persistent encephalopathy, likely multifactorial, initially 2/2 sepsis and dehydration in setting of underlying dementia w/suspicion for NPH. Intermittent improvement in mental status. Pt inconsistent with PO intake, noted to pocket food while eating, concern for aspiration discussed multiple times with family. Family wanted to cont PO feeds at that time. Speech re-evaluated, pt started on pureed diet. Pt noted to fall asleep while family trying to feed her. Family educated on aspiration risk. 8/3 , pt with new fever 100.9, noted to be more lethargic. Repeat CTH 8/3 with new L cerebellum density/possible acute CVA. d/w neurology, not candidate for tpa due to unclear timeline. stroke protocol initiated. asa, statin. 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 . EEG without acute seizures. Abx changed to zosyn for poss new recurrent asp pna. Son concerned about poss strep throat, pcr ordered 8/4. abx changed to cefepime for broader coverage for pna and uti as per ID 8/4, however changed back to zosyn 8/5 due to increased lethargy on cefepime. Family wanting to continue zosyn. Pt remains NPO. Pt now transitioned to comfort care measures only.     Metabolic encephalopathy, likely multifactorial due to sepsis, NPH in setting of underlying dementia, intermittent improvement, worse again 8/3  Sepsis, POA s/p septic shock, likely 2/2 aspiration PNA   Acute hypoxic respiratory failure s/p intubation w/mechanical ventilation  Dysphagia    -suspicion for NPH on CTH  -neurosx eval appreciated; not a candidate for any intracranial surgery such as  shunt due to multiple chronic comorbidities; therefore, as per neuro, will not pursue LP   -8/3, pt noted to be more lethargic with episode of fever; concern for recurrent infection/poss aspiration   -MRI initially canceled by neurosx and rec outpt f/u; family wanted to discuss use of acetazolamide for NPH with neurosx team. as per neurosx, acetazolamide not used for tx of NPH. Additionally not recommended for use as per neurology team   -EEG 8/3 neg for seizures   -8/3 repeat CTH with new CVA L cerebellum, started on neuro checks   -8/4: MRI without acute cva, chronic ischemic changes noted, re-demonstration of communicating hydrocephalus; R basal ganglia non-specific enhancement; imaging reviewed by neuro, rec repeat MRI 2-4 weeks as outpt; cont asa, statin, bp control   -carotid doppler: mild plaque b/l ICA, no significant stenosis   -s/p pressor support and steroids for hypotension while in MICU   -resumed on pureed diet with thickened liquids, however now NPO again   -seen by speech/swallow multiple times; last seen 8/7, with recs to cont npo due to high risk of aspiration; d/w family at bedside   -8/3 ct chest with new upper opacity/basal atelectasis/poss asp pna  -8/3 on zosyn, ceftriaxone, changed to cefepime 8/4, then back to zosyn 8/5 due to worsening ams   -bcx 8/3 ngtd   -ID recs appreciated   -GI recs appreciated     Possible UTI vs colonization   -family had previously refused marcano removal; nowd/c'ed   -bcx 7/28 neg   -ucx 50-99k ecoli, resistant to zosyn, s/p ceftriaxone and cefepime   -stop flomax     Severe aortic stenosis   -TTE reviewed   -as per cardio, not candidate for intervention given dementia     Normocytic anemia   -s/p 1u PRBC transfusion during hospital course w/adequate response   -s/p IV venofer     Hypertensive urgency   -npo at this time   -stop vitals     DM2  -hgba1c 10.8  -lantus d/c'ed due to inconsistent mental status and inconsistent nutrition status   -ISS     Hypothyroid   -synthroid, dose lowered 8/4 to 112mcg , converted to IV dose 8/5; will stop in setting of comfort care measures        Code status : DNR/DNI   Palliative following     pt now full comfort care measures. no vitals, labs or escalation of care   Awaiting Hospice placement

## 2022-08-14 NOTE — PROGRESS NOTE ADULT - SUBJECTIVE AND OBJECTIVE BOX
[facility] Division of Hospital Medicine    Chief Complaint:    AMS    SUBJECTIVE / OVERNIGHT EVENTS:  Maintain comfort care. No overnight events  Awaiting Hospice bed  Patient denies chest pain, SOB, abd pain, N/V, fever, chills, dysuria or any other complaints. All remainder ROS negative.     MEDICATIONS  (STANDING):  dextrose 5%. 1000 milliLiter(s) (50 mL/Hr) IV Continuous <Continuous>  dextrose 5%. 1000 milliLiter(s) (100 mL/Hr) IV Continuous <Continuous>  dextrose 50% Injectable 25 Gram(s) IV Push once  dextrose 50% Injectable 12.5 Gram(s) IV Push once  dextrose 50% Injectable 25 Gram(s) IV Push once  diazepam  Injectable 10 milliGRAM(s) IV Push every 8 hours  glucagon  Injectable 1 milliGRAM(s) IntraMuscular once  morphine  - Injectable 2 milliGRAM(s) IV Push every 6 hours    MEDICATIONS  (PRN):  acetaminophen  Suppository .. 650 milliGRAM(s) Rectal every 8 hours PRN Temp greater or equal to 38C (100.4F), Mild Pain (1 - 3)  dextrose Oral Gel 15 Gram(s) Oral once PRN Blood Glucose LESS THAN 70 milliGRAM(s)/deciliter  diazepam  Injectable 10 milliGRAM(s) IV Push every 4 hours PRN agtiation  glycopyrrolate Injectable 0.2 milliGRAM(s) IV Push every 6 hours PRN secretions  morphine  - Injectable 2 milliGRAM(s) IV Push every 2 hours PRN dyspnea  ondansetron Injectable 4 milliGRAM(s) IV Push every 6 hours PRN Nausea and/or Vomiting        I&O's Summary      PHYSICAL EXAM:  Vital Signs Last 24 Hrs  T(C): --  T(F): --  HR: --  BP: --  BP(mean): --  RR: --  SpO2: --            CONSTITUTIONAL: NAD, elderly, chronically ill appearing  ENMT: Moist oral mucosa, no pharyngeal injection or exudates; normal dentition  RESPIRATORY: Normal respiratory effort; lungs are clear to auscultation bilaterally  CARDIOVASCULAR: Regular rate and rhythm, normal S1 and S2, no murmur/rub/gallop; Peripheral pulses are 2+ bilaterally  ABDOMEN: Nontender to palpation, normoactive bowel sounds, no rebound/guarding;   MUSCLOSKELETAL:  No clubbing or cyanosis of digits; no joint swelling or tenderness to palpation  PSYCH: Not alert or oriented  NEUROLOGY: CN 2-12 are intact and symmetric; no gross sensory deficits;   SKIN: No rashes; no palpable lesions    LABS:                    CAPILLARY BLOOD GLUCOSE            RADIOLOGY & ADDITIONAL TESTS:  Results Reviewed:   Imaging Personally Reviewed:  Electrocardiogram Personally Reviewed:

## 2022-08-15 NOTE — PROGRESS NOTE ADULT - ASSESSMENT
87F with dementia,  DM, anemia, hypothyroidism, admitted for weakness, s/p ICU for possible aspiration event necessitating intubation/shock on pressors/fevers, s/p extubation 7/21, workup here revealed moderate NPH, and severe valvular heart disease, on comfort measures.      #1 Dementia  - seems to have been progressing over the last 3 years - per  has been sleeping more, eating less, etc   - poor overall prognosis  - supportive measures, frequent reorientation  - end stage     #2 Dyspnea  - continue ATC morphine and PRN  - if requiring multiple PRN dosing, will increase standing rate    #3 Dysphagia  - too lethargic to swallow  - would not recommend feeding tube due to advanced dementia, unlikely to improve condition or outcome   - did not tolerate NG tube trial, no PEG tube, pleasure feeds     #4 NPH  - not a candidate for shunting  - appreciate neurosurgery input  - given not a candidate for shunting, would not make sense to subject her to an LP  - had evidence of swelling in old imaging from 2019     #5 Severe AS    - poor prognosis  - not a candidate for valve surgery   - hospice appropriate    #6 Debility  - needs assist with ADLs    #7 Encounter for palliative care  - on comfort measures  - family does not want her moved to hospice at this time   - expect death in days   - sister Sara and son John at bedside, emotional support provided

## 2022-08-15 NOTE — DISCHARGE NOTE FOR THE EXPIRED PATIENT - HOSPITAL COURSE
Pt is an 87y/oF PMH HTN, hypothyroid, DM, anemia, BIB family with AMS and progressing generalized weakness. Pt was admitted for hypoxia and hypertensive urgency on 7/18. CTH was done without acute findings, placed on empiric abx. 7/19. During course, pt developed N/V with NBNB emesis with bradycardia and hypotension. Pt subsequently was intubated for airway protection and upgraded to MICU. Started on pressors. CT with b/l LL consolidations, poss atelectasis and/or pna, small b/l pleural effusions. Repeat CTH with NPH. Imaging reviewed by Neurology and neurosx. As per Neurosx, no significant changes from prior imaging and no surgical intervention was recommended. Pt also noted to have severe AS on TTE, though no intervention planned per cardio. Pt extubated on 7/21, downgraded to medicine 7/22. Hospital course further complicated by recurring fevers, resumed abx 7/29 for SIRS, possible UTI. Pt with persistent encephalopathy, likely multifactorial, initially 2/2 sepsis and dehydration in setting of underlying dementia w/suspicion for NPH. Intermittent improvement in mental status. Pt inconsistent with PO intake, noted to pocket food while eating, concern for aspiration discussed multiple times with family. Family wanted to cont PO feeds at that time. Speech re-evaluated, pt started on pureed diet. Pt noted to fall asleep while family trying to feed her. Family educated on aspiration risk. 8/3 , pt with new fever 100.9, noted to be more lethargic. Repeat CTH 8/3 with new L cerebellum density/possible acute CVA. d/w neurology, not candidate for tpa due to unclear timeline. stroke protocol initiated. asa, statin. 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 EEG without acute seizures. Abx changed to zosyn for poss new recurrent asp pna. Son concerned about poss strep throat, pcr ordered 8/4. abx changed to cefepime for broader coverage for pna and uti as per ID 8/4, however changed back to zosyn 8/5 due to increased lethargy on cefepime.  Pt then transitioned to comfort care measures.

## 2022-08-15 NOTE — PROGRESS NOTE ADULT - TIME BILLING
discussion with family. review of chart and notes from the weekend. Review of old imaging
review of symptom medications in [facility]. discussion with family at bedside regarding symptom management and end of life care
discussion with family regarding goals of care, end stage dementia, hospice care.
discussion with family at bedside regarding chronic strokes, vascular dementia. discussion with Dr. Murillo regarding plan of care
family discussion and discussion with Dr. Murillo regarding acute stroke and plan of care and directives
see above
see above

## 2022-08-15 NOTE — PROGRESS NOTE ADULT - NUTRITIONAL ASSESSMENT
This patient has been assessed with a concern for Malnutrition and has been determined to have a diagnosis/diagnoses of Severe protein-calorie malnutrition.    This patient is being managed with:   Diet NPO with Tube Feed-  Tube Feeding Modality: Orogastric  Jevity 1.5 Frank (JEVITY1.5)  Total Volume for 24 Hours (mL): 960  Continuous  Starting Tube Feed Rate {mL per Hour}: 10  Increase Tube Feed Rate by (mL): 10     Every 4 hours  Until Goal Tube Feed Rate (mL per Hour): 40  Tube Feed Duration (in Hours): 24  Tube Feed Start Time: 17:00  Entered: Jul 20 2022  4:29PM    
This patient has been assessed with a concern for Malnutrition and has been determined to have a diagnosis/diagnoses of Severe protein-calorie malnutrition.    This patient is being managed with:   Diet NPO-  Entered: Aug  3 2022  9:55PM    
This patient has been assessed with a concern for Malnutrition and has been determined to have a diagnosis/diagnoses of Severe protein-calorie malnutrition.    This patient is being managed with:   Diet NPO-  Entered: Aug  3 2022  9:55PM    
This patient has been assessed with a concern for Malnutrition and has been determined to have a diagnosis/diagnoses of Severe protein-calorie malnutrition.    This patient is being managed with:   Diet Pureed-  Entered: Jul 22 2022 11:49AM    
This patient has been assessed with a concern for Malnutrition and has been determined to have a diagnosis/diagnoses of Severe protein-calorie malnutrition.    This patient is being managed with:   Diet Pureed-  Mildly Thick Liquids (MILDTHICKLIQS)  Entered: Jul 30 2022  3:49PM    
This patient has been assessed with a concern for Malnutrition and has been determined to have a diagnosis/diagnoses of Severe protein-calorie malnutrition.    This patient is being managed with:   Diet NPO with Tube Feed-  Tube Feeding Modality: Orogastric  Jevity 1.5 Frank (JEVITY1.5)  Total Volume for 24 Hours (mL): 960  Continuous  Starting Tube Feed Rate {mL per Hour}: 10  Increase Tube Feed Rate by (mL): 10     Every 4 hours  Until Goal Tube Feed Rate (mL per Hour): 40  Tube Feed Duration (in Hours): 24  Tube Feed Start Time: 17:00  Entered: Jul 20 2022  4:29PM    
This patient has been assessed with a concern for Malnutrition and has been determined to have a diagnosis/diagnoses of Severe protein-calorie malnutrition.    This patient is being managed with:   Diet NPO-  Entered: Aug  3 2022  9:55PM    
This patient has been assessed with a concern for Malnutrition and has been determined to have a diagnosis/diagnoses of Severe protein-calorie malnutrition.    This patient is being managed with:   Diet Pureed-  Entered: Jul 22 2022 11:49AM    
This patient has been assessed with a concern for Malnutrition and has been determined to have a diagnosis/diagnoses of Severe protein-calorie malnutrition.    This patient is being managed with:   Diet Pureed-  Entered: Jul 22 2022 11:49AM      This patient has been assessed with a concern for Malnutrition and has been determined to have a diagnosis/diagnoses of Severe protein-calorie malnutrition.    This patient is being managed with:   Diet Pureed-  Entered: Jul 22 2022 11:49AM    
This patient has been assessed with a concern for Malnutrition and has been determined to have a diagnosis/diagnoses of Severe protein-calorie malnutrition.    This patient is being managed with:   Diet Pureed-  Mildly Thick Liquids (MILDTHICKLIQS)  Entered: Jul 30 2022  3:49PM    
This patient has been assessed with a concern for Malnutrition and has been determined to have a diagnosis/diagnoses of Severe protein-calorie malnutrition.    This patient is being managed with:   Diet NPO-  Entered: Aug  3 2022  9:55PM    
This patient has been assessed with a concern for Malnutrition and has been determined to have a diagnosis/diagnoses of Severe protein-calorie malnutrition.    This patient is being managed with:   Diet NPO-  Except Medications  Entered: Jul 29 2022  4:16PM    
This patient has been assessed with a concern for Malnutrition and has been determined to have a diagnosis/diagnoses of Severe protein-calorie malnutrition.    This patient is being managed with:   Diet Pureed-  Entered: Jul 22 2022 11:49AM    
This patient has been assessed with a concern for Malnutrition and has been determined to have a diagnosis/diagnoses of Severe protein-calorie malnutrition.    This patient is being managed with:   Diet Pureed-  Entered: Jul 22 2022 11:49AM    
This patient has been assessed with a concern for Malnutrition and has been determined to have a diagnosis/diagnoses of Severe protein-calorie malnutrition.    This patient is being managed with:   Diet Pureed-  No Liquids (NOLIQUIDS)  Entered: Jul 26 2022 11:34AM    
This patient has been assessed with a concern for Malnutrition and has been determined to have a diagnosis/diagnoses of Severe protein-calorie malnutrition.    This patient is being managed with:   Diet Pureed-  No Liquids (NOLIQUIDS)  Entered: Jul 26 2022 11:34AM    
This patient has been assessed with a concern for Malnutrition and has been determined to have a diagnosis/diagnoses of Severe protein-calorie malnutrition.    This patient is being managed with:   Diet NPO-  Entered: Aug  3 2022  9:55PM    
This patient has been assessed with a concern for Malnutrition and has been determined to have a diagnosis/diagnoses of Severe protein-calorie malnutrition.    This patient is being managed with:   Diet Pureed-  Mildly Thick Liquids (MILDTHICKLIQS)  Entered: Jul 30 2022  3:49PM    
This patient has been assessed with a concern for Malnutrition and has been determined to have a diagnosis/diagnoses of Severe protein-calorie malnutrition.    This patient is being managed with:   Diet Pureed-  No Liquids (NOLIQUIDS)  Entered: Jul 26 2022 11:34AM    
This patient has been assessed with a concern for Malnutrition and has been determined to have a diagnosis/diagnoses of Severe protein-calorie malnutrition.    This patient is being managed with:   Diet NPO-  Except Medications  Entered: Jul 29 2022  4:16PM    
This patient has been assessed with a concern for Malnutrition and has been determined to have a diagnosis/diagnoses of Severe protein-calorie malnutrition.    This patient is being managed with:   Diet Pureed-  Mildly Thick Liquids (MILDTHICKLIQS)  Entered: Jul 30 2022  3:49PM    
This patient has been assessed with a concern for Malnutrition and has been determined to have a diagnosis/diagnoses of Severe protein-calorie malnutrition.    This patient is being managed with:   Diet NPO-  Entered: Aug  3 2022  9:55PM    
This patient has been assessed with a concern for Malnutrition and has been determined to have a diagnosis/diagnoses of Severe protein-calorie malnutrition.    This patient is being managed with:   Diet NPO-  Entered: Aug  3 2022  9:55PM

## 2022-08-15 NOTE — PROGRESS NOTE ADULT - SUBJECTIVE AND OBJECTIVE BOX
HOSPITALIST PROGRESS NOTE    SHARIFA KADIE  [id number]  87yFemale    Patient is a 87y old  Female who presents with a chief complaint of ams (04 Aug 2022 11:00)      SUBJECTIVE:   Chart reviewed since last visit.  Patient seen and examined at bedside for comfort care.  sister and son at bedside.  Patient sleeping, comfortable      OBJECTIVE:  Vital Signs Last 24 Hrs  T(C): --  T(F): --  HR: --  BP: --  BP(mean): --  RR: --  SpO2: --        PHYSICAL EXAMINATION  General: Elderly female lying in bed, sleeping  HEENT:  Right corneal opacity  NECK:  No accessory muscle usage  CVS: regular rate and rhythm S1 S2  RESP:  Fair air entry  GI:    :   MSK:    CNS:  Sedate  INTEG:  LLE dressing  PSYCH:      MONITOR:  CAPILLARY BLOOD GLUCOSE            I&O's Summary                      Culture:    TTE:    RADIOLOGY        MEDICATIONS  (STANDING):  dextrose 5%. 1000 milliLiter(s) (50 mL/Hr) IV Continuous <Continuous>  dextrose 5%. 1000 milliLiter(s) (100 mL/Hr) IV Continuous <Continuous>  dextrose 50% Injectable 25 Gram(s) IV Push once  dextrose 50% Injectable 12.5 Gram(s) IV Push once  dextrose 50% Injectable 25 Gram(s) IV Push once  diazepam  Injectable 10 milliGRAM(s) IV Push every 8 hours  glucagon  Injectable 1 milliGRAM(s) IntraMuscular once  morphine  - Injectable 2 milliGRAM(s) IV Push every 6 hours      MEDICATIONS  (PRN):  acetaminophen  Suppository .. 650 milliGRAM(s) Rectal every 8 hours PRN Temp greater or equal to 38C (100.4F), Mild Pain (1 - 3)  dextrose Oral Gel 15 Gram(s) Oral once PRN Blood Glucose LESS THAN 70 milliGRAM(s)/deciliter  diazepam  Injectable 10 milliGRAM(s) IV Push every 4 hours PRN agtiation  glycopyrrolate Injectable 0.2 milliGRAM(s) IV Push every 6 hours PRN secretions  morphine  - Injectable 2 milliGRAM(s) IV Push every 2 hours PRN dyspnea  ondansetron Injectable 4 milliGRAM(s) IV Push every 6 hours PRN Nausea and/or Vomiting

## 2022-08-15 NOTE — PROGRESS NOTE ADULT - ASSESSMENT
87y/oF PMH HTN, hypothyroid, DM, anemia, BIB family with AMS and progressing generalized weakness. Pt aso with subjective fevers and poor PO intake. On admission, pt hypoxic and with hypertensive urgency, 7/18. CTH without acute findings, placed on empiric abx. 7/19, pt develops N/V with NBNB emesis with bradycardia and hypotension, RRT called. Pt subsequently intubated for airway protection and upgraded to MICU. Started on pressors. CT with b/l LL consolidations, poss atelectasis and/or pna, small b/l pleural effusions. Repeat CTH with NPH. Imaging reviewed by Neurology and neurosx. As per Neurosx, no significant changes from prior imaging and no surgical intervention recommended. Pt also noted to have severe AS on TTE, though no intervention planned per cardio. Pt extubated 7/21, downgraded to medicine 7/22. Hospital course further complicated by recurring fevers, resumed abx 7/29 for SIRS, possible UTI. Pt with persistent encephalopathy, likely multifactorial, initially 2/2 sepsis and dehydration in setting of underlying dementia w/suspicion for NPH. Intermittent improvement in mental status. Pt inconsistent with PO intake, noted to pocket food while eating, concern for aspiration discussed multiple times with family. Family wanted to cont PO feeds at that time. Speech re-evaluated, pt started on pureed diet. Pt noted to fall asleep while family trying to feed her. Family educated on aspiration risk. 8/3 , pt with new fever 100.9, noted to be more lethargic. Repeat CTH 8/3 with new L cerebellum density/possible acute CVA. d/w neurology, not candidate for tpa due to unclear timeline. stroke protocol initiated. asa, statin. 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 . EEG without acute seizures. Abx changed to zosyn for poss new recurrent asp pna. Son concerned about poss strep throat, pcr ordered 8/4. abx changed to cefepime for broader coverage for pna and uti as per ID 8/4, however changed back to zosyn 8/5 due to increased lethargy on cefepime. Family wanting to continue zosyn. Pt remains NPO. Pt now transitioned to comfort care measures only.       Metabolic encephalopathy, likely multifactorial due to sepsis, NPH in setting of underlying dementia   Sepsis, POA s/p septic shock, likely 2/2 aspiration pneumonia  Acute hypoxic respiratory failure s/p intubation w/mechanical ventilation  Dysphagia    Severe aortic stenosis   Bilateral pleural effusion  Severe Protein calorie malnutrition  Normocytic anemia   T2DM with hyperglycemia, hypoglycemia  Hypothyroid   Dementia     - Continue comfort care measures  - Morphine 2mg IV q6  - diazepam PRN  - glycopyrrolate PRN    Appropriate for [facility] - patient sister resistant and doesnt want to move patient    Discussed with patient sister, son, LAURA Sheth and Palliative care UZMA Conner

## 2022-08-15 NOTE — PROGRESS NOTE ADULT - REASON FOR ADMISSION
AMS
Fevers  Uncontrolled DM  Altered Mental Status
ams
dd
AMS
AMS and waekness
AMS

## 2022-08-15 NOTE — DISCHARGE NOTE FOR THE EXPIRED PATIENT - OTHER SIGNIFICANT FINDINGS
Notified by RN of pt expiration with son already present at bedside.     Pt seen and evaluated at bedside.  Pupils nonreactive b/l, absent corneal reflex, absent carotid and radial pulses b/l, absent lung and heart sounds.   Expiration confirmed, time of death 2309.   Pt's  and additional family members also at bedside. They do not want an autopsy.   Dr. Angelo notified.

## 2022-08-15 NOTE — PROGRESS NOTE ADULT - SUBJECTIVE AND OBJECTIVE BOX
OVERNIGHT EVENTS: appears comfortable     Present Symptoms:     Dyspnea: none currently   Nausea/Vomiting: No  Anxiety:  No  Depression: unable   Fatigue: unable   Loss of appetite: unable   Constipation: none     Pain: none currently             Character-            Duration-            Effect-            Factors-            Frequency-            Location-            Severity-    Pain AD Score:  [email or web address]://[email or web address]/painad.pdf (press ctrl + left click to view)    Review of Systems: Reviewed               Unable to obtain due to poor mentation   All others negative    MEDICATIONS  (STANDING):  dextrose 5%. 1000 milliLiter(s) (50 mL/Hr) IV Continuous <Continuous>  dextrose 5%. 1000 milliLiter(s) (100 mL/Hr) IV Continuous <Continuous>  dextrose 50% Injectable 25 Gram(s) IV Push once  dextrose 50% Injectable 12.5 Gram(s) IV Push once  dextrose 50% Injectable 25 Gram(s) IV Push once  diazepam  Injectable 10 milliGRAM(s) IV Push every 8 hours  glucagon  Injectable 1 milliGRAM(s) IntraMuscular once  morphine  - Injectable 2 milliGRAM(s) IV Push every 6 hours    MEDICATIONS  (PRN):  acetaminophen  Suppository .. 650 milliGRAM(s) Rectal every 8 hours PRN Temp greater or equal to 38C (100.4F), Mild Pain (1 - 3)  dextrose Oral Gel 15 Gram(s) Oral once PRN Blood Glucose LESS THAN 70 milliGRAM(s)/deciliter  diazepam  Injectable 10 milliGRAM(s) IV Push every 4 hours PRN agtiation  glycopyrrolate Injectable 0.2 milliGRAM(s) IV Push every 6 hours PRN secretions  morphine  - Injectable 2 milliGRAM(s) IV Push every 2 hours PRN dyspnea  ondansetron Injectable 4 milliGRAM(s) IV Push every 6 hours PRN Nausea and/or Vomiting    PHYSICAL EXAM:    Vital Signs Last 24 Hrs  T(C): --  T(F): --  HR: --  BP: --  BP(mean): --  RR: --  SpO2: --      General: lethargic     Karnofsky:  10 %    HEENT: normal      Lungs: comfortable    CV: normal      GI: normal      : normal      MSK: bedbound/wheelchair bound    Skin: no rash    LABS:    I&O's Summary    RADIOLOGY & ADDITIONAL STUDIES:    ADVANCE DIRECTIVES/TREATMENT PREFERENCES:  DNR/DNI/comfort care

## 2022-08-18 NOTE — CDI QUERY NOTE - NSCDIOTHERTXTBX_GEN_ALL_CORE_HH
Dr. Dora Andrade.  Sepsis is documented in the record. Can you please clarify if it was present on admission on developed after?  -The patient was treated for sepsis that was present on admission.  --The patient was treated for sepsis, not present on admission.  --other (please specify)  --Clinically irrelevant.    Supporting documentation.    ED Provider Note [Charted Location: [facility] ED] [Authored: 18-Jul-2022 16:23]-   · Clinical Summary: 86 y/o F hx of vascular dementia, hypothyroid, DM presents w/ weakness for the past 2 days. per son at bedside, states she has been more lethargic compared to her baseline beginning yesterday morning (>24 hours ago). hypoxic in field per ems, fsg elevated. saturating mid 90s on room air. febrile 100.6. will r/o infectious etiology, no clear source. cxr, ua,uc, rvp. ct head given elevated sbp on arrival > 230 and weakness/weak appearing. labetalol/tylenol. r/o DKA.  Disposition: ADMIT  SEPSIS – was this patient treated for sepsis? No.    H&P Adult [Charted Location: [facility] ERHR 1606 [id number]] [Authored: 18-Jul-2022 18:28]-  In the emergency department, the patient was noted to have fever and leukocytosis.  T(C): 38.1 (18 Jul 2022 16:13), Max: 38.1 (18 Jul 2022 16:13)  Hematology:    18-Jul-2022 16:15, Complete Blood Count + Automated Diff  WBC Count:   15.37, [3.80 - 10.50 K/uL]  Fever - Empiric antibiotic were administered in the emergency department. Chest Xray was without obvious consolidation and that patient is without dyspnea, cough, or hypoxia on examination. Urinalysis was not indicative of infection. Culture results to be reviewed when available.    Consult Note Adult-Critical Care Physician Assistant/Attendi [Charted Location: [facility] 3EST [id number]] [Authored: 19-Jul-2022 13:58]  Patient is a 87y old  Female who presents with a chief complaint of fever, altered mental status and poorly controlled DM  Family brought her to ED yesterday 7/18 and she was admitted to Medical service. WBC 15, U/a negative, CXR clear, urine/blood cultures no growth so far. Had been started on Zosyn.  This morning the patient was sitting up in bed, a little better mental state than yesterday and family had fed her a few spoonfuls of eggs and some water which she swallowed fine, then had a coughing fit and became unresponsive. Rapid Response called and patient was urgently intubated.  1- Altered mental status with acute hypoxic respiratory failure  2- Septic Shock, now requiring vasopressors, metabolic acidosis  Attestation Statements:   Pt presented with altered mental status on July 18 to [facility] ED with hypertensive crisis with systolic in 240s and diastolic in 120s was admitted to medical services with goal to slowly improve blood pressure with initial CT head negative and ruled out UTI on admission based on urinalysis with no significant finding on chest x-ray.  Hospital course today complicated by episode of vomiting earlier this morning which followed by bradycardia hypotension lethargy to obtundation with respiratory distress requiring urgent intubation by ER team and subsequent negative CT head without contrast for acute pathology and admitted to medical ICU for further assessment.  Repeat labs with improved blood sugar, WBC, metabolic acidosis.   patient's T-max is 38 degrees now after intubation with hypotension related to propofol.      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

## 2022-08-23 NOTE — CHART NOTE - NSCHARTNOTESELECT_GEN_ALL_CORE
Event Note
Nutrition Services
chart/Event Note
Event Note
Nutrition Services
Registered Dietitian
Registered Dietitian

## 2022-08-23 NOTE — CHART NOTE - NSCHARTNOTEFT_GEN_A_CORE
Palliative care social work note.    Bereavement call made to patients spouse Kane. Support and resources offered.

## 2024-01-19 NOTE — ASU PREOP CHECKLIST - RESPIRATORY RATE (BREATHS/MIN)
Return in about 2 weeks (around 2/2/2024).  Orders Placed This Encounter   Procedures    CBC with Auto Differential    Comprehensive Metabolic Panel    Cortisol Total    TSH     Follow up visit with MD+Labs+ treatment in 2 weeks.  Clinically doing well. Labs at baseline.   13

## 2024-03-22 NOTE — ASU PATIENT PROFILE, ADULT - HEALTHCARE INFORMATION NEEDED, PROFILE
LOV 10-10-23 (ge) cpe    amphetamine-dextroamphetamine XR (Adderall XR) 15 MG 24 hr capsule 30 capsule 0 2/16/2024 --    Sig - Route: Take 1 capsule by mouth daily. - Oral    Sent to pharmacy as: Amphetamine-Dextroamphet ER 15 MG Oral Capsule Extended Release 24 Hour (Adderall XR)    Class: Eprescribe        No next office visit scheduled    Please review for refill  
none

## 2025-01-07 NOTE — ED PROVIDER NOTE - CROS ED GI ALL NEG
heard.     No friction rub. No gallop.   Pulmonary:      Effort: Pulmonary effort is normal.      Breath sounds: Normal breath sounds. No wheezing or rales.   Abdominal:      General: Bowel sounds are normal. There is no distension.      Palpations: Abdomen is soft. There is no mass.      Tenderness: There is no abdominal tenderness.      Hernia: No hernia is present.   Lymphadenopathy:      Cervical: No cervical adenopathy.   Skin:     Findings: No rash.   Neurological:      Mental Status: She is alert and oriented to person, place, and time.         ASSESSMENT:  1. Cystitis  Comments:  Increase hydration  Urine sent for culture  Medication sent  Orders:  -     POCT Urinalysis no Micro  -     Culture, Urine  2. Hyperglycemia  Comments:  Reviewed prior blood work  Recheck fasting blood glucose and HA1c  Orders:  -     Basic Metabolic Panel; Future  -     CBC with Auto Differential; Future  -     Hemoglobin A1C; Future  3. Mixed hyperlipidemia  Comments:  Recheck fasting blood work  4. Osteoporosis, unspecified osteoporosis type, unspecified pathological fracture presence  Comments:  Stable followed by Endo        PLAN:    See orders  Discussed healthy lifestyle  
negative...

## 2025-07-24 NOTE — ASU PATIENT PROFILE, ADULT - PRO ARRIVE FROM
--DO NOT REPLY - Sent from PACT - If sent to wrong pool, reroute to P ECO Reroute pool --    General Message: Prior call dropped, patient called back to make sure request was complete and to provide contact number   Callback #: [phone]  Can a detailed message be left? Yes - Voicemail   Caller has been advised this message will be addressed within:2-3 business days [routine]      
--DO NOT REPLY - Sent from PACT - If sent to wrong pool, reroute to P ECO Reroute pool --    Message Type:  Refill Medication   Is the medication pended:Yes  Medication name: tadalafil (Cialis) 20 MG tablet  Message: refill request   Preferred pharmacy verified, and selected.  [facility] [address] [facility]  Call Back #: patient disconnected unable to get call back number   Can a detailed message be left?  N/A  Is the patient OUT of Medication?  No : route as Routine priority according to KB. Patient has been advised the message will be reviewed within 2-3 business days.  
Medication passed protocol.     Medication: Cialis passed protocol.   Last office visit date: 4/10/25  Next appointment scheduled?: Yes   
home